# Patient Record
Sex: FEMALE | Race: WHITE | NOT HISPANIC OR LATINO | Employment: OTHER | URBAN - METROPOLITAN AREA
[De-identification: names, ages, dates, MRNs, and addresses within clinical notes are randomized per-mention and may not be internally consistent; named-entity substitution may affect disease eponyms.]

---

## 2017-01-13 ENCOUNTER — APPOINTMENT (OUTPATIENT)
Dept: LAB | Age: 77
End: 2017-01-13
Payer: MEDICARE

## 2017-01-13 ENCOUNTER — TRANSCRIBE ORDERS (OUTPATIENT)
Dept: ADMINISTRATIVE | Age: 77
End: 2017-01-13

## 2017-01-13 DIAGNOSIS — E03.9 HYPOTHYROIDISM, UNSPECIFIED TYPE: Primary | ICD-10-CM

## 2017-01-13 DIAGNOSIS — E03.9 HYPOTHYROIDISM, UNSPECIFIED TYPE: ICD-10-CM

## 2017-01-13 DIAGNOSIS — E78.5 HYPERLIPIDEMIA, UNSPECIFIED HYPERLIPIDEMIA TYPE: ICD-10-CM

## 2017-01-13 LAB
ALBUMIN SERPL BCP-MCNC: 3.5 G/DL (ref 3.5–5)
ALP SERPL-CCNC: 123 U/L (ref 46–116)
ALT SERPL W P-5'-P-CCNC: 22 U/L (ref 12–78)
ANION GAP SERPL CALCULATED.3IONS-SCNC: 10 MMOL/L (ref 4–13)
AST SERPL W P-5'-P-CCNC: 14 U/L (ref 5–45)
BILIRUB SERPL-MCNC: 0.51 MG/DL (ref 0.2–1)
BUN SERPL-MCNC: 17 MG/DL (ref 5–25)
CALCIUM SERPL-MCNC: 8.6 MG/DL (ref 8.3–10.1)
CHLORIDE SERPL-SCNC: 107 MMOL/L (ref 100–108)
CHOLEST SERPL-MCNC: 236 MG/DL (ref 50–200)
CO2 SERPL-SCNC: 24 MMOL/L (ref 21–32)
CREAT SERPL-MCNC: 0.76 MG/DL (ref 0.6–1.3)
GFR SERPL CREATININE-BSD FRML MDRD: >60 ML/MIN/1.73SQ M
GLUCOSE SERPL-MCNC: 97 MG/DL (ref 65–140)
HDLC SERPL-MCNC: 64 MG/DL (ref 40–60)
LDLC SERPL CALC-MCNC: 145 MG/DL (ref 0–100)
POTASSIUM SERPL-SCNC: 4.1 MMOL/L (ref 3.5–5.3)
PROT SERPL-MCNC: 6.9 G/DL (ref 6.4–8.2)
SODIUM SERPL-SCNC: 141 MMOL/L (ref 136–145)
T4 SERPL-MCNC: 7 UG/DL (ref 4.7–13.3)
TRIGL SERPL-MCNC: 135 MG/DL
TSH SERPL DL<=0.05 MIU/L-ACNC: 6.07 UIU/ML (ref 0.36–3.74)

## 2017-01-13 PROCEDURE — 80061 LIPID PANEL: CPT

## 2017-01-13 PROCEDURE — 84436 ASSAY OF TOTAL THYROXINE: CPT

## 2017-01-13 PROCEDURE — 36415 COLL VENOUS BLD VENIPUNCTURE: CPT

## 2017-01-13 PROCEDURE — 80053 COMPREHEN METABOLIC PANEL: CPT

## 2017-01-13 PROCEDURE — 84443 ASSAY THYROID STIM HORMONE: CPT

## 2017-03-28 ENCOUNTER — TRANSCRIBE ORDERS (OUTPATIENT)
Dept: ADMINISTRATIVE | Age: 77
End: 2017-03-28

## 2017-03-28 ENCOUNTER — APPOINTMENT (OUTPATIENT)
Dept: LAB | Age: 77
End: 2017-03-28
Payer: MEDICARE

## 2017-03-28 DIAGNOSIS — E03.9 HYPOTHYROIDISM, UNSPECIFIED TYPE: Primary | ICD-10-CM

## 2017-03-28 DIAGNOSIS — E03.9 HYPOTHYROIDISM, UNSPECIFIED TYPE: ICD-10-CM

## 2017-03-28 LAB
T4 SERPL-MCNC: 6.8 UG/DL (ref 4.7–13.3)
TSH SERPL DL<=0.05 MIU/L-ACNC: 2.33 UIU/ML (ref 0.36–3.74)

## 2017-03-28 PROCEDURE — 84443 ASSAY THYROID STIM HORMONE: CPT

## 2017-03-28 PROCEDURE — 36415 COLL VENOUS BLD VENIPUNCTURE: CPT

## 2017-03-28 PROCEDURE — 84436 ASSAY OF TOTAL THYROXINE: CPT

## 2017-06-28 ENCOUNTER — APPOINTMENT (OUTPATIENT)
Dept: LAB | Age: 77
End: 2017-06-28
Payer: MEDICARE

## 2017-06-28 ENCOUNTER — TRANSCRIBE ORDERS (OUTPATIENT)
Dept: ADMINISTRATIVE | Age: 77
End: 2017-06-28

## 2017-06-28 DIAGNOSIS — E03.9 HYPOTHYROIDISM, UNSPECIFIED TYPE: ICD-10-CM

## 2017-06-28 DIAGNOSIS — E03.9 HYPOTHYROIDISM, UNSPECIFIED TYPE: Primary | ICD-10-CM

## 2017-06-28 LAB
T4 SERPL-MCNC: 8.3 UG/DL (ref 4.7–13.3)
TSH SERPL DL<=0.05 MIU/L-ACNC: 4.7 UIU/ML (ref 0.36–3.74)

## 2017-06-28 PROCEDURE — 36415 COLL VENOUS BLD VENIPUNCTURE: CPT

## 2017-06-28 PROCEDURE — 84436 ASSAY OF TOTAL THYROXINE: CPT

## 2017-06-28 PROCEDURE — 84443 ASSAY THYROID STIM HORMONE: CPT

## 2017-10-03 ENCOUNTER — APPOINTMENT (OUTPATIENT)
Dept: LAB | Age: 77
End: 2017-10-03
Payer: MEDICARE

## 2017-10-03 ENCOUNTER — TRANSCRIBE ORDERS (OUTPATIENT)
Dept: ADMINISTRATIVE | Age: 77
End: 2017-10-03

## 2017-10-03 DIAGNOSIS — E03.9 HYPOTHYROIDISM, UNSPECIFIED TYPE: Primary | ICD-10-CM

## 2017-10-03 DIAGNOSIS — E03.9 HYPOTHYROIDISM, UNSPECIFIED TYPE: ICD-10-CM

## 2017-10-03 LAB
T4 SERPL-MCNC: 8.7 UG/DL (ref 4.7–13.3)
TSH SERPL DL<=0.05 MIU/L-ACNC: 3.05 UIU/ML (ref 0.36–3.74)

## 2017-10-03 PROCEDURE — 84443 ASSAY THYROID STIM HORMONE: CPT

## 2017-10-03 PROCEDURE — 36415 COLL VENOUS BLD VENIPUNCTURE: CPT

## 2017-10-03 PROCEDURE — 84436 ASSAY OF TOTAL THYROXINE: CPT

## 2017-10-24 ENCOUNTER — ALLSCRIPTS OFFICE VISIT (OUTPATIENT)
Dept: OTHER | Facility: OTHER | Age: 77
End: 2017-10-24

## 2017-10-24 DIAGNOSIS — R94.31 ABNORMAL ELECTROCARDIOGRAM: ICD-10-CM

## 2017-10-24 DIAGNOSIS — Z12.31 ENCOUNTER FOR SCREENING MAMMOGRAM FOR MALIGNANT NEOPLASM OF BREAST: ICD-10-CM

## 2017-10-24 DIAGNOSIS — E78.5 HYPERLIPIDEMIA: ICD-10-CM

## 2017-10-25 NOTE — CONSULTS
Assessment  1  Hyperlipidemia (272 4) (E78 5)   2  Exertional dyspnea (786 09) (R06 09)   3  Abnormal EKG (794 31) (R94 31)    Plan  Abnormal EKG    · (1) CBC/ PLT (NO DIFF); Status:Active; Requested QQE:93ALO4538;    Perform:[facility]; YWM:58JYQ4869; Ordered; For:Abnormal EKG; Ordered By:Kvng Esposito;   · (1) COMPREHENSIVE METABOLIC PANEL; Status:Active; Requested MVC:82RZB4062;    Perform:[facility]; XYO:63VXO5448; Ordered; For:Abnormal EKG; Ordered By:Kvng Esposito;   · (1) NT- BNP (PRO BRAIN NATRIURETIC PEPTIDE); Status:Active; Requested  NXR:83EUX0401;    Perform:[facility]; MNO:32WLZ0169; Ordered; For:Abnormal EKG; Ordered By:Kvng Esposito;   · * XR CHEST PA & LATERAL; Status:Active; Requested KPC:01GFN5564;    Perform:[facility]; CBK:55LPC7560; Ordered; For:Abnormal EKG; Ordered By:Kvng Esposito;   · ECHO COMPLETE WITH CONTRAST IF INDICATED; Status:Hold For - Scheduling;  Requested QMR:82HIW5275;    Perform:[facility]; VSV:28FYN8062; Ordered; For:Abnormal EKG; Ordered By:Kvng Esposito;   · STRESS TEST ONLY, EXERCISE; Status:Hold For - Scheduling; Requested  KVH:[id number];    Perform:[facility]; DJV:36RDU2972; Ordered; For:Abnormal EKG; Ordered By:Kvng Esposito;   · Take your blood pressure twice a day  Record the numbers and bring them with you to  your appointments ; Status:Complete;   Done: 49IYC4148   Ordered; For:Abnormal EKG; Ordered By:Kvng Esposito; Abnormal EKG, Hyperlipidemia    · (1) LIPID PANEL FASTING W DIRECT LDL REFLEX; Status:Active; Requested  [id number];    Perform:[facility]; PKW:08NQS8137; Ordered; For:Abnormal EKG, Hyperlipidemia; Ordered By:Kvng Esposito;  Obesity    · EKG/ECG- POC; Status:Complete;   Done: 57ZPQ6848   Perform: In Office; [phone]; Last Updated Sneha Mars; 10/24/2017 3:39:34 PM;Ordered;For:Obesity; Ordered By:Kvng Esposito; Discussion/Summary    Exertional dyspnea, he patient who is active physically  Borderline abnormal EKG  Favor noninvasive evaluation  Further recommendations pending the results of the testing, might consider blood pressure medications  She we'll do ambulatory blood pressures  We'll repeat a lipid profile and LFTs, her alkaline phosphatase was borderline elevated on the last check  She was recently started on thyroid supplementation  Chief Complaint  Pt  here for new patient consult due to elevated bp, some fatigue and SOB  History of Present Illness  Cardiology HPI Free Text Note Form  Luke: Cardiology consultation for evaluation of borderline blood pressure elevation, and exertional dyspnea  Most pleasant 44-year-old white female who is no previous cardiac history, she tells me that she's been experience and mild exertional dyspnea  She does exercise regular, she rested bike daily  There is no associated chest discomfort, the symptoms are very mild she has had several blood pressure readings with systolics adequate control and diastolic consistently in the high 80s  She states being comply with a low sodium diet, she's comply with a cholesterol diet  Lipids several months ago revealed total cholesterol 236, HDL 64,  and triglycerides of 175  She does take fish oil 1 g daily  The patient denies orthopnea or PND  Denies any chest discomfort  She is a lifelong nonsmoker, her  is a patient of mine  Review of Systems      Cardiac: No complaints of chest pain, no palpitations, no fainting ,-- no chest pain,-- no rhythm problems,-- no fainting/blackouts,-- no heart murmur present,-- no signs of swelling,-- no palpitations present,-- no syncope/fainting,-- no AM fatigue-- and-- no witnessed apnea episodes  Skin: No complaints of nonhealing sores or skin rash  ,-- no non healing sores present-- and-- no rashes seen   Genitourinary: post menopausal, but-- No complaints of recurrent urinary tract infections, frequent urination at night, difficult urination, blood in urine, kidney stones, loss of bladder control, kidney problems, denies any birth control or hormone replacement, is not post menopausal, not currently pregnant  ,-- no recurrent urinary tract infections,-- no frequent urination at night,-- no difficult urination,-- no blood in urine,-- no kidney stones,-- no loss of bladder control-- and-- no kidney problems   Psychological: No complaints of feeling depressed, anxiety, panic attacks, or difficulty concentrating ,-- no depression,-- no anxiety,-- no panic attacks,-- no difficulty concentrating-- and-- no palpitations present  General: trouble sleeping-- and-- lack of energy/fatigue, but-- no appetite changes,-- no changes in weight,-- no fever,-- no night sweats-- and-- no frequent infections  Respiratory: shortness of breath,-- cough/sputum-- and-- phlegm, but-- as noted in HPI,-- no wheezing-- and-- no hemoptysis--   Just finished an antibiotic course and a steroid course for an URI  HEENT: serious eye problems, but-- no hearing problems,-- no nose problems,-- no throat problems-- and-- no snoring   Gastrointestinal: heartburn, but-- no liver problems,-- no nausea,-- no vomiting,-- no bloody stools,-- no diarrhea,-- no constipation,-- no abdonimal pain-- and-- no rectal bleeding   Hematologic: No complaints of bleeding disorders, anemia, blood clots, or excessive brusing ,-- no bleeding disorders,-- no anemia,-- no blood clots-- and-- no excessive bruising   Neurological: No complaints of numbness, tingling, dizziness, weakness, seizures, headaches, syncope or fainting, AM fatigue, daytime sleepiness, no witnessed apnea episodes  ,-- no numbness,-- no tingling,-- no weakness,-- no seizures,-- no headaches,-- no dizziness,-- no diplopia-- and-- no daytime sleepiness   Musculoskeletal: No complaints of arthritis, back pain, or painfull swelling ,-- no arthritis,-- no back pain-- and-- no swelling/pain-- No myalgias, no gait dysfunction  Active Problems  1  Arthritis (716 90) (M19 90)   2  Encounter for gynecological examination without abnormal finding (V72 31) (Z01 419)   3  Encounter for routine gynecological examination (V72 31) (Z01 419)   4  Encounter for screening mammogram for malignant neoplasm of breast (V76 12)   (Z12 31)   5  Esophageal reflux (530 81) (K21 9)   6  Hyperlipidemia (272 4) (E78 5)   7  Obesity (278 00) (E66 9)   8  Osteopenia (733 90) (M85 80)    Past Medical History   · 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)   · Normal delivery (650) (O80,Z37  9)    The active problems and past medical history were reviewed and updated today  Surgical History   · History of Total Abdominal Hysterectomy With Removal Of Both Ovaries    The surgical history was reviewed and updated today  Family History  Mother    · Family history of malignant neoplasm of stomach (V16 0) (Z80 0)  Father    · Family history of acute myocardial infarction (V17 3) (Z82 49)  Maternal Uncle    · Family history of Cancer  Family History Reviewed: The family history was reviewed and updated today  Social History   · Exercising Regularly   · Former smoker (L64 42) (J81 673)  The social history was reviewed and updated today  Current Meds   1  Biotin CAPS Recorded   2  Calcium 500 MG TABS; Therapy: 80UDB0139 to Recorded   3  Fish Oil 1000 MG Oral Capsule Recorded   4  Levothyroxine Sodium 50 MCG Oral Tablet; TAKE 1 TABLET DAILY; Therapy: 80JTY4673 to ([phone]) Recorded   5  Multivitamins TABS; Take 1 tablet daily; Therapy: 73VXG2911 to (Evaluate:32Ebn6902); Last Rx:65Cyu4568 Ordered   6  Pantoprazole Sodium 40 MG Oral Tablet Delayed Release; Therapy: 33PHO6661 to (Last OO:83NOS5824)  Requested for: 39CDM1803 Ordered   7  Vitamin D 400 UNIT CAPS; Therapy: 57Emk0716 to (Last Rx:04Fdf2182)  Requested for: 69Jzf6283 Ordered    The medication list was reviewed and updated today  Allergies  1   No Known Drug Allergies    Vitals  Signs   Heart Rate: 78, Apical  Pulse Quality: Regular, Apical  Systolic: 178, RUE, Sitting  Diastolic: 90, RUE, Sitting  BP Cuff Size: Large  Height: 5 ft 3 5 in  Weight: 263 lb   BMI Calculated: 45 86  BSA Calculated: 2 18    Physical Exam    Constitutional   General appearance: Abnormal   appears healthy,-- obese,-- well hydrated-- and-- appears younger than stated age  Eyes   Conjunctiva and Sclera examination: Conjunctiva pink, sclera anicteric  both conjunctiva were norml and pink  Ears, Nose, Mouth, and Throat - Oropharynx: Clear, nares are clear, mucous membranes are moist  Inspection of the oropharynx showed visible hard and soft palate and base of the uvula (Mallampati class 3)  Oral mucosa was not pale  Neck   Neck and thyroid: Normal, supple, trachea midline, no thyromegaly  The neck was normal in appearance-- and-- was supple  the trachea was midline  Jugular Veins: the JVP was not elevated-- and-- no sustained hepatojugular reflux  The thyroid was normal-- and-- was not enlarged  There were no thyroid nodules  Pulmonary   Respiratory effort: No increased work of breathing or signs of respiratory distress  Respiratory rate: normal  Assessment of respiratory effort revealed normal rhythm and effort,-- no accessory muscle use-- and-- no supraclavicular retractions  Respiratory Findings: no audible wheezing-- and-- no stridor  Evaluation of respiratory movements showed no abdominal breathing  Auscultation of lungs: Clear to auscultation, no rales, no rhonchi, no wheezing, good air movement  Auscultation of the lungs revealed no expiratory wheezing,-- normal expiratory time-- and-- no inspiratory wheezing  no rales or crackles were heard bilaterally  no rhonchi  no friction rub  no wheezing  no diminished breath sounds  no bronchial breath sounds  Cardiovascular   Palpation of heart: Abnormal   The apical impulse was not palpable     Auscultation of heart: Normal rate and rhythm, normal S1 and S2, no murmurs  The heart rate was normal  The rhythm was regular  Heart sounds: the heart sounds were distant, but-- normal S1,-- normal S2,-- no gallop heard,-- no S3,-- no S4-- and-- no click  No pericardial rub  no murmurs were heard  Carotid pulses: Normal, 2+ bilaterally  right 2+,-- no bruit heard over the right carotid,-- left 2+-- and-- no bruit heard over the left carotid  Peripheral vascular exam: Normal pulses throughout, no tenderness, erythema or swelling  Radial: right 2+,-- left 2+  Femoral: right 2+,-- no bruit heard over the right femoral artery,-- left 2+,-- no bruit heard over the left femoral artery  Posterior tibialis: right 2+,-- left 2+  Dorsalis pedis: right 2+,-- left 2+ Capillary refill: capillary refill of the fingers was normal  no pitting edema present  no varicosital changes  Abdominal aorta: Normal     Chest - Chest: Normal    Abdomen   Abdomen: Non-tender and no distention  The abdomen was obese  Bowel sounds were normal  The abdomen was soft and nontender  no masses palpated  Musculoskeletal Digits and nails: Normal without clubbing or cyanosis  Examination of the extremities revealed no fingernail clubbing-- and-- well perfused fingers  Skin - Skin and subcutaneous tissue: Normal without rashes or lesions  Skin is warm and well perfused, normal turgor  Skin Inspection: fair complexion, but-- normal color and pigmentation,-- no cyanosis-- and-- no diaphoresis  Neurologic - Speech: Normal     Psychiatric - Orientation to person, place, and time: Normal -- Mood and affect: Normal       Results/Data    Rhythm and rate:  normal sinus rhythm  Axis: left axis deviation  QRS: left ventricular hypertrophy      Future Appointments    Date/Time Provider Specialty Site   11/02/2017 09:20 AM TOMMY Soliz  Obstetrics/Gynecology [facility]     End of Encounter Meds  1  Multivitamins TABS; Take 1 tablet daily;    Therapy: 46Nma0861 to (Evaluate:02Wxi3138); Last Rx:[id number] Ordered  2  Biotin CAPS Recorded   3  Calcium 500 MG TABS; Therapy: 30APO0197 to Recorded   4  Fish Oil 1000 MG Oral Capsule Recorded   5  Levothyroxine Sodium 50 MCG Oral Tablet; TAKE 1 TABLET DAILY; Therapy: 40TZY3750 to ([phone]) Recorded   6  Pantoprazole Sodium 40 MG Oral Tablet Delayed Release; Therapy: 20TQL0284 to (Last PT:86GEA2717)  Requested for: 97ZZV8588 Ordered   7  Vitamin D 400 UNIT CAPS;    Therapy: 95Lwj5682 to (Last Rx:19Shs8830)  Requested for: 62Kmi6732 Ordered    Signatures   Electronically signed by : TOMMY Linda ; Oct 24 2017  4:01PM EST                       (Author)

## 2017-10-31 ENCOUNTER — APPOINTMENT (OUTPATIENT)
Dept: RADIOLOGY | Age: 77
End: 2017-10-31
Payer: MEDICARE

## 2017-10-31 ENCOUNTER — TRANSCRIBE ORDERS (OUTPATIENT)
Dept: ADMINISTRATIVE | Age: 77
End: 2017-10-31

## 2017-10-31 ENCOUNTER — APPOINTMENT (OUTPATIENT)
Dept: LAB | Age: 77
End: 2017-10-31
Payer: MEDICARE

## 2017-10-31 DIAGNOSIS — R94.31 ABNORMAL ELECTROCARDIOGRAM: ICD-10-CM

## 2017-10-31 DIAGNOSIS — E78.5 HYPERLIPIDEMIA: ICD-10-CM

## 2017-10-31 LAB
ALBUMIN SERPL BCP-MCNC: 3.3 G/DL (ref 3.5–5)
ALP SERPL-CCNC: 114 U/L (ref 46–116)
ALT SERPL W P-5'-P-CCNC: 23 U/L (ref 12–78)
ANION GAP SERPL CALCULATED.3IONS-SCNC: 8 MMOL/L (ref 4–13)
AST SERPL W P-5'-P-CCNC: 14 U/L (ref 5–45)
BILIRUB SERPL-MCNC: 0.52 MG/DL (ref 0.2–1)
BUN SERPL-MCNC: 13 MG/DL (ref 5–25)
CALCIUM SERPL-MCNC: 8.8 MG/DL (ref 8.3–10.1)
CHLORIDE SERPL-SCNC: 98 MMOL/L (ref 100–108)
CHOLEST SERPL-MCNC: 216 MG/DL (ref 50–200)
CO2 SERPL-SCNC: 26 MMOL/L (ref 21–32)
CREAT SERPL-MCNC: 0.81 MG/DL (ref 0.6–1.3)
ERYTHROCYTE [DISTWIDTH] IN BLOOD BY AUTOMATED COUNT: 16.1 % (ref 11.6–15.1)
GFR SERPL CREATININE-BSD FRML MDRD: 70 ML/MIN/1.73SQ M
GLUCOSE P FAST SERPL-MCNC: 91 MG/DL (ref 65–99)
HCT VFR BLD AUTO: 39.7 % (ref 34.8–46.1)
HDLC SERPL-MCNC: 72 MG/DL (ref 40–60)
HGB BLD-MCNC: 13.1 G/DL (ref 11.5–15.4)
LDLC SERPL CALC-MCNC: 123 MG/DL (ref 0–100)
MCH RBC QN AUTO: 27.2 PG (ref 26.8–34.3)
MCHC RBC AUTO-ENTMCNC: 33 G/DL (ref 31.4–37.4)
MCV RBC AUTO: 83 FL (ref 82–98)
NT-PROBNP SERPL-MCNC: 102 PG/ML
PLATELET # BLD AUTO: 324 THOUSANDS/UL (ref 149–390)
PMV BLD AUTO: 9.9 FL (ref 8.9–12.7)
POTASSIUM SERPL-SCNC: 3.9 MMOL/L (ref 3.5–5.3)
PROT SERPL-MCNC: 7 G/DL (ref 6.4–8.2)
RBC # BLD AUTO: 4.81 MILLION/UL (ref 3.81–5.12)
SODIUM SERPL-SCNC: 132 MMOL/L (ref 136–145)
TRIGL SERPL-MCNC: 104 MG/DL
WBC # BLD AUTO: 8.6 THOUSAND/UL (ref 4.31–10.16)

## 2017-10-31 PROCEDURE — 36415 COLL VENOUS BLD VENIPUNCTURE: CPT

## 2017-10-31 PROCEDURE — 85027 COMPLETE CBC AUTOMATED: CPT

## 2017-10-31 PROCEDURE — 80053 COMPREHEN METABOLIC PANEL: CPT

## 2017-10-31 PROCEDURE — 83880 ASSAY OF NATRIURETIC PEPTIDE: CPT

## 2017-10-31 PROCEDURE — 80061 LIPID PANEL: CPT

## 2017-10-31 PROCEDURE — 71020 HB CHEST X-RAY 2VW FRONTAL&LATL: CPT

## 2017-11-02 ENCOUNTER — GENERIC CONVERSION - ENCOUNTER (OUTPATIENT)
Dept: OTHER | Facility: OTHER | Age: 77
End: 2017-11-02

## 2017-11-08 ENCOUNTER — HOSPITAL ENCOUNTER (OUTPATIENT)
Dept: NON INVASIVE DIAGNOSTICS | Facility: CLINIC | Age: 77
Discharge: HOME | End: 2017-11-08
Payer: MEDICARE

## 2017-11-08 DIAGNOSIS — R94.31 ABNORMAL ELECTROCARDIOGRAM: ICD-10-CM

## 2017-11-08 LAB
CHEST PAIN STATEMENT: NORMAL
MAX DIASTOLIC BP: 88 MMHG
MAX HEART RATE: 131 BPM
MAX PREDICTED HEART RATE: 142 BPM
MAX. SYSTOLIC BP: 160 MMHG
PROTOCOL NAME: NORMAL
TARGET HR FORMULA: NORMAL
TEST INDICATION: NORMAL
TIME IN EXERCISE PHASE: 187 S

## 2017-11-08 PROCEDURE — 93017 CV STRESS TEST TRACING ONLY: CPT

## 2017-11-08 PROCEDURE — 93306 TTE W/DOPPLER COMPLETE: CPT

## 2017-11-21 ENCOUNTER — HOSPITAL ENCOUNTER (OUTPATIENT)
Dept: RADIOLOGY | Age: 77
Discharge: HOME | End: 2017-11-21
Payer: MEDICARE

## 2017-11-21 DIAGNOSIS — Z12.31 ENCOUNTER FOR SCREENING MAMMOGRAM FOR MALIGNANT NEOPLASM OF BREAST: ICD-10-CM

## 2017-11-21 PROCEDURE — G0202 SCR MAMMO BI INCL CAD: HCPCS

## 2018-01-14 VITALS
SYSTOLIC BLOOD PRESSURE: 128 MMHG | DIASTOLIC BLOOD PRESSURE: 90 MMHG | BODY MASS INDEX: 44.9 KG/M2 | HEIGHT: 64 IN | WEIGHT: 263 LBS | HEART RATE: 78 BPM

## 2018-01-22 VITALS
DIASTOLIC BLOOD PRESSURE: 94 MMHG | WEIGHT: 263 LBS | HEIGHT: 64 IN | SYSTOLIC BLOOD PRESSURE: 136 MMHG | BODY MASS INDEX: 44.9 KG/M2

## 2018-06-05 ENCOUNTER — APPOINTMENT (OUTPATIENT)
Dept: LAB | Age: 78
End: 2018-06-05
Payer: MEDICARE

## 2018-06-05 ENCOUNTER — TRANSCRIBE ORDERS (OUTPATIENT)
Dept: ADMINISTRATIVE | Age: 78
End: 2018-06-05

## 2018-06-05 DIAGNOSIS — E03.9 HYPOTHYROIDISM, UNSPECIFIED TYPE: ICD-10-CM

## 2018-06-05 DIAGNOSIS — E03.9 HYPOTHYROIDISM, UNSPECIFIED TYPE: Primary | ICD-10-CM

## 2018-06-05 LAB
T4 SERPL-MCNC: 7.4 UG/DL (ref 4.7–13.3)
TSH SERPL DL<=0.05 MIU/L-ACNC: 2.67 UIU/ML (ref 0.36–3.74)

## 2018-06-05 PROCEDURE — 36415 COLL VENOUS BLD VENIPUNCTURE: CPT

## 2018-06-05 PROCEDURE — 84436 ASSAY OF TOTAL THYROXINE: CPT

## 2018-06-05 PROCEDURE — 84443 ASSAY THYROID STIM HORMONE: CPT

## 2018-07-19 ENCOUNTER — LAB REQUISITION (OUTPATIENT)
Dept: LAB | Facility: HOSPITAL | Age: 78
End: 2018-07-19
Payer: MEDICARE

## 2018-07-19 DIAGNOSIS — K57.30 DIVERTICULOSIS OF LARGE INTESTINE WITHOUT PERFORATION OR ABSCESS WITHOUT BLEEDING: ICD-10-CM

## 2018-07-19 DIAGNOSIS — D12.0 BENIGN NEOPLASM OF CECUM: ICD-10-CM

## 2018-07-19 DIAGNOSIS — Z86.010 HISTORY OF COLONIC POLYPS: ICD-10-CM

## 2018-07-19 PROCEDURE — 88305 TISSUE EXAM BY PATHOLOGIST: CPT | Performed by: PATHOLOGY

## 2018-09-30 DIAGNOSIS — I10 ESSENTIAL HYPERTENSION: Primary | ICD-10-CM

## 2018-10-01 RX ORDER — AMLODIPINE BESYLATE 5 MG/1
TABLET ORAL
Qty: 90 TABLET | Refills: 0 | Status: SHIPPED | OUTPATIENT
Start: 2018-10-01 | End: 2018-10-23 | Stop reason: SDUPTHER

## 2018-10-23 ENCOUNTER — OFFICE VISIT (OUTPATIENT)
Dept: CARDIOLOGY CLINIC | Facility: CLINIC | Age: 78
End: 2018-10-23
Payer: MEDICARE

## 2018-10-23 VITALS
WEIGHT: 267 LBS | HEART RATE: 72 BPM | SYSTOLIC BLOOD PRESSURE: 122 MMHG | DIASTOLIC BLOOD PRESSURE: 82 MMHG | BODY MASS INDEX: 45.58 KG/M2 | HEIGHT: 64 IN

## 2018-10-23 DIAGNOSIS — I10 HYPERTENSION, ESSENTIAL, BENIGN: Primary | ICD-10-CM

## 2018-10-23 DIAGNOSIS — I77.810 ASCENDING AORTA DILATATION (HCC): ICD-10-CM

## 2018-10-23 DIAGNOSIS — E78.00 HYPERCHOLESTEROLEMIA: ICD-10-CM

## 2018-10-23 DIAGNOSIS — I10 ESSENTIAL HYPERTENSION: ICD-10-CM

## 2018-10-23 PROCEDURE — 93000 ELECTROCARDIOGRAM COMPLETE: CPT | Performed by: INTERNAL MEDICINE

## 2018-10-23 PROCEDURE — 99213 OFFICE O/P EST LOW 20 MIN: CPT | Performed by: INTERNAL MEDICINE

## 2018-10-23 RX ORDER — AMOXICILLIN 500 MG/1
CAPSULE ORAL
Refills: 0 | COMMUNITY
Start: 2018-08-28 | End: 2021-03-06 | Stop reason: HOSPADM

## 2018-10-23 RX ORDER — NICOTINE POLACRILEX 2 MG
GUM BUCCAL
COMMUNITY
End: 2022-07-06 | Stop reason: ALTCHOICE

## 2018-10-23 RX ORDER — AMLODIPINE BESYLATE 5 MG/1
5 TABLET ORAL DAILY
Qty: 90 TABLET | Refills: 4 | Status: SHIPPED | OUTPATIENT
Start: 2018-10-23 | End: 2018-11-06 | Stop reason: SDUPTHER

## 2018-10-23 RX ORDER — AMLODIPINE BESYLATE 5 MG/1
5 TABLET ORAL DAILY
Qty: 90 TABLET | Refills: 4 | Status: SHIPPED | OUTPATIENT
Start: 2018-10-23 | End: 2020-12-28 | Stop reason: SDUPTHER

## 2018-10-23 RX ORDER — IBUPROFEN 800 MG
TABLET ORAL
COMMUNITY
Start: 2011-07-21

## 2018-10-23 RX ORDER — CALCIUM CARBONATE 500(1250)
TABLET ORAL
COMMUNITY
Start: 2013-08-15 | End: 2021-10-13

## 2018-10-23 RX ORDER — LEVOTHYROXINE SODIUM 0.05 MG/1
0.5 TABLET ORAL DAILY
COMMUNITY
Start: 2017-10-24

## 2018-10-23 RX ORDER — PANTOPRAZOLE SODIUM 40 MG/1
TABLET, DELAYED RELEASE ORAL
COMMUNITY
Start: 2011-05-02

## 2018-10-23 RX ORDER — CHLORAL HYDRATE 500 MG
CAPSULE ORAL
COMMUNITY
End: 2021-10-13

## 2018-10-23 RX ORDER — AMLODIPINE BESYLATE 5 MG/1
1 TABLET ORAL DAILY
COMMUNITY
Start: 2017-11-24 | End: 2018-10-23 | Stop reason: SDUPTHER

## 2018-10-23 NOTE — PROGRESS NOTES
Cardiology Follow Up    Sharifa Nesbitt  1940  [id number]  [address] [facility] [place] [address]  [phone]    1  Hypertension, essential, benign  POCT ECG   2  Hypercholesterolemia  Lipid Panel with Direct LDL reflex   3  Ascending aorta dilatation (HCC)         Interval History:   Cardiology follow-up  Patient continues to do well, denies any chest pain or significant dyspnea  She exercises daily on a stationary bike, no exertional symptoms  She has been compliant low-sodium diet, blood pressures been well control, occasionally on the low side actually  Compliant low-cholesterol diet, LDL was elevated last year at 145    Patient Active Problem List   Diagnosis    Hypertension, essential, benign    Hypercholesterolemia    Ascending aorta dilatation (HCC)     Past Medical History:   Diagnosis Date    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[facility] [address] )     1992     Social History     Social History    Marital status: /Civil Union     Spouse name: N/A    Number of children: N/A    Years of education: N/A     Occupational History    Not on file       Social History Main Topics    Smoking status: Former Smoker    Smokeless tobacco: Never Used    Alcohol use Not on file    Drug use: Unknown    Sexual activity: Not on file     Other Topics Concern    Not on file     Social History Narrative    Exercising regularly      Family History   Problem Relation Age of Onset    Stomach cancer Mother     Heart attack Father     Cancer Maternal Uncle      Past Surgical History:   Procedure Laterality Date    TOTAL ABDOMINAL HYSTERECTOMY W/ BILATERAL SALPINGOOPHORECTOMY  1992    endometrial cancer       Current Outpatient Prescriptions:     amLODIPine (NORVASC) 5 mg tablet, TAKE 1 TABLET BY MOUTH DAILY, Disp: 90 tablet, Rfl: 0    amLODIPine (NORVASC) 5 mg tablet, Take 1 tablet by mouth daily, Disp: , Rfl:     amoxicillin (AMOXIL) 500 mg capsule, TK 4 CS PO 1 HOUR B DAPP, Disp: , Rfl: 0    Biotin 1 MG CAPS, Take by mouth, Disp: , Rfl:     Calcium 500 MG tablet, Take by mouth, Disp: , Rfl:     Cholecalciferol (VITAMIN D3) 400 units CAPS, Take by mouth, Disp: , Rfl:     levothyroxine 50 mcg tablet, Take 1 tablet by mouth daily, Disp: , Rfl:     Multiple Vitamin (MULTIVITAMINS PO), Take 1 tablet by mouth daily, Disp: , Rfl:     Omega-3 Fatty Acids (FISH OIL) 1,000 mg, Take by mouth, Disp: , Rfl:     pantoprazole (PROTONIX) 40 mg tablet, Take by mouth, Disp: , Rfl:   Allergies not on file    Labs:  Lab Requisition on 07/19/2018   Component Date Value    Case Report 07/19/2018                      Value:Surgical Pathology Report                         Case: [id number]                                   Authorizing Provider:  Kathy Reddy MD           Collected:           07/19/2018 0848              Pathologist:           Destin Vickers MD             Received:            07/19/2018 1701              Specimen:    Polyp, Colorectal, CECUM                                                                   Final Diagnosis 07/19/2018                      Value: This result contains rich text formatting which cannot be displayed here   Additional Information 07/19/2018                      Value: This result contains rich text formatting which cannot be displayed here  Vicbrennan Madison Gross Description 07/19/2018                      Value: This result contains rich text formatting which cannot be displayed here  Appointment on 06/05/2018   Component Date Value    TSH 3RD GENERATON 06/05/2018 2 670     T4 TOTAL 06/05/2018 7 4      Imaging: No results found  Review of Systems:  Review of Systems   Constitutional: Negative for activity change and fatigue  Eyes: Negative for visual disturbance  Respiratory: Negative for shortness of breath, wheezing and stridor      Cardiovascular: Negative for chest pain, palpitations and leg swelling  Gastrointestinal: Negative for blood in stool  Genitourinary: Negative for hematuria  Musculoskeletal: Positive for arthralgias and gait problem  Skin: Negative for pallor and rash  Neurological: Negative for syncope  Physical Exam:  Physical Exam   Constitutional: She is oriented to person, place, and time  No distress (Morbidly obese)  Neck: No JVD present  Cardiovascular: Normal rate, regular rhythm, normal heart sounds and intact distal pulses  Exam reveals no gallop and no friction rub  No murmur heard  Pulmonary/Chest: Effort normal and breath sounds normal  No respiratory distress  She has no wheezes  She has no rales  She exhibits no tenderness  Neurological: She is alert and oriented to person, place, and time  Skin: Skin is warm  She is not diaphoretic  Psychiatric: She has a normal mood and affect  Discussion/Summary:  Hypertension, currently well controlled current medication regimen  Stress test last year she only did 3 min of [place] protocol achieving target heart rate, there were no EKG changes consistent with ischemia  An echocardiogram revealed normal left systolic function with very mild mitral and aortic insufficiency minimal tricuspid insufficiency, estimated pulmonary pressures could not be determined  Mildly ectatic ascending aorta at 39 mm  Will need follow-up imaging on her next visit  Continue blood pressure control  EKG is unchanged with borderline left ventricular hypertrophy

## 2018-11-06 ENCOUNTER — ANNUAL EXAM (OUTPATIENT)
Dept: OBGYN CLINIC | Facility: CLINIC | Age: 78
End: 2018-11-06
Payer: MEDICARE

## 2018-11-06 VITALS
DIASTOLIC BLOOD PRESSURE: 84 MMHG | BODY MASS INDEX: 46.6 KG/M2 | WEIGHT: 263 LBS | HEIGHT: 63 IN | SYSTOLIC BLOOD PRESSURE: 134 MMHG

## 2018-11-06 DIAGNOSIS — Z01.419 ENCOUNTER FOR GYNECOLOGICAL EXAMINATION WITHOUT ABNORMAL FINDING: Primary | ICD-10-CM

## 2018-11-06 DIAGNOSIS — Z12.31 ENCOUNTER FOR SCREENING MAMMOGRAM FOR MALIGNANT NEOPLASM OF BREAST: ICD-10-CM

## 2018-11-06 DIAGNOSIS — M85.80 OSTEOPENIA, UNSPECIFIED LOCATION: ICD-10-CM

## 2018-11-06 PROCEDURE — G0101 CA SCREEN;PELVIC/BREAST EXAM: HCPCS | Performed by: OBSTETRICS & GYNECOLOGY

## 2018-11-06 NOTE — PROGRESS NOTES
Assessment/Plan:    Paps not indicated  Encounter for screening mammogram for malignant neoplasm of breast  -     Mammo screening bilateral w cad; Future    Osteopenia, unspecified location  -     DXA bone density spine hip and pelvis; Future        Subjective:      Patient ID: Asha Ochoa is a 77 y o  female  Yearly  Grav-2 Para-2   x 2  Postmenopausal  JOANNE-BSO- uterine cancer 20+ years ago  Pap- uncertain timing  Mammogram 2017-negative  Colonoscopy 2018 +polyp- 5 year intervals  Dr Mike Gamez  Dexa scan 10/29/2013- osteopenia      50+ years, not sexually active  Has a son (no kids) and daughter (three kids - all in [place], [place])  History endometrial CA - many years ago (25+)  She reports occasional leaking of urine, wears a pantyliner daily just in case  Gynecologic Exam   The patient's pertinent negatives include no pelvic pain or vaginal discharge  Pertinent negatives include no constipation, diarrhea or urgency  The following portions of the patient's history were reviewed and updated as appropriate: allergies, current medications, past family history, past medical history, past social history, past surgical history and problem list     Review of Systems   Gastrointestinal: Negative for constipation and diarrhea  Genitourinary: Negative for decreased urine volume, difficulty urinating, pelvic pain, urgency, vaginal bleeding, vaginal discharge and vaginal pain  Objective:      /84 (BP Location: Left arm, Patient Position: Sitting, Cuff Size: Large)   Ht 5' 2 5" (1 588 m)   Wt 119 kg (263 lb)   BMI 47 34 kg/m²          Physical Exam   Constitutional: She is oriented to person, place, and time  She appears well-developed and well-nourished  Morbid obesity     Pulmonary/Chest: Right breast exhibits no inverted nipple, no mass, no nipple discharge, no skin change and no tenderness   Left breast exhibits no inverted nipple, no mass, no nipple discharge, no skin change and no tenderness  Abdominal: Soft  There is no tenderness  Genitourinary: Vagina normal  There is no rash or lesion on the right labia  There is no rash or lesion on the left labia  Genitourinary Comments: Cervix: Surgically Absent  Uterus: Surgically Absent  Adnexa: Surgically Absent    Neurological: She is alert and oriented to person, place, and time  Psychiatric: She has a normal mood and affect  Vitals reviewed

## 2018-11-21 ENCOUNTER — APPOINTMENT (OUTPATIENT)
Dept: LAB | Age: 78
End: 2018-11-21
Payer: MEDICARE

## 2018-11-21 ENCOUNTER — TRANSCRIBE ORDERS (OUTPATIENT)
Dept: ADMINISTRATIVE | Age: 78
End: 2018-11-21

## 2018-11-21 ENCOUNTER — TELEPHONE (OUTPATIENT)
Dept: CARDIOLOGY CLINIC | Facility: CLINIC | Age: 78
End: 2018-11-21

## 2018-11-21 DIAGNOSIS — E78.5 HYPERLIPIDEMIA, UNSPECIFIED HYPERLIPIDEMIA TYPE: Primary | ICD-10-CM

## 2018-11-21 DIAGNOSIS — E78.00 HYPERCHOLESTEROLEMIA: ICD-10-CM

## 2018-11-21 LAB
CHOLEST SERPL-MCNC: 239 MG/DL (ref 50–200)
HDLC SERPL-MCNC: 66 MG/DL (ref 40–60)
LDLC SERPL CALC-MCNC: 147 MG/DL (ref 0–100)
TRIGL SERPL-MCNC: 130 MG/DL

## 2018-11-21 PROCEDURE — 36415 COLL VENOUS BLD VENIPUNCTURE: CPT

## 2018-11-21 PROCEDURE — 80061 LIPID PANEL: CPT

## 2018-11-21 NOTE — TELEPHONE ENCOUNTER
----- Message from Gisela Oshea MD sent at 11/21/2018 12:30 PM EST -----  Please call the patient regarding her abnormal result  ?diet

## 2018-11-21 NOTE — TELEPHONE ENCOUNTER
S/w Barb Slaughter, rides 6 5 miles daily on stationary bike  Eats a low fat, low cholesterol diet  Does consume red meats and eats 1 piece of naturally cured musa daily  Will start statin if that is your recommendation

## 2018-11-23 RX ORDER — ATORVASTATIN CALCIUM 10 MG/1
10 TABLET, FILM COATED ORAL
Qty: 90 TABLET | Refills: 3 | Status: SHIPPED | OUTPATIENT
Start: 2018-11-23 | End: 2019-10-28 | Stop reason: SDUPTHER

## 2018-12-19 ENCOUNTER — HOSPITAL ENCOUNTER (OUTPATIENT)
Dept: RADIOLOGY | Age: 78
Discharge: HOME | End: 2018-12-19
Payer: MEDICARE

## 2018-12-19 VITALS — WEIGHT: 263 LBS | BODY MASS INDEX: 46.6 KG/M2 | HEIGHT: 63 IN

## 2018-12-19 DIAGNOSIS — Z12.31 ENCOUNTER FOR SCREENING MAMMOGRAM FOR MALIGNANT NEOPLASM OF BREAST: ICD-10-CM

## 2018-12-19 DIAGNOSIS — M85.80 OSTEOPENIA, UNSPECIFIED LOCATION: ICD-10-CM

## 2018-12-19 PROCEDURE — 77067 SCR MAMMO BI INCL CAD: CPT

## 2018-12-19 PROCEDURE — 77080 DXA BONE DENSITY AXIAL: CPT

## 2018-12-28 ENCOUNTER — TELEPHONE (OUTPATIENT)
Dept: OBGYN CLINIC | Facility: CLINIC | Age: 78
End: 2018-12-28

## 2018-12-28 NOTE — TELEPHONE ENCOUNTER
----- Message from Jil Randle PA-C sent at 12/28/2018  9:32 AM EST -----  This is CT's patient  Please let her know that her bone density test shows mild thinning in only one location - other 2 sites are normal  Would recommend calcium 1500mg daily, vitamin D 800 IU daily, and daily weight bearing exercise  Will consider repeat testing in 2-3 years

## 2019-01-17 ENCOUNTER — APPOINTMENT (OUTPATIENT)
Dept: LAB | Age: 79
End: 2019-01-17
Payer: MEDICARE

## 2019-01-17 DIAGNOSIS — E78.5 HYPERLIPIDEMIA, UNSPECIFIED HYPERLIPIDEMIA TYPE: ICD-10-CM

## 2019-01-17 LAB
ALBUMIN SERPL BCP-MCNC: 3.8 G/DL (ref 3.5–5)
ALP SERPL-CCNC: 125 U/L (ref 46–116)
ALT SERPL W P-5'-P-CCNC: 28 U/L (ref 12–78)
AST SERPL W P-5'-P-CCNC: 20 U/L (ref 5–45)
BILIRUB DIRECT SERPL-MCNC: 0.15 MG/DL (ref 0–0.2)
BILIRUB SERPL-MCNC: 0.45 MG/DL (ref 0.2–1)
CHOLEST SERPL-MCNC: 164 MG/DL (ref 50–200)
HDLC SERPL-MCNC: 75 MG/DL (ref 40–60)
LDLC SERPL CALC-MCNC: 75 MG/DL (ref 0–100)
PROT SERPL-MCNC: 7.3 G/DL (ref 6.4–8.2)
TRIGL SERPL-MCNC: 72 MG/DL

## 2019-01-17 PROCEDURE — 80061 LIPID PANEL: CPT

## 2019-01-17 PROCEDURE — 36415 COLL VENOUS BLD VENIPUNCTURE: CPT

## 2019-01-17 PROCEDURE — 80076 HEPATIC FUNCTION PANEL: CPT

## 2019-05-09 ENCOUNTER — TRANSCRIBE ORDERS (OUTPATIENT)
Dept: ADMINISTRATIVE | Age: 79
End: 2019-05-09

## 2019-05-09 ENCOUNTER — APPOINTMENT (OUTPATIENT)
Dept: LAB | Age: 79
End: 2019-05-09
Payer: MEDICARE

## 2019-05-09 DIAGNOSIS — E03.9 HYPOTHYROIDISM, UNSPECIFIED TYPE: Primary | ICD-10-CM

## 2019-05-09 LAB
T4 SERPL-MCNC: 7.4 UG/DL (ref 4.7–13.3)
TSH SERPL DL<=0.05 MIU/L-ACNC: 2.81 UIU/ML (ref 0.36–3.74)

## 2019-05-09 PROCEDURE — 84443 ASSAY THYROID STIM HORMONE: CPT

## 2019-05-09 PROCEDURE — 84436 ASSAY OF TOTAL THYROXINE: CPT

## 2019-05-09 PROCEDURE — 36415 COLL VENOUS BLD VENIPUNCTURE: CPT

## 2019-10-28 DIAGNOSIS — E78.5 HYPERLIPIDEMIA, UNSPECIFIED HYPERLIPIDEMIA TYPE: ICD-10-CM

## 2019-10-28 RX ORDER — ATORVASTATIN CALCIUM 10 MG/1
10 TABLET, FILM COATED ORAL
Qty: 90 TABLET | Refills: 3 | Status: SHIPPED | OUTPATIENT
Start: 2019-10-28 | End: 2020-07-13

## 2019-10-29 ENCOUNTER — TRANSCRIBE ORDERS (OUTPATIENT)
Dept: ADMINISTRATIVE | Facility: HOSPITAL | Age: 79
End: 2019-10-29

## 2019-10-29 DIAGNOSIS — Z12.31 SCREENING MAMMOGRAM, ENCOUNTER FOR: Primary | ICD-10-CM

## 2019-11-13 DIAGNOSIS — I10 ESSENTIAL HYPERTENSION: ICD-10-CM

## 2019-11-18 RX ORDER — AMLODIPINE BESYLATE 5 MG/1
TABLET ORAL
Qty: 90 TABLET | Refills: 0 | Status: SHIPPED | OUTPATIENT
Start: 2019-11-18 | End: 2020-02-06

## 2019-12-24 ENCOUNTER — HOSPITAL ENCOUNTER (OUTPATIENT)
Dept: RADIOLOGY | Age: 79
Discharge: HOME | End: 2019-12-24
Payer: MEDICARE

## 2019-12-24 VITALS — HEIGHT: 63 IN | BODY MASS INDEX: 46.07 KG/M2 | WEIGHT: 260 LBS

## 2019-12-24 DIAGNOSIS — Z12.31 SCREENING MAMMOGRAM, ENCOUNTER FOR: ICD-10-CM

## 2019-12-24 PROCEDURE — 77067 SCR MAMMO BI INCL CAD: CPT

## 2020-02-06 DIAGNOSIS — I10 ESSENTIAL HYPERTENSION: ICD-10-CM

## 2020-02-06 RX ORDER — AMLODIPINE BESYLATE 5 MG/1
TABLET ORAL
Qty: 90 TABLET | Refills: 0 | Status: SHIPPED | OUTPATIENT
Start: 2020-02-06 | End: 2020-05-04

## 2020-05-03 DIAGNOSIS — I10 ESSENTIAL HYPERTENSION: ICD-10-CM

## 2020-05-04 RX ORDER — AMLODIPINE BESYLATE 5 MG/1
TABLET ORAL
Qty: 90 TABLET | Refills: 0 | Status: SHIPPED | OUTPATIENT
Start: 2020-05-04 | End: 2020-07-22

## 2020-05-15 ENCOUNTER — TRANSCRIBE ORDERS (OUTPATIENT)
Dept: ADMINISTRATIVE | Age: 80
End: 2020-05-15

## 2020-05-15 ENCOUNTER — APPOINTMENT (OUTPATIENT)
Dept: LAB | Age: 80
End: 2020-05-15
Payer: MEDICARE

## 2020-05-15 DIAGNOSIS — E03.9 ACQUIRED HYPOTHYROIDISM: ICD-10-CM

## 2020-05-15 DIAGNOSIS — I10 ESSENTIAL HYPERTENSION, MALIGNANT: ICD-10-CM

## 2020-05-15 DIAGNOSIS — I10 ESSENTIAL HYPERTENSION, MALIGNANT: Primary | ICD-10-CM

## 2020-05-15 LAB
ALBUMIN SERPL BCP-MCNC: 3.7 G/DL (ref 3.5–5)
ALP SERPL-CCNC: 107 U/L (ref 46–116)
ALT SERPL W P-5'-P-CCNC: 29 U/L (ref 12–78)
ANION GAP SERPL CALCULATED.3IONS-SCNC: 5 MMOL/L (ref 4–13)
AST SERPL W P-5'-P-CCNC: 20 U/L (ref 5–45)
BILIRUB SERPL-MCNC: 0.62 MG/DL (ref 0.2–1)
BUN SERPL-MCNC: 15 MG/DL (ref 5–25)
CALCIUM SERPL-MCNC: 8.8 MG/DL (ref 8.3–10.1)
CHLORIDE SERPL-SCNC: 101 MMOL/L (ref 100–108)
CHOLEST SERPL-MCNC: 186 MG/DL (ref 50–200)
CO2 SERPL-SCNC: 30 MMOL/L (ref 21–32)
CREAT SERPL-MCNC: 0.72 MG/DL (ref 0.6–1.3)
GFR SERPL CREATININE-BSD FRML MDRD: 79 ML/MIN/1.73SQ M
GLUCOSE P FAST SERPL-MCNC: 108 MG/DL (ref 65–99)
HDLC SERPL-MCNC: 70 MG/DL
LDLC SERPL CALC-MCNC: 95 MG/DL (ref 0–100)
NONHDLC SERPL-MCNC: 116 MG/DL
POTASSIUM SERPL-SCNC: 4.2 MMOL/L (ref 3.5–5.3)
PROT SERPL-MCNC: 7.1 G/DL (ref 6.4–8.2)
SODIUM SERPL-SCNC: 136 MMOL/L (ref 136–145)
T4 SERPL-MCNC: 9.9 UG/DL (ref 4.7–13.3)
TRIGL SERPL-MCNC: 104 MG/DL
TSH SERPL DL<=0.05 MIU/L-ACNC: 3.26 UIU/ML (ref 0.36–3.74)

## 2020-05-15 PROCEDURE — 80053 COMPREHEN METABOLIC PANEL: CPT

## 2020-05-15 PROCEDURE — 36415 COLL VENOUS BLD VENIPUNCTURE: CPT

## 2020-05-15 PROCEDURE — 80061 LIPID PANEL: CPT

## 2020-05-15 PROCEDURE — 84443 ASSAY THYROID STIM HORMONE: CPT

## 2020-05-15 PROCEDURE — 84436 ASSAY OF TOTAL THYROXINE: CPT

## 2020-07-13 DIAGNOSIS — E78.5 HYPERLIPIDEMIA, UNSPECIFIED HYPERLIPIDEMIA TYPE: ICD-10-CM

## 2020-07-13 RX ORDER — ATORVASTATIN CALCIUM 10 MG/1
TABLET, FILM COATED ORAL
Qty: 90 TABLET | Refills: 3 | Status: SHIPPED | OUTPATIENT
Start: 2020-07-13 | End: 2021-08-10

## 2020-07-22 DIAGNOSIS — I10 ESSENTIAL HYPERTENSION: ICD-10-CM

## 2020-07-22 RX ORDER — AMLODIPINE BESYLATE 5 MG/1
TABLET ORAL
Qty: 90 TABLET | Refills: 0 | Status: SHIPPED | OUTPATIENT
Start: 2020-07-22 | End: 2020-10-06 | Stop reason: SDUPTHER

## 2020-10-06 DIAGNOSIS — I10 ESSENTIAL HYPERTENSION: ICD-10-CM

## 2020-10-06 RX ORDER — AMLODIPINE BESYLATE 5 MG/1
5 TABLET ORAL DAILY
Qty: 90 TABLET | Refills: 0 | Status: SHIPPED | OUTPATIENT
Start: 2020-10-06 | End: 2021-07-06 | Stop reason: SDUPTHER

## 2020-12-08 ENCOUNTER — OFFICE VISIT (OUTPATIENT)
Dept: CARDIOLOGY CLINIC | Facility: CLINIC | Age: 80
End: 2020-12-08
Payer: MEDICARE

## 2020-12-08 VITALS
BODY MASS INDEX: 47.48 KG/M2 | SYSTOLIC BLOOD PRESSURE: 124 MMHG | DIASTOLIC BLOOD PRESSURE: 88 MMHG | HEIGHT: 63 IN | WEIGHT: 268 LBS | HEART RATE: 64 BPM

## 2020-12-08 DIAGNOSIS — I77.810 ASCENDING AORTA DILATATION (HCC): Primary | ICD-10-CM

## 2020-12-08 DIAGNOSIS — R94.31 ABNORMAL EKG: ICD-10-CM

## 2020-12-08 DIAGNOSIS — E66.2 MORBID (SEVERE) OBESITY WITH ALVEOLAR HYPOVENTILATION (HCC): ICD-10-CM

## 2020-12-08 DIAGNOSIS — E78.00 HYPERCHOLESTEROLEMIA: ICD-10-CM

## 2020-12-08 DIAGNOSIS — I10 HYPERTENSION, ESSENTIAL, BENIGN: ICD-10-CM

## 2020-12-08 PROCEDURE — 99213 OFFICE O/P EST LOW 20 MIN: CPT | Performed by: INTERNAL MEDICINE

## 2020-12-08 PROCEDURE — 93000 ELECTROCARDIOGRAM COMPLETE: CPT | Performed by: INTERNAL MEDICINE

## 2020-12-08 RX ORDER — PHENOL 1.4 %
600 AEROSOL, SPRAY (ML) MUCOUS MEMBRANE 2 TIMES WEEKLY
COMMUNITY
End: 2021-10-13

## 2020-12-08 RX ORDER — GLUCOSAMINE SULFATE 500 MG
1500 CAPSULE ORAL DAILY
COMMUNITY
End: 2022-07-06 | Stop reason: ALTCHOICE

## 2020-12-16 ENCOUNTER — TELEPHONE (OUTPATIENT)
Dept: SLEEP CENTER | Facility: CLINIC | Age: 80
End: 2020-12-16

## 2020-12-28 DIAGNOSIS — I10 ESSENTIAL HYPERTENSION: ICD-10-CM

## 2020-12-28 RX ORDER — AMLODIPINE BESYLATE 5 MG/1
5 TABLET ORAL DAILY
Qty: 90 TABLET | Refills: 3 | Status: SHIPPED | OUTPATIENT
Start: 2020-12-28 | End: 2021-03-06 | Stop reason: HOSPADM

## 2021-01-11 ENCOUNTER — TELEPHONE (OUTPATIENT)
Dept: OBGYN CLINIC | Facility: CLINIC | Age: 81
End: 2021-01-11

## 2021-01-11 DIAGNOSIS — Z12.31 VISIT FOR SCREENING MAMMOGRAM: Primary | ICD-10-CM

## 2021-01-11 NOTE — TELEPHONE ENCOUNTER
Pt needs  A danette script - she saw Dr Rachel Isaac in 2018- is [de-identified] yrs old and needs to know how often she needs to be seen as she is not having any problems  Please advise

## 2021-01-12 ENCOUNTER — HOSPITAL ENCOUNTER (OUTPATIENT)
Dept: RADIOLOGY | Age: 81
Discharge: HOME | End: 2021-01-12
Payer: MEDICARE

## 2021-01-12 VITALS — HEIGHT: 63 IN | WEIGHT: 268 LBS | BODY MASS INDEX: 47.48 KG/M2

## 2021-01-12 DIAGNOSIS — Z12.31 VISIT FOR SCREENING MAMMOGRAM: ICD-10-CM

## 2021-01-12 PROCEDURE — 77063 BREAST TOMOSYNTHESIS BI: CPT

## 2021-01-12 PROCEDURE — 77067 SCR MAMMO BI INCL CAD: CPT

## 2021-01-13 ENCOUNTER — TELEPHONE (OUTPATIENT)
Dept: LABOR AND DELIVERY | Facility: HOSPITAL | Age: 81
End: 2021-01-13

## 2021-01-22 ENCOUNTER — IMMUNIZATIONS (OUTPATIENT)
Dept: FAMILY MEDICINE CLINIC | Facility: HOSPITAL | Age: 81
End: 2021-01-22

## 2021-01-22 DIAGNOSIS — Z23 ENCOUNTER FOR IMMUNIZATION: Primary | ICD-10-CM

## 2021-01-22 PROCEDURE — 0001A SARS-COV-2 / COVID-19 MRNA VACCINE (PFIZER-BIONTECH) 30 MCG: CPT

## 2021-01-22 PROCEDURE — 91300 SARS-COV-2 / COVID-19 MRNA VACCINE (PFIZER-BIONTECH) 30 MCG: CPT

## 2021-02-10 ENCOUNTER — CONSULT (OUTPATIENT)
Dept: SURGERY | Facility: CLINIC | Age: 81
End: 2021-02-10
Payer: MEDICARE

## 2021-02-10 VITALS — BODY MASS INDEX: 46.07 KG/M2 | TEMPERATURE: 95.7 F | HEIGHT: 63 IN | WEIGHT: 260 LBS

## 2021-02-10 DIAGNOSIS — R10.12 LUQ PAIN: Primary | ICD-10-CM

## 2021-02-10 PROCEDURE — 99202 OFFICE O/P NEW SF 15 MIN: CPT | Performed by: SURGERY

## 2021-02-10 RX ORDER — BRIMONIDINE TARTRATE/TIMOLOL 0.2%-0.5%
DROPS OPHTHALMIC (EYE)
COMMUNITY
Start: 2021-02-06

## 2021-02-10 NOTE — PROGRESS NOTES
Assessment/Plan:    Diagnoses and all orders for this visit:    LUQ pain  -     CT abdomen pelvis w contrast; Future    Other orders  -     Combigan 0 2-0 5 %; INSTILL 1 DROP IN BOTH EYES TWICE DAILY     suspect mostly musculoskeletal etiology of her pain  No signs of hernia on examination  Will give her a call once CT scan has been performed  She may use nonsteroidal medications and warm soaks in the interim  Subjective:      Patient ID: Carolina Zavaleta is a 79 y o  female  Patient presents for possible ventral hernia consult  States she has had discomfort on the left side of her abdomen for 2 weeks  Points to an area just under the left rib cage  This bothers her mostly when sitting  Denies nausea or vomiting  Only past surgical history is an open hysterectomy  She does use a cane to ambulate  The following portions of the patient's history were reviewed and updated as appropriate:     She  has a past medical history of Disease of thyroid gland, Endometrial cancer ([facility] [address] ), 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[facility] [address] ), Esophageal reflux, Hypertension, Hypothyroid, and Obesity  She  has a past surgical history that includes Colonoscopy (08/2018); Knee Arthroplasty; Cataract extraction, bilateral; Total abdominal hysterectomy; and Total abdominal hysterectomy w/ bilateral salpingoophorectomy (1992)  Her family history includes Cancer in her maternal uncle; Heart attack in her father; No Known Problems in her daughter, maternal aunt, maternal aunt, maternal grandfather, maternal grandmother, paternal grandfather, and paternal grandmother; Pancreatic cancer (age of onset: 71) in her mother; Stomach cancer (age of onset: 71) in her mother  She  reports that she has quit smoking  She has never used smokeless tobacco  She reports that she does not use drugs  No history on file for alcohol    Current Outpatient Medications   Medication Sig Dispense Refill    amLODIPine (NORVASC) 5 mg tablet Take 1 tablet (5 mg total) by mouth daily 90 tablet 0    amLODIPine (NORVASC) 5 mg tablet Take 1 tablet (5 mg total) by mouth daily 90 tablet 3    amoxicillin (AMOXIL) 500 mg capsule TK 4 CS PO 1 HOUR B DAPP  0    Ascorbic Acid (VITAMIN C PO) Take by mouth      atorvastatin (LIPITOR) 10 mg tablet TAKE 1 TABLET BY MOUTH  DAILY AT BEDTIME 90 tablet 3    Biotin 1 MG CAPS Take by mouth      Calcium 500 MG tablet Take by mouth      calcium carbonate (Calcium 600) 600 MG tablet Take 600 mg by mouth 2 (two) times a week      Cholecalciferol (VITAMIN D3) 400 units CAPS Take by mouth      Combigan 0 2-0 5 % INSTILL 1 DROP IN BOTH EYES TWICE DAILY      glucosamine 500 MG CAPS capsule Take 1,500 mg by mouth daily      levothyroxine 50 mcg tablet Take 0 5 tablets by mouth daily       Multiple Vitamin (MULTIVITAMINS PO) Take 1 tablet by mouth daily      Omega-3 Fatty Acids (FISH OIL) 1,000 mg Take by mouth      pantoprazole (PROTONIX) 40 mg tablet Take by mouth       No current facility-administered medications for this visit  She has No Known Allergies       Review of Systems   Gastrointestinal: Positive for abdominal pain  All other systems reviewed and are negative  Objective:      Temp (!) 95 7 °F (35 4 °C)   Ht 5' 3" (1 6 m)   Wt 118 kg (260 lb)   BMI 46 06 kg/m²          Physical Exam  Constitutional:       Appearance: She is obese  Abdominal:      Palpations: There is no mass  Tenderness: There is no abdominal tenderness  There is no guarding  Hernia: No hernia is present  Skin:     General: Skin is warm and dry  Neurological:      Mental Status: She is alert

## 2021-02-12 ENCOUNTER — LAB (OUTPATIENT)
Dept: LAB | Age: 81
End: 2021-02-12
Payer: MEDICARE

## 2021-02-12 ENCOUNTER — IMMUNIZATIONS (OUTPATIENT)
Dept: FAMILY MEDICINE CLINIC | Facility: HOSPITAL | Age: 81
End: 2021-02-12

## 2021-02-12 DIAGNOSIS — Z23 ENCOUNTER FOR IMMUNIZATION: Primary | ICD-10-CM

## 2021-02-12 DIAGNOSIS — R10.12 LUQ PAIN: ICD-10-CM

## 2021-02-12 DIAGNOSIS — R10.12 LUQ PAIN: Primary | ICD-10-CM

## 2021-02-12 LAB
ANION GAP SERPL CALCULATED.3IONS-SCNC: 7 MMOL/L (ref 4–13)
BUN SERPL-MCNC: 13 MG/DL (ref 5–25)
CALCIUM SERPL-MCNC: 8.8 MG/DL (ref 8.3–10.1)
CHLORIDE SERPL-SCNC: 100 MMOL/L (ref 100–108)
CO2 SERPL-SCNC: 28 MMOL/L (ref 21–32)
CREAT SERPL-MCNC: 0.7 MG/DL (ref 0.6–1.3)
GFR SERPL CREATININE-BSD FRML MDRD: 82 ML/MIN/1.73SQ M
GLUCOSE P FAST SERPL-MCNC: 104 MG/DL (ref 65–99)
POTASSIUM SERPL-SCNC: 4.6 MMOL/L (ref 3.5–5.3)
SODIUM SERPL-SCNC: 135 MMOL/L (ref 136–145)

## 2021-02-12 PROCEDURE — 36415 COLL VENOUS BLD VENIPUNCTURE: CPT

## 2021-02-12 PROCEDURE — 80048 BASIC METABOLIC PNL TOTAL CA: CPT

## 2021-02-12 PROCEDURE — 0002A SARS-COV-2 / COVID-19 MRNA VACCINE (PFIZER-BIONTECH) 30 MCG: CPT

## 2021-02-12 PROCEDURE — 91300 SARS-COV-2 / COVID-19 MRNA VACCINE (PFIZER-BIONTECH) 30 MCG: CPT

## 2021-02-17 ENCOUNTER — HOSPITAL ENCOUNTER (OUTPATIENT)
Dept: RADIOLOGY | Age: 81
Discharge: HOME | End: 2021-02-17
Payer: MEDICARE

## 2021-02-17 DIAGNOSIS — R10.12 LUQ PAIN: ICD-10-CM

## 2021-02-17 PROCEDURE — 74177 CT ABD & PELVIS W/CONTRAST: CPT

## 2021-02-17 PROCEDURE — G1004 CDSM NDSC: HCPCS

## 2021-02-17 RX ADMIN — IOHEXOL 100 ML: 350 INJECTION, SOLUTION INTRAVENOUS at 09:04

## 2021-02-22 ENCOUNTER — TELEPHONE (OUTPATIENT)
Dept: SURGICAL ONCOLOGY | Facility: CLINIC | Age: 81
End: 2021-02-22

## 2021-02-22 NOTE — TELEPHONE ENCOUNTER
NEW PATIENT INTAKE   REFERRED TO WHICH SURGEON? Lydia Figueroa AND THEIR RELATIONSHIP TO PATIENT , Hailey Larios, called  REFERRING PROVIDER'S OFFICE PHONE #   [phone]   REASON FOR REFERRAL/CONSULT     Hiatal hernia consult   DID PATIENT HAVE TESTING COMPLETED? CT   FACILITY TESTING PERFORMED  (EX  [facility], [facility], [facility])   [facility]   IS INSURANCE REFERRAL NEEDED?    Medicare/[facility]   BEST NUMBER TO REACH PATIENT   [phone]   COMMENTS:

## 2021-02-24 ENCOUNTER — OFFICE VISIT (OUTPATIENT)
Dept: CARDIAC SURGERY | Facility: CLINIC | Age: 81
End: 2021-02-24
Payer: MEDICARE

## 2021-02-24 VITALS
WEIGHT: 271.39 LBS | HEIGHT: 63 IN | TEMPERATURE: 97.9 F | HEART RATE: 69 BPM | DIASTOLIC BLOOD PRESSURE: 90 MMHG | RESPIRATION RATE: 16 BRPM | SYSTOLIC BLOOD PRESSURE: 130 MMHG | BODY MASS INDEX: 48.09 KG/M2 | OXYGEN SATURATION: 96 %

## 2021-02-24 DIAGNOSIS — E07.89 OTHER SPECIFIED DISORDERS OF THYROID: ICD-10-CM

## 2021-02-24 DIAGNOSIS — K44.9 PARAESOPHAGEAL HERNIA: Primary | ICD-10-CM

## 2021-02-24 PROCEDURE — 99205 OFFICE O/P NEW HI 60 MIN: CPT | Performed by: PHYSICIAN ASSISTANT

## 2021-02-24 NOTE — PROGRESS NOTES
Thoracic Consult  Assessment/Plan:    Paraesophageal hernia  Ms  Tonio Kaur presents to discuss treatment of a recently discovered paraesophageal hernia  This causes her left upper quadrant pain, worsening over the last few weeks  She does have some symptoms related to regurgitation including sour taste in mouth  She takes Protonix daily  Dr Laurita Dewitt is recommending repair of this as they often become larger and risk increases of complication  Surgery involves a robotic paraesophageal hernia repair, possible mesh, gastropexy versus partial fundoplication  Risks anjd benefits as well as alternatives discussed  Consent obtained today  She will need to have a barium swallow prior to surgery to evaluate esophageal motility  Diagnoses and all orders for this visit:    Paraesophageal hernia  -     Case request operating room: REPAIR HERNIA PARAESOPHAGEAL LAPAROSCOPIC W ROBOTICS, GASTROPEXY VS FUNDOPLICATION, POSSIBLE MESH, ESOPHAGOGASTRODUODENOSCOPY (EGD); Standing  -     PAT Covid Screening; Future  -     Type and screen; Future  -     CBC and Platelet; Future  -     Basic metabolic panel; Future  -     Protime-INR; Future  -     EKG 12 lead; Future  -     Case request operating room: REPAIR HERNIA PARAESOPHAGEAL LAPAROSCOPIC W ROBOTICS, GASTROPEXY VS FUNDOPLICATION, POSSIBLE MESH, ESOPHAGOGASTRODUODENOSCOPY (EGD)  -     FL barium swallow; Future    Other specified disorders of thyroid   -     Protime-INR; Future    Other orders  -     Diet NPO; Sips with meds; Standing  -     Void on call to OR; Standing  -     Insert peripheral IV; Standing  -     Place sequential compression device; Standing  -     ceFAZolin (ANCEF) 3,000 mg in dextrose 5 % 100 mL IVPB  -     metroNIDAZOLE (FLAGYL) IVPB (premix) 500 mg 100 mL          Thoracic History   Diagnosis: Paraesophageal hernia   Procedures/Surgeries:    Pathology:    Adjuvant Therapy:       Subjective:    Patient ID: Timo Mascorro is a 79 y o  female      HPI   Ms  Constanza Brunson is an 79year old female referred to our office by Dr Jerilyn Bertrand for evaluation of a hiatal hernia  She had been experiencing discomfort in her left upper quadrant for the past month  CT abdomen pelvis 2/17/21 revealed the majority of stomach in the chest    She has a past medical history of endometrial cancer status post open hysterectomy in 1992, HTN, hypothyroidism  She is a former smoker, quit 50 year ago, 1/2ppd for 5 years  On discussion, she reports constipation, comes and over the last few months  She takes Protonix daily  If she misses a dose she notices no difference  Clear productive cough, chronic  She denies heartburn or regurgitation  No dysphagia or odynophagia  She reports left upper quadrant pain over a long time occasionally which was manageable, but over the last few weeks it has become more intense and sharp with increased frequency  The pain radiates to the RUQ  Nothing makes the pain worse, not related to eating  If she stretches out it resolves  She reports sour taste in her mouth occasionally  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       The following portions of the patient's history were reviewed and updated as appropriate: allergies, current medications, past family history, past medical history, past social history, past surgical history and problem list     Past Medical History:   Diagnosis Date    Cancer ([facility] [address] )     Disease of thyroid gland     Endometrial cancer ([facility] [address] )     age 45    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[facility] [address] )     1992    Esophageal reflux     Hypertension     Hypothyroid     Obesity       Past Surgical History:   Procedure Laterality Date    CATARACT EXTRACTION, BILATERAL      COLONOSCOPY  08/2018    polyp- 5 years    KNEE ARTHROPLASTY      TOTAL ABDOMINAL HYSTERECTOMY      age 45   [facility] TOTAL ABDOMINAL HYSTERECTOMY W/ BILATERAL SALPINGOOPHORECTOMY  1992    endometrial cancer      Family History   Problem Relation Age of Onset    Stomach cancer Mother 71    Pancreatic cancer Mother 71    Heart attack Father     Cancer Maternal Uncle     No Known Problems Daughter     No Known Problems Maternal Grandmother     No Known Problems Maternal Grandfather     No Known Problems Paternal Grandmother     No Known Problems Paternal Grandfather     No Known Problems Maternal Aunt     No Known Problems Maternal Aunt       Social History     Socioeconomic History    Marital status: /Civil Union     Spouse name: Not on file    Number of children: Not on file    Years of education: Not on file    Highest education level: Not on file   Occupational History    Not on file   Social Needs    Financial resource strain: Not on file    Food insecurity     Worry: Not on file     Inability: Not on file   [employer] needs     Medical: Not on file     Non-medical: Not on file   Tobacco Use    Smoking status: Former Smoker     Types: Cigarettes    Smokeless tobacco: Never Used    Tobacco comment: Quit 40+ years ago   Substance and Sexual Activity    Alcohol use: Not on file     Comment: socially    Drug use: No    Sexual activity: Not Currently     Partners: Male     Birth control/protection: Post-menopausal     Comment:    Lifestyle    Physical activity     Days per week: Not on file     Minutes per session: Not on file    Stress: Not on file   Relationships    Social connections     Talks on phone: Not on file     Gets together: Not on file     Attends Confucianism service: Not on file     Active member of club or organization: Not on file     Attends meetings of clubs or organizations: Not on file     Relationship status: Not on file    Intimate partner violence     Fear of current or ex partner: Not on file     Emotionally abused: Not on file     Physically abused: Not on file     Forced sexual activity: Not on file   Other Topics Concern    Not on file   Social History Narrative    Exercising regularly Current Outpatient Medications   Medication Instructions    amLODIPine (NORVASC) 5 mg, Oral, Daily    amLODIPine (NORVASC) 5 mg, Oral, Daily    amoxicillin (AMOXIL) 500 mg capsule TK 4 CS PO 1 HOUR B DAPP    Ascorbic Acid (VITAMIN C PO) Oral    atorvastatin (LIPITOR) 10 mg tablet TAKE 1 TABLET BY MOUTH  DAILY AT BEDTIME    Biotin 1 MG CAPS Oral    Calcium 500 MG tablet Oral    calcium carbonate (CALCIUM 600) 600 mg, Oral, 2 times weekly    Cholecalciferol (VITAMIN D3) 400 units CAPS Oral    Combigan 0 2-0 5 % INSTILL 1 DROP IN BOTH EYES TWICE DAILY    glucosamine 1,500 mg, Oral, Daily    levothyroxine 50 mcg tablet 0 5 tablets, Oral, Daily    Multiple Vitamin (MULTIVITAMINS PO) 1 tablet, Oral, Daily    Omega-3 Fatty Acids (FISH OIL) 1,000 mg Oral    pantoprazole (PROTONIX) 40 mg tablet Oral     No Known Allergies    Review of Systems   Constitutional: Negative for activity change, appetite change, chills, diaphoresis, fatigue, fever and unexpected weight change  HENT: Negative for trouble swallowing  Respiratory: Positive for cough (chronic, clear phlegm) and shortness of breath (exertion, stable)  Negative for chest tightness  Cardiovascular: Negative for chest pain  Gastrointestinal: Positive for abdominal pain and constipation  Negative for nausea and vomiting  Musculoskeletal: Negative for gait problem  Skin: Negative  Neurological: Negative  Hematological: Negative  Objective:   Physical Exam  Constitutional:       General: She is not in acute distress  Appearance: Normal appearance  HENT:      Head: Normocephalic and atraumatic  Eyes:      General: No scleral icterus  Conjunctiva/sclera: Conjunctivae normal    Neck:      Musculoskeletal: Neck supple  Cardiovascular:      Rate and Rhythm: Normal rate and regular rhythm  Pulmonary:      Effort: Pulmonary effort is normal  No respiratory distress  Breath sounds: Normal breath sounds     Abdominal: General: There is no distension  Palpations: Abdomen is soft  Comments: Vertical incision below the umbilicus   Lymphadenopathy:      Cervical: No cervical adenopathy  Skin:     General: Skin is warm and dry  Neurological:      General: No focal deficit present  Mental Status: She is alert and oriented to person, place, and time     Psychiatric:         Mood and Affect: Mood normal      /90   Pulse 69   Temp 97 9 °F (36 6 °C) (Tympanic)   Resp 16   Ht 5' 3" (1 6 m)   Wt 123 kg (271 lb 6 2 oz)   SpO2 96%   BMI 48 07 kg/m²

## 2021-02-24 NOTE — LETTER
February 24, 2021     Bryanna Barnett [address]  [address] [address]    Patient: Alcira Wright   YOB: 1940   Date of Visit: 2/24/2021       Dear Dr Jazmin Lubin: Thank you for referring Maty Huff to me for evaluation  Below are my notes for this consultation  If you have questions, please do not hesitate to call me  I look forward to following your patient along with you  Sincerely,        David Metz MD        CC: Emeli Root DO  [place], [place]  2/24/2021  9:38 AM  Attested  Thoracic Consult  Assessment/Plan:    Paraesophageal hernia  Ms Tad Mckeon presents to discuss treatment of a recently discovered paraesophageal hernia  This causes her left upper quadrant pain, worsening over the last few weeks  She does have some symptoms related to regurgitation including sour taste in mouth  She takes Protonix daily  Dr Madison Quiles is recommending repair of this as they often become larger and risk increases of complication  Surgery involves a robotic paraesophageal hernia repair, possible mesh, gastropexy versus partial fundoplication  Risks anjd benefits as well as alternatives discussed  Consent obtained today  She will need to have a barium swallow prior to surgery to evaluate esophageal motility  Diagnoses and all orders for this visit:    Paraesophageal hernia  -     Case request operating room: REPAIR HERNIA PARAESOPHAGEAL LAPAROSCOPIC W ROBOTICS, GASTROPEXY VS FUNDOPLICATION, POSSIBLE MESH, ESOPHAGOGASTRODUODENOSCOPY (EGD); Standing  -     PAT Covid Screening; Future  -     Type and screen; Future  -     CBC and Platelet; Future  -     Basic metabolic panel; Future  -     Protime-INR; Future  -     EKG 12 lead; Future  -     Case request operating room: REPAIR HERNIA PARAESOPHAGEAL LAPAROSCOPIC W ROBOTICS, GASTROPEXY VS FUNDOPLICATION, POSSIBLE MESH, ESOPHAGOGASTRODUODENOSCOPY (EGD)  -     FL barium swallow;  Future    Other specified disorders of thyroid   -     Protime-INR; Future    Other orders  -     Diet NPO; Sips with meds; Standing  -     Void on call to OR; Standing  -     Insert peripheral IV; Standing  -     Place sequential compression device; Standing  -     ceFAZolin (ANCEF) 3,000 mg in dextrose 5 % 100 mL IVPB  -     metroNIDAZOLE (FLAGYL) IVPB (premix) 500 mg 100 mL          Thoracic History   Diagnosis: Paraesophageal hernia   Procedures/Surgeries:    Pathology:    Adjuvant Therapy:       Subjective:    Patient ID: Marco Kelly is a 79 y o  female  HPI   Ms Hector Song is an 79year old female referred to our office by Dr Xiao Hayes for evaluation of a hiatal hernia  She had been experiencing discomfort in her left upper quadrant for the past month  CT abdomen pelvis 2/17/21 revealed the majority of stomach in the chest    She has a past medical history of endometrial cancer status post open hysterectomy in 1992, HTN, hypothyroidism  She is a former smoker, quit 50 year ago, 1/2ppd for 5 years  On discussion, she reports constipation, comes and over the last few months  She takes Protonix daily  If she misses a dose she notices no difference  Clear productive cough, chronic  She denies heartburn or regurgitation  No dysphagia or odynophagia  She reports left upper quadrant pain over a long time occasionally which was manageable, but over the last few weeks it has become more intense and sharp with increased frequency  The pain radiates to the RUQ  Nothing makes the pain worse, not related to eating  If she stretches out it resolves  She reports sour taste in her mouth occasionally  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       The following portions of the patient's history were reviewed and updated as appropriate: allergies, current medications, past family history, past medical history, past social history, past surgical history and problem list     Past Medical History:   Diagnosis Date    Cancer ([facility] [address] )     Disease of thyroid gland     Endometrial cancer [facility])     age 45    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[facility] [address] )     1992    Esophageal reflux     Hypertension     Hypothyroid     Obesity       Past Surgical History:   Procedure Laterality Date    CATARACT EXTRACTION, BILATERAL      COLONOSCOPY  08/2018    polyp- 5 years    KNEE ARTHROPLASTY      TOTAL ABDOMINAL HYSTERECTOMY      age 45   [facility] TOTAL ABDOMINAL HYSTERECTOMY W/ BILATERAL SALPINGOOPHORECTOMY  1992    endometrial cancer      Family History   Problem Relation Age of Onset    Stomach cancer Mother 71    Pancreatic cancer Mother 71    Heart attack Father     Cancer Maternal Uncle     No Known Problems Daughter     No Known Problems Maternal Grandmother     No Known Problems Maternal Grandfather     No Known Problems Paternal Grandmother     No Known Problems Paternal Grandfather     No Known Problems Maternal Aunt     No Known Problems Maternal Aunt       Social History     Socioeconomic History    Marital status: /Civil Union     Spouse name: Not on file    Number of children: Not on file    Years of education: Not on file    Highest education level: Not on file   Occupational History    Not on file   Social Needs    Financial resource strain: Not on file    Food insecurity     Worry: Not on file     Inability: Not on file    Transportation needs     Medical: Not on file     Non-medical: Not on file   Tobacco Use    Smoking status: Former Smoker     Types: Cigarettes    Smokeless tobacco: Never Used    Tobacco comment: Quit 40+ years ago   Substance and Sexual Activity    Alcohol use: Not on file     Comment: socially    Drug use: No    Sexual activity: Not Currently     Partners: Male     Birth control/protection: Post-menopausal     Comment:    Lifestyle    Physical activity     Days per week: Not on file     Minutes per session: Not on file    Stress: Not on file   Relationships    Social connections     Talks on phone: Not on file     Gets together: Not on file     Attends Yazidi service: Not on file     Active member of club or organization: Not on file     Attends meetings of clubs or organizations: Not on file     Relationship status: Not on file    Intimate partner violence     Fear of current or ex partner: Not on file     Emotionally abused: Not on file     Physically abused: Not on file     Forced sexual activity: Not on file   Other Topics Concern    Not on file   Social History Narrative    Exercising regularly      Current Outpatient Medications   Medication Instructions    amLODIPine (NORVASC) 5 mg, Oral, Daily    amLODIPine (NORVASC) 5 mg, Oral, Daily    amoxicillin (AMOXIL) 500 mg capsule TK 4 CS PO 1 HOUR B DAPP    Ascorbic Acid (VITAMIN C PO) Oral    atorvastatin (LIPITOR) 10 mg tablet TAKE 1 TABLET BY MOUTH  DAILY AT BEDTIME    Biotin 1 MG CAPS Oral    Calcium 500 MG tablet Oral    calcium carbonate (CALCIUM 600) 600 mg, Oral, 2 times weekly    Cholecalciferol (VITAMIN D3) 400 units CAPS Oral    Combigan 0 2-0 5 % INSTILL 1 DROP IN BOTH EYES TWICE DAILY    glucosamine 1,500 mg, Oral, Daily    levothyroxine 50 mcg tablet 0 5 tablets, Oral, Daily    Multiple Vitamin (MULTIVITAMINS PO) 1 tablet, Oral, Daily    Omega-3 Fatty Acids (FISH OIL) 1,000 mg Oral    pantoprazole (PROTONIX) 40 mg tablet Oral     No Known Allergies    Review of Systems   Constitutional: Negative for activity change, appetite change, chills, diaphoresis, fatigue, fever and unexpected weight change  HENT: Negative for trouble swallowing  Respiratory: Positive for cough (chronic, clear phlegm) and shortness of breath (exertion, stable)  Negative for chest tightness  Cardiovascular: Negative for chest pain  Gastrointestinal: Positive for abdominal pain and constipation  Negative for nausea and vomiting  Musculoskeletal: Negative for gait problem  Skin: Negative      Neurological: Negative  Hematological: Negative  Objective:   Physical Exam  Constitutional:       General: She is not in acute distress  Appearance: Normal appearance  HENT:      Head: Normocephalic and atraumatic  Eyes:      General: No scleral icterus  Conjunctiva/sclera: Conjunctivae normal    Neck:      Musculoskeletal: Neck supple  Cardiovascular:      Rate and Rhythm: Normal rate and regular rhythm  Pulmonary:      Effort: Pulmonary effort is normal  No respiratory distress  Breath sounds: Normal breath sounds  Abdominal:      General: There is no distension  Palpations: Abdomen is soft  Comments: Vertical incision below the umbilicus   Lymphadenopathy:      Cervical: No cervical adenopathy  Skin:     General: Skin is warm and dry  Neurological:      General: No focal deficit present  Mental Status: She is alert and oriented to person, place, and time  Psychiatric:         Mood and Affect: Mood normal      /90   Pulse 69   Temp 97 9 °F (36 6 °C) (Tympanic)   Resp 16   Ht 5' 3" (1 6 m)   Wt 123 kg (271 lb 6 2 oz)   SpO2 96%   BMI 48 07 kg/m²       Attestation signed by Carol Oleary MD at 2/24/2021  9:47 AM:    Thoracic surgery attending note     Patient seen examined with PA this morning  She was accompanied by her   21-year-old female with history of hypertension, hyperlipidemia, obesity with a BMI of 48,  and endometrial cancer status post resection in 1992 we are seeing in consultation from Dr Bessie Zimmerman  for a large type 3 paraesophageal hernia with the majority of her stomach in her chest   Over the past few years she has noted increasing left upper quadrant pain  This is a bandlike pain that radiates to her right side  This is constant and gnawing in nature  She states this is not get worse with eating but does improve with laying down and letting her body "relax"  She does have occasional regurgitation of sour liquids in her mouth  She denies any issues with food getting stuck  She used to have worse issues with heartburn is now on once daily Protonix  The symptoms have improved on this  She denies any current issues with heartburn  She does note some moderate shortness of breath with activity  On exam she appears in no acute distress  Abdomen is obese  She has a midline infraumbilical scar  No appreciable ventral wall hernia  No palpable tenderness to palpation    I personally reviewed CT imaging and PACs and showed this to the patient  She has a large type 3 paraesophageal hernia with 80-90% of her stomach in the chest   This is causing significant compression of the right lower lobe as well as compression of the heart  Assessment -  79 year female with a large type 3 paraesophageal hernia causing left upper quadrant pain,    some shortness of breath,  and mild regurgitation     Plan  I  discussed with Vickey Thakur  her large type 3 paraesophageal hernia with the majority of her chest in her thoracic space  This certainly can cause for bandlike discomfort  Additionally this can contribute to breathing issues as this is compressing both the right lower lobe and the left atrium  Finally she has some mild issues with reflux that are likely associated with this  We discussed all potential management options for this including observation and surgical intervention  She understands that surgery would entail a robotic assisted paraesophageal hernia repair  She understands that with observation this hernia would likely get bigger and has risk for strangulation  She would like to proceed with surgery  We discussed risks of surgery including risks of anesthesia such as cardiac events, blood clots, stroke, pulmonary embolism and need for prolonged intubation  We discussed risks of the surgery including bleeding, infection, hernia recurrence, and pain    She understands she has an elevated risk of hernia recurrence due to her BMI   Surgery would be a robotic assisted paraesophageal hernia repair with absorbable mesh and gastropexy versus partial fundoplication  We would make a decision on partial fundoplication based on preoperative barium swallow  and intraoperative factor such as tension  She understands all risks and lung would like to proceed with surgery  Consent was signed in the office today  She will undergo preoperative testing and a barium swallow  Surgery scheduled for Friday March 5th      Kat Crocker MD

## 2021-02-24 NOTE — ASSESSMENT & PLAN NOTE
Ms Thao Soto presents to discuss treatment of a recently discovered paraesophageal hernia  This causes her left upper quadrant pain, worsening over the last few weeks  She does have some symptoms related to regurgitation including sour taste in mouth  She takes Protonix daily  Dr Ayana Wick is recommending repair of this as they often become larger and risk increases of complication  Surgery involves a robotic paraesophageal hernia repair, possible mesh, gastropexy versus partial fundoplication  Risks anjd benefits as well as alternatives discussed  Consent obtained today  She will need to have a barium swallow prior to surgery to evaluate esophageal motility

## 2021-02-24 NOTE — H&P (VIEW-ONLY)
Thoracic Consult  Assessment/Plan:    Paraesophageal hernia  Ms  Josafat Martel presents to discuss treatment of a recently discovered paraesophageal hernia  This causes her left upper quadrant pain, worsening over the last few weeks  She does have some symptoms related to regurgitation including sour taste in mouth  She takes Protonix daily  Dr Aimee Keith is recommending repair of this as they often become larger and risk increases of complication  Surgery involves a robotic paraesophageal hernia repair, possible mesh, gastropexy versus partial fundoplication  Risks anjd benefits as well as alternatives discussed  Consent obtained today  She will need to have a barium swallow prior to surgery to evaluate esophageal motility  Diagnoses and all orders for this visit:    Paraesophageal hernia  -     Case request operating room: REPAIR HERNIA PARAESOPHAGEAL LAPAROSCOPIC W ROBOTICS, GASTROPEXY VS FUNDOPLICATION, POSSIBLE MESH, ESOPHAGOGASTRODUODENOSCOPY (EGD); Standing  -     PAT Covid Screening; Future  -     Type and screen; Future  -     CBC and Platelet; Future  -     Basic metabolic panel; Future  -     Protime-INR; Future  -     EKG 12 lead; Future  -     Case request operating room: REPAIR HERNIA PARAESOPHAGEAL LAPAROSCOPIC W ROBOTICS, GASTROPEXY VS FUNDOPLICATION, POSSIBLE MESH, ESOPHAGOGASTRODUODENOSCOPY (EGD)  -     FL barium swallow; Future    Other specified disorders of thyroid   -     Protime-INR; Future    Other orders  -     Diet NPO; Sips with meds; Standing  -     Void on call to OR; Standing  -     Insert peripheral IV; Standing  -     Place sequential compression device; Standing  -     ceFAZolin (ANCEF) 3,000 mg in dextrose 5 % 100 mL IVPB  -     metroNIDAZOLE (FLAGYL) IVPB (premix) 500 mg 100 mL          Thoracic History   Diagnosis: Paraesophageal hernia   Procedures/Surgeries:    Pathology:    Adjuvant Therapy:       Subjective:    Patient ID: Juliann Mascorro is a 79 y o  female      HPI   Ms  Harshal Ellingotn is an 79year old female referred to our office by Dr Davy Forbes for evaluation of a hiatal hernia  She had been experiencing discomfort in her left upper quadrant for the past month  CT abdomen pelvis 2/17/21 revealed the majority of stomach in the chest    She has a past medical history of endometrial cancer status post open hysterectomy in 1992, HTN, hypothyroidism  She is a former smoker, quit 50 year ago, 1/2ppd for 5 years  On discussion, she reports constipation, comes and over the last few months  She takes Protonix daily  If she misses a dose she notices no difference  Clear productive cough, chronic  She denies heartburn or regurgitation  No dysphagia or odynophagia  She reports left upper quadrant pain over a long time occasionally which was manageable, but over the last few weeks it has become more intense and sharp with increased frequency  The pain radiates to the RUQ  Nothing makes the pain worse, not related to eating  If she stretches out it resolves  She reports sour taste in her mouth occasionally  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       The following portions of the patient's history were reviewed and updated as appropriate: allergies, current medications, past family history, past medical history, past social history, past surgical history and problem list     Past Medical History:   Diagnosis Date    Cancer ([facility] [address] )     Disease of thyroid gland     Endometrial cancer ([facility] [address] )     age 45    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[facility] [address] )     1992    Esophageal reflux     Hypertension     Hypothyroid     Obesity       Past Surgical History:   Procedure Laterality Date    CATARACT EXTRACTION, BILATERAL      COLONOSCOPY  08/2018    polyp- 5 years    KNEE ARTHROPLASTY      TOTAL ABDOMINAL HYSTERECTOMY      age 45   Steve Pila TOTAL ABDOMINAL HYSTERECTOMY W/ BILATERAL SALPINGOOPHORECTOMY  1992    endometrial cancer      Family History   Problem Relation Age of Onset    Stomach cancer Mother 71    Pancreatic cancer Mother 71    Heart attack Father     Cancer Maternal Uncle     No Known Problems Daughter     No Known Problems Maternal Grandmother     No Known Problems Maternal Grandfather     No Known Problems Paternal Grandmother     No Known Problems Paternal Grandfather     No Known Problems Maternal Aunt     No Known Problems Maternal Aunt       Social History     Socioeconomic History    Marital status: /Civil Union     Spouse name: Not on file    Number of children: Not on file    Years of education: Not on file    Highest education level: Not on file   Occupational History    Not on file   Social Needs    Financial resource strain: Not on file    Food insecurity     Worry: Not on file     Inability: Not on file   [employer] needs     Medical: Not on file     Non-medical: Not on file   Tobacco Use    Smoking status: Former Smoker     Types: Cigarettes    Smokeless tobacco: Never Used    Tobacco comment: Quit 40+ years ago   Substance and Sexual Activity    Alcohol use: Not on file     Comment: socially    Drug use: No    Sexual activity: Not Currently     Partners: Male     Birth control/protection: Post-menopausal     Comment:    Lifestyle    Physical activity     Days per week: Not on file     Minutes per session: Not on file    Stress: Not on file   Relationships    Social connections     Talks on phone: Not on file     Gets together: Not on file     Attends Adventism service: Not on file     Active member of club or organization: Not on file     Attends meetings of clubs or organizations: Not on file     Relationship status: Not on file    Intimate partner violence     Fear of current or ex partner: Not on file     Emotionally abused: Not on file     Physically abused: Not on file     Forced sexual activity: Not on file   Other Topics Concern    Not on file   Social History Narrative    Exercising regularly Current Outpatient Medications   Medication Instructions    amLODIPine (NORVASC) 5 mg, Oral, Daily    amLODIPine (NORVASC) 5 mg, Oral, Daily    amoxicillin (AMOXIL) 500 mg capsule TK 4 CS PO 1 HOUR B DAPP    Ascorbic Acid (VITAMIN C PO) Oral    atorvastatin (LIPITOR) 10 mg tablet TAKE 1 TABLET BY MOUTH  DAILY AT BEDTIME    Biotin 1 MG CAPS Oral    Calcium 500 MG tablet Oral    calcium carbonate (CALCIUM 600) 600 mg, Oral, 2 times weekly    Cholecalciferol (VITAMIN D3) 400 units CAPS Oral    Combigan 0 2-0 5 % INSTILL 1 DROP IN BOTH EYES TWICE DAILY    glucosamine 1,500 mg, Oral, Daily    levothyroxine 50 mcg tablet 0 5 tablets, Oral, Daily    Multiple Vitamin (MULTIVITAMINS PO) 1 tablet, Oral, Daily    Omega-3 Fatty Acids (FISH OIL) 1,000 mg Oral    pantoprazole (PROTONIX) 40 mg tablet Oral     No Known Allergies    Review of Systems   Constitutional: Negative for activity change, appetite change, chills, diaphoresis, fatigue, fever and unexpected weight change  HENT: Negative for trouble swallowing  Respiratory: Positive for cough (chronic, clear phlegm) and shortness of breath (exertion, stable)  Negative for chest tightness  Cardiovascular: Negative for chest pain  Gastrointestinal: Positive for abdominal pain and constipation  Negative for nausea and vomiting  Musculoskeletal: Negative for gait problem  Skin: Negative  Neurological: Negative  Hematological: Negative  Objective:   Physical Exam  Constitutional:       General: She is not in acute distress  Appearance: Normal appearance  HENT:      Head: Normocephalic and atraumatic  Eyes:      General: No scleral icterus  Conjunctiva/sclera: Conjunctivae normal    Neck:      Musculoskeletal: Neck supple  Cardiovascular:      Rate and Rhythm: Normal rate and regular rhythm  Pulmonary:      Effort: Pulmonary effort is normal  No respiratory distress  Breath sounds: Normal breath sounds     Abdominal: General: There is no distension  Palpations: Abdomen is soft  Comments: Vertical incision below the umbilicus   Lymphadenopathy:      Cervical: No cervical adenopathy  Skin:     General: Skin is warm and dry  Neurological:      General: No focal deficit present  Mental Status: She is alert and oriented to person, place, and time     Psychiatric:         Mood and Affect: Mood normal      /90   Pulse 69   Temp 97 9 °F (36 6 °C) (Tympanic)   Resp 16   Ht 5' 3" (1 6 m)   Wt 123 kg (271 lb 6 2 oz)   SpO2 96%   BMI 48 07 kg/m²

## 2021-02-26 ENCOUNTER — TRANSCRIBE ORDERS (OUTPATIENT)
Dept: LAB | Facility: CLINIC | Age: 81
End: 2021-02-26

## 2021-02-26 ENCOUNTER — APPOINTMENT (OUTPATIENT)
Dept: LAB | Facility: CLINIC | Age: 81
End: 2021-02-26
Payer: MEDICARE

## 2021-02-26 ENCOUNTER — LAB (OUTPATIENT)
Dept: LAB | Facility: CLINIC | Age: 81
End: 2021-02-26
Payer: MEDICARE

## 2021-02-26 ENCOUNTER — LAB REQUISITION (OUTPATIENT)
Dept: LAB | Facility: HOSPITAL | Age: 81
End: 2021-02-26
Payer: MEDICARE

## 2021-02-26 DIAGNOSIS — K44.9 PARAESOPHAGEAL HERNIA: ICD-10-CM

## 2021-02-26 DIAGNOSIS — K44.9 DIAPHRAGMATIC HERNIA WITHOUT OBSTRUCTION OR GANGRENE: ICD-10-CM

## 2021-02-26 DIAGNOSIS — E07.89 OTHER SPECIFIED DISORDERS OF THYROID: ICD-10-CM

## 2021-02-26 DIAGNOSIS — S27.809A RUPTURE OF DIAPHRAGM: ICD-10-CM

## 2021-02-26 DIAGNOSIS — S27.809A RUPTURE OF DIAPHRAGM: Primary | ICD-10-CM

## 2021-02-26 LAB
ABO GROUP BLD: NORMAL
ANION GAP SERPL CALCULATED.3IONS-SCNC: 8 MMOL/L (ref 4–13)
BLD GP AB SCN SERPL QL: NEGATIVE
BUN SERPL-MCNC: 13 MG/DL (ref 5–25)
CALCIUM SERPL-MCNC: 8.7 MG/DL (ref 8.3–10.1)
CHLORIDE SERPL-SCNC: 100 MMOL/L (ref 100–108)
CO2 SERPL-SCNC: 29 MMOL/L (ref 21–32)
CREAT SERPL-MCNC: 0.76 MG/DL (ref 0.6–1.3)
ERYTHROCYTE [DISTWIDTH] IN BLOOD BY AUTOMATED COUNT: 12.4 % (ref 11.6–15.1)
GFR SERPL CREATININE-BSD FRML MDRD: 74 ML/MIN/1.73SQ M
GLUCOSE SERPL-MCNC: 96 MG/DL (ref 65–140)
HCT VFR BLD AUTO: 46 % (ref 34.8–46.1)
HGB BLD-MCNC: 15.3 G/DL (ref 11.5–15.4)
INR PPP: 1 (ref 0.84–1.19)
MCH RBC QN AUTO: 32.4 PG (ref 26.8–34.3)
MCHC RBC AUTO-ENTMCNC: 33.3 G/DL (ref 31.4–37.4)
MCV RBC AUTO: 98 FL (ref 82–98)
PLATELET # BLD AUTO: 221 THOUSANDS/UL (ref 149–390)
PMV BLD AUTO: 10.5 FL (ref 8.9–12.7)
POTASSIUM SERPL-SCNC: 4.7 MMOL/L (ref 3.5–5.3)
PROTHROMBIN TIME: 13.3 SECONDS (ref 11.6–14.5)
RBC # BLD AUTO: 4.72 MILLION/UL (ref 3.81–5.12)
RH BLD: POSITIVE
SARS-COV-2 RNA RESP QL NAA+PROBE: NEGATIVE
SODIUM SERPL-SCNC: 137 MMOL/L (ref 136–145)
SPECIMEN EXPIRATION DATE: NORMAL
WBC # BLD AUTO: 6.76 THOUSAND/UL (ref 4.31–10.16)

## 2021-02-26 PROCEDURE — 85027 COMPLETE CBC AUTOMATED: CPT

## 2021-02-26 PROCEDURE — U0005 INFEC AGEN DETEC AMPLI PROBE: HCPCS | Performed by: PHYSICIAN ASSISTANT

## 2021-02-26 PROCEDURE — 85610 PROTHROMBIN TIME: CPT

## 2021-02-26 PROCEDURE — 80048 BASIC METABOLIC PNL TOTAL CA: CPT

## 2021-02-26 PROCEDURE — 86901 BLOOD TYPING SEROLOGIC RH(D): CPT | Performed by: PHYSICIAN ASSISTANT

## 2021-02-26 PROCEDURE — 36415 COLL VENOUS BLD VENIPUNCTURE: CPT

## 2021-02-26 PROCEDURE — 86900 BLOOD TYPING SEROLOGIC ABO: CPT | Performed by: PHYSICIAN ASSISTANT

## 2021-02-26 PROCEDURE — U0003 INFECTIOUS AGENT DETECTION BY NUCLEIC ACID (DNA OR RNA); SEVERE ACUTE RESPIRATORY SYNDROME CORONAVIRUS 2 (SARS-COV-2) (CORONAVIRUS DISEASE [COVID-19]), AMPLIFIED PROBE TECHNIQUE, MAKING USE OF HIGH THROUGHPUT TECHNOLOGIES AS DESCRIBED BY CMS-2020-01-R: HCPCS | Performed by: PHYSICIAN ASSISTANT

## 2021-02-26 PROCEDURE — 86850 RBC ANTIBODY SCREEN: CPT | Performed by: PHYSICIAN ASSISTANT

## 2021-02-28 LAB
ATRIAL RATE: 72 BPM
P AXIS: 10 DEGREES
PR INTERVAL: 198 MS
QRS AXIS: -32 DEGREES
QRSD INTERVAL: 80 MS
QT INTERVAL: 396 MS
QTC INTERVAL: 433 MS
T WAVE AXIS: 40 DEGREES
VENTRICULAR RATE: 72 BPM

## 2021-03-02 ENCOUNTER — HOSPITAL ENCOUNTER (OUTPATIENT)
Dept: RADIOLOGY | Facility: HOSPITAL | Age: 81
Discharge: HOME | DRG: 327 | End: 2021-03-02
Payer: MEDICARE

## 2021-03-02 DIAGNOSIS — K44.9 PARAESOPHAGEAL HERNIA: ICD-10-CM

## 2021-03-02 PROCEDURE — 74220 X-RAY XM ESOPHAGUS 1CNTRST: CPT

## 2021-03-04 ENCOUNTER — ANESTHESIA EVENT (OUTPATIENT)
Dept: PERIOP | Facility: HOSPITAL | Age: 81
DRG: 327 | End: 2021-03-04
Payer: MEDICARE

## 2021-03-04 PROBLEM — E66.01 MORBID OBESITY (HCC): Chronic | Status: ACTIVE | Noted: 2021-03-04

## 2021-03-04 NOTE — PRE-PROCEDURE INSTRUCTIONS
Pre-Surgery Instructions:    Pre procedure instructions given, verbalizes understanding reviewed on 3/4 with understanding  NO COVID TEST NEEDED PT IS FULLY VACCINATED LAST DOSE 2/10    Have you had / have a sore throat? no  have you had / have a cough less than 1 week? no  Have you had / have a fever greater than 100 0 - 100  4? no  Are you experiencing any shortness of breath? no       Medication Instructions    amLODIPine (NORVASC) 5 mg tablet Instructed patient per Anesthesia Guidelines  taking on 3/5    amLODIPine (NORVASC) 5 mg tablet Instructed patient per Anesthesia Guidelines   atorvastatin (LIPITOR) 10 mg tablet Instructed patient per Anesthesia Guidelines  taking 3/4 pm    glucosamine 500 MG CAPS capsule Instructed patient per Anesthesia Guidelines  holding for surgery    levothyroxine 50 mcg tablet Instructed patient per Anesthesia Guidelines  taking on 3/5

## 2021-03-04 NOTE — ANESTHESIA PREPROCEDURE EVALUATION
Procedure:  REPAIR HERNIA PARAESOPHAGEAL LAPAROSCOPIC W ROBOTICS, GASTROPEXY VS FUNDOPLICATION, POSSIBLE MESH (N/A Abdomen)  ESOPHAGOGASTRODUODENOSCOPY (EGD) (N/A Esophagus)    Relevant Problems   ANESTHESIA (within normal limits)      CARDIO   (+) Ascending aorta dilatation (HCC) (Stable  Under surveillance  per cardiology)   (+) Hypercholesterolemia   (+) Hypertension, essential, benign      GI/HEPATIC   (+) Paraesophageal hernia      Other   (+) Morbid obesity (HCC)        Physical Exam    Airway    Mallampati score: IV  TM Distance: >3 FB  Neck ROM: full     Dental   No notable dental hx     Cardiovascular  Rhythm: regular, Rate: normal, Cardiovascular exam normal    Pulmonary  Pulmonary exam normal Breath sounds clear to auscultation,     Other Findings        Anesthesia Plan  ASA Score- 3     Anesthesia Type- general with ASA Monitors  Additional Monitors: arterial line  Airway Plan: ETT  Plan Factors-Exercise tolerance (METS): <4 METS  Chart reviewed  EKG reviewed  Existing labs reviewed  Patient summary reviewed  Patient is not a current smoker  Induction- intravenous  Postoperative Plan- Plan for postoperative opioid use  Planned trial extubation    Informed Consent- Anesthetic plan and risks discussed with patient  I personally reviewed this patient with the CRNA  Discussed and agreed on the Anesthesia Plan with the CRNA  Christopher Gipson

## 2021-03-05 ENCOUNTER — ANESTHESIA (OUTPATIENT)
Dept: PERIOP | Facility: HOSPITAL | Age: 81
DRG: 327 | End: 2021-03-05
Payer: MEDICARE

## 2021-03-05 ENCOUNTER — HOSPITAL ENCOUNTER (INPATIENT)
Facility: HOSPITAL | Age: 81
LOS: 1 days | Discharge: HOME | DRG: 327 | End: 2021-03-06
Attending: THORACIC SURGERY (CARDIOTHORACIC VASCULAR SURGERY) | Admitting: THORACIC SURGERY (CARDIOTHORACIC VASCULAR SURGERY)
Payer: MEDICARE

## 2021-03-05 DIAGNOSIS — K44.9 PARAESOPHAGEAL HERNIA: ICD-10-CM

## 2021-03-05 LAB
ANION GAP SERPL CALCULATED.3IONS-SCNC: 6 MMOL/L (ref 4–13)
ATRIAL RATE: 69 BPM
BASE EXCESS BLDA CALC-SCNC: 4 MMOL/L (ref -2–3)
BUN SERPL-MCNC: 12 MG/DL (ref 5–25)
CA-I BLD-SCNC: 1.16 MMOL/L (ref 1.12–1.32)
CALCIUM SERPL-MCNC: 8.4 MG/DL (ref 8.3–10.1)
CHLORIDE SERPL-SCNC: 106 MMOL/L (ref 100–108)
CO2 SERPL-SCNC: 25 MMOL/L (ref 21–32)
CREAT SERPL-MCNC: 0.6 MG/DL (ref 0.6–1.3)
ERYTHROCYTE [DISTWIDTH] IN BLOOD BY AUTOMATED COUNT: 12.4 % (ref 11.6–15.1)
GFR SERPL CREATININE-BSD FRML MDRD: 86 ML/MIN/1.73SQ M
GLUCOSE P FAST SERPL-MCNC: 136 MG/DL (ref 65–99)
GLUCOSE SERPL-MCNC: 120 MG/DL (ref 65–140)
GLUCOSE SERPL-MCNC: 136 MG/DL (ref 65–140)
HCO3 BLDA-SCNC: 31.7 MMOL/L (ref 22–28)
HCT VFR BLD AUTO: 40.8 % (ref 34.8–46.1)
HCT VFR BLD CALC: 39 % (ref 34.8–46.1)
HGB BLD-MCNC: 13.6 G/DL (ref 11.5–15.4)
HGB BLDA-MCNC: 13.3 G/DL (ref 11.5–15.4)
MAGNESIUM SERPL-MCNC: 1.9 MG/DL (ref 1.6–2.6)
MCH RBC QN AUTO: 32.4 PG (ref 26.8–34.3)
MCHC RBC AUTO-ENTMCNC: 33.3 G/DL (ref 31.4–37.4)
MCV RBC AUTO: 97 FL (ref 82–98)
P AXIS: 49 DEGREES
PCO2 BLD: 34 MMOL/L (ref 21–32)
PCO2 BLD: 61.1 MM HG (ref 36–44)
PH BLD: 7.32 [PH] (ref 7.35–7.45)
PLATELET # BLD AUTO: 209 THOUSANDS/UL (ref 149–390)
PMV BLD AUTO: 10.5 FL (ref 8.9–12.7)
PO2 BLD: 89 MM HG (ref 75–129)
POTASSIUM BLD-SCNC: 4 MMOL/L (ref 3.5–5.3)
POTASSIUM SERPL-SCNC: 4.2 MMOL/L (ref 3.5–5.3)
PR INTERVAL: 250 MS
QRS AXIS: -28 DEGREES
QRSD INTERVAL: 88 MS
QT INTERVAL: 408 MS
QTC INTERVAL: 438 MS
RBC # BLD AUTO: 4.2 MILLION/UL (ref 3.81–5.12)
SAO2 % BLD FROM PO2: 96 % (ref 60–85)
SODIUM BLD-SCNC: 137 MMOL/L (ref 136–145)
SODIUM SERPL-SCNC: 137 MMOL/L (ref 136–145)
SPECIMEN SOURCE: ABNORMAL
T WAVE AXIS: 66 DEGREES
VENTRICULAR RATE: 69 BPM
WBC # BLD AUTO: 11.52 THOUSAND/UL (ref 4.31–10.16)

## 2021-03-05 PROCEDURE — 0DJ08ZZ INSPECTION OF UPPER INTESTINAL TRACT, VIA NATURAL OR ARTIFICIAL OPENING ENDOSCOPIC: ICD-10-PCS | Performed by: THORACIC SURGERY (CARDIOTHORACIC VASCULAR SURGERY)

## 2021-03-05 PROCEDURE — 80048 BASIC METABOLIC PNL TOTAL CA: CPT | Performed by: PHYSICIAN ASSISTANT

## 2021-03-05 PROCEDURE — 82947 ASSAY GLUCOSE BLOOD QUANT: CPT

## 2021-03-05 PROCEDURE — C1781 MESH (IMPLANTABLE): HCPCS | Performed by: THORACIC SURGERY (CARDIOTHORACIC VASCULAR SURGERY)

## 2021-03-05 PROCEDURE — 82330 ASSAY OF CALCIUM: CPT

## 2021-03-05 PROCEDURE — 82803 BLOOD GASES ANY COMBINATION: CPT

## 2021-03-05 PROCEDURE — 88302 TISSUE EXAM BY PATHOLOGIST: CPT | Performed by: PATHOLOGY

## 2021-03-05 PROCEDURE — 0BUT4JZ SUPPLEMENT DIAPHRAGM WITH SYNTHETIC SUBSTITUTE, PERCUTANEOUS ENDOSCOPIC APPROACH: ICD-10-PCS | Performed by: THORACIC SURGERY (CARDIOTHORACIC VASCULAR SURGERY)

## 2021-03-05 PROCEDURE — 84295 ASSAY OF SERUM SODIUM: CPT

## 2021-03-05 PROCEDURE — 85027 COMPLETE CBC AUTOMATED: CPT | Performed by: PHYSICIAN ASSISTANT

## 2021-03-05 PROCEDURE — 85014 HEMATOCRIT: CPT

## 2021-03-05 PROCEDURE — 0DV44ZZ RESTRICTION OF ESOPHAGOGASTRIC JUNCTION, PERCUTANEOUS ENDOSCOPIC APPROACH: ICD-10-PCS | Performed by: THORACIC SURGERY (CARDIOTHORACIC VASCULAR SURGERY)

## 2021-03-05 PROCEDURE — 83735 ASSAY OF MAGNESIUM: CPT | Performed by: PHYSICIAN ASSISTANT

## 2021-03-05 PROCEDURE — 93010 ELECTROCARDIOGRAM REPORT: CPT | Performed by: INTERNAL MEDICINE

## 2021-03-05 PROCEDURE — 43282 LAP PARAESOPH HER RPR W/MESH: CPT | Performed by: THORACIC SURGERY (CARDIOTHORACIC VASCULAR SURGERY)

## 2021-03-05 PROCEDURE — 84132 ASSAY OF SERUM POTASSIUM: CPT

## 2021-03-05 PROCEDURE — 8E0W4CZ ROBOTIC ASSISTED PROCEDURE OF TRUNK REGION, PERCUTANEOUS ENDOSCOPIC APPROACH: ICD-10-PCS | Performed by: THORACIC SURGERY (CARDIOTHORACIC VASCULAR SURGERY)

## 2021-03-05 PROCEDURE — 93005 ELECTROCARDIOGRAM TRACING: CPT

## 2021-03-05 DEVICE — BIO-A TISSUE REINFORCEMENT 7CMX10CM
Type: IMPLANTABLE DEVICE | Site: ESOPHAGUS | Status: FUNCTIONAL
Brand: GORE BIO-A TISSUE REINFORCEMENT

## 2021-03-05 RX ORDER — DEXAMETHASONE SODIUM PHOSPHATE 10 MG/ML
INJECTION, SOLUTION INTRAMUSCULAR; INTRAVENOUS AS NEEDED
Status: DISCONTINUED | OUTPATIENT
Start: 2021-03-05 | End: 2021-03-05

## 2021-03-05 RX ORDER — ONDANSETRON 2 MG/ML
4 INJECTION INTRAMUSCULAR; INTRAVENOUS ONCE AS NEEDED
Status: COMPLETED | OUTPATIENT
Start: 2021-03-05 | End: 2021-03-05

## 2021-03-05 RX ORDER — PROMETHAZINE HYDROCHLORIDE 25 MG/ML
12.5 INJECTION, SOLUTION INTRAMUSCULAR; INTRAVENOUS ONCE AS NEEDED
Status: DISCONTINUED | OUTPATIENT
Start: 2021-03-05 | End: 2021-03-05

## 2021-03-05 RX ORDER — ALBUTEROL SULFATE 2.5 MG/3ML
2.5 SOLUTION RESPIRATORY (INHALATION) ONCE AS NEEDED
Status: DISCONTINUED | OUTPATIENT
Start: 2021-03-05 | End: 2021-03-05 | Stop reason: HOSPADM

## 2021-03-05 RX ORDER — PROPOFOL 10 MG/ML
INJECTION, EMULSION INTRAVENOUS CONTINUOUS PRN
Status: DISCONTINUED | OUTPATIENT
Start: 2021-03-05 | End: 2021-03-05

## 2021-03-05 RX ORDER — ACETAMINOPHEN 325 MG/1
650 TABLET ORAL EVERY 6 HOURS SCHEDULED
Status: DISCONTINUED | OUTPATIENT
Start: 2021-03-05 | End: 2021-03-06 | Stop reason: HOSPADM

## 2021-03-05 RX ORDER — PROPOFOL 10 MG/ML
INJECTION, EMULSION INTRAVENOUS AS NEEDED
Status: DISCONTINUED | OUTPATIENT
Start: 2021-03-05 | End: 2021-03-05

## 2021-03-05 RX ORDER — ONDANSETRON 2 MG/ML
INJECTION INTRAMUSCULAR; INTRAVENOUS AS NEEDED
Status: DISCONTINUED | OUTPATIENT
Start: 2021-03-05 | End: 2021-03-05

## 2021-03-05 RX ORDER — AMLODIPINE BESYLATE 5 MG/1
5 TABLET ORAL DAILY
Status: DISCONTINUED | OUTPATIENT
Start: 2021-03-06 | End: 2021-03-06 | Stop reason: HOSPADM

## 2021-03-05 RX ORDER — GLIMEPIRIDE 2 MG/1
1 TABLET ORAL DAILY
Status: DISCONTINUED | OUTPATIENT
Start: 2021-03-06 | End: 2021-03-06 | Stop reason: HOSPADM

## 2021-03-05 RX ORDER — OXYCODONE HYDROCHLORIDE 5 MG/1
10 TABLET ORAL EVERY 4 HOURS PRN
Status: DISCONTINUED | OUTPATIENT
Start: 2021-03-05 | End: 2021-03-06 | Stop reason: HOSPADM

## 2021-03-05 RX ORDER — DEXMEDETOMIDINE HYDROCHLORIDE 100 UG/ML
INJECTION, SOLUTION INTRAVENOUS AS NEEDED
Status: DISCONTINUED | OUTPATIENT
Start: 2021-03-05 | End: 2021-03-05

## 2021-03-05 RX ORDER — SENNOSIDES 8.6 MG
1 TABLET ORAL DAILY
Status: DISCONTINUED | OUTPATIENT
Start: 2021-03-06 | End: 2021-03-06 | Stop reason: HOSPADM

## 2021-03-05 RX ORDER — ONDANSETRON 2 MG/ML
4 INJECTION INTRAMUSCULAR; INTRAVENOUS ONCE AS NEEDED
Status: DISCONTINUED | OUTPATIENT
Start: 2021-03-05 | End: 2021-03-05

## 2021-03-05 RX ORDER — NEOSTIGMINE METHYLSULFATE 1 MG/ML
INJECTION INTRAVENOUS AS NEEDED
Status: DISCONTINUED | OUTPATIENT
Start: 2021-03-05 | End: 2021-03-05

## 2021-03-05 RX ORDER — SODIUM CHLORIDE, SODIUM LACTATE, POTASSIUM CHLORIDE, CALCIUM CHLORIDE 600; 310; 30; 20 MG/100ML; MG/100ML; MG/100ML; MG/100ML
100 INJECTION, SOLUTION INTRAVENOUS CONTINUOUS
Status: DISCONTINUED | OUTPATIENT
Start: 2021-03-05 | End: 2021-03-06

## 2021-03-05 RX ORDER — EPHEDRINE SULFATE 50 MG/ML
INJECTION INTRAVENOUS AS NEEDED
Status: DISCONTINUED | OUTPATIENT
Start: 2021-03-05 | End: 2021-03-05

## 2021-03-05 RX ORDER — GLYCOPYRROLATE 0.2 MG/ML
INJECTION INTRAMUSCULAR; INTRAVENOUS AS NEEDED
Status: DISCONTINUED | OUTPATIENT
Start: 2021-03-05 | End: 2021-03-05

## 2021-03-05 RX ORDER — SODIUM CHLORIDE, SODIUM LACTATE, POTASSIUM CHLORIDE, CALCIUM CHLORIDE 600; 310; 30; 20 MG/100ML; MG/100ML; MG/100ML; MG/100ML
INJECTION, SOLUTION INTRAVENOUS CONTINUOUS PRN
Status: DISCONTINUED | OUTPATIENT
Start: 2021-03-05 | End: 2021-03-05

## 2021-03-05 RX ORDER — HYDROMORPHONE HCL/PF 1 MG/ML
0.2 SYRINGE (ML) INJECTION
Status: DISCONTINUED | OUTPATIENT
Start: 2021-03-05 | End: 2021-03-05 | Stop reason: HOSPADM

## 2021-03-05 RX ORDER — PANTOPRAZOLE SODIUM 40 MG/1
40 TABLET, DELAYED RELEASE ORAL
Status: DISCONTINUED | OUTPATIENT
Start: 2021-03-06 | End: 2021-03-06 | Stop reason: HOSPADM

## 2021-03-05 RX ORDER — LEVOTHYROXINE SODIUM 0.03 MG/1
25 TABLET ORAL
Status: DISCONTINUED | OUTPATIENT
Start: 2021-03-06 | End: 2021-03-06 | Stop reason: HOSPADM

## 2021-03-05 RX ORDER — OXYCODONE HYDROCHLORIDE 5 MG/1
5 TABLET ORAL EVERY 4 HOURS PRN
Status: DISCONTINUED | OUTPATIENT
Start: 2021-03-05 | End: 2021-03-06 | Stop reason: HOSPADM

## 2021-03-05 RX ORDER — HYDROMORPHONE HCL/PF 1 MG/ML
0.5 SYRINGE (ML) INJECTION
Status: DISCONTINUED | OUTPATIENT
Start: 2021-03-05 | End: 2021-03-06 | Stop reason: HOSPADM

## 2021-03-05 RX ORDER — ONDANSETRON 2 MG/ML
4 INJECTION INTRAMUSCULAR; INTRAVENOUS EVERY 6 HOURS PRN
Status: DISCONTINUED | OUTPATIENT
Start: 2021-03-05 | End: 2021-03-06

## 2021-03-05 RX ORDER — MAGNESIUM SULFATE 1 G/100ML
1 INJECTION INTRAVENOUS ONCE
Status: COMPLETED | OUTPATIENT
Start: 2021-03-05 | End: 2021-03-05

## 2021-03-05 RX ORDER — MAGNESIUM HYDROXIDE 1200 MG/15ML
LIQUID ORAL AS NEEDED
Status: DISCONTINUED | OUTPATIENT
Start: 2021-03-05 | End: 2021-03-05 | Stop reason: HOSPADM

## 2021-03-05 RX ORDER — DOCUSATE SODIUM 100 MG/1
100 CAPSULE, LIQUID FILLED ORAL 2 TIMES DAILY
Status: DISCONTINUED | OUTPATIENT
Start: 2021-03-06 | End: 2021-03-06 | Stop reason: HOSPADM

## 2021-03-05 RX ORDER — METOCLOPRAMIDE HYDROCHLORIDE 5 MG/ML
10 INJECTION INTRAMUSCULAR; INTRAVENOUS ONCE AS NEEDED
Status: DISCONTINUED | OUTPATIENT
Start: 2021-03-05 | End: 2021-03-05

## 2021-03-05 RX ORDER — HYDROMORPHONE HCL/PF 1 MG/ML
0.5 SYRINGE (ML) INJECTION
Status: DISCONTINUED | OUTPATIENT
Start: 2021-03-05 | End: 2021-03-05 | Stop reason: HOSPADM

## 2021-03-05 RX ORDER — ALBUMIN, HUMAN INJ 5% 5 %
SOLUTION INTRAVENOUS CONTINUOUS PRN
Status: DISCONTINUED | OUTPATIENT
Start: 2021-03-05 | End: 2021-03-05

## 2021-03-05 RX ORDER — LIDOCAINE HYDROCHLORIDE 10 MG/ML
0.5 INJECTION, SOLUTION EPIDURAL; INFILTRATION; INTRACAUDAL; PERINEURAL ONCE AS NEEDED
Status: DISCONTINUED | OUTPATIENT
Start: 2021-03-05 | End: 2021-03-05

## 2021-03-05 RX ORDER — LABETALOL 20 MG/4 ML (5 MG/ML) INTRAVENOUS SYRINGE
10
Status: DISCONTINUED | OUTPATIENT
Start: 2021-03-05 | End: 2021-03-05 | Stop reason: HOSPADM

## 2021-03-05 RX ORDER — HYDRALAZINE HYDROCHLORIDE 20 MG/ML
5 INJECTION INTRAMUSCULAR; INTRAVENOUS
Status: DISCONTINUED | OUTPATIENT
Start: 2021-03-05 | End: 2021-03-05 | Stop reason: HOSPADM

## 2021-03-05 RX ORDER — ATORVASTATIN CALCIUM 10 MG/1
10 TABLET, FILM COATED ORAL
Status: DISCONTINUED | OUTPATIENT
Start: 2021-03-06 | End: 2021-03-06 | Stop reason: HOSPADM

## 2021-03-05 RX ORDER — MAGNESIUM SULFATE HEPTAHYDRATE 40 MG/ML
INJECTION, SOLUTION INTRAVENOUS AS NEEDED
Status: DISCONTINUED | OUTPATIENT
Start: 2021-03-05 | End: 2021-03-05

## 2021-03-05 RX ORDER — PROMETHAZINE HYDROCHLORIDE 25 MG/ML
12.5 INJECTION, SOLUTION INTRAMUSCULAR; INTRAVENOUS ONCE AS NEEDED
Status: DISCONTINUED | OUTPATIENT
Start: 2021-03-05 | End: 2021-03-05 | Stop reason: HOSPADM

## 2021-03-05 RX ORDER — POLYETHYLENE GLYCOL 3350 17 G/17G
17 POWDER, FOR SOLUTION ORAL DAILY PRN
Status: DISCONTINUED | OUTPATIENT
Start: 2021-03-06 | End: 2021-03-06 | Stop reason: HOSPADM

## 2021-03-05 RX ORDER — BUPIVACAINE HYDROCHLORIDE 2.5 MG/ML
INJECTION, SOLUTION EPIDURAL; INFILTRATION; INTRACAUDAL AS NEEDED
Status: DISCONTINUED | OUTPATIENT
Start: 2021-03-05 | End: 2021-03-05 | Stop reason: HOSPADM

## 2021-03-05 RX ORDER — FENTANYL CITRATE 50 UG/ML
INJECTION, SOLUTION INTRAMUSCULAR; INTRAVENOUS AS NEEDED
Status: DISCONTINUED | OUTPATIENT
Start: 2021-03-05 | End: 2021-03-05

## 2021-03-05 RX ORDER — METOCLOPRAMIDE HYDROCHLORIDE 5 MG/ML
10 INJECTION INTRAMUSCULAR; INTRAVENOUS ONCE AS NEEDED
Status: DISCONTINUED | OUTPATIENT
Start: 2021-03-05 | End: 2021-03-05 | Stop reason: HOSPADM

## 2021-03-05 RX ORDER — SODIUM CHLORIDE 9 MG/ML
INJECTION, SOLUTION INTRAVENOUS CONTINUOUS PRN
Status: DISCONTINUED | OUTPATIENT
Start: 2021-03-05 | End: 2021-03-05

## 2021-03-05 RX ORDER — ALBUTEROL SULFATE 90 UG/1
2 AEROSOL, METERED RESPIRATORY (INHALATION) EVERY 4 HOURS PRN
Status: DISCONTINUED | OUTPATIENT
Start: 2021-03-05 | End: 2021-03-06 | Stop reason: HOSPADM

## 2021-03-05 RX ORDER — ROCURONIUM BROMIDE 10 MG/ML
INJECTION, SOLUTION INTRAVENOUS AS NEEDED
Status: DISCONTINUED | OUTPATIENT
Start: 2021-03-05 | End: 2021-03-05

## 2021-03-05 RX ORDER — FENTANYL CITRATE/PF 50 MCG/ML
25 SYRINGE (ML) INJECTION
Status: DISCONTINUED | OUTPATIENT
Start: 2021-03-05 | End: 2021-03-05 | Stop reason: HOSPADM

## 2021-03-05 RX ORDER — LABETALOL 20 MG/4 ML (5 MG/ML) INTRAVENOUS SYRINGE
10 EVERY 4 HOURS PRN
Status: DISCONTINUED | OUTPATIENT
Start: 2021-03-05 | End: 2021-03-06 | Stop reason: HOSPADM

## 2021-03-05 RX ADMIN — ONDANSETRON 4 MG: 2 INJECTION INTRAMUSCULAR; INTRAVENOUS at 10:59

## 2021-03-05 RX ADMIN — Medication 3000 MG: at 07:35

## 2021-03-05 RX ADMIN — ONDANSETRON 4 MG: 2 INJECTION INTRAMUSCULAR; INTRAVENOUS at 21:09

## 2021-03-05 RX ADMIN — HYDROMORPHONE HYDROCHLORIDE 0.2 MG: 1 INJECTION, SOLUTION INTRAMUSCULAR; INTRAVENOUS; SUBCUTANEOUS at 14:31

## 2021-03-05 RX ADMIN — PHENYLEPHRINE HYDROCHLORIDE 50 MCG: 10 INJECTION INTRAVENOUS at 09:40

## 2021-03-05 RX ADMIN — HYDROMORPHONE HYDROCHLORIDE 0.2 MG: 1 INJECTION, SOLUTION INTRAMUSCULAR; INTRAVENOUS; SUBCUTANEOUS at 14:01

## 2021-03-05 RX ADMIN — DEXMEDETOMIDINE HCL 4 MCG: 100 INJECTION INTRAVENOUS at 11:15

## 2021-03-05 RX ADMIN — MAGNESIUM SULFATE HEPTAHYDRATE 1 G: 1 INJECTION, SOLUTION INTRAVENOUS at 14:40

## 2021-03-05 RX ADMIN — HYDROMORPHONE HYDROCHLORIDE 0.2 MG: 1 INJECTION, SOLUTION INTRAMUSCULAR; INTRAVENOUS; SUBCUTANEOUS at 14:06

## 2021-03-05 RX ADMIN — PHENYLEPHRINE HYDROCHLORIDE 100 MCG: 10 INJECTION INTRAVENOUS at 08:58

## 2021-03-05 RX ADMIN — ALBUMIN (HUMAN): 12.5 INJECTION, SOLUTION INTRAVENOUS at 08:32

## 2021-03-05 RX ADMIN — SODIUM CHLORIDE, SODIUM LACTATE, POTASSIUM CHLORIDE, AND CALCIUM CHLORIDE: .6; .31; .03; .02 INJECTION, SOLUTION INTRAVENOUS at 07:30

## 2021-03-05 RX ADMIN — ONDANSETRON 4 MG: 2 INJECTION INTRAMUSCULAR; INTRAVENOUS at 13:38

## 2021-03-05 RX ADMIN — EPHEDRINE SULFATE 10 MG: 50 INJECTION, SOLUTION INTRAVENOUS at 08:04

## 2021-03-05 RX ADMIN — FENTANYL CITRATE 25 MCG: 50 INJECTION INTRAMUSCULAR; INTRAVENOUS at 08:20

## 2021-03-05 RX ADMIN — HYDROMORPHONE HYDROCHLORIDE 0.2 MG: 1 INJECTION, SOLUTION INTRAMUSCULAR; INTRAVENOUS; SUBCUTANEOUS at 13:23

## 2021-03-05 RX ADMIN — EPHEDRINE SULFATE 5 MG: 50 INJECTION, SOLUTION INTRAVENOUS at 09:24

## 2021-03-05 RX ADMIN — ROCURONIUM BROMIDE 50 MG: 50 INJECTION, SOLUTION INTRAVENOUS at 07:45

## 2021-03-05 RX ADMIN — SODIUM CHLORIDE: 0.9 INJECTION, SOLUTION INTRAVENOUS at 07:50

## 2021-03-05 RX ADMIN — GLYCOPYRROLATE 0.2 MG: 0.2 INJECTION, SOLUTION INTRAMUSCULAR; INTRAVENOUS at 09:29

## 2021-03-05 RX ADMIN — HYDRALAZINE HYDROCHLORIDE 5 MG: 20 INJECTION INTRAMUSCULAR; INTRAVENOUS at 15:40

## 2021-03-05 RX ADMIN — SODIUM CHLORIDE, SODIUM LACTATE, POTASSIUM CHLORIDE, AND CALCIUM CHLORIDE 100 ML/HR: .6; .31; .03; .02 INJECTION, SOLUTION INTRAVENOUS at 12:35

## 2021-03-05 RX ADMIN — MAGNESIUM SULFATE HEPTAHYDRATE 2 G: 40 INJECTION, SOLUTION INTRAVENOUS at 09:23

## 2021-03-05 RX ADMIN — ROCURONIUM BROMIDE 10 MG: 50 INJECTION, SOLUTION INTRAVENOUS at 09:35

## 2021-03-05 RX ADMIN — PROPOFOL 150 MG: 10 INJECTION, EMULSION INTRAVENOUS at 07:45

## 2021-03-05 RX ADMIN — SUGAMMADEX 236 MG: 100 INJECTION, SOLUTION INTRAVENOUS at 11:21

## 2021-03-05 RX ADMIN — EPHEDRINE SULFATE 10 MG: 50 INJECTION, SOLUTION INTRAVENOUS at 09:09

## 2021-03-05 RX ADMIN — HYDROMORPHONE HYDROCHLORIDE 0.2 MG: 1 INJECTION, SOLUTION INTRAMUSCULAR; INTRAVENOUS; SUBCUTANEOUS at 13:33

## 2021-03-05 RX ADMIN — PHENYLEPHRINE HYDROCHLORIDE 100 MCG: 10 INJECTION INTRAVENOUS at 09:54

## 2021-03-05 RX ADMIN — GLYCOPYRROLATE 0.8 MG: 0.2 INJECTION, SOLUTION INTRAMUSCULAR; INTRAVENOUS at 11:07

## 2021-03-05 RX ADMIN — Medication 25 MCG: at 13:10

## 2021-03-05 RX ADMIN — EPHEDRINE SULFATE 5 MG: 50 INJECTION, SOLUTION INTRAVENOUS at 08:32

## 2021-03-05 RX ADMIN — OXYCODONE HYDROCHLORIDE 5 MG: 5 TABLET ORAL at 19:57

## 2021-03-05 RX ADMIN — PHENYLEPHRINE HYDROCHLORIDE 50 MCG: 10 INJECTION INTRAVENOUS at 09:26

## 2021-03-05 RX ADMIN — PHENYLEPHRINE HYDROCHLORIDE 50 MCG: 10 INJECTION INTRAVENOUS at 08:31

## 2021-03-05 RX ADMIN — FENTANYL CITRATE 25 MCG: 50 INJECTION INTRAMUSCULAR; INTRAVENOUS at 07:40

## 2021-03-05 RX ADMIN — PROPOFOL 50 MCG/KG/MIN: 10 INJECTION, EMULSION INTRAVENOUS at 07:47

## 2021-03-05 RX ADMIN — LABETALOL 20 MG/4 ML (5 MG/ML) INTRAVENOUS SYRINGE 10 MG: at 23:41

## 2021-03-05 RX ADMIN — NEOSTIGMINE METHYLSULFATE 4 MG: 1 INJECTION INTRAVENOUS at 11:07

## 2021-03-05 RX ADMIN — ACETAMINOPHEN 650 MG: 325 TABLET ORAL at 18:07

## 2021-03-05 RX ADMIN — FENTANYL CITRATE 50 MCG: 50 INJECTION INTRAMUSCULAR; INTRAVENOUS at 11:18

## 2021-03-05 RX ADMIN — SODIUM CHLORIDE: 0.9 INJECTION, SOLUTION INTRAVENOUS at 10:05

## 2021-03-05 RX ADMIN — Medication 25 MCG: at 12:04

## 2021-03-05 RX ADMIN — DEXMEDETOMIDINE HCL 8 MCG: 100 INJECTION INTRAVENOUS at 11:13

## 2021-03-05 RX ADMIN — Medication 25 MCG: at 12:31

## 2021-03-05 RX ADMIN — PHENYLEPHRINE HYDROCHLORIDE 20 MCG/MIN: 10 INJECTION INTRAVENOUS at 07:48

## 2021-03-05 RX ADMIN — ACETAMINOPHEN 650 MG: 325 TABLET ORAL at 23:40

## 2021-03-05 RX ADMIN — METRONIDAZOLE 500 MG: 500 INJECTION, SOLUTION INTRAVENOUS at 07:35

## 2021-03-05 RX ADMIN — PHENYLEPHRINE HYDROCHLORIDE 200 MCG: 10 INJECTION INTRAVENOUS at 09:00

## 2021-03-05 RX ADMIN — LABETALOL 20 MG/4 ML (5 MG/ML) INTRAVENOUS SYRINGE 10 MG: at 15:37

## 2021-03-05 RX ADMIN — DEXAMETHASONE SODIUM PHOSPHATE 10 MG: 10 INJECTION, SOLUTION INTRAMUSCULAR; INTRAVENOUS at 10:59

## 2021-03-05 RX ADMIN — PHENYLEPHRINE HYDROCHLORIDE 100 MCG: 10 INJECTION INTRAVENOUS at 07:48

## 2021-03-05 RX ADMIN — ROCURONIUM BROMIDE 20 MG: 50 INJECTION, SOLUTION INTRAVENOUS at 09:12

## 2021-03-05 RX ADMIN — Medication 25 MCG: at 13:05

## 2021-03-05 RX ADMIN — PHENYLEPHRINE HYDROCHLORIDE 200 MCG: 10 INJECTION INTRAVENOUS at 08:49

## 2021-03-05 NOTE — RESPIRATORY THERAPY NOTE
03/05/21 1741   Respiratory Protocol   Protocol Initiated? Yes   Protocol Selection Respiratory; Airway Clearance   Language Barrier? No   Medical & Social History Reviewed? Yes   Diagnostic Studies Reviewed? Yes   Physical Assessment Performed? Yes   Respiratory Plan No distress/Pulmonary history   Airway Clearance Plan Incentive Spirometer   Respiratory Assessment   Assessment Type Assess only   General Appearance Alert   Respiratory Pattern Normal   Chest Assessment Chest expansion symmetrical   Bilateral Breath Sounds Coarse; Expiratory wheezes   Cough Strong;Non-productive   Resp Comments Pt assessed for ACP  Able to achieve >1000 mL on 1st attempt  c/o associated pain  breathsounds coarse with wheeze  Pt reports previous aprox 5 pk yr smoker  Takes a rescue inhaler at home, infrequently, PRN  Plan to order same based on assessment, and follow for ACP     O2 Device 6 lpm nc   Additional Assessments   SpO2 94 %

## 2021-03-05 NOTE — ANESTHESIA PROCEDURE NOTES
Arterial Line Insertion  Performed by: Shabana Toledo CRNA  Authorized by: Aaron Campa MD   Consent: Verbal consent obtained  Written consent obtained  Patient understanding: patient states understanding of the procedure being performed  Patient consent: the patient's understanding of the procedure matches consent given  Procedure consent: procedure consent matches procedure scheduled  Relevant documents: relevant documents present and verified  Required items: required blood products, implants, devices, and special equipment available  Patient identity confirmed: arm band  Time out: Immediately prior to procedure a "time out" was called to verify the correct patient, procedure, equipment, support staff and site/side marked as required  Preparation: Patient was prepped and draped in the usual sterile fashion    Indications: hemodynamic monitoring  Orientation:  Right  Location: radial artery  Sedation:  Patient sedated: yes    Procedure Details:  Zak's test normal: yes  Needle gauge: 20  Seldinger technique: Seldinger technique used  Number of attempts: 1    Post-procedure:  Post-procedure: dressing applied  Waveform: good waveform and waveform confirmed  Post-procedure CNS: normal and unchanged

## 2021-03-05 NOTE — OP NOTE
OPERATIVE REPORT  PATIENT NAME: Kristin Jara    :  1940  MRN: [id number]  Pt Location: [facility] OR ROOM 14    SURGERY DATE: 3/5/2021    Surgeon(s) and Role:     * Therese Gray MD - Primary     * Marc Garcia PA-C - Assisting    Preop Diagnosis:  Paraesophageal hernia [K44 9]    Post-Op Diagnosis Codes:     * Paraesophageal hernia [K44 9]    Procedure(s) (LRB):  REPAIR HERNIA PARAESOPHAGEAL LAPAROSCOPIC W ROBOTICS WITH MESH, DOOR FUNDOPLICATION (N/A)  ESOPHAGOGASTRODUODENOSCOPY (EGD) (N/A)   1  Robotic type 3 paraesophageal hernia repair and Albino fundoplication with mesh  2  EGD    Specimen(s):  ID Type Source Tests Collected by Time Destination   1 : Anterior fat pad and portion of paraesophageal hernia sac Tissue Soft Tissue, Other TISSUE EXAM Therese Gray MD 3/5/2021 1036        Estimated Blood Loss:   Minimal    Drains:  Urethral Catheter Straight-tip; Non-latex 16 Fr  (Active)   Site Assessment Clean;Skin intact 21 1129   Collection Container Standard drainage bag 21 1129   Securement Method Securing device (Describe) 21 1129   Number of days: 0       Anesthesia Type:   General    Operative Indications:  Paraesophageal hernia [K44 9]   79 year female with a large type 3 paraesophageal hernia causing left upper quadrant pain,    some shortness of breath,  and mild regurgitation       Operative Findings:   large type 3 paraesophageal hernia with 75% of stomach in chest   Initially GE junction located at 31 cm from the incisors  At the conclusion was located at 39 cm from the incisors or approximately 2-3 cm in the abdomen  Very patulous GE junction on endoscopy so perform partial fundoplication  Complications:   None    Procedure and Technique:  After obtaining informed consent from the patient, they were transferred to the operating room and placed supine on the operating table   General anesthesia was then induced and a single-lumen endotracheal tube was placed without incident  A footboard was placed at the base of the bed and the patients feet, legs and thighs were secured to the bed  All bony prominences were padded and checked  The operating room table was placed in full reverse Trendelenburg to check for any patient movement and adjustments were made as needed  Next a formal time-out was called verifying patient , date of birth, procedure, consent, antibiotics, beta-blocker as indicated, anticipated specimens with handling, SCD use and other special considerations  Next and EGD was performed  The adult endoscope was inserted into the patient's oropharynx and directed down into their esophagus  Here we encountered   A dilated proximal esophagus  The GE junction was located at 30 cm from the incisors  There was retained food products in the stomach and the majority of the stomach was in the chest and very compressed  I could not initially get into the duodenum  The abdomen was then prepped with ChloraPrep and draped in standard surgical fashion  An 8 mm incision was made at Barrera's point, 2 finger breaths below the left costal margin in the midclavicular line  The Veress needle was inserted through the abdominal wall and into the abdominal cavity using the water drop technique  Next the abdomen was then insufflated without issue  A supraumbilical 8 mm incision was made and a robotic trocar was placed  The 30 degree robotic camera was inserted and the abdomen was thoroughly inspected  There was no sign Veress needle or trocar injury  There was some moderate adhesions below the umbilicus from the previous patient's previous incisions and surgery  These were outside our port placement  All other ports were placed under direct visualization using the robotic camera  3mL of 0 25% Marcaine was used to anesthetize the peritoneum for all additional port placements  Next a left upper quadrant 8mm lateral robotic port was placed   An robotic 8mm port was placed through the Veress incision  An 8mm RUQ robotic port was placed just lateral to the falciform, at the same level as the LUQ port  A RLQ 12mm assistant port was placed between the camera and the RUQ robotic port  Finally a 5 mm incision was made just to the left of the xiphoid process  A Loreto liver retractor was placed through here and secured to the bedside with a sterile hernandez  The patient was then placed in full reverse trendeleburg  The [employer] Xi robot was docked at this time  A tip-up fenestrated grasper, caudier grasper and robotic vessel sealer were inserted and tested  I un-scrubbed at this time and went to the robotic console  We started our dissection along the lesser curvature, entering the gastro-hepatic ligament  The robotic vessel sealer was used to continue this dissection towards the right crura  Once this was identified we continued this dissection along the crura, being careful to protect the peritoneal lining of the muscle  We slowly began to reduce the hernia sac, keeping this with our stomach  We continued our dissection into the mediastinum,  all mediastinal attachments and taking care to avoid the pleura  We specifically looked for and identified the bilateral vagus nerves in the chest, taking care to stay well away from these with our dissection  The mediastinal dissection was continued around to the left lateral aspect of the esophagus and down onto the left crura  We then transitioned to the greater curvature of the stomach on the fundus  A few short gastric vessels were taken with the vessel sealer moving cephalad around the fundus towards the GE junction  We continued this dissection up towards the left corner with lobe rolling the stomach medially  This eventually allowed us to meet up with our previous mediastinal dissection      At this point, we went back to the lesser curvature and dissected the inferior aspect of the gastro-esophageal junction off of the crura  A window was made in this retroesophageal space and a penrose drain was passed through this space  This Penrose drain was then passed through a slit in itself and tightened to help with downward retraction on the gastroesophageal junction  The bedside assistant helped to grasp this penrose, providing counter traction  With the help with this retraction we went back up into the mediastinum and fully mobilized the hernia sac  The mediastinal attachments to the esophagus were freed  Again care was taken in this area to identify the bilateral vagus nerves and preserved these  The dissection this area with continued up as high as we could go  We then excised the hernia sac and a portion of the anterior gastric fat pad  This was set above the spleen for  Eventual removal      we then performed a EGD  Repeat EGD showed a fully reduced hernia  The GE junction was located at 39 cm from the incisors  This was approximately 2-3 cm inside the abdomen  The stomach was still full of a moderate amount of fluid  Remainder of stomach did not had a major masses  The duodenum appeared normal without any ulcers  Retroflexion view showed no twisting of the stomach  Withdrawal the scope but a somewhat dilated/ patch was GE junction that was patent without any insufflation  We then placed a 52 [place] bougie without any issue  Going back to the robot we mobilized the esophagus lobe further as much as possible  We dissected underneath the inferior pulmonary veins  This was as far up as we could reach  Next we performed our crural repair  Simple, interrupted 0 Ethibond sutures were used to reapproximate the crura posteriorly  A total of 4 sutures were placed posteriorly  We also placed 3 additional lateral sutures at the 11 o'clock position to further close down the crural space  When finished the esophagus without any bougie or EGD had a few mm of room circumferentially around the crura    Next a Bio [employer] Canon semi-biologic mesh was placed at the hiatus  This was positioned in place behind the liver and underneath the gastroesophageal junction  The bougie was then removed at this time without issue     Then given the patulous GE junction we performed a partial fundoplication  The stomach was translated anteriorly over the GE junction and sutured in place performing our Albino fundoplication  Major to mobilize the short gastric line so that there was no tension  A total of 4 sutures were used to suture the short gastric line to the left crura, midpoint of the crura, right crura and base of the crura  At the conclusion of this there was no significant tension on the stomach  This laid nicely and slightly compressed the GE junction  The abdomen was then inspected and thoroughly aspirated dry  There was no active bleeding identified  The robot was undocked at this time and all robotic instruments were removed  The fascia for the 12 mm assistant port site was then closed using a percutaneous closure device and 2 interrupted 0 vicryl suture  The skin and soft tissue was closed with 4 Monocryl  Surgical glue was applied to all incisions  Sponge, needle and instrument counts were correct at the conclusion of the procedure  The patient was extubated without incident in the operating room and was transported to the PACU in stable condition  LUANA Mei was scrubbed as a bedside assistant for the entirety of the procedure as no other qualified assistant or general surgery resident was available  She was critical to the performance of this operation as she was the bedside robotic assistant  In doing so, she aided with retraction, needle insertion/removal, irrigation, suctioning and instrument exchange        I was present for the entire procedure, A qualified resident physician was not available and A physician assistant was required during the procedure for retraction tissue handling,dissection and suturing    Patient Disposition:  PACU     SIGNATURE: Deon Saez MD  DATE: March 5, 2021  TIME: 11:59 AM

## 2021-03-05 NOTE — ANESTHESIA POSTPROCEDURE EVALUATION
Post-Op Assessment Note    CV Status:  Stable  Pain Score: 3    Pain management: adequate     Mental Status:  Awake and alert   Hydration Status:  Stable   PONV Controlled:  None   Airway Patency:  Patent      Post Op Vitals Reviewed: Yes      Staff: CRNA         No complications documented      /77   Temp      Pulse 70   Resp 12   SpO2 95% on simple face mask

## 2021-03-06 ENCOUNTER — APPOINTMENT (INPATIENT)
Dept: RADIOLOGY | Facility: HOSPITAL | Age: 81
DRG: 327 | End: 2021-03-06
Payer: MEDICARE

## 2021-03-06 VITALS
HEART RATE: 66 BPM | SYSTOLIC BLOOD PRESSURE: 124 MMHG | DIASTOLIC BLOOD PRESSURE: 85 MMHG | OXYGEN SATURATION: 91 % | BODY MASS INDEX: 47.03 KG/M2 | HEIGHT: 63 IN | RESPIRATION RATE: 18 BRPM | TEMPERATURE: 97.5 F | WEIGHT: 265.43 LBS

## 2021-03-06 LAB
ANION GAP SERPL CALCULATED.3IONS-SCNC: 9 MMOL/L (ref 4–13)
ATRIAL RATE: 71 BPM
BUN SERPL-MCNC: 8 MG/DL (ref 5–25)
CALCIUM SERPL-MCNC: 8.4 MG/DL (ref 8.3–10.1)
CHLORIDE SERPL-SCNC: 99 MMOL/L (ref 100–108)
CO2 SERPL-SCNC: 25 MMOL/L (ref 21–32)
CREAT SERPL-MCNC: 0.36 MG/DL (ref 0.6–1.3)
ERYTHROCYTE [DISTWIDTH] IN BLOOD BY AUTOMATED COUNT: 12.3 % (ref 11.6–15.1)
GFR SERPL CREATININE-BSD FRML MDRD: 101 ML/MIN/1.73SQ M
GLUCOSE SERPL-MCNC: 115 MG/DL (ref 65–140)
HCT VFR BLD AUTO: 41.9 % (ref 34.8–46.1)
HGB BLD-MCNC: 14 G/DL (ref 11.5–15.4)
MAGNESIUM SERPL-MCNC: 1.7 MG/DL (ref 1.6–2.6)
MCH RBC QN AUTO: 32.3 PG (ref 26.8–34.3)
MCHC RBC AUTO-ENTMCNC: 33.4 G/DL (ref 31.4–37.4)
MCV RBC AUTO: 97 FL (ref 82–98)
P AXIS: 19 DEGREES
PLATELET # BLD AUTO: 201 THOUSANDS/UL (ref 149–390)
PMV BLD AUTO: 11.2 FL (ref 8.9–12.7)
POTASSIUM SERPL-SCNC: 3.9 MMOL/L (ref 3.5–5.3)
PR INTERVAL: 216 MS
QRS AXIS: -32 DEGREES
QRSD INTERVAL: 92 MS
QT INTERVAL: 412 MS
QTC INTERVAL: 447 MS
RBC # BLD AUTO: 4.34 MILLION/UL (ref 3.81–5.12)
SODIUM SERPL-SCNC: 133 MMOL/L (ref 136–145)
T WAVE AXIS: 21 DEGREES
VENTRICULAR RATE: 71 BPM
WBC # BLD AUTO: 12.02 THOUSAND/UL (ref 4.31–10.16)

## 2021-03-06 PROCEDURE — 80048 BASIC METABOLIC PNL TOTAL CA: CPT | Performed by: PHYSICIAN ASSISTANT

## 2021-03-06 PROCEDURE — 74220 X-RAY XM ESOPHAGUS 1CNTRST: CPT

## 2021-03-06 PROCEDURE — 83735 ASSAY OF MAGNESIUM: CPT | Performed by: PHYSICIAN ASSISTANT

## 2021-03-06 PROCEDURE — 85027 COMPLETE CBC AUTOMATED: CPT | Performed by: PHYSICIAN ASSISTANT

## 2021-03-06 PROCEDURE — 99024 POSTOP FOLLOW-UP VISIT: CPT | Performed by: THORACIC SURGERY (CARDIOTHORACIC VASCULAR SURGERY)

## 2021-03-06 PROCEDURE — 93005 ELECTROCARDIOGRAM TRACING: CPT

## 2021-03-06 PROCEDURE — 93010 ELECTROCARDIOGRAM REPORT: CPT | Performed by: INTERNAL MEDICINE

## 2021-03-06 RX ORDER — LANOLIN ALCOHOL/MO/W.PET/CERES
6 CREAM (GRAM) TOPICAL
Status: DISCONTINUED | OUTPATIENT
Start: 2021-03-06 | End: 2021-03-06 | Stop reason: HOSPADM

## 2021-03-06 RX ORDER — OXYCODONE HYDROCHLORIDE 5 MG/1
5 TABLET ORAL EVERY 4 HOURS PRN
Qty: 20 TABLET | Refills: 0 | Status: SHIPPED | OUTPATIENT
Start: 2021-03-06 | End: 2022-07-06 | Stop reason: ALTCHOICE

## 2021-03-06 RX ORDER — ONDANSETRON 2 MG/ML
4 INJECTION INTRAMUSCULAR; INTRAVENOUS EVERY 4 HOURS PRN
Status: DISCONTINUED | OUTPATIENT
Start: 2021-03-06 | End: 2021-03-06 | Stop reason: HOSPADM

## 2021-03-06 RX ORDER — ASPIRIN 325 MG
325 TABLET ORAL ONCE
Status: COMPLETED | OUTPATIENT
Start: 2021-03-06 | End: 2021-03-06

## 2021-03-06 RX ORDER — DEXTROSE, SODIUM CHLORIDE, AND POTASSIUM CHLORIDE 5; .45; .15 G/100ML; G/100ML; G/100ML
100 INJECTION INTRAVENOUS CONTINUOUS
Status: DISCONTINUED | OUTPATIENT
Start: 2021-03-06 | End: 2021-03-06

## 2021-03-06 RX ADMIN — STANDARDIZED SENNA CONCENTRATE 8.6 MG: 8.6 TABLET ORAL at 09:05

## 2021-03-06 RX ADMIN — OXYCODONE HYDROCHLORIDE 10 MG: 5 TABLET ORAL at 01:55

## 2021-03-06 RX ADMIN — LEVOTHYROXINE SODIUM 25 MCG: 25 TABLET ORAL at 05:19

## 2021-03-06 RX ADMIN — ACETAMINOPHEN 650 MG: 325 TABLET ORAL at 11:39

## 2021-03-06 RX ADMIN — PANTOPRAZOLE SODIUM 40 MG: 40 TABLET, DELAYED RELEASE ORAL at 05:22

## 2021-03-06 RX ADMIN — AMLODIPINE BESYLATE 5 MG: 5 TABLET ORAL at 09:05

## 2021-03-06 RX ADMIN — IOHEXOL 10 ML: 350 INJECTION, SOLUTION INTRAVENOUS at 12:41

## 2021-03-06 RX ADMIN — SODIUM CHLORIDE, SODIUM LACTATE, POTASSIUM CHLORIDE, AND CALCIUM CHLORIDE 100 ML/HR: .6; .31; .03; .02 INJECTION, SOLUTION INTRAVENOUS at 02:04

## 2021-03-06 RX ADMIN — DOCUSATE SODIUM 100 MG: 100 CAPSULE, LIQUID FILLED ORAL at 09:05

## 2021-03-06 RX ADMIN — ONDANSETRON 4 MG: 2 INJECTION INTRAMUSCULAR; INTRAVENOUS at 01:55

## 2021-03-06 RX ADMIN — ACETAMINOPHEN 650 MG: 325 TABLET ORAL at 17:21

## 2021-03-06 RX ADMIN — ACETAMINOPHEN 650 MG: 325 TABLET ORAL at 05:19

## 2021-03-06 RX ADMIN — ENOXAPARIN SODIUM 40 MG: 40 INJECTION SUBCUTANEOUS at 09:05

## 2021-03-06 RX ADMIN — ASPIRIN 325 MG ORAL TABLET 325 MG: 325 PILL ORAL at 09:05

## 2021-03-06 RX ADMIN — MELATONIN 6 MG: at 01:54

## 2021-03-06 RX ADMIN — HYDROMORPHONE HYDROCHLORIDE 0.5 MG: 1 INJECTION, SOLUTION INTRAMUSCULAR; INTRAVENOUS; SUBCUTANEOUS at 02:53

## 2021-03-06 NOTE — QUICK NOTE
Post Op Check Note - Surgery Resident  Debbie Hartley 79 y o  female MRN: [id number]  Unit/Bed#: [facility] 410-01 Encounter: [id number]    ASSESSMENT:  Debbie Hartley is a 79 y o  female who is status post paraesophageal hernia repair    Subjective: Reports mild HA, "feeling hot"  Denies n/v/f/c/chest pain  Denies passing gas  Physical Exam:  GEN: NAD  CV: RRR  Lung: Normal effort  Ab: Soft, ND, mild TTP in epigastric/LUQ regions  Neuro: A+Ox3  Incisions: 4; c/d/i    Blood pressure 145/85, pulse 72, temperature 98 2 °F (36 8 °C), temperature source Temporal, resp  rate 17, height 5' 3" (1 6 m), weight 118 kg (260 lb), SpO2 93 %, not currently breastfeeding  ,Body mass index is 46 06 kg/m²  Intake/Output Summary (Last 24 hours) at 3/5/2021 1939  Last data filed at 3/5/2021 1550  Gross per 24 hour   Intake 2550 ml   Output 910 ml   Net 1640 ml       Invasive Devices     Peripheral Intravenous Line            Peripheral IV 03/05/21 Left Hand less than 1 day          Drain            Urethral Catheter Straight-tip; Non-latex 16 Fr  less than 1 day                VTE Pharmacologic Prophylaxis: Sequential compression device (Venodyne)  and Enoxaparin (Lovenox)    PLAN  -Continue NPO with sips  -LR @ 100  -Pain mx with acetaminophen q6h and PRN pain mx  -OOB/IS  -DVT ppx  -Add PRN labetalol for SBP >160  -F/u morning labs  -Esophagram tomorrow AM (3/6)  -Resume home meds tomorrow AM (3/6)

## 2021-03-06 NOTE — QUICK NOTE
Post Op Check Note - Surgery Resident  Brian Mccormick 79 y o  female MRN: [id number]  Unit/Bed#: [facility] 410-01 Encounter: [id number]    ASSESSMENT:  Brian Mccormick is a 79 y o  female who is status post paraesophageal hernia repair    Subjective: Reports mild HA, "feeling hot"  Denies n/v/f/c/chest pain  Denies passing gas  Physical Exam:  GEN: NAD  CV: RRR  Lung: Normal effort  Ab: Soft, ND, mild TTP in epigastric/LUQ regions  Neuro: A+Ox3  Incisions: 4; c/d/i    Blood pressure 163/95, pulse 75, temperature (!) 97 4 °F (36 3 °C), temperature source Oral, resp  rate 18, height 5' 3" (1 6 m), weight 118 kg (260 lb), SpO2 97 %, not currently breastfeeding  ,Body mass index is 46 06 kg/m²  Intake/Output Summary (Last 24 hours) at 3/5/2021 1959  Last data filed at 3/5/2021 1946  Gross per 24 hour   Intake 2550 ml   Output 1260 ml   Net 1290 ml       Invasive Devices     Peripheral Intravenous Line            Peripheral IV 03/05/21 Left Hand less than 1 day          Drain            Urethral Catheter Straight-tip; Non-latex 16 Fr  less than 1 day                VTE Pharmacologic Prophylaxis: Sequential compression device (Venodyne)  and Enoxaparin (Lovenox)    PLAN  -Continue NPO with sips  -LR @ 100  -Pain mx with acetaminophen q6h and PRN pain mx  -OOB/IS  -DVT ppx  -Add PRN labetalol for SBP >160  -F/u morning labs  -Esophagram tomorrow AM (3/6)  -Resume home meds tomorrow AM (3/6)

## 2021-03-06 NOTE — PROGRESS NOTES
Patient written for discharge  Patient recently placed on room air  Patient currently sating between 88-92%  Patient is asymptomatic, with no complaints  Patient actively participating in deep breathing and incentive spirometry  Thoracic resident, Kaitlyn Garcia How made aware  Patient okay'd to be discharged home on room air per resident

## 2021-03-06 NOTE — PROGRESS NOTES
Pt was seen tonight by surgical team, Dr Nikhil Johnston, and made aware of pt's slight HTN  Will monitor closely

## 2021-03-06 NOTE — PROGRESS NOTES
Progress Note - Thoracic Surgery   Karl Michel 79 y o  female MRN: [id number]  Unit/Bed#: [facility] 410-01 Encounter: [id number]    Assessment:  79 y o  female POD1 from elective minimally invasive paraesophageal hernia repair  No major issues overnight, complained of a headache       Plan:  UGI today  CLD pending UGI results  If patient progresses very quickly, could consider full liquids and discharge today   More likely will be discharged tomorrow    Subjective/Objective     Subjective: Complains of surgical site pain, but more bothersome to her is her significant headache      Objective:     Vitals: Temp:  [97 3 °F (36 3 °C)-98 7 °F (37 1 °C)] 97 5 °F (36 4 °C)  HR:  [56-80] 65  Resp:  [17-25] 20  BP: (113-169)/(67-96) 166/96  Arterial Line BP: (121-134)/(80-94) 134/94  Body mass index is 46 06 kg/m²  I/O       03/04 0701 - 03/05 0700 03/05 0701 - 03/06 0700    I V  (mL/kg)  2860 (24 2)    IV Piggyback  250    Total Intake(mL/kg)  3110 (26 4)    Urine (mL/kg/hr)  1410 (0 5)    Total Output  1410    Net  +1700                Physical Exam:  GEN: NAD  HEENT: MMM  CV: RRR  Lung: Normal effort  Ab: Soft, ND, appropriately mildly tender  Extrem: No CCE   Neuro: A+Ox3     Lab, Imaging and other studies: I have personally reviewed pertinent reports    , CBC with diff:   Lab Results   Component Value Date    WBC 11 52 (H) 03/05/2021    HGB 13 6 03/05/2021    HCT 40 8 03/05/2021    MCV 97 03/05/2021     03/05/2021    MCH 32 4 03/05/2021    MCHC 33 3 03/05/2021    RDW 12 4 03/05/2021    MPV 10 5 03/05/2021   , BMP/CMP:   Lab Results   Component Value Date    SODIUM 137 03/05/2021    K 4 2 03/05/2021     03/05/2021    CO2 25 03/05/2021    CO2 34 (H) 03/05/2021    BUN 12 03/05/2021    CREATININE 0 60 03/05/2021    GLUCOSE 120 03/05/2021    CALCIUM 8 4 03/05/2021    EGFR 86 03/05/2021     VTE Pharmacologic Prophylaxis: Enoxaparin (Lovenox)  VTE Mechanical Prophylaxis: sequential compression device      Bernadette Milo MD [place]  3/6/2021 2:03 AM

## 2021-03-06 NOTE — DISCHARGE INSTRUCTIONS
Please keep on full liquid diet until seen in the office  Laparoscopic Hiatal Hernia Repair     WHAT YOU NEED TO KNOW:   Laparoscopic hernia repair is surgery to repair a hiatal hernia  DISCHARGE INSTRUCTIONS:   Seek care immediately if:   · You feel lightheaded, short of breath, and have chest pain  · You cough up blood  · Your arm or leg feels warm, tender, and painful  It may look swollen and red  · Blood soaks through your bandage  · You have pus or a foul-smelling odor coming from your incision  · Your bowel movements are black, bloody, or tarry-looking  · Your vomit looks like coffee grounds or has blood in it  · You develop shortness of breath or you begin to wheeze  Contact your healthcare provider if:   · You have a fever above 101° F (or 100° F if you are older than 65 years)  · You develop a skin rash, hives, or itching  · You have nausea, or you are vomiting  · You have bloating or pain that does not improve  · You have trouble swallowing that does not improve  · You have heartburn symptoms  · You have questions or concerns about your condition or care  Medicines:   · Medicine  may be given to decrease pain  Ask your healthcare provider how to take prescription pain medicine safely  You may also need to take medicines to decrease or block stomach acid  · Take your medicine as directed  Contact your healthcare provider if you think your medicine is not helping or if you have side effects  Tell him or her if you are allergic to any medicine  Keep a list of the medicines, vitamins, and herbs you take  Include the amounts, and when and why you take them  Bring the list or the pill bottles to follow-up visits  Carry your medicine list with you in case of an emergency  Follow up with your healthcare provider as directed:  Write down your questions so you remember to ask them during your visits  Nutrition:   · Eat soft foods as directed    You may need to eat soft foods for up to 4 weeks after surgery  Examples of soft foods include mashed potatoes, pudding, custard, and milkshakes  · Avoid drinks and foods that produce gas  It may be hard for you to burp to release air in your stomach after this surgery  Avoid high-fat foods and carbonated beverages such as soda  Avoid chewing gum and drinking with a straw  Eat and drink slowly, and chew your food well  Wound care:  Care for your wound as directed  Carefully wash the wound with soap and water  Dry the area and put on new, clean bandages as directed  Change your bandages when they get wet or dirty  If you have steristrips covering your wound, do not remove them  They will come off on their own  Do not get in a bathtub, swimming pool or, hot tub until your healthcare provider says it is okay  Deep breathing:  Take deep breaths and cough 10 times each hour  This will decrease your risk for a lung infection  Take a deep breath and hold it for as long as you can  Let the air out and then cough strongly  Deep breaths help open your airway  You may be given an incentive spirometer to help you take deep breaths  Put the plastic piece in your mouth and take a slow, deep breath, then let the air out and cough  Repeat these steps 10 times every hour  Activity:  Do not drive for a week after your surgery  Do not lift heavy objects  Ask your healthcare provider when you can return to your daily activities  © Copyright [address] Information is for End User's use only and may not be sold, redistributed or otherwise used for commercial purposes  All illustrations and images included in CareNotes® are the copyrighted property of [employer]  or [facility] Reyna Gracia   The above information is an  only  It is not intended as medical advice for individual conditions or treatments   Talk to your doctor, nurse or pharmacist before following any medical regimen to see if it is safe and effective for you     Full Liquid Diet   WHAT YOU NEED TO KNOW:   A full liquid diet includes only liquids and foods that are liquid at room temperature  You may need to follow this diet if you have trouble chewing or swallowing  You may also need to follow this diet if you have a severe intestinal illness or had surgery on your intestine  Your healthcare provider will tell you how long to follow this diet and when to start solid foods  DISCHARGE INSTRUCTIONS:   Liquids and foods to include:   · Fruit juices or pureed fruit without seeds    · Vegetable juices or pureed vegetables in broth     · Broth or strained cream soups    · Refined and strained cooked cereals thinned with milk or half-and-half creamer    · Flavored gelatin without chunks of fruit    · Plain ice cream, milk shakes, sherbet, or popsicles    · Yogurt, pudding, or soft custard    · Milk or liquid nutrition supplements    · Coffee, tea, sodas, water, or sports drinks    · Butter, margarine, or cream    Other things you should know about a full liquid diet:   · You may need nutrition supplements  if you will be on this diet for more than 5 to 7 days  The full liquid diet does not provide all the nutrients, vitamins, minerals, or calories that your body needs  · You may need other diet changes  if you have another medical condition such as diabetes, high blood pressure, or heart disease  For example, you may need to choose sugar-free, low-sodium, or low-fat foods as part of your full liquid diet  · Choose lactose-free liquids if you are lactose intolerant  Examples include acidophilus milk, lactose-free milk, soy milk, or lactose-free liquid nutrition supplements  © Copyright [address] Information is for End User's use only and may not be sold, redistributed or otherwise used for commercial purposes   All illustrations and images included in CareNotes® are the copyrighted property of [employer]  or [facility] Reyna Gracia   The above information is an  only  It is not intended as medical advice for individual conditions or treatments  Talk to your doctor, nurse or pharmacist before following any medical regimen to see if it is safe and effective for you

## 2021-03-06 NOTE — PROGRESS NOTES
Pt L hand IV infiltrated with LR running  L hand and lower FA swollen, pulse still intact  IV removed, arm elevated and ice applied  Thoracic made aware  Will continue to monitor

## 2021-03-07 NOTE — DISCHARGE SUMMARY
Discharge Summary - Lula Galeas 79 y o  female MRN: [id number]    Unit/Bed#: [facility] 410-01 Encounter: [id number]    Admission Date:   Admission Orders (From admission, onward)     Ordered        03/05/21 1421  Inpatient Admission  Once                     Admitting Diagnosis: Paraesophageal hernia [K44 9]    HPI: Presented for elective surgery HHR    Procedures Performed: No orders of the defined types were placed in this encounter  Summary of Hospital Course:   Patient underwent surgery and did well  POD1, had UGi that showed no leak  Started on FLD which she tolerated well  Her pain was controlled  She was tolerating diet without problems  She was deemed ready to be discharged    Significant Findings, Care, Treatment and Services Provided: As above    Complications: none    Discharge Diagnosis: hiatal hernia    Resolved Problems  Date Reviewed: 3/5/2021    None          Condition at Discharge: good         Discharge instructions/Information to patient and family:   See after visit summary for information provided to patient and family  Provisions for Follow-Up Care:  See after visit summary for information related to follow-up care and any pertinent home health orders  PCP: Sharon Morillo MD    Disposition: Home    Planned Readmission: No      Discharge Statement   I spent 30 minutes discharging the patient  This time was spent on the day of discharge  I had direct contact with the patient on the day of discharge  Additional documentation is required if more than 30 minutes were spent on discharge  Discharge Medications:  See after visit summary for reconciled discharge medications provided to patient and family

## 2021-03-08 ENCOUNTER — TELEPHONE (OUTPATIENT)
Dept: SURGICAL ONCOLOGY | Facility: CLINIC | Age: 81
End: 2021-03-08

## 2021-03-09 ENCOUNTER — TELEPHONE (OUTPATIENT)
Dept: HEMATOLOGY ONCOLOGY | Facility: CLINIC | Age: 81
End: 2021-03-09

## 2021-03-09 NOTE — TELEPHONE ENCOUNTER
Spoke to patient at 45  She did not have a bowel movement since before surgery  She took dulcolax yesterday and today  She is starting a soft diet today and so far is without dysphagia  She is taking oxycodone as needed for some abdominal pain  I recommended colace and Miralax for now  If this does not help can try dulcolax suppository or even magnesium citrate  She should decrease use of oxycodone as able as this is constipating

## 2021-03-09 NOTE — TELEPHONE ENCOUNTER
Patient's  calling to report that the patient has not had a bowel movement since before her surgery date of 3/5/2021      Please contact patient: [phone]

## 2021-03-13 NOTE — PHYSICIAN ADVISOR
Current patient class: Inpatient  The patient is currently on Hospital Day: 2 at [address] [place] [address]      The patient was admitted to the hospital at 05 09 31 10 19 on 3/5/21 for the following diagnosis:  Paraesophageal hernia [K44 9]       There is documentation in the medical record of an expected length of stay of at least 2 midnights  The patient is therefore expected to satisfy the 2 midnight benchmark and given the 2 midnight presumption is appropriate for INPATIENT ADMISSION  Given this expectation of a satisfying stay, CMS instructs us that the patient is most often appropriate for inpatient admission under part A provided medical necessity is documented in the chart  After review of the relevant documentation, labs, vital signs and test results, the patient is appropriate for INPATIENT ADMISSION  Admission to the hospital as an inpatient is a complex decision making process which requires the practitioner to consider the patients presenting complaint, history and physical examination and all relevant testing  With this in mind, in this case, the patient was deemed appropriate for INPATIENT ADMISSION  After review of the documentation and testing available at the time of the admission I concur with this clinical determination of medical necessity  Rationale is as follows: The patient is a 79 yrs old Female who presented to the ED at 3/5/2021  5:24 AM with a chief complaint of No chief complaint on file  Patient admitted for a repair of a large paraesophageal hernia done via laparoscope using robotics with mesh placement and door fundoplication  She tolerated the procedure well and was placed on IV fluids post procedure and continued NPO with occasional sips allowed  A barium swallow was ordered for the next day to determine if any leaks were present and to see if barium progressed through the surgical repair    It is noted that the belief was that the patient would require a second night in the hospital   The barium swallow revealed no leaks and there was transit of the material through the esophagus  The patient was able to tolerate a full diet and was discharged in stable condition after only a one night hospital stay  An admission status would be considered appropriate for this patient due to the complexity of the surgical procedure even though she did recover faster than initially anticipated  The patients vitals on arrival were   ED Triage Vitals   Temperature Pulse Respirations Blood Pressure SpO2   03/05/21 0552 03/05/21 0552 03/05/21 0552 03/05/21 0552 03/05/21 0552   97 7 °F (36 5 °C) 67 18 123/83 95 %      Temp Source Heart Rate Source Patient Position - Orthostatic VS BP Location FiO2 (%)   03/05/21 0552 03/05/21 0552 03/05/21 1928 03/05/21 1928 --   Tympanic Monitor Lying Right arm       Pain Score       03/05/21 1200       Worst Possible Pain           Past Medical History:   Diagnosis Date    Cancer ([facility] )     Disease of thyroid gland     [facility]     age 45    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[facility] )     1992    Esophageal reflux     Hypertension     Hypothyroid     Obesity      Past Surgical History:   Procedure Laterality Date    CATARACT EXTRACTION, BILATERAL      COLONOSCOPY  08/2018    polyp- 5 years    JOINT REPLACEMENT      KNEE ARTHROPLASTY      AL ESOPHAGOGASTRODUODENOSCOPY TRANSORAL DIAGNOSTIC N/A 3/5/2021    Procedure: ESOPHAGOGASTRODUODENOSCOPY (EGD);   Surgeon: Fouzia Dean MD;  Location: [facility];  Service: Thoracic    AL LAP, REPAIR PARAESOPHAGEAL HERNIA, INCL FUNDOPLASTY W/O MESH N/A 3/5/2021    Procedure: REPAIR HERNIA PARAESOPHAGEAL LAPAROSCOPIC W ROBOTICS WITH MESH, DOOR FUNDOPLICATION;  Surgeon: Fouzia Dean MD;  Location[facility];  Service: Thoracic    TOTAL ABDOMINAL HYSTERECTOMY      age 45    TOTAL ABDOMINAL HYSTERECTOMY W/ BILATERAL SALPINGOOPHORECTOMY  1992    endometrial cancer Consults have been placed to:   None    Vitals:    03/06/21 0745 03/06/21 0905 03/06/21 1115 03/06/21 1601   BP: 167/84 160/93 157/96 124/85   BP Location: Right arm  Right arm Right arm   Pulse: 68  75 66   Resp: 20  18 18   Temp: 97 8 °F (36 6 °C)  98 °F (36 7 °C) 97 5 °F (36 4 °C)   TempSrc: Oral  Oral Oral   SpO2: 96%  95% 91%   Weight:       Height:           Most recent labs:    No results for input(s): WBC, HGB, HCT, PLT, K, NA, CALCIUM, BUN, CREATININE, LIPASE, AMYLASE, INR, TROPONINI, CKTOTAL, AST, ALT, ALKPHOS, BILITOT in the last 72 hours  Scheduled Meds:  Continuous Infusions:No current facility-administered medications for this encounter  PRN Meds:      Surgical procedures (if appropriate):  Procedure(s):  REPAIR HERNIA PARAESOPHAGEAL LAPAROSCOPIC W ROBOTICS WITH MESH, DOOR FUNDOPLICATION  ESOPHAGOGASTRODUODENOSCOPY (EGD)

## 2021-03-24 ENCOUNTER — OFFICE VISIT (OUTPATIENT)
Dept: CARDIAC SURGERY | Facility: CLINIC | Age: 81
End: 2021-03-24

## 2021-03-24 VITALS
HEART RATE: 75 BPM | WEIGHT: 272.27 LBS | SYSTOLIC BLOOD PRESSURE: 158 MMHG | HEIGHT: 63 IN | TEMPERATURE: 98.3 F | RESPIRATION RATE: 18 BRPM | DIASTOLIC BLOOD PRESSURE: 89 MMHG | BODY MASS INDEX: 48.24 KG/M2 | OXYGEN SATURATION: 97 %

## 2021-03-24 DIAGNOSIS — K44.9 PARAESOPHAGEAL HERNIA: Primary | ICD-10-CM

## 2021-03-24 PROCEDURE — 99024 POSTOP FOLLOW-UP VISIT: CPT | Performed by: PHYSICIAN ASSISTANT

## 2021-03-24 PROCEDURE — 1123F ACP DISCUSS/DSCN MKR DOCD: CPT | Performed by: PHYSICIAN ASSISTANT

## 2021-03-24 NOTE — PROGRESS NOTES
Thoracic Follow-Up  Assessment/Plan:    Paraesophageal hernia  She is healing well from a thoracic surgery standpoint  Her incisions are healing well and she is tolerating all food consistencies thus far  She can continue to liberalize her diet and stop her Protonix  She is afraid to eat breads and some meats, but was encouraged to at least try them  It is highly unlikely that the feeling of her organs moving around is a sign of a recurrent hernia, but any weight loss will help prevent recurrence  If she develops any other symptoms, we would obtain a repeat swallow  At this point, we will have her return in one month for her 2nd post op visit  She is in agreement with the plan  Diagnoses and all orders for this visit:    Paraesophageal hernia          Thoracic History       Diagnosis: Paraesophageal hernia   Procedure: EGD, Robotic repair of paraesophageal hernia with mesh and Albino fundoplication on 9/8/77  Pathology: Anterior fat pad and portion of paraesophageal hernia sac revealed mesothelium lined fibrovascular tissue, consistent with hernia sac  3 benign lymph nodes  Patient ID: Danica Stark is a 79 y o  female  HPI    Ms Reji Awad is an 80 yo female who was electively admitted on 3/5/21 to undergo a robotic repair of paraesophageal hernia with Albino fundoplication  Her hospital course was uncomplicated and was discharged on 3/6/21 on a full liquid diet  She returns today for her first post operative visit and on discussion, is doing okay  She did have some pain right after surgery, which has since dissipated  She denies fever, chills, shortness of breath, dysphagia, regurgitation, nausea, heartburn, or vomiting  She is currently eating orange, banana, grilled cheese, soup, lasagna, fish, chicken, rice and pasta  She is still on protonix  She feels like sometimes her organs are moving around in her abdomen         The following portions of the patient's history were reviewed and updated as appropriate: allergies, current medications, past family history, past medical history, past social history, past surgical history and problem list     Review of Systems      Objective:   Physical Exam  Vitals signs reviewed  Constitutional:       General: She is not in acute distress  Appearance: Normal appearance  She is not ill-appearing  HENT:      Head: Normocephalic and atraumatic  Mouth/Throat:      Comments: Masked   Eyes:      Extraocular Movements: Extraocular movements intact  Comments: glasses   Neck:      Musculoskeletal: Normal range of motion and neck supple  Cardiovascular:      Rate and Rhythm: Normal rate and regular rhythm  Pulmonary:      Effort: Pulmonary effort is normal  No respiratory distress  Breath sounds: Normal breath sounds  No wheezing  Abdominal:      General: Bowel sounds are normal  There is distension  Palpations: Abdomen is soft  Skin:     General: Skin is warm and dry  Comments: Robotic incisions healing well  No erythema or drainage  Neurological:      Mental Status: She is alert and oriented to person, place, and time     Psychiatric:         Mood and Affect: Mood normal          Behavior: Behavior normal        /89   Pulse 75   Temp 98 3 °F (36 8 °C) (Tympanic)   Resp 18   Ht 5' 3" (1 6 m)   Wt 124 kg (272 lb 4 3 oz)   SpO2 97%   BMI 48 23 kg/m²

## 2021-03-24 NOTE — ASSESSMENT & PLAN NOTE
She is healing well from a thoracic surgery standpoint  Her incisions are healing well and she is tolerating all food consistencies thus far  She can continue to liberalize her diet and stop her Protonix  She is afraid to eat breads and some meats, but was encouraged to at least try them  It is highly unlikely that the feeling of her organs moving around is a sign of a recurrent hernia, but any weight loss will help prevent recurrence  If she develops any other symptoms, we would obtain a repeat swallow  At this point, we will have her return in one month for her 2nd post op visit  She is in agreement with the plan

## 2021-04-19 ENCOUNTER — ANNUAL EXAM (OUTPATIENT)
Dept: OBGYN CLINIC | Facility: CLINIC | Age: 81
End: 2021-04-19
Payer: MEDICARE

## 2021-04-19 VITALS — DIASTOLIC BLOOD PRESSURE: 72 MMHG | SYSTOLIC BLOOD PRESSURE: 110 MMHG | BODY MASS INDEX: 45.21 KG/M2 | WEIGHT: 255.2 LBS

## 2021-04-19 DIAGNOSIS — Z78.0 MENOPAUSE: ICD-10-CM

## 2021-04-19 DIAGNOSIS — Z13.820 ENCOUNTER FOR SCREENING FOR OSTEOPOROSIS: ICD-10-CM

## 2021-04-19 DIAGNOSIS — Z01.419 ENCOUNTER FOR GYNECOLOGICAL EXAMINATION WITHOUT ABNORMAL FINDING: Primary | ICD-10-CM

## 2021-04-19 DIAGNOSIS — Z12.31 ENCOUNTER FOR SCREENING MAMMOGRAM FOR MALIGNANT NEOPLASM OF BREAST: ICD-10-CM

## 2021-04-19 PROCEDURE — G0101 CA SCREEN;PELVIC/BREAST EXAM: HCPCS | Performed by: OBSTETRICS & GYNECOLOGY

## 2021-04-19 NOTE — PROGRESS NOTES
Gardenia Garcia   1940    CC:  Yearly exam    S:  79 y o  female here for yearly exam  She is s/p JOANNE-BSO for uterine cancer > 20 yrs ago  Had paraesophageal hernia repeat - robotically in March  Overall recovering well  Has some discomfort in LUQ near ribs  Incisions healing  She and her  are still active, she rides her stationary bike most days  She denies vaginal bleeding  She denies vaginal discharge, itching, odor or dryness  She is not sexually active      Last Mammo 1/12/21 - BiRad 1  Last Colonoscopy 7/19/18 - polyp, 5yr recall   Last DEXA  12/19/18 - osteopenia (-1 4 L femoral neck)    Family hx of breast cancer: no  Family hx of ovarian cancer: no  Family hx of colon cancer: no      Current Outpatient Medications:     amLODIPine (NORVASC) 5 mg tablet, Take 1 tablet (5 mg total) by mouth daily, Disp: 90 tablet, Rfl: 0    Ascorbic Acid (VITAMIN C PO), Take by mouth, Disp: , Rfl:     atorvastatin (LIPITOR) 10 mg tablet, TAKE 1 TABLET BY MOUTH  DAILY AT BEDTIME, Disp: 90 tablet, Rfl: 3    Biotin 1 MG CAPS, Take by mouth, Disp: , Rfl:     Calcium 500 MG tablet, Take by mouth, Disp: , Rfl:     calcium carbonate (Calcium 600) 600 MG tablet, Take 600 mg by mouth 2 (two) times a week, Disp: , Rfl:     Cholecalciferol (VITAMIN D3) 400 units CAPS, Take by mouth, Disp: , Rfl:     Combigan 0 2-0 5 %, INSTILL 1 DROP IN BOTH EYES TWICE DAILY, Disp: , Rfl:     glucosamine 500 MG CAPS capsule, Take 1,500 mg by mouth daily, Disp: , Rfl:     levothyroxine 50 mcg tablet, Take 0 5 tablets by mouth daily , Disp: , Rfl:     Multiple Vitamin (MULTIVITAMINS PO), Take 1 tablet by mouth daily, Disp: , Rfl:     Omega-3 Fatty Acids (FISH OIL) 1,000 mg, Take by mouth, Disp: , Rfl:     pantoprazole (PROTONIX) 40 mg tablet, Take by mouth, Disp: , Rfl:     oxyCODONE (ROXICODONE) 5 mg immediate release tablet, Take 1 tablet (5 mg total) by mouth every 4 (four) hours as needed for moderate pain for up to 20 dosesMax Daily Amount: 30 mg (Patient not taking: Reported on 2021), Disp: 20 tablet, Rfl: 0  Social History     Socioeconomic History    Marital status: /Civil Union     Spouse name: Not on file    Number of children: Not on file    Years of education: Not on file    Highest education level: Not on file   Occupational History    Not on file   Social Needs    Financial resource strain: Not on file    Food insecurity     Worry: Not on file     Inability: Not on file   [employer] needs     Medical: Not on file     Non-medical: Not on file   Tobacco Use    Smoking status: Former Smoker     Packs/day: 0 25     Years: 10 00     Pack years: 2 50     Types: Cigarettes     Start date:      Quit date:      Years since quittin 3    Smokeless tobacco: Never Used    Tobacco comment: Quit 50+ years ago 3/24/21   Substance and Sexual Activity    Alcohol use: Not Currently     Comment: socially    Drug use: No    Sexual activity: Not Currently     Partners: Male     Birth control/protection: Post-menopausal     Comment:    Lifestyle    Physical activity     Days per week: Not on file     Minutes per session: Not on file    Stress: Not on file   Relationships    Social connections     Talks on phone: Not on file     Gets together: Not on file     Attends Buddhism service: Not on file     Active member of club or organization: Not on file     Attends meetings of clubs or organizations: Not on file     Relationship status: Not on file    Intimate partner violence     Fear of current or ex partner: Not on file     Emotionally abused: Not on file     Physically abused: Not on file     Forced sexual activity: Not on file   Other Topics Concern    Not on file   Social History Narrative    Exercising regularly     Family History   Problem Relation Age of Onset    Stomach cancer Mother 71    Pancreatic cancer Mother 71    Heart attack Father     Cancer Maternal Uncle  No Known Problems Daughter     No Known Problems Maternal Grandmother     No Known Problems Maternal Grandfather     No Known Problems Paternal Grandmother     No Known Problems Paternal Grandfather     No Known Problems Maternal Aunt     No Known Problems Maternal Aunt      Past Medical History:   Diagnosis Date    [facility] ([facility] )     Disease of thyroid gland     Endometrial cancer ([facility] )     age 45    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    Esophageal reflux     Hypertension     Hypothyroid     Obesity       No Known Allergies    Review of Systems   Respiratory: Negative  Cardiovascular: Negative  Gastrointestinal: Negative for constipation and diarrhea  Genitourinary: Negative for difficulty urinating, pelvic pain, vaginal bleeding, vaginal discharge, itching, or odor  O:  Blood pressure 110/72, weight 116 kg (255 lb 3 2 oz), not currently breastfeeding  Patient appears well and is not in distress, morbid obesity  Breasts are symmetrical without mass, tenderness, nipple discharge, skin changes or adenopathy  Abdomen is soft and nontender without masses, obese  External genitals are normal without lesions or rashes  Urethral meatus and urethra are normal  Vagina is normal without discharge or bleeding  Cervix and uterus are surgically absent  Adnexa - surgically absent have no masses, nontender     A:   Yearly exam      P:   Mammo slip provided   Colonoscopy   DEXA ordered  RTO one year for yearly exam or sooner as needed

## 2021-04-26 ENCOUNTER — OFFICE VISIT (OUTPATIENT)
Dept: CARDIAC SURGERY | Facility: CLINIC | Age: 81
End: 2021-04-26

## 2021-04-26 VITALS
HEART RATE: 60 BPM | HEIGHT: 63 IN | BODY MASS INDEX: 46.52 KG/M2 | DIASTOLIC BLOOD PRESSURE: 80 MMHG | OXYGEN SATURATION: 97 % | TEMPERATURE: 97 F | WEIGHT: 262.57 LBS | RESPIRATION RATE: 16 BRPM | SYSTOLIC BLOOD PRESSURE: 117 MMHG

## 2021-04-26 DIAGNOSIS — K44.9 PARAESOPHAGEAL HERNIA: Primary | ICD-10-CM

## 2021-04-26 PROCEDURE — 99024 POSTOP FOLLOW-UP VISIT: CPT | Performed by: PHYSICIAN ASSISTANT

## 2021-04-26 NOTE — PROGRESS NOTES
Thoracic Follow-Up  Assessment/Plan:    Paraesophageal hernia  Ms Claudio Collier presents almost two months out from robotic paraesophageal hernia repair and Albino fundoplication  She is tolerating all food consistencies  She tried to come off of the Protonix but experienced recurrent heartburn, so she started taking it again  She is more constipated since starting calcium supplementation, so I recommended she take Colace  She still has an occasional "feeling" in her left upper quadrant of "things moving"  I don't think this is concern for recurrence  She will monitor for pain, dysphagia, regurgitation or weight loss  She will return for a 6 month follow up to see how she is doing long term  All of her questions were addressed  Diagnoses and all orders for this visit:    Paraesophageal hernia          Thoracic History   Diagnosis: Paraesophageal hernia   Procedure: EGD, Robotic repair of paraesophageal hernia with mesh and Albino fundoplication on 3/6/32  Pathology: Anterior fat pad and portion of paraesophageal hernia sac revealed mesothelium lined fibrovascular tissue, consistent with hernia sac  3 benign lymph nodes  Subjective:    Patient ID: Yovana Pate is a 79 y o  female  HPI   Ms Claudio Collier is a 79year old female who underwent a robotic PEHR and Albino fundoplication 0/3/07  She was last seen in the office 3/24/21 for her first post-operative visit at which time she was tolerating soft foods well  Protonix was discontinued  Today, she presents nearly 2 months out from surgery  She tried to get off of the Protonix, but experienced extreme heartburn both times so she is just continuing it  She denies dysphagia, vomiting, nausea, or regurgitation  She lost about 10 pounds since the last visit       The following portions of the patient's history were reviewed and updated as appropriate: allergies, current medications, past family history, past medical history, past social history, past surgical history and problem list     Review of Systems   Constitutional: Negative for activity change, appetite change, chills, diaphoresis, fatigue, fever and unexpected weight change  Respiratory: Negative for cough and shortness of breath  Cardiovascular: Negative for chest pain  Gastrointestinal: Positive for constipation  Negative for abdominal pain, nausea and vomiting  +heartburn   Musculoskeletal: Negative for joint swelling and myalgias  Skin: Negative for color change and rash  Psychiatric/Behavioral: Negative for confusion  Objective:   Physical Exam  Constitutional:       General: She is not in acute distress  Appearance: Normal appearance  HENT:      Head: Normocephalic and atraumatic  Eyes:      General: No scleral icterus  Conjunctiva/sclera: Conjunctivae normal    Neck:      Musculoskeletal: Neck supple  Cardiovascular:      Rate and Rhythm: Normal rate and regular rhythm  Pulmonary:      Effort: Pulmonary effort is normal  No respiratory distress  Breath sounds: Normal breath sounds  Abdominal:      General: There is no distension  Palpations: Abdomen is soft  Comments: Incisions well healed   Lymphadenopathy:      Cervical: No cervical adenopathy  Skin:     General: Skin is warm and dry  Neurological:      General: No focal deficit present  Mental Status: She is alert and oriented to person, place, and time  Psychiatric:         Mood and Affect: Mood normal      /80   Pulse 60   Temp (!) 97 °F (36 1 °C) (Tympanic)   Resp 16   Ht 5' 3" (1 6 m)   Wt 119 kg (262 lb 9 1 oz)   SpO2 97%   BMI 46 51 kg/m²       Fl Esophagram Complete    Result Date: 3/6/2021  Narrative BARIUM SWALLOW-ESOPHAGRAM INDICATION:   s/p paraesophageal hernia repair  COMPARISON:  3/2/202 IMAGES:  23 FLUOROSCOPY TIME:   1 minute 43 seconds  TECHNIQUE: The patient was given water-soluble contrast by mouth and images of the esophagus were obtained   FINDINGS: Esophagus is mildly to moderately dilated with occasional tertiary contractions  There is slow passage across the gastroesophageal junction which is likely related to edema or spasm secondary to surgery performed yesterday  There has been reduction of the paraesophageal hernia  There is no extravasation of contrast   Slow passage into the gastric fundus is seen, without obvious abnormality  The patient was not given to much water subtle contrast to ingest, to avoid any risk of aspiration     Impression Status post repair of a large type III hiatal hernia yesterday  Slow passage across the narrowed GE junction most likely related to postoperative edema/spasm  There is no evidence of residual hernia or leak  Workstation performed: [id number]     Fl Barium Swallow    Result Date: 3/2/2021  Narrative BARIUM SWALLOW-ESOPHAGRAM INDICATION:   K44 9: Diaphragmatic hernia without obstruction or gangrene  COMPARISON:  CT abdomen dated 2/17/2021  IMAGES:  14 FLUOROSCOPY TIME:   1 min 35 sec  TECHNIQUE: The patient was given effervescent granules and barium by mouth and images of the esophagus were obtained  FINDINGS: The esophagus is normal in caliber  Esophageal motility is normal and emptying of contrast from the esophagus is prompt  No mucosal lesion, ulceration or evidence of fold thickening is seen  Gastroesophageal reflux was not observed  Large type III paraesophageal hernia is again seen  No volvulus  Impression Large type III paraesophageal hernia   Workstation performed: [id number]

## 2021-04-26 NOTE — ASSESSMENT & PLAN NOTE
Ms Malva Leventhal presents almost two months out from robotic paraesophageal hernia repair and Albino fundoplication  She is tolerating all food consistencies  She tried to come off of the Protonix but experienced recurrent heartburn, so she started taking it again  She is more constipated since starting calcium supplementation, so I recommended she take Colace  She still has an occasional "feeling" in her left upper quadrant of "things moving"  I don't think this is concern for recurrence  She will monitor for pain, dysphagia, regurgitation or weight loss  She will return for a 6 month follow up to see how she is doing long term  All of her questions were addressed

## 2021-07-06 DIAGNOSIS — I10 ESSENTIAL HYPERTENSION: ICD-10-CM

## 2021-07-06 RX ORDER — AMLODIPINE BESYLATE 5 MG/1
5 TABLET ORAL DAILY
Qty: 90 TABLET | Refills: 3 | Status: SHIPPED | OUTPATIENT
Start: 2021-07-06 | End: 2022-07-07

## 2021-08-10 DIAGNOSIS — E78.5 HYPERLIPIDEMIA, UNSPECIFIED HYPERLIPIDEMIA TYPE: ICD-10-CM

## 2021-08-10 RX ORDER — ATORVASTATIN CALCIUM 10 MG/1
TABLET, FILM COATED ORAL
Qty: 90 TABLET | Refills: 3 | Status: SHIPPED | OUTPATIENT
Start: 2021-08-10 | End: 2022-07-18

## 2021-08-17 ENCOUNTER — APPOINTMENT (OUTPATIENT)
Dept: LAB | Age: 81
End: 2021-08-17
Payer: MEDICARE

## 2021-08-17 DIAGNOSIS — E03.9 HYPOTHYROIDISM, UNSPECIFIED TYPE: ICD-10-CM

## 2021-08-17 LAB
ALBUMIN SERPL BCP-MCNC: 3.4 G/DL (ref 3.5–5)
ALP SERPL-CCNC: 112 U/L (ref 46–116)
ALT SERPL W P-5'-P-CCNC: 27 U/L (ref 12–78)
ANION GAP SERPL CALCULATED.3IONS-SCNC: 6 MMOL/L (ref 4–13)
AST SERPL W P-5'-P-CCNC: 19 U/L (ref 5–45)
BILIRUB SERPL-MCNC: 0.64 MG/DL (ref 0.2–1)
BUN SERPL-MCNC: 13 MG/DL (ref 5–25)
CALCIUM ALBUM COR SERPL-MCNC: 9.7 MG/DL (ref 8.3–10.1)
CALCIUM SERPL-MCNC: 9.2 MG/DL (ref 8.3–10.1)
CHLORIDE SERPL-SCNC: 105 MMOL/L (ref 100–108)
CHOLEST SERPL-MCNC: 146 MG/DL (ref 50–200)
CO2 SERPL-SCNC: 27 MMOL/L (ref 21–32)
CREAT SERPL-MCNC: 0.76 MG/DL (ref 0.6–1.3)
GFR SERPL CREATININE-BSD FRML MDRD: 74 ML/MIN/1.73SQ M
GLUCOSE P FAST SERPL-MCNC: 107 MG/DL (ref 65–99)
HDLC SERPL-MCNC: 57 MG/DL
LDLC SERPL CALC-MCNC: 73 MG/DL (ref 0–100)
NONHDLC SERPL-MCNC: 89 MG/DL
POTASSIUM SERPL-SCNC: 4.3 MMOL/L (ref 3.5–5.3)
PROT SERPL-MCNC: 7 G/DL (ref 6.4–8.2)
SODIUM SERPL-SCNC: 138 MMOL/L (ref 136–145)
T4 FREE SERPL-MCNC: 0.97 NG/DL (ref 0.76–1.46)
TRIGL SERPL-MCNC: 82 MG/DL
TSH SERPL DL<=0.05 MIU/L-ACNC: 2.57 UIU/ML (ref 0.36–3.74)

## 2021-08-17 PROCEDURE — 80053 COMPREHEN METABOLIC PANEL: CPT

## 2021-08-17 PROCEDURE — 84439 ASSAY OF FREE THYROXINE: CPT

## 2021-08-17 PROCEDURE — 80061 LIPID PANEL: CPT

## 2021-08-17 PROCEDURE — 84443 ASSAY THYROID STIM HORMONE: CPT

## 2021-08-17 PROCEDURE — 36415 COLL VENOUS BLD VENIPUNCTURE: CPT

## 2021-08-22 ENCOUNTER — HOSPITAL ENCOUNTER (INPATIENT)
Facility: HOSPITAL | Age: 81
LOS: 3 days | Discharge: HOME | DRG: 660 | End: 2021-08-25
Attending: EMERGENCY MEDICINE | Admitting: INTERNAL MEDICINE
Payer: MEDICARE

## 2021-08-22 ENCOUNTER — APPOINTMENT (EMERGENCY)
Dept: RADIOLOGY | Facility: HOSPITAL | Age: 81
DRG: 660 | End: 2021-08-22
Payer: MEDICARE

## 2021-08-22 DIAGNOSIS — R10.12 LEFT UPPER QUADRANT ABDOMINAL PAIN: Primary | ICD-10-CM

## 2021-08-22 DIAGNOSIS — K13.79 DISORDER OF UVULA: ICD-10-CM

## 2021-08-22 DIAGNOSIS — K21.9 GERD (GASTROESOPHAGEAL REFLUX DISEASE): ICD-10-CM

## 2021-08-22 DIAGNOSIS — R11.10 INTRACTABLE VOMITING: ICD-10-CM

## 2021-08-22 DIAGNOSIS — K44.9 PARAESOPHAGEAL HERNIA: ICD-10-CM

## 2021-08-22 DIAGNOSIS — N20.0 KIDNEY STONE ON LEFT SIDE: ICD-10-CM

## 2021-08-22 PROBLEM — K21.00 GASTROESOPHAGEAL REFLUX DISEASE WITH ESOPHAGITIS WITHOUT HEMORRHAGE: Status: ACTIVE | Noted: 2021-08-22

## 2021-08-22 PROBLEM — R11.15 PERSISTENT RECURRENT VOMITING: Status: ACTIVE | Noted: 2021-08-22

## 2021-08-22 LAB
ALBUMIN SERPL BCP-MCNC: 3.5 G/DL (ref 3.5–5)
ALP SERPL-CCNC: 110 U/L (ref 46–116)
ALT SERPL W P-5'-P-CCNC: 26 U/L (ref 12–78)
ANION GAP SERPL CALCULATED.3IONS-SCNC: 8 MMOL/L (ref 4–13)
AST SERPL W P-5'-P-CCNC: 22 U/L (ref 5–45)
BACTERIA UR QL AUTO: NORMAL /HPF
BASOPHILS # BLD AUTO: 0.03 THOUSANDS/ΜL (ref 0–0.1)
BASOPHILS NFR BLD AUTO: 0 % (ref 0–1)
BILIRUB SERPL-MCNC: 0.64 MG/DL (ref 0.2–1)
BILIRUB UR QL STRIP: NEGATIVE
BUN SERPL-MCNC: 14 MG/DL (ref 5–25)
CALCIUM SERPL-MCNC: 8.9 MG/DL (ref 8.3–10.1)
CHLORIDE SERPL-SCNC: 105 MMOL/L (ref 100–108)
CLARITY UR: CLEAR
CO2 SERPL-SCNC: 24 MMOL/L (ref 21–32)
COLOR UR: YELLOW
CREAT SERPL-MCNC: 0.72 MG/DL (ref 0.6–1.3)
EOSINOPHIL # BLD AUTO: 0.01 THOUSAND/ΜL (ref 0–0.61)
EOSINOPHIL NFR BLD AUTO: 0 % (ref 0–6)
ERYTHROCYTE [DISTWIDTH] IN BLOOD BY AUTOMATED COUNT: 12.3 % (ref 11.6–15.1)
GFR SERPL CREATININE-BSD FRML MDRD: 79 ML/MIN/1.73SQ M
GLUCOSE SERPL-MCNC: 155 MG/DL (ref 65–140)
GLUCOSE UR STRIP-MCNC: NEGATIVE MG/DL
HCT VFR BLD AUTO: 42.4 % (ref 34.8–46.1)
HGB BLD-MCNC: 14.4 G/DL (ref 11.5–15.4)
HGB UR QL STRIP.AUTO: ABNORMAL
HYALINE CASTS #/AREA URNS LPF: NORMAL /LPF
IMM GRANULOCYTES # BLD AUTO: 0.03 THOUSAND/UL (ref 0–0.2)
IMM GRANULOCYTES NFR BLD AUTO: 0 % (ref 0–2)
KETONES UR STRIP-MCNC: ABNORMAL MG/DL
LACTATE SERPL-SCNC: 1.7 MMOL/L (ref 0.5–2)
LEUKOCYTE ESTERASE UR QL STRIP: NEGATIVE
LIPASE SERPL-CCNC: 72 U/L (ref 73–393)
LYMPHOCYTES # BLD AUTO: 1.45 THOUSANDS/ΜL (ref 0.6–4.47)
LYMPHOCYTES NFR BLD AUTO: 19 % (ref 14–44)
MCH RBC QN AUTO: 32.5 PG (ref 26.8–34.3)
MCHC RBC AUTO-ENTMCNC: 34 G/DL (ref 31.4–37.4)
MCV RBC AUTO: 96 FL (ref 82–98)
MONOCYTES # BLD AUTO: 0.46 THOUSAND/ΜL (ref 0.17–1.22)
MONOCYTES NFR BLD AUTO: 6 % (ref 4–12)
NEUTROPHILS # BLD AUTO: 5.72 THOUSANDS/ΜL (ref 1.85–7.62)
NEUTS SEG NFR BLD AUTO: 75 % (ref 43–75)
NITRITE UR QL STRIP: NEGATIVE
NON-SQ EPI CELLS URNS QL MICRO: NORMAL /HPF
NRBC BLD AUTO-RTO: 0 /100 WBCS
PH UR STRIP.AUTO: 6.5 [PH] (ref 4.5–8)
PLATELET # BLD AUTO: 198 THOUSANDS/UL (ref 149–390)
PMV BLD AUTO: 11.2 FL (ref 8.9–12.7)
POTASSIUM SERPL-SCNC: 3.9 MMOL/L (ref 3.5–5.3)
PROT SERPL-MCNC: 7.1 G/DL (ref 6.4–8.2)
PROT UR STRIP-MCNC: NEGATIVE MG/DL
RBC # BLD AUTO: 4.43 MILLION/UL (ref 3.81–5.12)
RBC #/AREA URNS AUTO: NORMAL /HPF
SODIUM SERPL-SCNC: 137 MMOL/L (ref 136–145)
SP GR UR STRIP.AUTO: 1.01 (ref 1–1.03)
TROPONIN I SERPL-MCNC: <0.02 NG/ML
UROBILINOGEN UR QL STRIP.AUTO: 0.2 E.U./DL
WBC # BLD AUTO: 7.7 THOUSAND/UL (ref 4.31–10.16)
WBC #/AREA URNS AUTO: NORMAL /HPF

## 2021-08-22 PROCEDURE — 36415 COLL VENOUS BLD VENIPUNCTURE: CPT

## 2021-08-22 PROCEDURE — 86850 RBC ANTIBODY SCREEN: CPT | Performed by: EMERGENCY MEDICINE

## 2021-08-22 PROCEDURE — 74174 CTA ABD&PLVS W/CONTRAST: CPT

## 2021-08-22 PROCEDURE — 83605 ASSAY OF LACTIC ACID: CPT

## 2021-08-22 PROCEDURE — 96361 HYDRATE IV INFUSION ADD-ON: CPT

## 2021-08-22 PROCEDURE — 99223 1ST HOSP IP/OBS HIGH 75: CPT | Performed by: INTERNAL MEDICINE

## 2021-08-22 PROCEDURE — 96375 TX/PRO/DX INJ NEW DRUG ADDON: CPT

## 2021-08-22 PROCEDURE — 99285 EMERGENCY DEPT VISIT HI MDM: CPT

## 2021-08-22 PROCEDURE — 96376 TX/PRO/DX INJ SAME DRUG ADON: CPT

## 2021-08-22 PROCEDURE — 83690 ASSAY OF LIPASE: CPT

## 2021-08-22 PROCEDURE — 86901 BLOOD TYPING SEROLOGIC RH(D): CPT | Performed by: EMERGENCY MEDICINE

## 2021-08-22 PROCEDURE — 81001 URINALYSIS AUTO W/SCOPE: CPT

## 2021-08-22 PROCEDURE — 93005 ELECTROCARDIOGRAM TRACING: CPT

## 2021-08-22 PROCEDURE — 99285 EMERGENCY DEPT VISIT HI MDM: CPT | Performed by: EMERGENCY MEDICINE

## 2021-08-22 PROCEDURE — 96374 THER/PROPH/DIAG INJ IV PUSH: CPT

## 2021-08-22 PROCEDURE — 71275 CT ANGIOGRAPHY CHEST: CPT

## 2021-08-22 PROCEDURE — 85025 COMPLETE CBC W/AUTO DIFF WBC: CPT

## 2021-08-22 PROCEDURE — 76705 ECHO EXAM OF ABDOMEN: CPT | Performed by: EMERGENCY MEDICINE

## 2021-08-22 PROCEDURE — 86900 BLOOD TYPING SEROLOGIC ABO: CPT | Performed by: EMERGENCY MEDICINE

## 2021-08-22 PROCEDURE — 80053 COMPREHEN METABOLIC PANEL: CPT

## 2021-08-22 PROCEDURE — 84484 ASSAY OF TROPONIN QUANT: CPT | Performed by: EMERGENCY MEDICINE

## 2021-08-22 RX ORDER — SODIUM PHOSPHATE,MONO-DIBASIC 19G-7G/118
1500 ENEMA (ML) RECTAL DAILY
Status: DISCONTINUED | OUTPATIENT
Start: 2021-08-23 | End: 2021-08-25 | Stop reason: HOSPADM

## 2021-08-22 RX ORDER — HYDROMORPHONE HCL/PF 1 MG/ML
0.5 SYRINGE (ML) INJECTION ONCE
Status: COMPLETED | OUTPATIENT
Start: 2021-08-22 | End: 2021-08-22

## 2021-08-22 RX ORDER — ATORVASTATIN CALCIUM 10 MG/1
10 TABLET, FILM COATED ORAL
Status: DISCONTINUED | OUTPATIENT
Start: 2021-08-23 | End: 2021-08-25 | Stop reason: HOSPADM

## 2021-08-22 RX ORDER — KETOROLAC TROMETHAMINE 30 MG/ML
15 INJECTION, SOLUTION INTRAMUSCULAR; INTRAVENOUS ONCE
Status: COMPLETED | OUTPATIENT
Start: 2021-08-22 | End: 2021-08-22

## 2021-08-22 RX ORDER — OXYCODONE HYDROCHLORIDE 5 MG/1
5 TABLET ORAL EVERY 4 HOURS PRN
Status: DISCONTINUED | OUTPATIENT
Start: 2021-08-22 | End: 2021-08-25 | Stop reason: HOSPADM

## 2021-08-22 RX ORDER — SUCRALFATE 1 G/1
1 TABLET ORAL EVERY 6 HOURS SCHEDULED
Status: DISCONTINUED | OUTPATIENT
Start: 2021-08-23 | End: 2021-08-25 | Stop reason: HOSPADM

## 2021-08-22 RX ORDER — SODIUM CHLORIDE, SODIUM GLUCONATE, SODIUM ACETATE, POTASSIUM CHLORIDE, MAGNESIUM CHLORIDE, SODIUM PHOSPHATE, DIBASIC, AND POTASSIUM PHOSPHATE .53; .5; .37; .037; .03; .012; .00082 G/100ML; G/100ML; G/100ML; G/100ML; G/100ML; G/100ML; G/100ML
125 INJECTION, SOLUTION INTRAVENOUS CONTINUOUS
Status: DISCONTINUED | OUTPATIENT
Start: 2021-08-22 | End: 2021-08-25

## 2021-08-22 RX ORDER — LEVOTHYROXINE SODIUM 0.03 MG/1
25 TABLET ORAL DAILY
Status: DISCONTINUED | OUTPATIENT
Start: 2021-08-23 | End: 2021-08-25 | Stop reason: HOSPADM

## 2021-08-22 RX ORDER — ACETAMINOPHEN 325 MG/1
650 TABLET ORAL EVERY 6 HOURS PRN
Status: DISCONTINUED | OUTPATIENT
Start: 2021-08-22 | End: 2021-08-25 | Stop reason: HOSPADM

## 2021-08-22 RX ORDER — TIMOLOL MALEATE 5 MG/ML
1 SOLUTION/ DROPS OPHTHALMIC 2 TIMES DAILY
Status: DISCONTINUED | OUTPATIENT
Start: 2021-08-23 | End: 2021-08-25 | Stop reason: HOSPADM

## 2021-08-22 RX ORDER — PANTOPRAZOLE SODIUM 40 MG/1
40 INJECTION, POWDER, FOR SOLUTION INTRAVENOUS EVERY 12 HOURS SCHEDULED
Status: DISCONTINUED | OUTPATIENT
Start: 2021-08-23 | End: 2021-08-25

## 2021-08-22 RX ORDER — PANTOPRAZOLE SODIUM 40 MG/1
40 INJECTION, POWDER, FOR SOLUTION INTRAVENOUS ONCE
Status: COMPLETED | OUTPATIENT
Start: 2021-08-23 | End: 2021-08-23

## 2021-08-22 RX ORDER — CHLORAL HYDRATE 500 MG
1000 CAPSULE ORAL DAILY
Status: DISCONTINUED | OUTPATIENT
Start: 2021-08-23 | End: 2021-08-25 | Stop reason: HOSPADM

## 2021-08-22 RX ORDER — CALCIUM CARBONATE 500(1250)
1 TABLET ORAL
Status: DISCONTINUED | OUTPATIENT
Start: 2021-08-23 | End: 2021-08-25 | Stop reason: HOSPADM

## 2021-08-22 RX ORDER — HYDROMORPHONE HCL IN WATER/PF 6 MG/30 ML
0.2 PATIENT CONTROLLED ANALGESIA SYRINGE INTRAVENOUS
Status: DISCONTINUED | OUTPATIENT
Start: 2021-08-22 | End: 2021-08-25 | Stop reason: HOSPADM

## 2021-08-22 RX ORDER — AMLODIPINE BESYLATE 5 MG/1
5 TABLET ORAL DAILY
Status: DISCONTINUED | OUTPATIENT
Start: 2021-08-23 | End: 2021-08-25 | Stop reason: HOSPADM

## 2021-08-22 RX ORDER — DOCUSATE SODIUM 100 MG/1
100 CAPSULE, LIQUID FILLED ORAL 2 TIMES DAILY PRN
Status: DISCONTINUED | OUTPATIENT
Start: 2021-08-22 | End: 2021-08-25 | Stop reason: HOSPADM

## 2021-08-22 RX ORDER — ONDANSETRON 2 MG/ML
4 INJECTION INTRAMUSCULAR; INTRAVENOUS EVERY 6 HOURS PRN
Status: DISCONTINUED | OUTPATIENT
Start: 2021-08-22 | End: 2021-08-25 | Stop reason: HOSPADM

## 2021-08-22 RX ORDER — ONDANSETRON 2 MG/ML
1 INJECTION INTRAMUSCULAR; INTRAVENOUS ONCE
Status: DISCONTINUED | OUTPATIENT
Start: 2021-08-22 | End: 2021-08-22

## 2021-08-22 RX ORDER — OMEGA-3S/DHA/EPA/FISH OIL/D3 300MG-1000
400 CAPSULE ORAL DAILY
Status: DISCONTINUED | OUTPATIENT
Start: 2021-08-23 | End: 2021-08-25 | Stop reason: HOSPADM

## 2021-08-22 RX ORDER — ONDANSETRON 2 MG/ML
4 INJECTION INTRAMUSCULAR; INTRAVENOUS ONCE
Status: COMPLETED | OUTPATIENT
Start: 2021-08-22 | End: 2021-08-22

## 2021-08-22 RX ADMIN — ONDANSETRON 4 MG: 2 INJECTION INTRAMUSCULAR; INTRAVENOUS at 21:15

## 2021-08-22 RX ADMIN — HYDROMORPHONE HYDROCHLORIDE 0.5 MG: 1 INJECTION, SOLUTION INTRAMUSCULAR; INTRAVENOUS; SUBCUTANEOUS at 21:19

## 2021-08-22 RX ADMIN — SODIUM CHLORIDE 1000 ML: 0.9 INJECTION, SOLUTION INTRAVENOUS at 21:20

## 2021-08-22 RX ADMIN — IOHEXOL 100 ML: 350 INJECTION, SOLUTION INTRAVENOUS at 21:36

## 2021-08-22 RX ADMIN — KETOROLAC TROMETHAMINE 15 MG: 30 INJECTION, SOLUTION INTRAMUSCULAR; INTRAVENOUS at 23:36

## 2021-08-22 RX ADMIN — HYDROMORPHONE HYDROCHLORIDE 0.5 MG: 1 INJECTION, SOLUTION INTRAMUSCULAR; INTRAVENOUS; SUBCUTANEOUS at 22:17

## 2021-08-23 ENCOUNTER — ANESTHESIA EVENT (INPATIENT)
Dept: PERIOP | Facility: HOSPITAL | Age: 81
DRG: 660 | End: 2021-08-23
Payer: MEDICARE

## 2021-08-23 PROBLEM — N20.0 KIDNEY STONE: Status: ACTIVE | Noted: 2021-08-23

## 2021-08-23 LAB
ABO GROUP BLD: NORMAL
ALBUMIN SERPL BCP-MCNC: 3.8 G/DL (ref 3.5–5)
ALP SERPL-CCNC: 124 U/L (ref 46–116)
ALT SERPL W P-5'-P-CCNC: 30 U/L (ref 12–78)
ANION GAP SERPL CALCULATED.3IONS-SCNC: 8 MMOL/L (ref 4–13)
AST SERPL W P-5'-P-CCNC: 22 U/L (ref 5–45)
ATRIAL RATE: 93 BPM
BASOPHILS # BLD AUTO: 0.01 THOUSANDS/ΜL (ref 0–0.1)
BASOPHILS NFR BLD AUTO: 0 % (ref 0–1)
BILIRUB SERPL-MCNC: 0.74 MG/DL (ref 0.2–1)
BLD GP AB SCN SERPL QL: NEGATIVE
BUN SERPL-MCNC: 11 MG/DL (ref 5–25)
CALCIUM SERPL-MCNC: 9 MG/DL (ref 8.3–10.1)
CHLORIDE SERPL-SCNC: 101 MMOL/L (ref 100–108)
CO2 SERPL-SCNC: 24 MMOL/L (ref 21–32)
CREAT SERPL-MCNC: 0.64 MG/DL (ref 0.6–1.3)
EOSINOPHIL # BLD AUTO: 0 THOUSAND/ΜL (ref 0–0.61)
EOSINOPHIL NFR BLD AUTO: 0 % (ref 0–6)
ERYTHROCYTE [DISTWIDTH] IN BLOOD BY AUTOMATED COUNT: 12.3 % (ref 11.6–15.1)
GFR SERPL CREATININE-BSD FRML MDRD: 84 ML/MIN/1.73SQ M
GLUCOSE SERPL-MCNC: 169 MG/DL (ref 65–140)
HCT VFR BLD AUTO: 45.8 % (ref 34.8–46.1)
HGB BLD-MCNC: 15.6 G/DL (ref 11.5–15.4)
IMM GRANULOCYTES # BLD AUTO: 0.03 THOUSAND/UL (ref 0–0.2)
IMM GRANULOCYTES NFR BLD AUTO: 0 % (ref 0–2)
LIPASE SERPL-CCNC: 77 U/L (ref 73–393)
LYMPHOCYTES # BLD AUTO: 1 THOUSANDS/ΜL (ref 0.6–4.47)
LYMPHOCYTES NFR BLD AUTO: 11 % (ref 14–44)
MAGNESIUM SERPL-MCNC: 1.6 MG/DL (ref 1.6–2.6)
MCH RBC QN AUTO: 32.6 PG (ref 26.8–34.3)
MCHC RBC AUTO-ENTMCNC: 34.1 G/DL (ref 31.4–37.4)
MCV RBC AUTO: 96 FL (ref 82–98)
MONOCYTES # BLD AUTO: 0.36 THOUSAND/ΜL (ref 0.17–1.22)
MONOCYTES NFR BLD AUTO: 4 % (ref 4–12)
NEUTROPHILS # BLD AUTO: 7.83 THOUSANDS/ΜL (ref 1.85–7.62)
NEUTS SEG NFR BLD AUTO: 85 % (ref 43–75)
NRBC BLD AUTO-RTO: 0 /100 WBCS
P AXIS: 34 DEGREES
PHOSPHATE SERPL-MCNC: 3.1 MG/DL (ref 2.3–4.1)
PLATELET # BLD AUTO: 213 THOUSANDS/UL (ref 149–390)
PMV BLD AUTO: 11.4 FL (ref 8.9–12.7)
POTASSIUM SERPL-SCNC: 3.8 MMOL/L (ref 3.5–5.3)
PR INTERVAL: 202 MS
PROT SERPL-MCNC: 7.8 G/DL (ref 6.4–8.2)
QRS AXIS: -41 DEGREES
QRSD INTERVAL: 82 MS
QT INTERVAL: 410 MS
QTC INTERVAL: 467 MS
RBC # BLD AUTO: 4.79 MILLION/UL (ref 3.81–5.12)
RH BLD: NORMAL
SODIUM SERPL-SCNC: 133 MMOL/L (ref 136–145)
SPECIMEN EXPIRATION DATE: NORMAL
T WAVE AXIS: 28 DEGREES
TSH SERPL DL<=0.05 MIU/L-ACNC: 1.12 UIU/ML (ref 0.36–3.74)
VENTRICULAR RATE: 78 BPM
WBC # BLD AUTO: 9.23 THOUSAND/UL (ref 4.31–10.16)

## 2021-08-23 PROCEDURE — 80053 COMPREHEN METABOLIC PANEL: CPT | Performed by: INTERNAL MEDICINE

## 2021-08-23 PROCEDURE — 83735 ASSAY OF MAGNESIUM: CPT | Performed by: INTERNAL MEDICINE

## 2021-08-23 PROCEDURE — 84100 ASSAY OF PHOSPHORUS: CPT | Performed by: INTERNAL MEDICINE

## 2021-08-23 PROCEDURE — 83690 ASSAY OF LIPASE: CPT | Performed by: INTERNAL MEDICINE

## 2021-08-23 PROCEDURE — 93010 ELECTROCARDIOGRAM REPORT: CPT | Performed by: INTERNAL MEDICINE

## 2021-08-23 PROCEDURE — 99222 1ST HOSP IP/OBS MODERATE 55: CPT | Performed by: UROLOGY

## 2021-08-23 PROCEDURE — 85025 COMPLETE CBC W/AUTO DIFF WBC: CPT | Performed by: INTERNAL MEDICINE

## 2021-08-23 PROCEDURE — C9113 INJ PANTOPRAZOLE SODIUM, VIA: HCPCS | Performed by: INTERNAL MEDICINE

## 2021-08-23 PROCEDURE — 99232 SBSQ HOSP IP/OBS MODERATE 35: CPT | Performed by: NURSE PRACTITIONER

## 2021-08-23 PROCEDURE — 36415 COLL VENOUS BLD VENIPUNCTURE: CPT | Performed by: INTERNAL MEDICINE

## 2021-08-23 PROCEDURE — 99222 1ST HOSP IP/OBS MODERATE 55: CPT | Performed by: INTERNAL MEDICINE

## 2021-08-23 PROCEDURE — 84443 ASSAY THYROID STIM HORMONE: CPT | Performed by: INTERNAL MEDICINE

## 2021-08-23 RX ORDER — LORAZEPAM 2 MG/ML
1 INJECTION INTRAMUSCULAR EVERY 4 HOURS PRN
Status: DISCONTINUED | OUTPATIENT
Start: 2021-08-23 | End: 2021-08-25 | Stop reason: HOSPADM

## 2021-08-23 RX ORDER — CEFAZOLIN SODIUM 2 G/50ML
2000 SOLUTION INTRAVENOUS
Status: COMPLETED | OUTPATIENT
Start: 2021-08-24 | End: 2021-08-24

## 2021-08-23 RX ORDER — MAGNESIUM SULFATE HEPTAHYDRATE 40 MG/ML
2 INJECTION, SOLUTION INTRAVENOUS ONCE
Status: COMPLETED | OUTPATIENT
Start: 2021-08-23 | End: 2021-08-24

## 2021-08-23 RX ORDER — PROMETHAZINE HYDROCHLORIDE 25 MG/ML
12.5 INJECTION, SOLUTION INTRAMUSCULAR; INTRAVENOUS EVERY 4 HOURS PRN
Status: DISCONTINUED | OUTPATIENT
Start: 2021-08-23 | End: 2021-08-25 | Stop reason: HOSPADM

## 2021-08-23 RX ORDER — HYDRALAZINE HYDROCHLORIDE 20 MG/ML
10 INJECTION INTRAMUSCULAR; INTRAVENOUS EVERY 4 HOURS PRN
Status: DISCONTINUED | OUTPATIENT
Start: 2021-08-23 | End: 2021-08-25 | Stop reason: HOSPADM

## 2021-08-23 RX ADMIN — AMLODIPINE BESYLATE 5 MG: 5 TABLET ORAL at 09:06

## 2021-08-23 RX ADMIN — SUCRALFATE 1 G: 1 TABLET ORAL at 02:11

## 2021-08-23 RX ADMIN — LORAZEPAM 1 MG: 2 INJECTION INTRAMUSCULAR; INTRAVENOUS at 05:02

## 2021-08-23 RX ADMIN — HYDROMORPHONE HYDROCHLORIDE 0.2 MG: 0.2 INJECTION, SOLUTION INTRAMUSCULAR; INTRAVENOUS; SUBCUTANEOUS at 05:07

## 2021-08-23 RX ADMIN — FAMOTIDINE 20 MG: 10 INJECTION INTRAVENOUS at 09:24

## 2021-08-23 RX ADMIN — PANTOPRAZOLE SODIUM 40 MG: 40 INJECTION, POWDER, FOR SOLUTION INTRAVENOUS at 09:06

## 2021-08-23 RX ADMIN — ACETAMINOPHEN 650 MG: 325 TABLET, FILM COATED ORAL at 15:55

## 2021-08-23 RX ADMIN — ONDANSETRON 4 MG: 2 INJECTION INTRAMUSCULAR; INTRAVENOUS at 09:06

## 2021-08-23 RX ADMIN — PANTOPRAZOLE SODIUM 40 MG: 40 INJECTION, POWDER, FOR SOLUTION INTRAVENOUS at 21:33

## 2021-08-23 RX ADMIN — FAMOTIDINE 20 MG: 10 INJECTION INTRAVENOUS at 21:33

## 2021-08-23 RX ADMIN — MAGNESIUM SULFATE HEPTAHYDRATE 2 G: 40 INJECTION, SOLUTION INTRAVENOUS at 09:09

## 2021-08-23 RX ADMIN — FAMOTIDINE 20 MG: 10 INJECTION INTRAVENOUS at 02:12

## 2021-08-23 RX ADMIN — LEVOTHYROXINE SODIUM 25 MCG: 25 TABLET ORAL at 09:11

## 2021-08-23 RX ADMIN — SODIUM CHLORIDE, SODIUM GLUCONATE, SODIUM ACETATE, POTASSIUM CHLORIDE, MAGNESIUM CHLORIDE, SODIUM PHOSPHATE, DIBASIC, AND POTASSIUM PHOSPHATE 125 ML/HR: .53; .5; .37; .037; .03; .012; .00082 INJECTION, SOLUTION INTRAVENOUS at 02:10

## 2021-08-23 RX ADMIN — ENOXAPARIN SODIUM 40 MG: 40 INJECTION SUBCUTANEOUS at 15:53

## 2021-08-23 RX ADMIN — ATORVASTATIN CALCIUM 10 MG: 20 TABLET, FILM COATED ORAL at 02:11

## 2021-08-23 RX ADMIN — SODIUM CHLORIDE, SODIUM GLUCONATE, SODIUM ACETATE, POTASSIUM CHLORIDE, MAGNESIUM CHLORIDE, SODIUM PHOSPHATE, DIBASIC, AND POTASSIUM PHOSPHATE 125 ML/HR: .53; .5; .37; .037; .03; .012; .00082 INJECTION, SOLUTION INTRAVENOUS at 20:05

## 2021-08-23 RX ADMIN — PROMETHAZINE HYDROCHLORIDE 12.5 MG: 25 INJECTION INTRAMUSCULAR; INTRAVENOUS at 02:24

## 2021-08-23 RX ADMIN — ATORVASTATIN CALCIUM 10 MG: 20 TABLET, FILM COATED ORAL at 21:32

## 2021-08-23 RX ADMIN — OXYCODONE HYDROCHLORIDE 5 MG: 5 TABLET ORAL at 11:31

## 2021-08-23 RX ADMIN — HYDROMORPHONE HYDROCHLORIDE 0.2 MG: 0.2 INJECTION, SOLUTION INTRAMUSCULAR; INTRAVENOUS; SUBCUTANEOUS at 01:23

## 2021-08-23 RX ADMIN — SODIUM CHLORIDE, SODIUM GLUCONATE, SODIUM ACETATE, POTASSIUM CHLORIDE, MAGNESIUM CHLORIDE, SODIUM PHOSPHATE, DIBASIC, AND POTASSIUM PHOSPHATE 125 ML/HR: .53; .5; .37; .037; .03; .012; .00082 INJECTION, SOLUTION INTRAVENOUS at 09:26

## 2021-08-23 RX ADMIN — HYDROMORPHONE HYDROCHLORIDE 0.2 MG: 0.2 INJECTION, SOLUTION INTRAMUSCULAR; INTRAVENOUS; SUBCUTANEOUS at 21:33

## 2021-08-23 RX ADMIN — PANTOPRAZOLE SODIUM 40 MG: 40 INJECTION, POWDER, FOR SOLUTION INTRAVENOUS at 02:11

## 2021-08-23 RX ADMIN — TIMOLOL MALEATE 1 DROP: 5 SOLUTION/ DROPS OPHTHALMIC at 15:50

## 2021-08-23 RX ADMIN — SUCRALFATE 1 G: 1 TABLET ORAL at 18:00

## 2021-08-23 RX ADMIN — PROMETHAZINE HYDROCHLORIDE 12.5 MG: 25 INJECTION INTRAMUSCULAR; INTRAVENOUS at 11:31

## 2021-08-23 RX ADMIN — ENOXAPARIN SODIUM 40 MG: 40 INJECTION SUBCUTANEOUS at 02:12

## 2021-08-23 NOTE — ASSESSMENT & PLAN NOTE
Patient on Protonix 40 mg daily will continue and change dose to twice daily  Pepcid 20 mg twice daily  Carafate  continue  IV fluids  GI consulted,  Plan for NPO after midnight EGD in a m

## 2021-08-23 NOTE — ASSESSMENT & PLAN NOTE
Will place patient on both Protonix twice daily with H2 blockers twice daily (Pepcid)  GI consult regarding possibility of gastroesophageal reflux disease with gastritis complicating current presentation  Patient needs to be upright or at least 45° back rest   NPO right now  Possibility of EGD? Carafate 4 times daily  Fluids  Recheck metabolic profile with CBC in the morning

## 2021-08-23 NOTE — CONSULTS
Consult Service: Gastroenterology      PATIENT INFORMATION      Marielena Turpin 79 y o  female MRN: [id number]  Unit/Bed#: [facility] 304-01 Encounter: [id number]  PCP: Karri Prabhakar MD  Date of Admission:  8/22/2021  Date of Consultation: 08/23/21  Requesting Physician: Alek Bethea MD       ASSESSMENTS & PLAN   Marielena Turpin is a 79 y o  old female with PMH of morbid obesity, paraesophageal hernia status post repair in March, GERD who presented with 2 week history of nausea, vomiting and poor p o  intake  Gastrointestinal disturbance - patient with 2 week history of nausea, vomiting associated with 10 lb weight loss  Her abdominal pain has resolved  No clear etiology at this point however differential would include GERD, dysmotility from hiatal hernia repair, hiatal hernia repair dysfunction, gastroenteritis, dyspepsia, H pylori  CT scan does show medium hiatal hernia  · Plan for EGD tomorrow  Clear liquid diet tomorrow  · Symptomatic control:  IV PPI b i d , Carafate, Mylanta prn, Zofran prn    Paraesophageal hernia - status post laparoscopic repair on 03/05/2021 by Dr Prashanth Munguia  · Will re-evaluate hernia during EGD  · Follow-up outpatient with thoracic surgery        REASON FOR CONSULTATION      Nausea and vomiting      HISTORY OF PRESENT ILLNESS      Marielena Turpin is a 79 y o  old female with PMH of morbid obesity, paraesophageal hernia status post repair in March, GERD who presented with 2 week history of nausea, vomiting and poor p o  intake     Patient states she has been constantly nauseous for the past 2 weeks which has affected her ability to ED  This is associated with a 10 lb weight loss  She has vomited once per day after supper which is nonbloody or bilious  She also had associated epigastric/left upper quadrant pain starting last night that was severe 10/10 that prompted her to come to the emergency room however this has resolved    No change in bowel habits and she states she goes once a day  No dysphagia odynophagia  She denies NSAID use or blood thinners  She believes she had EGD approximately 10 years ago  She states last colonoscopy was 1 year ago however I do not have these records  No significant alcohol or smoking history  REVIEW OF SYSTEMS     A thorough 12-point review of systems has been conducted  Pertinent positives and negatives are mentioned in the history of present illness  PAST MEDICAL & SURGICAL HISTORY      Past Medical History:   Diagnosis Date    Cancer [facility])     Disease of thyroid gland     Endometrial cancer [facility])     age 45    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    Esophageal reflux     Hypertension     Hypothyroid     Obesity        Past Surgical History:   Procedure Laterality Date    CATARACT EXTRACTION, BILATERAL      COLONOSCOPY  08/2018    polyp- 5 years    JOINT REPLACEMENT      KNEE ARTHROPLASTY      CT ESOPHAGOGASTRODUODENOSCOPY TRANSORAL DIAGNOSTIC N/A 3/5/2021    Procedure: ESOPHAGOGASTRODUODENOSCOPY (EGD); Surgeon: Micky Murphy MD;  [facility]: [facility];  Service: Thoracic    CT LAP, REPAIR PARAESOPHAGEAL HERNIA, INCL FUNDOPLASTY W/O MESH N/A 3/5/2021    Procedure: REPAIR HERNIA PARAESOPHAGEAL LAPAROSCOPIC W ROBOTICS WITH MESH, DOOR FUNDOPLICATION;  Surgeon: Micky Murphy MD;  Location[facility];  Service: Thoracic    TOTAL ABDOMINAL HYSTERECTOMY      age 45    TOTAL ABDOMINAL HYSTERECTOMY W/ BILATERAL SALPINGOOPHORECTOMY  1992    endometrial cancer         MEDICATIONS & ALLERGIES       Medications:   Prior to Admission medications    Medication Sig Start Date End Date Taking?  Authorizing Provider   amLODIPine (NORVASC) 5 mg tablet Take 1 tablet (5 mg total) by mouth daily 7/6/21   Maty Davis MD   Ascorbic Acid (VITAMIN C PO) Take by mouth    Historical Provider, MD   atorvastatin (LIPITOR) 10 mg tablet TAKE 1 TABLET BY MOUTH  DAILY AT BEDTIME 8/10/21   Maty Davis MD   Biotin 1 MG CAPS Take by mouth    Historical Provider, MD   Calcium 500 MG tablet Take by mouth 8/15/13   Historical Provider, MD   calcium carbonate (Calcium 600) 600 MG tablet Take 600 mg by mouth 2 (two) times a week    Historical Provider, MD   Cholecalciferol (VITAMIN D3) 400 units CAPS Take by mouth 11   Historical Provider, MD   Combigan 0 2-0 5 % INSTILL 1 DROP IN BOTH EYES TWICE DAILY 21   Historical Provider, MD   glucosamine 500 MG CAPS capsule Take 1,500 mg by mouth daily    Historical Provider, MD   levothyroxine 50 mcg tablet Take 0 5 tablets by mouth daily  10/24/17   Historical Provider, MD   Multiple Vitamin (MULTIVITAMINS PO) Take 1 tablet by mouth daily 12   Historical Provider, MD   Omega-3 Fatty Acids (FISH OIL) 1,000 mg Take by mouth    Historical Provider, MD   oxyCODONE (ROXICODONE) 5 mg immediate release tablet Take 1 tablet (5 mg total) by mouth every 4 (four) hours as needed for moderate pain for up to 20 dosesMax Daily Amount: 30 mg 3/6/21   Brynn Chin PA-C   pantoprazole (PROTONIX) 40 mg tablet Take by mouth 11   Historical Provider, MD       Allergies: No Known Allergies      SOCIAL HISTORY      Marital Status: /Civil Union    Substance Use History:   Social History     Substance and Sexual Activity   Alcohol Use Not Currently    Comment: socially     Social History     Tobacco Use   Smoking Status Former Smoker    Packs/day: 0 25    Years: 10 00    Pack years: 2 50    Types: Cigarettes    Start date: 64    Quit date: 55    Years since quittin 6   Smokeless Tobacco Never Used   Tobacco Comment    Quit 50+ years ago 3/24/21     Social History     Substance and Sexual Activity   Drug Use No         FAMILY HISTORY      Non-Contributory      PHYSICAL EXAM     Vitals:   Blood Pressure: (!) 174/101 (21 0600)  Pulse: 88 (21 0600)  Temperature: 97 8 °F (36 6 °C) (21)  Temp Source: Oral (21)  Respirations: 20 (21 0600)  Height: 5' 3" (160 cm) (08/23/21 0800)  Weight - Scale: 111 kg (245 lb) (08/23/21 0800)  SpO2: 95 % (08/23/21 0600)    Physical Exam:   GENERAL: NAD  HEENT:  NC/AT, no scleral icterus  CARDIAC:  RRR, +S1/S2  PULMONARY:  CTA B/L, no wheezing/rales/rhonci, non-labored breathing  ABDOMEN:  Soft, NT/ND, +BS, no rebound/guarding/rigidity  Extremities:  No edema, cyanosis, or clubbing  NEUROLOGIC:  Alert  SKIN:  No rashes or erythema      ADDITIONAL DATA     Lab Results:     Results from last 7 days   Lab Units 08/23/21  0501   WBC Thousand/uL 9 23   HEMOGLOBIN g/dL 15 6*   HEMATOCRIT % 45 8   PLATELETS Thousands/uL 213   NEUTROS PCT % 85*   LYMPHS PCT % 11*   MONOS PCT % 4   EOS PCT % 0     Results from last 7 days   Lab Units 08/23/21  0501   POTASSIUM mmol/L 3 8   CHLORIDE mmol/L 101   CO2 mmol/L 24   BUN mg/dL 11   CREATININE mg/dL 0 64   CALCIUM mg/dL 9 0   ALK PHOS U/L 124*   ALT U/L 30   AST U/L 22           Imaging:    CTA dissection protocol chest/abdomen/pelvis    Result Date: 8/22/2021  Narrative: CTA - CHEST, ABDOMEN AND PELVIS - WITHOUT AND WITH IV CONTRAST INDICATION:   Rule out dissection  Upper abdominal pain  Surgical repair of paraesophageal hernia performed in March  Vomiting for several weeks  Now with intractable vomiting and left-sided abdominal pain, pain in the left upper abdomen and lower chest   Epigastric and left upper quadrant tenderness  COMPARISON:  CT of the abdomen and pelvis dated February 17, 2021  TECHNIQUE: CT examination of the chest, abdomen and pelvis was performed both prior to and after the administration of intravenous contrast   The noncontrast portion of this examination was performed utilizing low radiation dose technique  Thin section angiographic arterial phase post contrast technique was used in order to evaluate for aortic dissection  3D reformatted images and volume rendering were performed on an independent workstation    Additionally, axial, sagittal, and coronal 2D reformatted images were created from the source data and submitted for interpretation  Radiation dose length product (DLP) for this visit:  2771 15 mGy-cm   This examination, like all CT scans performed in the [facility], was performed utilizing techniques to minimize radiation dose exposure, including the use of iterative reconstruction and automated exposure control  IV Contrast:  100 mL of iohexol (OMNIPAQUE) Enteric Contrast:  Enteric contrast was not administered  FINDINGS: AORTA:  There is no aortic dissection or intramural hematoma  There is no aortic aneurysm  There is atherosclerosis  CHEST LUNGS:  Minimal bibasilar atelectasis  No focal consolidation  No pneumothorax  PLEURA:  Unremarkable  HEART/PULMONARY ARTERIAL TREE:  Coronary artery disease  MEDIASTINUM AND REGAN:  There is a moderate-sized hiatal hernia; this is significantly smaller compared with February 17, 2021  There is some fluid in the esophagus, extending to just below the level of the mary, suggesting gastroesophageal reflux  CHEST WALL AND LOWER NECK:   Unremarkable  ABDOMEN LIVER/BILIARY TREE:  There is a 1 cm area of enhancement within the right lobe of the liver (series 3 image 98), likely ALEX versus small hemangioma  GALLBLADDER:  There are gallstone(s) within the gallbladder, without pericholecystic inflammatory changes  SPLEEN:  Unremarkable  PANCREAS:  Unremarkable  ADRENAL GLANDS:  Unremarkable  KIDNEYS/URETERS:  There is a 2 cm cyst in the upper pole right kidney  There is a 1 6 x 1 1 x 1 5 cm calculus in the left ureteropelvic junction  The left renal pelvis is minimally dilated compared to the prior study, however, without caliectasis  There is slightly more perinephric stranding on the left when compared to the prior study  STOMACH AND BOWEL:  There is a moderate-sized hiatal hernia, significantly smaller compared to the prior study  There is no bowel obstruction    There is colonic diverticulosis without evidence of acute diverticulitis  APPENDIX:  No findings to suggest appendicitis  ABDOMINOPELVIC CAVITY:  As described above  No pneumoperitoneum  PELVIS REPRODUCTIVE ORGANS:  Prior hysterectomy  URINARY BLADDER:  Unremarkable  ABDOMINAL WALL/INGUINAL REGIONS:  Small fat-containing umbilical/supraumbilical hernia unchanged  OSSEOUS STRUCTURES:  No acute fracture or destructive osseous lesion  There is multilevel degenerative change of the spine  Impression: There is a 1 6 x 1 1 x 1 5 cm calculus in the left ureteropelvic junction  The left renal pelvis is minimally dilated compared to the prior study, however, there is no significant caliectasis  There is slightly more perinephric stranding on the left when compared with the prior study  No evidence of aortic aneurysm or aortic dissection  Atherosclerosis  Coronary artery disease  Colonic diverticulosis without evidence of acute diverticulitis  No bowel obstruction  Cholelithiasis  No definite CT evidence of acute cholecystitis  There is a moderate-sized hiatal hernia; this is significantly smaller compared with February 17, 2021  There is some fluid in the esophagus, extending to just below the level of the mary, suggesting gastroesophageal reflux  Other nonemergent findings, as described above  Please see discussion  I personally discussed this study with Derek Reyes on 8/22/2021 at 10:25 PM  Workstation performed: [id number]     [address] [facility] bedside procedure    Result Date: 8/22/2021  Narrative: 1 2 [id number] 2 224 [id number]      EKG, Pathology, and Other Studies Reviewed on Admission:   · EKG: Reviewed      Counseling / Coordination of Care Time: 30 total mins spent n consult   Greater than 50% of total time spent on patient counseling and coordination of care            ** Please Note: This note is constructed using a voice recognition dictation system   **

## 2021-08-23 NOTE — ED ATTENDING ATTESTATION
8/22/2021  I, Jhon Pandey MD, saw and evaluated the patient  I have discussed the patient with the resident/non-physician practitioner and agree with the resident's/non-physician practitioner's findings, Plan of Care, and MDM as documented in the resident's/non-physician practitioner's note, except where noted  All available labs and Radiology studies were reviewed  I was present for key portions of any procedure(s) performed by the resident/non-physician practitioner and I was immediately available to provide assistance  At this point I agree with the current assessment done in the Emergency Department  I have conducted an independent evaluation of this patient a history and physical is as follows: This is an 79-year-old woman who presents to the emergency department with upper abdominal pain  Patient states that she is status post repair of paraesophageal hernia that was done in March  Patient states that she has been having vomiting in the evenings for the last couple of weeks  States that she does not vomit in the morning, but in the evening, she often has several episodes of vomiting  The ears jar generally on painful  Patient states that tonight, she had vomiting, then developed severe left-sided abdominal pain  Patient states that the pain is in her upper abdomen/low chest, is severe and unremitting  Has had intractable vomiting since  Vomiting has been nonbloody  Patient has not had pain like this before  This does not feel like prior episodes of GERD, nor does it feel like her symptoms related to her hernia  Patient does not have shortness of breath  The pain does not radiate  She has not had fevers, chills, or diarrhea  Review of systems otherwise negative in 12 systems reviewed  On exam, the patient is a difficult historian because of ongoing vomiting and significant pain  The patient is leaning forward, clutching her left upper quadrant  Patient is distraught    On vital signs, she is afebrile  She is not tachycardic  She is slightly tachypneic, but does not appear to have increased work of breathing  The patient has adequate blood pressure  On HEENT exam, she does not have any pallor  She has no conjunctival injection, nor did she have any scleral icterus  Her mucous membranes are moist   Neck is supple without JVD  The patient's heart is regular without any murmurs, rubs, gallops  Lungs are clear bilaterally with good air movement  Abdomen is soft with epigastric and left upper quadrant tenderness with no rebound or guarding  I do not appreciate any masses  Her lower abdomen is benign  Extremities are intact with good pulses  She is neurologically intact with normal skin and back exam   ED course:  Patient with significant pain, chart review reveals history of thoracic aneurysm  Patient to CT scan for CT, CTA  Pending read currently, but I do not see signs of dissection  Bedside ultrasound performed, patient with enlarged gallbladder and gallbladder distention, but no observed wall edema, and no enlargement of her common bile duct  Patient states that Dilaudid has improved her pain a little, but that is now more localized to her left upper abdomen, and she states that it has moved  Patient given numerous doses of Zofran, call put in to radiology for expedited read    Patient will be admitted to the hospital for intractable vomiting, abdominal pain  ED Course         Critical Care Time  CriticalCare Time  Performed by: Radha Bang MD  Authorized by: Radha Bang MD     Critical care provider statement:     Critical care time (minutes):  34    Critical care time was exclusive of:  Separately billable procedures and treating other patients and teaching time    Critical care was time spent personally by me on the following activities:  Blood draw for specimens, obtaining history from patient or surrogate, development of treatment plan with patient or surrogate, discussions with consultants, discussions with primary provider, evaluation of patient's response to treatment, examination of patient, review of old charts, re-evaluation of patient's condition, ordering and review of radiographic studies, ordering and review of laboratory studies and ordering and performing treatments and interventions

## 2021-08-23 NOTE — H&P
[facility]  H&P- Rica Schneider 1940, 79 y o  female MRN: [id number]  Unit/Bed#: ED 29 Encounter: [id number]  Primary Care Provider: Slade Carolina MD   Date and time admitted to hospital: 8/22/2021  8:34 PM    * Persistent recurrent vomiting  Assessment & Plan  Will place patient on both Protonix twice daily with H2 blockers twice daily (Pepcid)  GI consult regarding possibility of gastroesophageal reflux disease with gastritis complicating current presentation  Patient needs to be upright or at least 45° back rest   NPO right now  Possibility of EGD? Carafate 4 times daily  Fluids  Recheck metabolic profile with CBC in the morning  Gastroesophageal reflux disease with esophagitis without hemorrhage  Assessment & Plan  Patient on Protonix 40 mg daily will continue and change dose to twice daily  Pepcid 20 mg twice daily  Carafate  Fluids for now  GI consult pending  Hypertension, essential, benign  Assessment & Plan  Continue Norvasc 5 mg daily  Acquired hypothyroidism  Assessment & Plan  On levothyroxine 50 mcg daily  Hypercholesterolemia  Assessment & Plan  Continue Lipitor 10 mg daily  Paraesophageal hernia  Assessment & Plan  Status post correction and currently is smaller although still with reflux but improved from previous  GI consult still pending  VTE Prophylaxis: Enoxaparin (Lovenox)  / sequential compression device   Code Status: Level 1 - Full Code   POLST: There is no POLST form on file for this patient (pre-hospital)    Anticipated Length of Stay:  Patient will be admitted on an Inpatient basis with an anticipated length of stay of  greater than 2 midnights  Justification for Hospital Stay: Please see detailed plans noted above      Chief Complaint:     Intractable nausea vomiting with halitosis and left-sided upper quadrant abdominal pain  History of Present Illness:  Rica Schneider is a 79 y o  female who has past medical history significant for morbid obesity, para as if a MARK ANTHONY hiatal hernia with nausea and vomiting that was corrected approximately 2 months ago few days ago of this year  Likewise she also has benign essential hypertension, hypothyroidism, hypercholesterolemia, arthritis and glaucoma  The patient for the past 2 weeks has been having nausea and over the past  Few days has been vomiting persistently with previously ingested material   Because of that, the patient is also experiencing left upper quadrant abdominal pain which is made worse  Patient denies any costovertebral angle tenderness, no fever  She also denies having any new onset nasal congestion  No back pain  No one is sick in the house         Review of Systems:    Constitutional:  Denies fever or chills   Eyes:  Denies change in visual acuity   HENT:  Denies nasal congestion or sore throat   Respiratory:  Denies cough or shortness of breath   Cardiovascular:  Denies chest pain or edema   GI:  Left upper quadrant abdominal discomfort with vomiting, patient demonstrates nausea, vomiting, without bloody stools or diarrhea   :  Denies dysuria   Musculoskeletal:  Denies back pain or joint pain   Integument:  Denies rash   Neurologic:  Denies headache, focal weakness or sensory changes   Endocrine:  Denies polyuria or polydipsia   Lymphatic:  Denies swollen glands   Psychiatric:  Denies depression or anxiety     Past Medical and Surgical History:   Past Medical History:   Diagnosis Date    [facility]     Disease of thyroid gland     Endometrial cancer [facility]     age 45    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    Esophageal reflux     Hypertension     Hypothyroid     Obesity      Past Surgical History:   Procedure Laterality Date    CATARACT EXTRACTION, BILATERAL      COLONOSCOPY  08/2018    polyp- 5 years    JOINT REPLACEMENT      KNEE ARTHROPLASTY      ND ESOPHAGOGASTRODUODENOSCOPY TRANSORAL DIAGNOSTIC N/A 3/5/2021 Procedure: ESOPHAGOGASTRODUODENOSCOPY (EGD); Surgeon: Lizzie Guerrero MD;  Location: [facility];  Service: Thoracic    WA LAP, REPAIR PARAESOPHAGEAL HERNIA, INCL FUNDOPLASTY W/O MESH N/A 3/5/2021    Procedure: REPAIR HERNIA PARAESOPHAGEAL LAPAROSCOPIC W ROBOTICS WITH MESH, DOOR FUNDOPLICATION;  Surgeon: Lizzie Guerrero MD;  Location: [facility];  Service: Thoracic    TOTAL ABDOMINAL HYSTERECTOMY      age 45    TOTAL ABDOMINAL HYSTERECTOMY W/ BILATERAL SALPINGOOPHORECTOMY      endometrial cancer       Meds/Allergies:  (Not in a hospital admission)      Allergies: No Known Allergies  History:  Marital Status: /Civil Union   Occupation:  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 and lives with     Patient Pre-hospital Living Situation:  Lives at home  Patient Pre-hospital Level of Mobility:  Ambulatory  Patient Pre-hospital Diet Restrictions:  Regular diet  Substance Use History:   Social History     Substance and Sexual Activity   Alcohol Use Not Currently    Comment: socially     Social History     Tobacco Use   Smoking Status Former Smoker    Packs/day: 0 25    Years: 10 00    Pack years: 2 50    Types: Cigarettes    Start date: 64    Quit date: Jess Martin Years since quittin 6   Smokeless Tobacco Never Used   Tobacco Comment    Quit 50+ years ago 3/24/21     Social History     Substance and Sexual Activity   Drug Use No       Family History:  Family History   Problem Relation Age of Onset    Stomach cancer Mother 71    Pancreatic cancer Mother 71    Heart attack Father     Cancer Maternal Uncle     No Known Problems Daughter     No Known Problems Maternal Grandmother     No Known Problems Maternal Grandfather     No Known Problems Paternal Grandmother     No Known Problems Paternal Grandfather     No Known Problems Maternal Aunt     No Known Problems Maternal Aunt        Physical Exam:     Vitals:   Blood Pressure: (!) 189/92 (08/22/21 2207)  Pulse: 76 (08/22/21 2207)  Temperature: 97 8 °F (36 6 °C) (08/22/21 2039)  Temp Source: Oral (08/22/21 2039)  Respirations: 22 (08/22/21 2207)  SpO2: 98 % (08/22/21 2207)    Constitutional:  Well developed, obese habitus no acute distress, non-toxic appearance however has tendency to hiccup with vomiting noted  Previously ingested material vomitus  Noted halitosis  Eyes:  PERRL, conjunctiva normal   HENT:  Atraumatic, external ears normal, nose normal, oropharynx moist, no pharyngeal exudates  Neck- normal range of motion, no tenderness, supple   Respiratory:  No respiratory distress, normal breath sounds, no rales, no wheezing   Cardiovascular:  Normal rate, normal rhythm, no murmurs, no gallops, no rubs   GI:  Soft, nondistended, normal bowel sounds, nontender except at a specific point at the left upper quadrant just underneath the ribcage, no organomegaly, no mass, no rebound, no guarding   :  No costovertebral angle tenderness   Musculoskeletal:  No edema, no tenderness, no deformities  Back- no tenderness  Integument:  Well hydrated, no rash   Lymphatic:  No lymphadenopathy noted   Neurologic:  Alert &awake, communicative, CN 2-12 normal, normal motor function, normal sensory function, no focal deficits noted   Psychiatric:  Speech and behavior appropriate but worried      Lab Results: I have personally reviewed pertinent reports  Results from last 7 days   Lab Units 08/22/21  2117   WBC Thousand/uL 7 70   HEMOGLOBIN g/dL 14 4   HEMATOCRIT % 42 4   PLATELETS Thousands/uL 198   NEUTROS PCT % 75   LYMPHS PCT % 19   MONOS PCT % 6   EOS PCT % 0     Results from last 7 days   Lab Units 08/22/21  2117   POTASSIUM mmol/L 3 9   CHLORIDE mmol/L 105   CO2 mmol/L 24   BUN mg/dL 14   CREATININE mg/dL 0 72   CALCIUM mg/dL 8 9   ALK PHOS U/L 110   ALT U/L 26   AST U/L 22           Imaging: I have personally reviewed pertinent reports        CTA dissection protocol chest/abdomen/pelvis    Result Date: 8/22/2021  Narrative: CTA - CHEST, ABDOMEN AND PELVIS - WITHOUT AND WITH IV CONTRAST INDICATION:   Rule out dissection  Upper abdominal pain  Surgical repair of paraesophageal hernia performed in March  Vomiting for several weeks  Now with intractable vomiting and left-sided abdominal pain, pain in the left upper abdomen and lower chest   Epigastric and left upper quadrant tenderness  COMPARISON:  CT of the abdomen and pelvis dated February 17, 2021  TECHNIQUE: CT examination of the chest, abdomen and pelvis was performed both prior to and after the administration of intravenous contrast   The noncontrast portion of this examination was performed utilizing low radiation dose technique  Thin section angiographic arterial phase post contrast technique was used in order to evaluate for aortic dissection  3D reformatted images and volume rendering were performed on an independent workstation  Additionally, axial, sagittal, and coronal 2D reformatted images were created from the source data and submitted for interpretation  Radiation dose length product (DLP) for this visit:  2771 15 mGy-cm   This examination, like all CT scans performed in the [facility], was performed utilizing techniques to minimize radiation dose exposure, including the use of iterative reconstruction and automated exposure control  IV Contrast:  100 mL of iohexol (OMNIPAQUE) Enteric Contrast:  Enteric contrast was not administered  FINDINGS: AORTA:  There is no aortic dissection or intramural hematoma  There is no aortic aneurysm  There is atherosclerosis  CHEST LUNGS:  Minimal bibasilar atelectasis  No focal consolidation  No pneumothorax  PLEURA:  Unremarkable  HEART/PULMONARY ARTERIAL TREE:  Coronary artery disease  MEDIASTINUM AND REGAN:  There is a moderate-sized hiatal hernia; this is significantly smaller compared with February 17, 2021    There is some fluid in the esophagus, extending to just below the level of the mary, suggesting gastroesophageal reflux  CHEST WALL AND LOWER NECK:   Unremarkable  ABDOMEN LIVER/BILIARY TREE:  There is a 1 cm area of enhancement within the right lobe of the liver (series 3 image 98), likely ALEX versus small hemangioma  GALLBLADDER:  There are gallstone(s) within the gallbladder, without pericholecystic inflammatory changes  SPLEEN:  Unremarkable  PANCREAS:  Unremarkable  ADRENAL GLANDS:  Unremarkable  KIDNEYS/URETERS:  There is a 2 cm cyst in the upper pole right kidney  There is a 1 6 x 1 1 x 1 5 cm calculus in the left ureteropelvic junction  The left renal pelvis is minimally dilated compared to the prior study, however, without caliectasis  There is slightly more perinephric stranding on the left when compared to the prior study  STOMACH AND BOWEL:  There is a moderate-sized hiatal hernia, significantly smaller compared to the prior study  There is no bowel obstruction  There is colonic diverticulosis without evidence of acute diverticulitis  APPENDIX:  No findings to suggest appendicitis  ABDOMINOPELVIC CAVITY:  As described above  No pneumoperitoneum  PELVIS REPRODUCTIVE ORGANS:  Prior hysterectomy  URINARY BLADDER:  Unremarkable  ABDOMINAL WALL/INGUINAL REGIONS:  Small fat-containing umbilical/supraumbilical hernia unchanged  OSSEOUS STRUCTURES:  No acute fracture or destructive osseous lesion  There is multilevel degenerative change of the spine  Impression: There is a 1 6 x 1 1 x 1 5 cm calculus in the left ureteropelvic junction  The left renal pelvis is minimally dilated compared to the prior study, however, there is no significant caliectasis  There is slightly more perinephric stranding on the left when compared with the prior study  No evidence of aortic aneurysm or aortic dissection  Atherosclerosis  Coronary artery disease  Colonic diverticulosis without evidence of acute diverticulitis  No bowel obstruction  Cholelithiasis  No definite CT evidence of acute cholecystitis  There is a moderate-sized hiatal hernia; this is significantly smaller compared with February 17, 2021  There is some fluid in the esophagus, extending to just below the level of the mary, suggesting gastroesophageal reflux  Other nonemergent findings, as described above  Please see discussion  I personally discussed this study with Tanya Rodriguez on 8/22/2021 at 10:25 PM  Workstation performed: [id number]     [id number] bedside procedure    Result Date: 8/22/2021  Narrative: 1 2 840 [id number] 2 224 [id number]        ** Please Note: Dragon 360 Dictation voice to text software was used in the creation of this document   **

## 2021-08-23 NOTE — ASSESSMENT & PLAN NOTE
Patient on Protonix 40 mg daily will continue and change dose to twice daily  Pepcid 20 mg twice daily  Carafate  Fluids for now  GI consult pending

## 2021-08-23 NOTE — ASSESSMENT & PLAN NOTE
A 1 6 x 1 1 x 1 5 cm calculus in left ureteral pelvic junction noted on CT scan  Left renal pelvis minimally dilated compared to prior study, however there is no significant gallop basis, there is some perinephric stranding noted As per radiology report   patient does admit to left upper abdominal discomfort radiating to her left back  Urology consulted      Follow up on recommendations

## 2021-08-23 NOTE — ED PROVIDER NOTES
History  Chief Complaint   Patient presents with    Abdominal Pain     Abd pain, N/V, hernia surgery 2 months ago  Patient is a 79year old female with a past medical history of a paraesophageal hernia repaired this past March currently presenting with sudden onset abdominal pain  She states that she has been nauseous for the past two weeks, and been vomiting daily, worse in the morning  She endorses innumerous episodes of nonbloody vomitus  She states that today she began experiencing sudden onset abdominal pain  This pain was unprovoked  It is worse in the left upper abdomen  She states that it is worse with eating  She tried taking pepto bismol and this did not help  She has never had pain like this before  This does not feel like the pain she used to get with her paraesophageal hernia  She states that the pain is constant  She is also endorsing increased sweating since the abdominal pain started  She is denying chest pain, shortness of breath, difficulty breathing, diarrhea, fever, chills, or sick contacts  Prior to Admission Medications   Prescriptions Last Dose Informant Patient Reported? Taking?    Ascorbic Acid (VITAMIN C PO)  Self Yes No   Sig: Take by mouth   Biotin 1 MG CAPS  Self Yes No   Sig: Take by mouth   Calcium 500 MG tablet  Self Yes No   Sig: Take by mouth   Cholecalciferol (VITAMIN D3) 400 units CAPS  Self Yes No   Sig: Take by mouth   Combigan 0 2-0 5 %  Self Yes No   Sig: INSTILL 1 DROP IN BOTH EYES TWICE DAILY   Multiple Vitamin (MULTIVITAMINS PO)  Self Yes No   Sig: Take 1 tablet by mouth daily   Omega-3 Fatty Acids (FISH OIL) 1,000 mg  Self Yes No   Sig: Take by mouth   amLODIPine (NORVASC) 5 mg tablet   No No   Sig: Take 1 tablet (5 mg total) by mouth daily   atorvastatin (LIPITOR) 10 mg tablet   No No   Sig: TAKE 1 TABLET BY MOUTH  DAILY AT BEDTIME   calcium carbonate (Calcium 600) 600 MG tablet  Self Yes No   Sig: Take 600 mg by mouth 2 (two) times a week   glucosamine 500 MG CAPS capsule  Self Yes No   Sig: Take 1,500 mg by mouth daily   levothyroxine 50 mcg tablet  Self Yes No   Sig: Take 0 5 tablets by mouth daily    oxyCODONE (ROXICODONE) 5 mg immediate release tablet  Self No No   Sig: Take 1 tablet (5 mg total) by mouth every 4 (four) hours as needed for moderate pain for up to 20 dosesMax Daily Amount: 30 mg   pantoprazole (PROTONIX) 40 mg tablet  Self Yes No   Sig: Take by mouth      Facility-Administered Medications: None       Past Medical History:   Diagnosis Date    Cancer ([facility] )     Disease of thyroid gland     Endometrial cancer ([facility] )     age 45    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    Esophageal reflux     Hypertension     Hypothyroid     Obesity        Past Surgical History:   Procedure Laterality Date    CATARACT EXTRACTION, BILATERAL      COLONOSCOPY  08/2018    polyp- 5 years    JOINT REPLACEMENT      KNEE ARTHROPLASTY      FL ESOPHAGOGASTRODUODENOSCOPY TRANSORAL DIAGNOSTIC N/A 3/5/2021    Procedure: ESOPHAGOGASTRODUODENOSCOPY (EGD);   Surgeon: Nya Fowler MD;  [facility]: [facility];  Service: Thoracic    FL LAP, REPAIR PARAESOPHAGEAL HERNIA, INCL FUNDOPLASTY W/O MESH N/A 3/5/2021    Procedure: REPAIR HERNIA PARAESOPHAGEAL LAPAROSCOPIC W ROBOTICS WITH MESH, DOOR FUNDOPLICATION;  Surgeon: Nya Fowler MD;  [facility];  Service: Thoracic    TOTAL ABDOMINAL HYSTERECTOMY      age 45    TOTAL ABDOMINAL HYSTERECTOMY W/ BILATERAL SALPINGOOPHORECTOMY  1992    endometrial cancer       Family History   Problem Relation Age of Onset    Stomach cancer Mother 71    Pancreatic cancer Mother 71    Heart attack Father     Cancer Maternal Uncle     No Known Problems Daughter     No Known Problems Maternal Grandmother     No Known Problems Maternal Grandfather     No Known Problems Paternal Grandmother     No Known Problems Paternal Grandfather     No Known Problems Maternal Aunt     No Known Problems Maternal Aunt I have reviewed and agree with the history as documented  E-Cigarette/Vaping    E-Cigarette Use Never User      E-Cigarette/Vaping Substances    Nicotine No     THC No     CBD No     Flavoring No     Other No     Unknown No      Social History     Tobacco Use    Smoking status: Former Smoker     Packs/day: 0 25     Years: 10 00     Pack years: 2 50     Types: Cigarettes     Start date: 64     Quit date:      Years since quittin 6    Smokeless tobacco: Never Used    Tobacco comment: Quit 50+ years ago 3/24/21   Vaping Use    Vaping Use: Never used   Substance Use Topics    Alcohol use: Not Currently     Comment: socially    Drug use: No        Review of Systems   Constitutional: Positive for appetite change and diaphoresis  Negative for chills and fever  HENT: Negative for ear pain, rhinorrhea and sore throat  Eyes: Negative for pain  Respiratory: Negative for shortness of breath  Cardiovascular: Negative for chest pain and palpitations  Gastrointestinal: Positive for abdominal pain, nausea and vomiting  Negative for constipation and diarrhea  Genitourinary: Negative for dysuria  Musculoskeletal: Negative for myalgias  Skin: Negative for color change and rash  Neurological: Negative for dizziness, syncope, light-headedness and headaches  Psychiatric/Behavioral: Negative for agitation  All other systems reviewed and are negative        Physical Exam  ED Triage Vitals   Temperature Pulse Respirations Blood Pressure SpO2   21   97 8 °F (36 6 °C) 70 (!) 26 (!) 162/118 98 %      Temp Source Heart Rate Source Patient Position - Orthostatic VS BP Location FiO2 (%)   21 0300 21 --   Oral Monitor Lying Right arm       Pain Score       21       Worst Possible Pain             Orthostatic Vital Signs  Vitals:    21 0530 21 0600 21 9957 08/23/21 0911   BP: (!) 171/90 (!) 174/101 158/94 (!) 184/112   Pulse: 82 88  99   Patient Position - Orthostatic VS: Lying Lying  Lying       Physical Exam  Vitals and nursing note reviewed  Constitutional:       General: She is in acute distress  Appearance: Normal appearance  She is ill-appearing and diaphoretic  HENT:      Head: Normocephalic and atraumatic  Right Ear: External ear normal       Left Ear: External ear normal       Nose: Nose normal    Eyes:      General: No scleral icterus  Right eye: No discharge  Left eye: No discharge  Extraocular Movements: Extraocular movements intact  Conjunctiva/sclera: Conjunctivae normal    Cardiovascular:      Rate and Rhythm: Normal rate and regular rhythm  Pulses: Normal pulses  Heart sounds: Normal heart sounds  No murmur heard  No friction rub  No gallop  Comments: Radial and dorsalis pedis pulses 2+/4 and equal  Pulmonary:      Effort: Pulmonary effort is normal  No respiratory distress  Breath sounds: Normal breath sounds  Abdominal:      General: Abdomen is flat  A surgical scar is present  Bowel sounds are normal  There is no distension  Palpations: Abdomen is soft  There is no mass  Tenderness: There is abdominal tenderness in the left upper quadrant  There is no right CVA tenderness, left CVA tenderness, guarding or rebound  Negative signs include Merino's sign and McBurney's sign  Musculoskeletal:         General: Normal range of motion  Cervical back: Normal range of motion  Skin:     General: Skin is warm  Capillary Refill: Capillary refill takes less than 2 seconds  Neurological:      General: No focal deficit present  Mental Status: She is alert and oriented to person, place, and time  Cranial Nerves: Cranial nerves are intact  No facial asymmetry  Sensory: Sensation is intact     Psychiatric:         Mood and Affect: Mood normal          ED Medications  Medications   famotidine (PEPCID) injection 20 mg (20 mg Intravenous Given 8/23/21 0924)   sucralfate (CARAFATE) tablet 1 g (1 g Oral Not Given 8/23/21 0716)   HYDROmorphone HCl (DILAUDID) injection 0 2 mg (0 2 mg Intravenous Given 8/23/21 0507)   amLODIPine (NORVASC) tablet 5 mg (5 mg Oral Given 8/23/21 0906)   atorvastatin (LIPITOR) tablet 10 mg (10 mg Oral Given 8/23/21 0211)   calcium carbonate (OYSTER SHELL,OSCAL) 500 mg tablet 1 tablet (1 tablet Oral Not Given 8/23/21 0717)   cholecalciferol (VITAMIN D3) tablet 400 Units (has no administration in time range)   timolol (TIMOPTIC) 0 5 % ophthalmic solution 1 drop (has no administration in time range)   glucosamine sulfate capsule 1,500 mg (has no administration in time range)   levothyroxine tablet 25 mcg (25 mcg Oral Given 8/23/21 0911)   multivitamin stress formula tablet 1 tablet (has no administration in time range)   fish oil capsule 1,000 mg (has no administration in time range)   oxyCODONE (ROXICODONE) IR tablet 5 mg (has no administration in time range)   pantoprazole (PROTONIX) injection 40 mg (40 mg Intravenous Given 8/23/21 0906)   multi-electrolyte (PLASMALYTE-A/ISOLYTE-S PH 7 4) IV solution (0 mL/hr Intravenous Paused 8/23/21 0927)   acetaminophen (TYLENOL) tablet 650 mg (has no administration in time range)   docusate sodium (COLACE) capsule 100 mg (has no administration in time range)   ondansetron (ZOFRAN) injection 4 mg (4 mg Intravenous Given 8/23/21 0906)   enoxaparin (LOVENOX) subcutaneous injection 40 mg (40 mg Subcutaneous Given 8/23/21 0212)   promethazine (PHENERGAN) injection 12 5 mg (12 5 mg Intravenous Given 8/23/21 0224)   hydrALAZINE (APRESOLINE) injection 10 mg (has no administration in time range)   LORazepam (ATIVAN) injection 1 mg (1 mg Intravenous Given 8/23/21 0502)   magnesium sulfate 2 g/50 mL IVPB (premix) 2 g (2 g Intravenous New Bag 8/23/21 9190)   ondansetron (ZOFRAN) injection 4 mg (4 mg Intravenous Given 8/22/21 2115)   sodium chloride 0 9 % bolus 1,000 mL (0 mL Intravenous Stopped 8/22/21 2336)   HYDROmorphone (DILAUDID) injection 0 5 mg (0 5 mg Intravenous Given 8/22/21 2119)   iohexol (OMNIPAQUE) 350 MG/ML injection (SINGLE-DOSE) 100 mL (100 mL Intravenous Given 8/22/21 2136)   HYDROmorphone (DILAUDID) injection 0 5 mg (0 5 mg Intravenous Given 8/22/21 2217)   ketorolac (TORADOL) injection 15 mg (15 mg Intravenous Given 8/22/21 2336)   pantoprazole (PROTONIX) injection 40 mg (40 mg Intravenous Given 8/23/21 0211)       Diagnostic Studies  Results Reviewed     Procedure Component Value Units Date/Time    TSH, 3rd generation [[id number]]  (Normal) Collected: 08/23/21 0501    Lab Status: Final result Specimen: Blood from Line, Venous Updated: 08/23/21 0557     TSH 3RD GENERATON 1 120 uIU/mL     Narrative:      Patients undergoing fluorescein dye angiography may retain small amounts of fluorescein in the body for 48-72 hours post procedure  Samples containing fluorescein can produce falsely depressed TSH values  If the patient had this procedure,a specimen should be resubmitted post fluorescein clearance        Comprehensive metabolic panel [[id number]]  (Abnormal) Collected: 08/23/21 0501    Lab Status: Final result Specimen: Blood from Line, Venous Updated: 08/23/21 0551     Sodium 133 mmol/L      Potassium 3 8 mmol/L      Chloride 101 mmol/L      CO2 24 mmol/L      ANION GAP 8 mmol/L      BUN 11 mg/dL      Creatinine 0 64 mg/dL      Glucose 169 mg/dL      Calcium 9 0 mg/dL      AST 22 U/L      ALT 30 U/L      Alkaline Phosphatase 124 U/L      Total Protein 7 8 g/dL      Albumin 3 8 g/dL      Total Bilirubin 0 74 mg/dL      eGFR 84 ml/min/1 73sq m     Narrative:      [place] guidelines for Chronic Kidney Disease (CKD):     Stage 1 with normal or high GFR (GFR > 90 mL/min/1 73 square meters)    Stage 2 Mild CKD (GFR = 60-89 mL/min/1 73 square meters)    Stage 3A Moderate CKD (GFR = 45-59 mL/min/1 73 square meters)    Stage 3B Moderate CKD (GFR = 30-44 mL/min/1 73 square meters)    Stage 4 Severe CKD (GFR = 15-29 mL/min/1 73 square meters)    Stage 5 End Stage CKD (GFR <15 mL/min/1 73 square meters)  Note: GFR calculation is accurate only with a steady state creatinine    Magnesium [[id number]]  (Normal) Collected: 08/23/21 0501    Lab Status: Final result Specimen: Blood from Line, Venous Updated: 08/23/21 0551     Magnesium 1 6 mg/dL     Phosphorus [[id number]]  (Normal) Collected: 08/23/21 0501    Lab Status: Final result Specimen: Blood from Line, Venous Updated: 08/23/21 0551     Phosphorus 3 1 mg/dL     Lipase [[id number]]  (Normal) Collected: 08/23/21 0501    Lab Status: Final result Specimen: Blood from Line, Venous Updated: 08/23/21 0551     Lipase 77 u/L     CBC and differential [[id number]]  (Abnormal) Collected: 08/23/21 0501    Lab Status: Final result Specimen: Blood from Line, Venous Updated: 08/23/21 0523     WBC 9 23 Thousand/uL      RBC 4 79 Million/uL      Hemoglobin 15 6 g/dL      Hematocrit 45 8 %      MCV 96 fL      MCH 32 6 pg      MCHC 34 1 g/dL      RDW 12 3 %      MPV 11 4 fL      Platelets 124 Thousands/uL      nRBC 0 /100 WBCs      Neutrophils Relative 85 %      Immat GRANS % 0 %      Lymphocytes Relative 11 %      Monocytes Relative 4 %      Eosinophils Relative 0 %      Basophils Relative 0 %      Neutrophils Absolute 7 83 Thousands/µL      Immature Grans Absolute 0 03 Thousand/uL      Lymphocytes Absolute 1 00 Thousands/µL      Monocytes Absolute 0 36 Thousand/µL      Eosinophils Absolute 0 00 Thousand/µL      Basophils Absolute 0 01 Thousands/µL     Troponin I [[id number]]  (Normal) Collected: 08/22/21 2221    Lab Status: Final result Specimen: Blood from Arm, Left Updated: 08/22/21 2246     Troponin I <0 02 ng/mL     Urine Microscopic [[id number]]  (Normal) Collected: 08/22/21 2206    Lab Status: Final result Specimen: Urine, Clean Catch Updated: 08/22/21 2223     RBC, UA 2-4 /hpf      WBC, UA None Seen /hpf      Epithelial Cells None Seen /hpf      Bacteria, UA None Seen /hpf      Hyaline Casts, UA None Seen /lpf     Urine Macroscopic, POC [[id number]]  (Abnormal) Collected: 08/22/21 2206    Lab Status: Final result Specimen: Urine Updated: 08/22/21 2207     Color, UA Yellow     Clarity, UA Clear     pH, UA 6 5     Leukocytes, UA Negative     Nitrite, UA Negative     Protein, UA Negative mg/dl      Glucose, UA Negative mg/dl      Ketones, UA 40 (2+) mg/dl      Urobilinogen, UA 0 2 E U /dl      Bilirubin, UA Negative     Blood, UA Trace     Specific [place], UA 1 015    Narrative:      CLINITEK RESULT    Lactic acid, plasma [[id number]]  (Normal) Collected: 08/22/21 2117    Lab Status: Final result Specimen: Blood from Arm, Left Updated: 08/22/21 2157     LACTIC ACID 1 7 mmol/L     Narrative:      Result may be elevated if tourniquet was used during collection      Comprehensive metabolic panel [[id number]]  (Abnormal) Collected: 08/22/21 2117    Lab Status: Final result Specimen: Blood from Arm, Left Updated: 08/22/21 2149     Sodium 137 mmol/L      Potassium 3 9 mmol/L      Chloride 105 mmol/L      CO2 24 mmol/L      ANION GAP 8 mmol/L      BUN 14 mg/dL      Creatinine 0 72 mg/dL      Glucose 155 mg/dL      Calcium 8 9 mg/dL      AST 22 U/L      ALT 26 U/L      Alkaline Phosphatase 110 U/L      Total Protein 7 1 g/dL      Albumin 3 5 g/dL      Total Bilirubin 0 64 mg/dL      eGFR 79 ml/min/1 73sq m     Narrative:      [place] guidelines for Chronic Kidney Disease (CKD):     Stage 1 with normal or high GFR (GFR > 90 mL/min/1 73 square meters)    Stage 2 Mild CKD (GFR = 60-89 mL/min/1 73 square meters)    Stage 3A Moderate CKD (GFR = 45-59 mL/min/1 73 square meters)    Stage 3B Moderate CKD (GFR = 30-44 mL/min/1 73 square meters)    Stage 4 Severe CKD (GFR = 15-29 mL/min/1 73 square meters)    Stage 5 End Stage CKD (GFR <15 mL/min/1 73 square meters)  Note: GFR calculation is accurate only with a steady state creatinine    Lipase [[id number]]  (Abnormal) Collected: 08/22/21 2117    Lab Status: Final result Specimen: Blood from Arm, Left Updated: 08/22/21 2149     Lipase 72 u/L     CBC and differential [[id number]] Collected: 08/22/21 2117    Lab Status: Final result Specimen: Blood from Arm, Left Updated: 08/22/21 2126     WBC 7 70 Thousand/uL      RBC 4 43 Million/uL      Hemoglobin 14 4 g/dL      Hematocrit 42 4 %      MCV 96 fL      MCH 32 5 pg      MCHC 34 0 g/dL      RDW 12 3 %      MPV 11 2 fL      Platelets 462 Thousands/uL      nRBC 0 /100 WBCs      Neutrophils Relative 75 %      Immat GRANS % 0 %      Lymphocytes Relative 19 %      Monocytes Relative 6 %      Eosinophils Relative 0 %      Basophils Relative 0 %      Neutrophils Absolute 5 72 Thousands/µL      Immature Grans Absolute 0 03 Thousand/uL      Lymphocytes Absolute 1 45 Thousands/µL      Monocytes Absolute 0 46 Thousand/µL      Eosinophils Absolute 0 01 Thousand/µL      Basophils Absolute 0 03 Thousands/µL                  CTA dissection protocol chest/abdomen/pelvis   Final Result by Tracie Duron MD (08/22 2253)      There is a 1 6 x 1 1 x 1 5 cm calculus in the left ureteropelvic junction  The left renal pelvis is minimally dilated compared to the prior study, however, there is no significant caliectasis  There is slightly more perinephric stranding on the left    when compared with the prior study  No evidence of aortic aneurysm or aortic dissection  Atherosclerosis  Coronary artery disease  Colonic diverticulosis without evidence of acute diverticulitis  No bowel obstruction  Cholelithiasis  No definite CT evidence of acute cholecystitis  There is a moderate-sized hiatal hernia; this is significantly smaller compared with February 17, 2021    There is some fluid in the esophagus, extending to just below the level of the mary, suggesting gastroesophageal reflux  Other nonemergent findings, as described above  Please see discussion  I personally discussed this study with Leonard Elizabeth on 8/22/2021 at 10:25 PM                   Workstation performed: [id number]               Procedures  ECG 12 Lead Documentation Only    Date/Time: 8/22/2021 10:14 PM  Performed by: Kaur Kay DO  Authorized by: Kaur Kay DO     Indications / Diagnosis:  Abdominal pain  ECG reviewed by me, the ED Provider: yes    Patient location:  ED  Previous ECG:     Previous ECG:  Compared to current    Similarity:  No change    Comparison to cardiac monitor: Yes    Interpretation:     Interpretation: abnormal    Rate:     ECG rate:  78    ECG rate assessment: normal    Rhythm:     Rhythm: sinus rhythm    QRS:     QRS axis:  Left  ST segments:     ST segments:  Normal  POC Biliary US    Date/Time: 8/22/2021 10:15 PM  Performed by: Kaur Kay DO  Authorized by: Kaur Kay DO     Patient location:  ED  Performed by:  Resident  Other Assisting Provider: Yes (comment) Aliza Couch    Procedure details:     Exam Type:  Diagnostic    Indications: nausea      Assessment for:  Cholecystitis    Views obtained: gallbladder (transverse and longitudinal)      Image quality: diagnostic    Findings:     Common bile duct:  Normal    Gallbladder wall:  Normal    Pericholecystic fluid: not identified      Sonographic Merino's sign: negative      Polyps: not identified      Mass: not identified    Interpretation:     Biliary ultrasound impressions: indeterminate            ED Course  ED Course as of Aug 23 1010   Sun Aug 22, 2021   2145 Patient examined by me  Vitals reviewed  She is hypertensive  She appears to be in acute distress  Labs and imaging ordered  Pain medications ordered  2244 Patient reexamined  She states that her pain is decreased with dilaudid, but pain still persists  Toradol ordered        2257 Troponin I: <0 02   2257 Renal stone   CTA dissection protocol chest/abdomen/pelvis   2325 Patient accepted for admit                  Identification of Seniors at Risk      Most Recent Value   (ISAR) Identification of Seniors at Risk   Before the illness or injury that brought you to the Emergency, did you need someone to help you on a regular basis? 0 Filed at: 08/22/2021 2057   In the last 24 hours, have you needed more help than usual?  0 Filed at: 08/22/2021 2057   Have you been hospitalized for one or more nights during the past 6 months?  --   In general, do you see well?  0 Filed at: 08/22/2021 2057   In general, do you have serious problems with your memory? 0 Filed at: 08/22/2021 2057   Do you take more than three different medications every day? 1 Filed at: 08/22/2021 2057   ISAR Score  1 Filed at: 08/22/2021 2057                              MDM  Number of Diagnoses or Management Options  GERD (gastroesophageal reflux disease)  Intractable vomiting  Left upper quadrant abdominal pain  Diagnosis management comments: Patient is a 59-year-old female with a history of a recent paraesophageal hernia repair presenting with 2 weeks of vomiting and 1 day of sudden onset abdominal pain  Based on her evaluation, differential diagnosis includes: Aortic dissection, esophageal perforation, mesenteric ischemia, cholecystitis, choledocholithiasis, renal stone, gastritis, less likely ACS  Plan:  ECG, troponin, CBC, CMP, lipase, lactate, IV fluids, Zofran, Dilaudid, CTA for dissection  ECG did not show any ischemic changes, troponin negative  CTA showed:       -There is a 1 6 x 1 1 x 1 5 cm calculus in the left ureteropelvic junction  The left renal pelvis is minimally dilated compared to the prior study, however, there is no significant caliectasis    There is slightly more perinephric stranding on the left   when compared with the prior study   -No evidence of aortic aneurysm or aortic dissection   -Atherosclerosis  Coronary artery disease    -Colonic diverticulosis without evidence of acute diverticulitis  No bowel obstruction    -Cholelithiasis  No definite CT evidence of acute cholecystitis  -There is a moderate-sized hiatal hernia; this is significantly smaller compared with February 17, 2021  There is some fluid in the esophagus, extending to just below the level of the mary, suggesting gastroesophageal reflux  Her abdominal pain is unspecified, however it could be due to either her renal stone or her underlying GERD  Because of her poor pain control and intractable vomiting, as well as her history of paraesophageal hernia, will plan to admit for possible GI management inpatient  Plan discussed with patient and she seems to understand  She is agreeable  Patient accepted for admission         Amount and/or Complexity of Data Reviewed  Clinical lab tests: ordered and reviewed  Tests in the radiology section of CPT®: ordered and reviewed  Review and summarize past medical records: yes  Discuss the patient with other providers: yes  Independent visualization of images, tracings, or specimens: yes    Risk of Complications, Morbidity, and/or Mortality  Presenting problems: moderate  Diagnostic procedures: low  Management options: low    Patient Progress  Patient progress: stable      Disposition  Final diagnoses:   Left upper quadrant abdominal pain   Intractable vomiting   GERD (gastroesophageal reflux disease)     Time reflects when diagnosis was documented in both MDM as applicable and the Disposition within this note     Time User Action Codes Description Comment    8/22/2021 11:27 PM Lee Ann Domingo Add [R10 9] Abdominal pain     8/22/2021 11:27 PM Lee Ann Domingo Remove [R10 9] Abdominal pain     8/22/2021 11:27 PM [place], [address] [R10 12] Left upper quadrant abdominal pain     8/22/2021 11:27 PM Lee Ann Domingo Add [R11 10] Vomiting     8/22/2021 11:27 PM [place], [address] [K21 9] GERD (gastroesophageal reflux disease)     8/22/2021 11:27 PM Caty Crigler Add [R11 10] Intractable vomiting     8/22/2021 11:27 PM Caty Crigler Remove [R11 10] Vomiting     8/22/2021 11:27 PM Caty Crigler Remove [K21 9] GERD (gastroesophageal reflux disease)     8/22/2021 11:27 PM [place], [address] [K21 9] GERD (gastroesophageal reflux disease)     8/23/2021  8:09 AM Pamela Curd Add [K44 9] Paraesophageal hernia       ED Disposition     ED Disposition Condition Date/Time Comment    Admit Stable Sun Aug 22, 2021 11:27 PM Case was discussed with BA and the patient's admission status was agreed to be Admission Status: inpatient status to the service of Dr Talitha Burkitt           Follow-up Information    None         Current Discharge Medication List      CONTINUE these medications which have NOT CHANGED    Details   amLODIPine (NORVASC) 5 mg tablet Take 1 tablet (5 mg total) by mouth daily  Qty: 90 tablet, Refills: 3    Associated Diagnoses: Essential hypertension      Ascorbic Acid (VITAMIN C PO) Take by mouth      atorvastatin (LIPITOR) 10 mg tablet TAKE 1 TABLET BY MOUTH  DAILY AT BEDTIME  Qty: 90 tablet, Refills: 3    Associated Diagnoses: Hyperlipidemia, unspecified hyperlipidemia type      Biotin 1 MG CAPS Take by mouth      Calcium 500 MG tablet Take by mouth      calcium carbonate (Calcium 600) 600 MG tablet Take 600 mg by mouth 2 (two) times a week      Cholecalciferol (VITAMIN D3) 400 units CAPS Take by mouth      Combigan 0 2-0 5 % INSTILL 1 DROP IN BOTH EYES TWICE DAILY      glucosamine 500 MG CAPS capsule Take 1,500 mg by mouth daily      levothyroxine 50 mcg tablet Take 0 5 tablets by mouth daily       Multiple Vitamin (MULTIVITAMINS PO) Take 1 tablet by mouth daily      Omega-3 Fatty Acids (FISH OIL) 1,000 mg Take by mouth      oxyCODONE (ROXICODONE) 5 mg immediate release tablet Take 1 tablet (5 mg total) by mouth every 4 (four) hours as needed for moderate pain for up to 20 dosesMax Daily Amount: 30 mg  Qty: 20 tablet, Refills: 0    Associated Diagnoses: Paraesophageal hernia      pantoprazole (PROTONIX) 40 mg tablet Take by mouth           No discharge procedures on file  PDMP Review     None           ED Provider  Attending physically available and evaluated Collin Driver  I managed the patient along with the ED Attending      Electronically Signed by         Cristin Berg DO  08/23/21 6465

## 2021-08-23 NOTE — CASE MANAGEMENT
CM met with the pt at bedside and gathered the following information:    HOME: pt lives in a 1st floor condo w/ 1 STEVEN    LIVES W/: Spouse    [facility] [phone]    [facility]A: [facility]) [phone]    PCP: Oj Herrera MD    PHARMACY: [facility]    INDEPENDENCE: Ind at baseline w/ SPC    TRANSPORTATION Appts: Drives self    DME: [facility]    HHC: None reported    I/P REHAB: None reported    MENTAL HEALTH:None reported    D&A: None reported    TRANSPORTATION AT D/C: [facility]    Patient/caregiver received discharge checklist   Content reviewed  Patient/caregiver encouraged to participate in discharge plan of care prior to discharge home  CM reviewed d/c planning process including the following: identifying help at home, patient preference for d/c planning needs, Discharge Lounge, [facility] Meds to Bed program, availability of treatment team to discuss questions or concerns patient and/or family may have regarding understanding medications and recognizing signs and symptoms once discharged  CM also encouraged patient to follow up with all recommended appointments after discharge  Patient advised of importance for patient and family to participate in managing patients medical well being

## 2021-08-23 NOTE — ASSESSMENT & PLAN NOTE
Continue patient on both Protonix twice daily with H2 blockers twice daily (Pepcid)  GI consult regarding possibility of gastroesophageal reflux disease with gastritis complicating current presentation;  Plan for EGD on 08/24  Patient needs to be upright or at least 45° back rest   Carafate 4 times daily  continue IV fluids  Recheck metabolic profile with CBC in the morning

## 2021-08-23 NOTE — PROGRESS NOTES
[facility]  Progress Note - Levon Bunch 1940, 79 y o  female MRN: [id number]  Unit/Bed#: [facility] 304-01 Encounter: [id number]  Primary Care Provider: Santos Balderas MD   Date and time admitted to hospital: 8/22/2021  8:34 PM    * Persistent recurrent vomiting  Assessment & Plan  Continue patient on both Protonix twice daily with H2 blockers twice daily (Pepcid)  GI consult regarding possibility of gastroesophageal reflux disease with gastritis complicating current presentation;  Plan for EGD on 08/24  Patient needs to be upright or at least 45° back rest   Carafate 4 times daily  continue IV fluids  Recheck metabolic profile with CBC in the morning  Paraesophageal hernia  Assessment & Plan  Status post correction and currently is smaller although still with reflux but improved from previous  GI consulted   plan for NPO after midnight, EGD in a m  Zofran p r n  nausea    Gastroesophageal reflux disease with esophagitis without hemorrhage  Assessment & Plan  Patient on Protonix 40 mg daily will continue and change dose to twice daily  Pepcid 20 mg twice daily  Carafate  continue  IV fluids  GI consulted,  Plan for NPO after midnight EGD in a      Kidney stone  Assessment & Plan   A 1 6 x 1 1 x 1 5 cm calculus in left ureteral pelvic junction noted on CT scan  Left renal pelvis minimally dilated compared to prior study, however there is no significant gallop basis, there is some perinephric stranding noted As per radiology report   patient does admit to left upper abdominal discomfort radiating to her left back  Urology consulted  Follow up on recommendations        Hypertension, essential, benign  Assessment & Plan  Continue Norvasc 5 mg daily  p r n  hydralazine for SBP greater than 170    Hypercholesterolemia  Assessment & Plan  Continue Lipitor 10 mg daily     heart healthy diet when diet resumed    Acquired hypothyroidism  Assessment & Plan  On levothyroxine 50 mcg daily  VTE Pharmacologic Prophylaxis:   Pharmacologic: Enoxaparin (Lovenox)  Mechanical VTE Prophylaxis in Place: Yes    Patient Centered Rounds: I have performed bedside rounds with nursing staff today  Discussions with Specialists or Other Care Team Provider:  TERRA note appreciated    Education and Discussions with Family / Patient:  I spoke with the patient and her  at the bedside, I have answered all questions to the best of my abilities  Time Spent for Care: 30 minutes  More than 50% of total time spent on counseling and coordination of care as described above  Current Length of Stay: 1 day(s)    Current Patient Status: Inpatient   Certification Statement: The patient will continue to require additional inpatient hospital stay due to GI consultation, EGD in a m , IV fluids, urology consultation for kidney stone  Repeat labs in a m  Discharge Plan:   Not stable for discharge today    Code Status: Level 1 - Full Code      Subjective:   Patient seen  Lying in bed, appears ill  Has been reported that patient had not been able to sleep while she was in the emergency department at all  She does awaken when spoken to, states that she is exhausted and continues to have some discomfort in her left upper quadrant radiating to her left back  Continues to still have intermittent periods of "spitting up"  Objective:     Vitals:   Temp (24hrs), Av 7 °F (36 5 °C), Min:97 6 °F (36 4 °C), Max:97 8 °F (36 6 °C)    Temp:  [97 6 °F (36 4 °C)-97 8 °F (36 6 °C)] 97 6 °F (36 4 °C)  HR:  [] 101  Resp:  [18-26] 20  BP: (158-189)/() 164/98  SpO2:  [92 %-99 %] 96 %  Body mass index is 43 4 kg/m²  Input and Output Summary (last 24 hours):        Intake/Output Summary (Last 24 hours) at 2021 1309  Last data filed at 2021 0900  Gross per 24 hour   Intake 125 ml   Output 850 ml   Net -725 ml       Physical Exam:     Physical Exam  Vitals and nursing note reviewed  Constitutional:       Appearance: She is obese  She is ill-appearing  HENT:      Head: Normocephalic  Nose: Nose normal       Mouth/Throat:      Mouth: Mucous membranes are dry  Eyes:      Extraocular Movements: Extraocular movements intact  Cardiovascular:      Rate and Rhythm: Normal rate and regular rhythm  Pulses: Normal pulses  Heart sounds: Normal heart sounds  Pulmonary:      Effort: Pulmonary effort is normal       Breath sounds: Normal breath sounds  Abdominal:      General: Bowel sounds are normal       Palpations: Abdomen is soft  Tenderness: There is abdominal tenderness  Genitourinary:     Comments: Voiding spontaneously  Musculoskeletal:         General: Normal range of motion  Cervical back: Normal range of motion  Right lower leg: No edema  Left lower leg: No edema  Skin:     General: Skin is warm and dry  Capillary Refill: Capillary refill takes less than 2 seconds  Coloration: Skin is pale  Neurological:      Mental Status: She is oriented to person, place, and time  Psychiatric:         Mood and Affect: Mood normal          Behavior: Behavior normal          Additional Data:     Labs:    Results from last 7 days   Lab Units 08/23/21  0501   WBC Thousand/uL 9 23   HEMOGLOBIN g/dL 15 6*   HEMATOCRIT % 45 8   PLATELETS Thousands/uL 213   NEUTROS PCT % 85*   LYMPHS PCT % 11*   MONOS PCT % 4   EOS PCT % 0     Results from last 7 days   Lab Units 08/23/21  0501   POTASSIUM mmol/L 3 8   CHLORIDE mmol/L 101   CO2 mmol/L 24   BUN mg/dL 11   CREATININE mg/dL 0 64   CALCIUM mg/dL 9 0   ALK PHOS U/L 124*   ALT U/L 30   AST U/L 22           * I Have Reviewed All Lab Data Listed Above  * Additional Pertinent Lab Tests Reviewed:  Naif 66 Admission Reviewed    Imaging:    Imaging Reports Reviewed Today Include:   CTA of chest abdomen pelvis  Imaging Personally Reviewed by Myself Includes:   none    Recent Cultures (last 7 days):           Last 24 Hours Medication List:   Current Facility-Administered Medications   Medication Dose Route Frequency Provider Last Rate    acetaminophen  650 mg Oral Q6H PRN Krista Cruz, MD      amLODIPine  5 mg Oral Daily Krista Cluster, MD      atorvastatin  10 mg Oral HS Krista Cruz, MD      calcium carbonate  1 tablet Oral Daily With Breakfast Krista Cruz, MD      cholecalciferol  400 Units Oral Daily Krista Cruz, MD      docusate sodium  100 mg Oral BID PRN Krista [facility], MD      enoxaparin  40 mg Subcutaneous Q12H Krista [facility], MD      famotidine  20 mg Intravenous Q12H [address] Krista Cluster, MD      fish oil  1,000 mg Oral Daily Krista Cluster, MD      glucosamine sulfate  1,500 mg Oral Daily Krista [facility], MD      hydrALAZINE  10 mg Intravenous Q4H [facility]      HYDROmorphone  0 2 mg Intravenous Q3H PRN [facility]      levothyroxine  25 mcg Oral Daily Krista [facility], MD      LORazepam  1 mg Intravenous Q4H PRN Krista [facility], MD      multi-electrolyte  125 mL/hr Intravenous Continuous Krista Cruz,  mL/hr (08/23/21 1126)    multivitamin stress formula  1 tablet Oral Daily Krista [facility], MD      ondansetron  4 mg Intravenous Q6H PRN Krista [facility], MD      oxyCODONE  5 mg Oral Q4H PRN [facility]      pantoprazole  40 mg Intravenous Q12H [address] Krista Cruz, MD      promethazine  12 5 mg Intravenous Q4H PRN Krista [facility], MD      sucralfate  1 g Oral Q6H [address] [facility]      timolol  1 drop Both Eyes BID Krista Cruz, MD          Today, Patient Was Seen By: BRAYDEN Albright    ** Please Note: Dictation voice to text software may have been used in the creation of this document   **

## 2021-08-23 NOTE — ASSESSMENT & PLAN NOTE
Status post correction and currently is smaller although still with reflux but improved from previous  GI consult still pending

## 2021-08-23 NOTE — ASSESSMENT & PLAN NOTE
Status post correction and currently is smaller although still with reflux but improved from previous  GI consulted   plan for NPO after midnight, EGD in a m     Zofran p r n  nausea

## 2021-08-24 ENCOUNTER — TELEPHONE (OUTPATIENT)
Dept: GASTROENTEROLOGY | Facility: HOSPITAL | Age: 81
End: 2021-08-24

## 2021-08-24 ENCOUNTER — APPOINTMENT (INPATIENT)
Dept: RADIOLOGY | Facility: HOSPITAL | Age: 81
DRG: 660 | End: 2021-08-24
Payer: MEDICARE

## 2021-08-24 ENCOUNTER — ANESTHESIA (INPATIENT)
Dept: PERIOP | Facility: HOSPITAL | Age: 81
DRG: 660 | End: 2021-08-24
Payer: MEDICARE

## 2021-08-24 ENCOUNTER — APPOINTMENT (INPATIENT)
Dept: GASTROENTEROLOGY | Facility: HOSPITAL | Age: 81
DRG: 660 | End: 2021-08-24
Payer: MEDICARE

## 2021-08-24 PROBLEM — N20.1 OBSTRUCTION OF LEFT URETEROPELVIC JUNCTION (UPJ) DUE TO STONE: Status: ACTIVE | Noted: 2021-08-23

## 2021-08-24 PROBLEM — R10.12 LEFT UPPER QUADRANT ABDOMINAL PAIN: Status: ACTIVE | Noted: 2021-08-22

## 2021-08-24 PROBLEM — E87.6 HYPOKALEMIA: Status: ACTIVE | Noted: 2021-08-24

## 2021-08-24 PROBLEM — E87.1 HYPONATREMIA: Status: ACTIVE | Noted: 2021-08-24

## 2021-08-24 PROBLEM — N13.5 OBSTRUCTION OF LEFT URETEROPELVIC JUNCTION (UPJ): Status: ACTIVE | Noted: 2021-08-23

## 2021-08-24 LAB
ANION GAP SERPL CALCULATED.3IONS-SCNC: 5 MMOL/L (ref 4–13)
BASOPHILS # BLD AUTO: 0.02 THOUSANDS/ΜL (ref 0–0.1)
BASOPHILS NFR BLD AUTO: 0 % (ref 0–1)
BUN SERPL-MCNC: 8 MG/DL (ref 5–25)
CALCIUM SERPL-MCNC: 8.5 MG/DL (ref 8.3–10.1)
CHLORIDE SERPL-SCNC: 97 MMOL/L (ref 100–108)
CO2 SERPL-SCNC: 29 MMOL/L (ref 21–32)
CREAT SERPL-MCNC: 0.53 MG/DL (ref 0.6–1.3)
EOSINOPHIL # BLD AUTO: 0 THOUSAND/ΜL (ref 0–0.61)
EOSINOPHIL NFR BLD AUTO: 0 % (ref 0–6)
ERYTHROCYTE [DISTWIDTH] IN BLOOD BY AUTOMATED COUNT: 12.4 % (ref 11.6–15.1)
GFR SERPL CREATININE-BSD FRML MDRD: 89 ML/MIN/1.73SQ M
GLUCOSE SERPL-MCNC: 106 MG/DL (ref 65–140)
HCT VFR BLD AUTO: 47.2 % (ref 34.8–46.1)
HGB BLD-MCNC: 15.9 G/DL (ref 11.5–15.4)
IMM GRANULOCYTES # BLD AUTO: 0.04 THOUSAND/UL (ref 0–0.2)
IMM GRANULOCYTES NFR BLD AUTO: 0 % (ref 0–2)
LYMPHOCYTES # BLD AUTO: 1.39 THOUSANDS/ΜL (ref 0.6–4.47)
LYMPHOCYTES NFR BLD AUTO: 13 % (ref 14–44)
MAGNESIUM SERPL-MCNC: 1.9 MG/DL (ref 1.6–2.6)
MCH RBC QN AUTO: 32.2 PG (ref 26.8–34.3)
MCHC RBC AUTO-ENTMCNC: 33.7 G/DL (ref 31.4–37.4)
MCV RBC AUTO: 96 FL (ref 82–98)
MONOCYTES # BLD AUTO: 0.94 THOUSAND/ΜL (ref 0.17–1.22)
MONOCYTES NFR BLD AUTO: 9 % (ref 4–12)
NEUTROPHILS # BLD AUTO: 7.96 THOUSANDS/ΜL (ref 1.85–7.62)
NEUTS SEG NFR BLD AUTO: 78 % (ref 43–75)
NRBC BLD AUTO-RTO: 0 /100 WBCS
PLATELET # BLD AUTO: 216 THOUSANDS/UL (ref 149–390)
PMV BLD AUTO: 11.9 FL (ref 8.9–12.7)
POTASSIUM SERPL-SCNC: 3.2 MMOL/L (ref 3.5–5.3)
RBC # BLD AUTO: 4.94 MILLION/UL (ref 3.81–5.12)
SODIUM SERPL-SCNC: 131 MMOL/L (ref 136–145)
WBC # BLD AUTO: 10.35 THOUSAND/UL (ref 4.31–10.16)

## 2021-08-24 PROCEDURE — 83735 ASSAY OF MAGNESIUM: CPT | Performed by: NURSE PRACTITIONER

## 2021-08-24 PROCEDURE — 52332 CYSTOSCOPY AND TREATMENT: CPT | Performed by: UROLOGY

## 2021-08-24 PROCEDURE — 80048 BASIC METABOLIC PNL TOTAL CA: CPT | Performed by: NURSE PRACTITIONER

## 2021-08-24 PROCEDURE — 74018 RADEX ABDOMEN 1 VIEW: CPT

## 2021-08-24 PROCEDURE — 85025 COMPLETE CBC W/AUTO DIFF WBC: CPT | Performed by: NURSE PRACTITIONER

## 2021-08-24 PROCEDURE — 0T778DZ DILATION OF LEFT URETER WITH INTRALUMINAL DEVICE, VIA NATURAL OR ARTIFICIAL OPENING ENDOSCOPIC: ICD-10-PCS | Performed by: UROLOGY

## 2021-08-24 PROCEDURE — C9113 INJ PANTOPRAZOLE SODIUM, VIA: HCPCS | Performed by: INTERNAL MEDICINE

## 2021-08-24 PROCEDURE — C1758 CATHETER, URETERAL: HCPCS | Performed by: UROLOGY

## 2021-08-24 PROCEDURE — C9113 INJ PANTOPRAZOLE SODIUM, VIA: HCPCS | Performed by: UROLOGY

## 2021-08-24 PROCEDURE — C2617 STENT, NON-COR, TEM W/O DEL: HCPCS | Performed by: UROLOGY

## 2021-08-24 DEVICE — INLAY OPTIMA URETERAL STENT W/O GUIDEWIRE
Type: IMPLANTABLE DEVICE | Site: URETER | Status: NON-FUNCTIONAL
Brand: BARD® INLAY OPTIMA® URETERAL STENT
Removed: 2021-10-15

## 2021-08-24 RX ORDER — PROMETHAZINE HYDROCHLORIDE 25 MG/ML
12.5 INJECTION, SOLUTION INTRAMUSCULAR; INTRAVENOUS ONCE AS NEEDED
Status: DISCONTINUED | OUTPATIENT
Start: 2021-08-24 | End: 2021-08-24 | Stop reason: HOSPADM

## 2021-08-24 RX ORDER — DEXAMETHASONE SODIUM PHOSPHATE 10 MG/ML
INJECTION, SOLUTION INTRAMUSCULAR; INTRAVENOUS AS NEEDED
Status: DISCONTINUED | OUTPATIENT
Start: 2021-08-24 | End: 2021-08-24

## 2021-08-24 RX ORDER — LIDOCAINE HYDROCHLORIDE 10 MG/ML
INJECTION, SOLUTION EPIDURAL; INFILTRATION; INTRACAUDAL; PERINEURAL AS NEEDED
Status: DISCONTINUED | OUTPATIENT
Start: 2021-08-24 | End: 2021-08-24

## 2021-08-24 RX ORDER — OXYBUTYNIN CHLORIDE 5 MG/1
5 TABLET ORAL 3 TIMES DAILY PRN
Qty: 20 TABLET | Refills: 0 | Status: SHIPPED | OUTPATIENT
Start: 2021-08-24 | End: 2021-10-13

## 2021-08-24 RX ORDER — SUCCINYLCHOLINE/SOD CL,ISO/PF 100 MG/5ML
SYRINGE (ML) INTRAVENOUS AS NEEDED
Status: DISCONTINUED | OUTPATIENT
Start: 2021-08-24 | End: 2021-08-24

## 2021-08-24 RX ORDER — HYDROMORPHONE HCL IN WATER/PF 6 MG/30 ML
0.2 PATIENT CONTROLLED ANALGESIA SYRINGE INTRAVENOUS
Status: DISCONTINUED | OUTPATIENT
Start: 2021-08-24 | End: 2021-08-24 | Stop reason: HOSPADM

## 2021-08-24 RX ORDER — PROPOFOL 10 MG/ML
INJECTION, EMULSION INTRAVENOUS AS NEEDED
Status: DISCONTINUED | OUTPATIENT
Start: 2021-08-24 | End: 2021-08-24

## 2021-08-24 RX ORDER — POTASSIUM CHLORIDE 20 MEQ/1
40 TABLET, EXTENDED RELEASE ORAL ONCE
Status: COMPLETED | OUTPATIENT
Start: 2021-08-24 | End: 2021-08-24

## 2021-08-24 RX ORDER — ALBUTEROL SULFATE 2.5 MG/3ML
2.5 SOLUTION RESPIRATORY (INHALATION) ONCE AS NEEDED
Status: DISCONTINUED | OUTPATIENT
Start: 2021-08-24 | End: 2021-08-24 | Stop reason: HOSPADM

## 2021-08-24 RX ORDER — PHENAZOPYRIDINE HYDROCHLORIDE 200 MG/1
200 TABLET, FILM COATED ORAL 3 TIMES DAILY PRN
Qty: 30 TABLET | Refills: 0 | Status: SHIPPED | OUTPATIENT
Start: 2021-08-24 | End: 2022-07-06 | Stop reason: ALTCHOICE

## 2021-08-24 RX ORDER — HYDROCODONE BITARTRATE AND ACETAMINOPHEN 5; 325 MG/1; MG/1
1-2 TABLET ORAL EVERY 4 HOURS PRN
Qty: 20 TABLET | Refills: 0 | Status: SHIPPED | OUTPATIENT
Start: 2021-08-24 | End: 2021-09-03

## 2021-08-24 RX ORDER — FENTANYL CITRATE/PF 50 MCG/ML
12.5 SYRINGE (ML) INJECTION
Status: DISCONTINUED | OUTPATIENT
Start: 2021-08-24 | End: 2021-08-24 | Stop reason: HOSPADM

## 2021-08-24 RX ORDER — MAGNESIUM HYDROXIDE 1200 MG/15ML
LIQUID ORAL AS NEEDED
Status: DISCONTINUED | OUTPATIENT
Start: 2021-08-24 | End: 2021-08-24 | Stop reason: HOSPADM

## 2021-08-24 RX ADMIN — AMLODIPINE BESYLATE 5 MG: 5 TABLET ORAL at 08:52

## 2021-08-24 RX ADMIN — TIMOLOL MALEATE 1 DROP: 5 SOLUTION/ DROPS OPHTHALMIC at 17:11

## 2021-08-24 RX ADMIN — LEVOTHYROXINE SODIUM 25 MCG: 25 TABLET ORAL at 05:30

## 2021-08-24 RX ADMIN — POTASSIUM CHLORIDE 40 MEQ: 1500 TABLET, EXTENDED RELEASE ORAL at 17:10

## 2021-08-24 RX ADMIN — DEXAMETHASONE SODIUM PHOSPHATE 5 MG: 10 INJECTION, SOLUTION INTRAMUSCULAR; INTRAVENOUS at 15:33

## 2021-08-24 RX ADMIN — Medication 100 MG: at 15:29

## 2021-08-24 RX ADMIN — SUCRALFATE 1 G: 1 TABLET ORAL at 01:18

## 2021-08-24 RX ADMIN — OXYCODONE HYDROCHLORIDE 5 MG: 5 TABLET ORAL at 05:30

## 2021-08-24 RX ADMIN — SODIUM CHLORIDE, SODIUM GLUCONATE, SODIUM ACETATE, POTASSIUM CHLORIDE, MAGNESIUM CHLORIDE, SODIUM PHOSPHATE, DIBASIC, AND POTASSIUM PHOSPHATE 125 ML/HR: .53; .5; .37; .037; .03; .012; .00082 INJECTION, SOLUTION INTRAVENOUS at 12:45

## 2021-08-24 RX ADMIN — ONDANSETRON 4 MG: 2 INJECTION INTRAMUSCULAR; INTRAVENOUS at 08:52

## 2021-08-24 RX ADMIN — PANTOPRAZOLE SODIUM 40 MG: 40 INJECTION, POWDER, FOR SOLUTION INTRAVENOUS at 08:52

## 2021-08-24 RX ADMIN — FAMOTIDINE 20 MG: 10 INJECTION INTRAVENOUS at 08:52

## 2021-08-24 RX ADMIN — OXYCODONE HYDROCHLORIDE 5 MG: 5 TABLET ORAL at 01:18

## 2021-08-24 RX ADMIN — B-COMPLEX W/ C & FOLIC ACID TAB 1 TABLET: TAB at 08:52

## 2021-08-24 RX ADMIN — SUCRALFATE 1 G: 1 TABLET ORAL at 20:34

## 2021-08-24 RX ADMIN — SUCRALFATE 1 G: 1 TABLET ORAL at 08:52

## 2021-08-24 RX ADMIN — LIDOCAINE HYDROCHLORIDE 50 MG: 10 INJECTION, SOLUTION EPIDURAL; INFILTRATION; INTRACAUDAL; PERINEURAL at 15:29

## 2021-08-24 RX ADMIN — ENOXAPARIN SODIUM 40 MG: 40 INJECTION SUBCUTANEOUS at 01:18

## 2021-08-24 RX ADMIN — CEFAZOLIN SODIUM 2000 MG: 2 SOLUTION INTRAVENOUS at 05:30

## 2021-08-24 RX ADMIN — FAMOTIDINE 20 MG: 10 INJECTION INTRAVENOUS at 20:35

## 2021-08-24 RX ADMIN — Medication 1500 MG: at 08:52

## 2021-08-24 RX ADMIN — TIMOLOL MALEATE 1 DROP: 5 SOLUTION/ DROPS OPHTHALMIC at 08:51

## 2021-08-24 RX ADMIN — SODIUM CHLORIDE, SODIUM GLUCONATE, SODIUM ACETATE, POTASSIUM CHLORIDE, MAGNESIUM CHLORIDE, SODIUM PHOSPHATE, DIBASIC, AND POTASSIUM PHOSPHATE 125 ML/HR: .53; .5; .37; .037; .03; .012; .00082 INJECTION, SOLUTION INTRAVENOUS at 17:13

## 2021-08-24 RX ADMIN — ATORVASTATIN CALCIUM 10 MG: 20 TABLET, FILM COATED ORAL at 20:38

## 2021-08-24 RX ADMIN — PANTOPRAZOLE SODIUM 40 MG: 40 INJECTION, POWDER, FOR SOLUTION INTRAVENOUS at 20:36

## 2021-08-24 RX ADMIN — PROPOFOL 150 MG: 10 INJECTION, EMULSION INTRAVENOUS at 15:29

## 2021-08-24 RX ADMIN — PROMETHAZINE HYDROCHLORIDE 12.5 MG: 25 INJECTION INTRAMUSCULAR; INTRAVENOUS at 15:36

## 2021-08-24 NOTE — ASSESSMENT & PLAN NOTE
· With nausea and vomiting (see related plan)  · Appreciate GI input   · Protonix BID + Pepcid 20 mg BID  · Carafate QID   · Tentative plan for EGD tomorrow

## 2021-08-24 NOTE — DISCHARGE INSTRUCTIONS
Expect to see blood in the urine, and to experience urgency/frequency/burning with urination and dribbling  This is normal after urological procedures  Is also normal to experience some nausea after these procedures  Go back your regular diet carefully  It is normal to feel pain in the kidney when urinating and when the bladder is filling due to urine refluxing up to the kidney because of the open stent  The stent is necessary to drain the kidney past the stone, and you will need future cystoscopy left ureteroscopy laser lithotripsy and left ureteral stent exchange with a urine culture done 1-2 weeks prior to the procedure  Our staff will contact you to set up a surgery date  I have sent in prescriptions your pharmacy for pain and discomfort  Call for fever greater that 101 5, inability to urinate, prolonged nausea and vomiting, or severe pain not relieved by pain medications  Lela valdez  No driving/operating machinery for 24 hours, and while taking narcotics  Take over the counter remedy of choice to avoid constipation  Drink plenty of fluids

## 2021-08-24 NOTE — ANESTHESIA POSTPROCEDURE EVALUATION
Post-Op Assessment Note    CV Status:  Stable    Pain management: adequate     Mental Status:  Awake   Hydration Status:  Stable   PONV Controlled:  Controlled   Airway Patency:  Patent      Post Op Vitals Reviewed: Yes      Staff: Anesthesiologist, CRNA         No complications documented      /85 (08/24/21 1556)    Temp 97 9 °F (36 6 °C) (08/24/21 1556)    Pulse 80 (08/24/21 1556)   Resp 15 (08/24/21 1556)    SpO2 96 % (08/24/21 1556)

## 2021-08-24 NOTE — ASSESSMENT & PLAN NOTE
· Status post laparascopic repair on 3/5/21  · Appreciate GI input  · Tentative plan for EGD tomorrow  · Remained of plan as above

## 2021-08-24 NOTE — INTERVAL H&P NOTE
H&P reviewed  After examining the patient I find no changes in the patients condition since the H&P had been written  Plan cysto, left ureteral stent for decompression- procedure, as well as risks explained in detail- pt wishes to proceed      Vitals:    08/24/21 1122   BP: (!) 114/113   Pulse: 77   Resp: 20   Temp: 100 1 °F (37 8 °C)   SpO2: 95%

## 2021-08-24 NOTE — CONSULTS
UROLOGY CONSULTATION NOTE     Patient Identifiers: Janna Patino (MRN [id number])  Service Requesting Consultation: Medicine  Service Providing Consultation:  Urology, Andi Snog MD    Date of Service: 8/23/2021  Inpatient consult to Urology  Consult performed by: Andi Song MD  Consult ordered by: BRAYDEN Hurst          Reason for Consultation:  Left renal calculus    History of Present Illness:     Janna Patino is a 79 y o  old with a history of  Esophageal problems and persistent vomiting  She has history of surgery in March  Recently patient developed significant vomiting as well as left upper quadrant pain over the last couple of days  She has been admitted for further evaluation  CT scan reveals a left renal calculus located in the renal pelvis  There is not significant proximal hydronephrosis  Review of previous CT scan from February, appearance is similar although currently stone may appear a bit larger, there is no evidence of significant obstruction on either film  Currently patient is somewhat somnolent  She does not endorse any significant pain at this time  Family reports she has been tolerating clear liquids today  Past Medical, Past Surgical History:     Past Medical History:   Diagnosis Date    [facility] [address] [facility]     Disease of thyroid gland     Endometrial cancer ([facility] [address] )     age 45    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    Esophageal reflux     Hypertension     Hypothyroid     Obesity    :    Past Surgical History:   Procedure Laterality Date    CATARACT EXTRACTION, BILATERAL      COLONOSCOPY  08/2018    polyp- 5 years    JOINT REPLACEMENT      KNEE ARTHROPLASTY      MO ESOPHAGOGASTRODUODENOSCOPY TRANSORAL DIAGNOSTIC N/A 3/5/2021    Procedure: ESOPHAGOGASTRODUODENOSCOPY (EGD);   Surgeon: Jack Morales MD;  [facility]: [facility];  Service: Thoracic    MO LAP, REPAIR PARAESOPHAGEAL HERNIA, INCL FUNDOPLASTY W/O MESH N/A 3/5/2021 Procedure: REPAIR HERNIA PARAESOPHAGEAL LAPAROSCOPIC W ROBOTICS WITH MESH, DOOR FUNDOPLICATION;  Surgeon: Marli Huitron MD;  Location: [facility];  Service: Thoracic    TOTAL ABDOMINAL HYSTERECTOMY      age 45    TOTAL ABDOMINAL HYSTERECTOMY W/ BILATERAL SALPINGOOPHORECTOMY  1992    endometrial cancer   :    Medications, Allergies:     Current Facility-Administered Medications   Medication Dose Route Frequency    acetaminophen (TYLENOL) tablet 650 mg  650 mg Oral Q6H PRN    amLODIPine (NORVASC) tablet 5 mg  5 mg Oral Daily    atorvastatin (LIPITOR) tablet 10 mg  10 mg Oral HS    calcium carbonate (OYSTER SHELL,OSCAL) 500 mg tablet 1 tablet  1 tablet Oral Daily With Breakfast    [START ON 8/24/2021] ceFAZolin (ANCEF) IVPB (premix in dextrose) 2,000 mg 50 mL  2,000 mg Intravenous On Call To OR    cholecalciferol (VITAMIN D3) tablet 400 Units  400 Units Oral Daily    docusate sodium (COLACE) capsule 100 mg  100 mg Oral BID PRN    enoxaparin (LOVENOX) subcutaneous injection 40 mg  40 mg Subcutaneous Q12H    famotidine (PEPCID) injection 20 mg  20 mg Intravenous Q12H [address]    fish oil capsule 1,000 mg  1,000 mg Oral Daily    glucosamine sulfate capsule 1,500 mg  1,500 mg Oral Daily    hydrALAZINE (APRESOLINE) injection 10 mg  10 mg Intravenous Q4H PRN    HYDROmorphone HCl (DILAUDID) injection 0 2 mg  0 2 mg Intravenous Q3H PRN    levothyroxine tablet 25 mcg  25 mcg Oral Daily    LORazepam (ATIVAN) injection 1 mg  1 mg Intravenous Q4H PRN    multi-electrolyte (PLASMALYTE-A/ISOLYTE-S PH 7 4) IV solution  125 mL/hr Intravenous Continuous    multivitamin stress formula tablet 1 tablet  1 tablet Oral Daily    ondansetron (ZOFRAN) injection 4 mg  4 mg Intravenous Q6H PRN    oxyCODONE (ROXICODONE) IR tablet 5 mg  5 mg Oral Q4H PRN    pantoprazole (PROTONIX) injection 40 mg  40 mg Intravenous Q12H [address]    promethazine (PHENERGAN) injection 12 5 mg  12 5 mg Intravenous Q4H PRN    sucralfate (CARAFATE) tablet 1 g  1 g Oral Q6H [facility]    timolol (TIMOPTIC) 0 5 % ophthalmic solution 1 drop  1 drop Both Eyes BID       Allergies:  No Known Allergies:    Social and Family History:   Social History:   Social History     Tobacco Use    Smoking status: Former Smoker     Packs/day: 0 25     Years: 10 00     Pack years: 2 50     Types: Cigarettes     Start date:      Quit date:      Years since quittin 6    Smokeless tobacco: Never Used    Tobacco comment: Quit 50+ years ago 3/24/21   Vaping Use    Vaping Use: Never used   Substance Use Topics    Alcohol use: Not Currently     Comment: socially    Drug use: No        Social History     Tobacco Use   Smoking Status Former Smoker    Packs/day: 0 25    Years: 10 00    Pack years: 2 50    Types: Cigarettes    Start date:     Quit date:  Years since quittin 6   Smokeless Tobacco Never Used   Tobacco Comment    Quit 50+ years ago 3/24/21       Family History:  Family History   Problem Relation Age of Onset    Stomach cancer Mother 71    Pancreatic cancer Mother 71    Heart attack Father     Cancer Maternal Uncle     No Known Problems Daughter     No Known Problems Maternal Grandmother     No Known Problems Maternal Grandfather     No Known Problems Paternal Grandmother     No Known Problems Paternal Grandfather     No Known Problems Maternal Aunt     No Known Problems Maternal Aunt    :     Review of Systems:     General: Fever, chills, or night sweats: negative  Cardiac: Negative for chest pain  Pulmonary: Negative for shortness of breath  Gastrointestinal: Abdominal pain negative  Nausea, vomiting, or diarrhea negative,  Genitourinary: See HPI above  Patient does not have hematuria  All other systems queried were negative  Physical Exam:   General: Patient is pleasant and in NAD   Awake and alert  /98 (BP Location: Right arm)   Pulse 74   Temp 98 5 °F (36 9 °C) (Oral)   Resp 20   Ht 5' 3" (1 6 m)   Wt 111 kg (245 lb)   SpO2 94%   BMI 43 40 kg/m² Temp (24hrs), Av 1 °F (36 7 °C), Min:97 6 °F (36 4 °C), Max:98 5 °F (36 9 °C)  current; Temperature: 98 5 °F (36 9 °C)  I/O last 24 hours: In: 1104 2 [I V :1104 2]  Out: 850 [Urine:850]  Head: Normocephalic, without obvious abnormality, atraumatic  Eyes: negative  Lungs: clear to auscultation bilaterally  Heart: regular rate and rhythm  Extremities: extremities normal, warm and well-perfused; no cyanosis, clubbing, or edema  Neurologic: Grossly normal    Abdomen: There is no abdominal tenderness at this time  Abdomen is soft, nontender, nondistended  :    There is no CVA tenderness at this time  Labs:     Lab Results   Component Value Date    HGB 15 6 (H) 2021    HCT 45 8 2021    WBC 9 23 2021     2021   ]    Lab Results   Component Value Date    K 3 8 2021     2021    CO2 24 2021    BUN 11 2021    CREATININE 0 64 2021    CALCIUM 9 0 2021    GLUCOSE 120 2021   ]    Imaging:   I personally reviewed the images and report of the following studies, and reviewed them with the patient:    CT Abdomen: Personally reviewed by me with the above-noted findings as indicated in HPI  ASSESSMENT:     79 y o  old female with    Left renal calculus, chronic  Recent nausea, vomiting, left upper quadrant pain  Etiology unclear whether related to her GI problems or left renal calculus  PLAN:      discussed findings with the patient and her family  She is somewhat somnolent so primarily discussed with her family  This includes her  and son  We discussed that typically renal colic would involve left flank pain and may certainly involve nausea and vomiting  However patient's complaints have been left upper quadrant  There is no CVA tenderness  Additionally CT scan is not appreciably different than several months ago    Therefore is unclear whether the source of her symptoms is the nonobstructing kidney stone or her GI issues  We discussed possible retrograde pyelogram and stent placement to rule this out, however she has not been NPO  Further, she is tentatively scheduled for GI endoscopy tomorrow  Will need to re-evaluate and coordinate with GI which procedure should take precedence for diagnostic evaluation  Patient and family understand and agree with the plan  Thank you for allowing me to participate in this patients care  Please do not hesitate to call with any additional questions    Gualberto Gao MD

## 2021-08-24 NOTE — ASSESSMENT & PLAN NOTE
· Presented with LUQ abdominal pain, nausea and vomiting x 2 weeks associated with 10 lb weight loss   · CT abdomen/pelvis revealed left-sided UPJ obstructing stone, moderate hiatal hernia with findings suggestive of reflux, diverticulosis without diverticulitis, cholelithiasis without cholecystitis   · Appreciate GI consult and recommendations   · Continue IV Protonix BID as well as Pepcid BID  · Continue carafate QID, Mylanta prn   · Deferred EGD today, will reassess tomorrow, keep NPO after midnight

## 2021-08-24 NOTE — ASSESSMENT & PLAN NOTE
· Patient presented with nausea, vomiting, LUQ abominal pain radiating to back   · CT abdomen/pelvis: 1 6 x 1 1 x 1 5 cm calculus in the left ureteropelvic junction   The left renal pelvis is minimally dilated compared to the prior study, however, there is no significant caliectasis   There is slightly more perinephric stranding on the left when compared with the prior study     · Appreciate urology consult and recommendations   · Plan for cystoscopy with left ureteral stent placement today

## 2021-08-24 NOTE — H&P (VIEW-ONLY)
UROLOGY CONSULTATION NOTE     Patient Identifiers: Brinda Winchester (MRN [id number])  Service Requesting Consultation: Medicine  Service Providing Consultation:  Urology, Cristela Molina MD    Date of Service: 8/23/2021  Inpatient consult to Urology  Consult performed by: Cristela Molina MD  Consult ordered by: BRAYDEN Diallo          Reason for Consultation:  Left renal calculus    History of Present Illness:     Brinda Winchester is a 79 y o  old with a history of  Esophageal problems and persistent vomiting  She has history of surgery in March  Recently patient developed significant vomiting as well as left upper quadrant pain over the last couple of days  She has been admitted for further evaluation  CT scan reveals a left renal calculus located in the renal pelvis  There is not significant proximal hydronephrosis  Review of previous CT scan from February, appearance is similar although currently stone may appear a bit larger, there is no evidence of significant obstruction on either film  Currently patient is somewhat somnolent  She does not endorse any significant pain at this time  Family reports she has been tolerating clear liquids today  Past Medical, Past Surgical History:     Past Medical History:   Diagnosis Date    Cancer ([facility])     Disease of thyroid gland     Endometrial cancer ([facility])     age 45    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    Esophageal reflux     Hypertension     Hypothyroid     Obesity    :    Past Surgical History:   Procedure Laterality Date    CATARACT EXTRACTION, BILATERAL      COLONOSCOPY  08/2018    polyp- 5 years    JOINT REPLACEMENT      KNEE ARTHROPLASTY      DC ESOPHAGOGASTRODUODENOSCOPY TRANSORAL DIAGNOSTIC N/A 3/5/2021    Procedure: ESOPHAGOGASTRODUODENOSCOPY (EGD);   Surgeon: Jennifer Toribio MD;  [facility]: [facility];  Service: Thoracic    DC LAP, REPAIR PARAESOPHAGEAL HERNIA, INCL FUNDOPLASTY W/O MESH N/A 3/5/2021 Procedure: REPAIR HERNIA PARAESOPHAGEAL LAPAROSCOPIC W ROBOTICS WITH MESH, DOOR FUNDOPLICATION;  Surgeon: Xander Garcia MD;  Location[facility];  Service: Thoracic    TOTAL ABDOMINAL HYSTERECTOMY      age 45    TOTAL ABDOMINAL HYSTERECTOMY W/ BILATERAL SALPINGOOPHORECTOMY  1992    endometrial cancer   :    Medications, Allergies:     Current Facility-Administered Medications   Medication Dose Route Frequency    acetaminophen (TYLENOL) tablet 650 mg  650 mg Oral Q6H PRN    amLODIPine (NORVASC) tablet 5 mg  5 mg Oral Daily    atorvastatin (LIPITOR) tablet 10 mg  10 mg Oral HS    calcium carbonate (OYSTER SHELL,OSCAL) 500 mg tablet 1 tablet  1 tablet Oral Daily With Breakfast    [START ON 8/24/2021] ceFAZolin (ANCEF) IVPB (premix in dextrose) 2,000 mg 50 mL  2,000 mg Intravenous On Call To OR    cholecalciferol (VITAMIN D3) tablet 400 Units  400 Units Oral Daily    docusate sodium (COLACE) capsule 100 mg  100 mg Oral BID PRN    enoxaparin (LOVENOX) subcutaneous injection 40 mg  40 mg Subcutaneous Q12H    famotidine (PEPCID) injection 20 mg  20 mg Intravenous Q12H [address]    fish oil capsule 1,000 mg  1,000 mg Oral Daily    glucosamine sulfate capsule 1,500 mg  1,500 mg Oral Daily    hydrALAZINE (APRESOLINE) injection 10 mg  10 mg Intravenous Q4H PRN    HYDROmorphone HCl (DILAUDID) injection 0 2 mg  0 2 mg Intravenous Q3H PRN    levothyroxine tablet 25 mcg  25 mcg Oral Daily    LORazepam (ATIVAN) injection 1 mg  1 mg Intravenous Q4H PRN    multi-electrolyte (PLASMALYTE-A/ISOLYTE-S PH 7 4) IV solution  125 mL/hr Intravenous Continuous    multivitamin stress formula tablet 1 tablet  1 tablet Oral Daily    ondansetron (ZOFRAN) injection 4 mg  4 mg Intravenous Q6H PRN    oxyCODONE (ROXICODONE) IR tablet 5 mg  5 mg Oral Q4H PRN    pantoprazole (PROTONIX) injection 40 mg  40 mg Intravenous Q12H [address]    promethazine (PHENERGAN) injection 12 5 mg  12 5 mg Intravenous Q4H PRN    sucralfate (CARAFATE) tablet 1 g  1 g Oral Q6H [address]    timolol (TIMOPTIC) 0 5 % ophthalmic solution 1 drop  1 drop Both Eyes BID       Allergies:  No Known Allergies:    Social and Family History:   Social History:   Social History     Tobacco Use    Smoking status: Former Smoker     Packs/day: 0 25     Years: 10 00     Pack years: 2 50     Types: Cigarettes     Start date: 64     Quit date:      Years since quittin 6    Smokeless tobacco: Never Used    Tobacco comment: Quit 50+ years ago 3/24/21   Vaping Use    Vaping Use: Never used   Substance Use Topics    Alcohol use: Not Currently     Comment: socially    Drug use: No        Social History     Tobacco Use   Smoking Status Former Smoker    Packs/day: 0 25    Years: 10 00    Pack years: 2 50    Types: Cigarettes    Start date:     Quit date:  Years since quittin 6   Smokeless Tobacco Never Used   Tobacco Comment    Quit 50+ years ago 3/24/21       Family History:  Family History   Problem Relation Age of Onset    Stomach cancer Mother 71    Pancreatic cancer Mother 71    Heart attack Father     Cancer Maternal Uncle     No Known Problems Daughter     No Known Problems Maternal Grandmother     No Known Problems Maternal Grandfather     No Known Problems Paternal Grandmother     No Known Problems Paternal Grandfather     No Known Problems Maternal Aunt     No Known Problems Maternal Aunt    :     Review of Systems:     General: Fever, chills, or night sweats: negative  Cardiac: Negative for chest pain  Pulmonary: Negative for shortness of breath  Gastrointestinal: Abdominal pain negative  Nausea, vomiting, or diarrhea negative,  Genitourinary: See HPI above  Patient does not have hematuria  All other systems queried were negative  Physical Exam:   General: Patient is pleasant and in NAD   Awake and alert  /98 (BP Location: Right arm)   Pulse 74   Temp 98 5 °F (36 9 °C) (Oral)   Resp 20   Ht 5' 3" (1 6 m)   Wt 111 kg (245 lb)   SpO2 94%   BMI 43 40 kg/m² Temp (24hrs), Av 1 °F (36 7 °C), Min:97 6 °F (36 4 °C), Max:98 5 °F (36 9 °C)  current; Temperature: 98 5 °F (36 9 °C)  I/O last 24 hours: In: 1104 2 [I V :1104 2]  Out: 850 [Urine:850]  Head: Normocephalic, without obvious abnormality, atraumatic  Eyes: negative  Lungs: clear to auscultation bilaterally  Heart: regular rate and rhythm  Extremities: extremities normal, warm and well-perfused; no cyanosis, clubbing, or edema  Neurologic: Grossly normal    Abdomen: There is no abdominal tenderness at this time  Abdomen is soft, nontender, nondistended  :    There is no CVA tenderness at this time  Labs:     Lab Results   Component Value Date    HGB 15 6 (H) 2021    HCT 45 8 2021    WBC 9 23 2021     2021   ]    Lab Results   Component Value Date    K 3 8 2021     2021    CO2 24 2021    BUN 11 2021    CREATININE 0 64 2021    CALCIUM 9 0 2021    GLUCOSE 120 2021   ]    Imaging:   I personally reviewed the images and report of the following studies, and reviewed them with the patient:    CT Abdomen: Personally reviewed by me with the above-noted findings as indicated in HPI  ASSESSMENT:     79 y o  old female with    Left renal calculus, chronic  Recent nausea, vomiting, left upper quadrant pain  Etiology unclear whether related to her GI problems or left renal calculus  PLAN:      discussed findings with the patient and her family  She is somewhat somnolent so primarily discussed with her family  This includes her  and son  We discussed that typically renal colic would involve left flank pain and may certainly involve nausea and vomiting  However patient's complaints have been left upper quadrant  There is no CVA tenderness  Additionally CT scan is not appreciably different than several months ago    Therefore is unclear whether the source of her symptoms is the nonobstructing kidney stone or her GI issues  We discussed possible retrograde pyelogram and stent placement to rule this out, however she has not been NPO  Further, she is tentatively scheduled for GI endoscopy tomorrow  Will need to re-evaluate and coordinate with GI which procedure should take precedence for diagnostic evaluation  Patient and family understand and agree with the plan  Thank you for allowing me to participate in this patients care  Please do not hesitate to call with any additional questions    Amos Guevara MD

## 2021-08-24 NOTE — PROGRESS NOTES
[facility]  Progress Note - Silvina Grandchild 1940, 79 y o  female MRN: [id number]  Unit/Bed#: OR POOL Encounter: [id number]  Primary Care Provider: Chinmay Da Silva MD   Date and time admitted to hospital: 8/22/2021  8:34 PM    Patient was not personally seen or examined today as she was off the floor initially for EGD and then for cystoscopy  Chart was thoroughly reviewed including vital signs, laboratory/imaging studies, progress notes  EGD deferred today in preference for urologic intervention  Discussed with GI, ok to resume diet after cystoscopy and keep NPO after midnight for potential EGD tomorrow  Plan to reassess symptoms in AM prior to scheduling procedure  Discussed case with patient's primary RN as well as  and daughter at bedside  They state patient was doing well this morning, she did not have any further episode of vomiting and had reported being hungry  All family's questions were addressed and answered to the best of my ability      Assessment/Plan:    * Left upper quadrant abdominal pain, nausea and vomiting  Assessment & Plan  · Presented with LUQ abdominal pain, nausea and vomiting x 2 weeks associated with 10 lb weight loss   · CT abdomen/pelvis revealed left-sided UPJ obstructing stone, moderate hiatal hernia with findings suggestive of reflux, diverticulosis without diverticulitis, cholelithiasis without cholecystitis   · Appreciate GI consult and recommendations   · Continue IV Protonix BID as well as Pepcid BID  · Continue carafate QID, Mylanta prn   · Deferred EGD today, will reassess tomorrow, keep NPO after midnight     Obstruction of left ureteropelvic junction (UPJ) due to stone  Assessment & Plan  · Patient presented with nausea, vomiting, LUQ abominal pain radiating to back   · CT abdomen/pelvis: 1 6 x 1 1 x 1 5 cm calculus in the left ureteropelvic junction   The left renal pelvis is minimally dilated compared to the prior study, however, there is no significant caliectasis   There is slightly more perinephric stranding on the left when compared with the prior study  · Appreciate urology consult and recommendations   · Plan for cystoscopy with left ureteral stent placement today     Paraesophageal hernia  Assessment & Plan  · Status post laparascopic repair on 3/5/21  · Appreciate GI input  · Tentative plan for EGD tomorrow  · Remained of plan as above     Gastroesophageal reflux disease with esophagitis without hemorrhage  Assessment & Plan  · With nausea and vomiting (see related plan)  · Appreciate GI input   · Protonix BID + Pepcid 20 mg BID  · Carafate QID   · Tentative plan for EGD tomorrow     Hypertension, essential, benign  Assessment & Plan  · Hypertensive urgency on admission, improved this morning   · Continue Norvasc 5 mg daily  · IV hydralazine prn for SBP > 170    Hyponatremia  Assessment & Plan  · Likely due to dehydration   · Continue with IVF for now   · Monitor BMP    Hypokalemia  Assessment & Plan  · In the setting of nausea and vomiting as well as poor oral intake   · Replete and monitor     Acquired hypothyroidism  Assessment & Plan  · Continue levothyroxine 50 mcg daily      Hypercholesterolemia  Assessment & Plan  · Continue Lipitor 10 mg daily

## 2021-08-24 NOTE — ANESTHESIA PREPROCEDURE EVALUATION
Procedure:  CYSTOSCOPY RETROGRADE PYELOGRAM WITH INSERTION STENT URETERAL (Left Bladder)    Relevant Problems   CARDIO   (+) Ascending aorta dilatation (HCC)   (+) Hypercholesterolemia   (+) Hypertension, essential, benign      ENDO   (+) Acquired hypothyroidism      GI/HEPATIC   (+) Gastroesophageal reflux disease with esophagitis without hemorrhage   (+) Paraesophageal hernia      /RENAL   (+) Obstruction of left ureteropelvic junction (UPJ) due to stone        Physical Exam    Airway    Mallampati score: IV  TM Distance: >3 FB  Neck ROM: full     Dental   No notable dental hx     Cardiovascular  Rhythm: regular, Rate: normal,     Pulmonary  Breath sounds clear to auscultation,     Other Findings    Patient presented to pr-eprocedure area feeling nauseaous and vomiting  Patient given 4mg zofran  HTN - denies taking amlodipine at home, however was given a dose at the hospital today  Received levothyroxine this am         Anesthesia Plan  ASA Score- 3     Anesthesia Type- general with ASA Monitors  Additional Monitors:   Airway Plan: ETT  Plan Factors-Exercise tolerance (METS): >4 METS  Chart reviewed  EKG reviewed  Patient summary reviewed  Patient is not a current smoker  Induction- rapid sequence induction  Postoperative Plan- Plan for postoperative opioid use  Planned trial extubation    Informed Consent- Anesthetic plan and risks discussed with patient  I personally reviewed this patient with the CRNA  Discussed and agreed on the Anesthesia Plan with the CRNA  Rica Nicholas

## 2021-08-24 NOTE — ASSESSMENT & PLAN NOTE
· Hypertensive urgency on admission, improved this morning   · Continue Norvasc 5 mg daily    · IV hydralazine prn for SBP > 170

## 2021-08-25 VITALS
SYSTOLIC BLOOD PRESSURE: 137 MMHG | OXYGEN SATURATION: 95 % | HEIGHT: 63 IN | HEART RATE: 74 BPM | WEIGHT: 245 LBS | BODY MASS INDEX: 43.41 KG/M2 | DIASTOLIC BLOOD PRESSURE: 91 MMHG | TEMPERATURE: 97.8 F | RESPIRATION RATE: 12 BRPM

## 2021-08-25 PROBLEM — K13.79 DISORDER OF UVULA: Status: ACTIVE | Noted: 2021-08-25

## 2021-08-25 LAB
ANION GAP SERPL CALCULATED.3IONS-SCNC: 4 MMOL/L (ref 4–13)
BASOPHILS # BLD AUTO: 0.01 THOUSANDS/ΜL (ref 0–0.1)
BASOPHILS NFR BLD AUTO: 0 % (ref 0–1)
BUN SERPL-MCNC: 9 MG/DL (ref 5–25)
CALCIUM SERPL-MCNC: 8.4 MG/DL (ref 8.3–10.1)
CHLORIDE SERPL-SCNC: 97 MMOL/L (ref 100–108)
CO2 SERPL-SCNC: 30 MMOL/L (ref 21–32)
CREAT SERPL-MCNC: 0.75 MG/DL (ref 0.6–1.3)
EOSINOPHIL # BLD AUTO: 0.01 THOUSAND/ΜL (ref 0–0.61)
EOSINOPHIL NFR BLD AUTO: 0 % (ref 0–6)
ERYTHROCYTE [DISTWIDTH] IN BLOOD BY AUTOMATED COUNT: 12.1 % (ref 11.6–15.1)
GFR SERPL CREATININE-BSD FRML MDRD: 75 ML/MIN/1.73SQ M
GLUCOSE SERPL-MCNC: 134 MG/DL (ref 65–140)
HCT VFR BLD AUTO: 44.9 % (ref 34.8–46.1)
HGB BLD-MCNC: 15.2 G/DL (ref 11.5–15.4)
IMM GRANULOCYTES # BLD AUTO: 0.03 THOUSAND/UL (ref 0–0.2)
IMM GRANULOCYTES NFR BLD AUTO: 0 % (ref 0–2)
LYMPHOCYTES # BLD AUTO: 1.85 THOUSANDS/ΜL (ref 0.6–4.47)
LYMPHOCYTES NFR BLD AUTO: 18 % (ref 14–44)
MCH RBC QN AUTO: 32.3 PG (ref 26.8–34.3)
MCHC RBC AUTO-ENTMCNC: 33.9 G/DL (ref 31.4–37.4)
MCV RBC AUTO: 95 FL (ref 82–98)
MONOCYTES # BLD AUTO: 1.38 THOUSAND/ΜL (ref 0.17–1.22)
MONOCYTES NFR BLD AUTO: 13 % (ref 4–12)
NEUTROPHILS # BLD AUTO: 7.08 THOUSANDS/ΜL (ref 1.85–7.62)
NEUTS SEG NFR BLD AUTO: 69 % (ref 43–75)
NRBC BLD AUTO-RTO: 0 /100 WBCS
PLATELET # BLD AUTO: 210 THOUSANDS/UL (ref 149–390)
PMV BLD AUTO: 10.9 FL (ref 8.9–12.7)
POTASSIUM SERPL-SCNC: 3.7 MMOL/L (ref 3.5–5.3)
RBC # BLD AUTO: 4.71 MILLION/UL (ref 3.81–5.12)
SODIUM SERPL-SCNC: 131 MMOL/L (ref 136–145)
WBC # BLD AUTO: 10.36 THOUSAND/UL (ref 4.31–10.16)

## 2021-08-25 PROCEDURE — 99239 HOSP IP/OBS DSCHRG MGMT >30: CPT | Performed by: PHYSICIAN ASSISTANT

## 2021-08-25 PROCEDURE — 85025 COMPLETE CBC W/AUTO DIFF WBC: CPT | Performed by: PHYSICIAN ASSISTANT

## 2021-08-25 PROCEDURE — 80048 BASIC METABOLIC PNL TOTAL CA: CPT | Performed by: PHYSICIAN ASSISTANT

## 2021-08-25 PROCEDURE — 99232 SBSQ HOSP IP/OBS MODERATE 35: CPT | Performed by: PHYSICIAN ASSISTANT

## 2021-08-25 PROCEDURE — C9113 INJ PANTOPRAZOLE SODIUM, VIA: HCPCS | Performed by: UROLOGY

## 2021-08-25 PROCEDURE — 99221 1ST HOSP IP/OBS SF/LOW 40: CPT

## 2021-08-25 RX ORDER — LIDOCAINE 50 MG/G
1 PATCH TOPICAL DAILY
Status: DISCONTINUED | OUTPATIENT
Start: 2021-08-25 | End: 2021-08-25 | Stop reason: HOSPADM

## 2021-08-25 RX ORDER — PANTOPRAZOLE SODIUM 40 MG/1
40 TABLET, DELAYED RELEASE ORAL
Status: DISCONTINUED | OUTPATIENT
Start: 2021-08-26 | End: 2021-08-25 | Stop reason: HOSPADM

## 2021-08-25 RX ORDER — SUCRALFATE 1 G/1
1 TABLET ORAL EVERY 6 HOURS SCHEDULED
Qty: 120 TABLET | Refills: 0 | Status: CANCELLED | OUTPATIENT
Start: 2021-08-25 | End: 2021-09-24

## 2021-08-25 RX ADMIN — ENOXAPARIN SODIUM 40 MG: 40 INJECTION SUBCUTANEOUS at 13:10

## 2021-08-25 RX ADMIN — SODIUM CHLORIDE, SODIUM GLUCONATE, SODIUM ACETATE, POTASSIUM CHLORIDE, MAGNESIUM CHLORIDE, SODIUM PHOSPHATE, DIBASIC, AND POTASSIUM PHOSPHATE 125 ML/HR: .53; .5; .37; .037; .03; .012; .00082 INJECTION, SOLUTION INTRAVENOUS at 02:40

## 2021-08-25 RX ADMIN — LIDOCAINE 1 PATCH: 50 PATCH TOPICAL at 09:01

## 2021-08-25 RX ADMIN — PANTOPRAZOLE SODIUM 40 MG: 40 INJECTION, POWDER, FOR SOLUTION INTRAVENOUS at 09:06

## 2021-08-25 RX ADMIN — ENOXAPARIN SODIUM 40 MG: 40 INJECTION SUBCUTANEOUS at 00:27

## 2021-08-25 RX ADMIN — TIMOLOL MALEATE 1 DROP: 5 SOLUTION/ DROPS OPHTHALMIC at 09:08

## 2021-08-25 RX ADMIN — SODIUM CHLORIDE, SODIUM GLUCONATE, SODIUM ACETATE, POTASSIUM CHLORIDE, MAGNESIUM CHLORIDE, SODIUM PHOSPHATE, DIBASIC, AND POTASSIUM PHOSPHATE 125 ML/HR: .53; .5; .37; .037; .03; .012; .00082 INJECTION, SOLUTION INTRAVENOUS at 11:00

## 2021-08-25 RX ADMIN — SUCRALFATE 1 G: 1 TABLET ORAL at 03:28

## 2021-08-25 RX ADMIN — HYDRALAZINE HYDROCHLORIDE 10 MG: 20 INJECTION, SOLUTION INTRAMUSCULAR; INTRAVENOUS at 09:03

## 2021-08-25 RX ADMIN — OXYCODONE HYDROCHLORIDE 5 MG: 5 TABLET ORAL at 07:51

## 2021-08-25 RX ADMIN — LEVOTHYROXINE SODIUM 25 MCG: 25 TABLET ORAL at 05:54

## 2021-08-25 RX ADMIN — FAMOTIDINE 20 MG: 10 INJECTION INTRAVENOUS at 10:16

## 2021-08-25 RX ADMIN — AMLODIPINE BESYLATE 5 MG: 5 TABLET ORAL at 09:04

## 2021-08-25 NOTE — DISCHARGE SUMMARY
[facility]  Discharge- Marielena Turpin 1940, 79 y o  female MRN: [id number]  Unit/Bed#: [facility] 304-01 Encounter: [id number]  Primary Care Provider: Karri Prabhakar MD   Date and time admitted to hospital: 8/22/2021  8:34 PM    * Left upper quadrant abdominal pain, nausea and vomiting  Assessment & Plan  · Presented with LUQ abdominal pain, nausea and vomiting x 2 weeks associated with 10 lb weight loss   · CT abdomen/pelvis revealed left-sided UPJ obstructing stone, moderate hiatal hernia with findings suggestive of reflux, diverticulosis without diverticulitis, cholelithiasis without cholecystitis   · Appreciate GI consult and recommendations   · Deferred EGD at this time and will arrange for outpatient follow-up   · Resolved s/p urologic intervention, plan to follow-up with Urology and GI after discharge     Obstruction of left ureteropelvic junction (UPJ) due to stone  Assessment & Plan  · Patient presented with nausea, vomiting, LUQ abominal pain radiating to back   · CT abdomen/pelvis: 1 6 x 1 1 x 1 5 cm calculus in the left ureteropelvic junction   The left renal pelvis is minimally dilated compared to the prior study, however, there is no significant caliectasis   There is slightly more perinephric stranding on the left when compared with the prior study     · Appreciate urology consult and recommendations   · S/p cystoscopy with left ureteral stent placement on 08/24  · Outpatient follow-up     Paraesophageal hernia  Assessment & Plan  · Status post laparascopic repair on 3/5/21  · Appreciate GI input  · No need for EGD at this time, will arrange for outpatient follow-up   · Remainer of plan as above       Medical Problems     Resolved Problems  Date Reviewed: 8/25/2021    None              Discharging Physician / Practitioner: Tory Marcelino PA-C  PCP: Karri Prabhakar MD  Admission Date:   Admission Orders (From admission, onward)     Ordered        08/22/21 2331  Inpatient Admission  Once                   Discharge Date: 08/25/21    Consultations During Hospital Stay:  · Urology  · Gastroenterology  · ENT    Procedures Performed:   · Cystoscopy with left ureteral stent placement on 08/24    Significant Findings / Test Results:   · Outlined above    Incidental Findings:   · None     Test Results Pending at Discharge (will require follow up): · None     Outpatient Tests Requested:  · Outpatient follow-up with Urology  · Outpatient follow-up with GI    Complications:  None    Reason for Admission:  Abdominal pain, nausea and vomiting    Hospital Course:   Jeanna Bradley is a 79 y o  female patient who originally presented to the hospital on 8/22/2021 due to abdominal pain, nausea and vomiting  CT imaging revealed left UPJ calculus causing hydronephrosis  Patient was seen by Urology and underwent cystoscopy with left ureteral stent placement on 08/24  Note, patient recently had hiatal hernia repaired earlier this year and there was concern that there was GI etiology to her presenting symptoms  She was evaluated by GI here and initially was planned for endoscopy for further evaluation however given resolution in symptoms after urologic procedure EGD was deferred at this time with plan for follow-up outpatient  On day following urologic procedure patient had complained of uvular damage and therefore was seen by ENT  No intervention recommended at this time, just symptomatic management recommended with cold liquids and ice and allow for routine healing  Patient had no abdominal pain, nausea or vomiting on day of discharge  She was eager for discharge home and recommended to schedule follow-up appointment with her PCP  She is aware that urology and GI will be calling her to schedule follow-up appointments as well  Patient and family at bedside expressed understanding and are in agreement with this plan of care      Please see above list of diagnoses and related plan for additional information  Condition at Discharge: fair    Discharge Day Visit / Exam:   * Please refer to separate progress note for these details *    Discussion with Family: Updated  ( and daughter) at bedside  Discharge instructions/Information to patient and family:   See after visit summary for information provided to patient and family  Provisions for Follow-Up Care:  See after visit summary for information related to follow-up care and any pertinent home health orders  Disposition:   Home    Planned Readmission: no     Discharge Statement:  I spent 30 minutes discharging the patient  This time was spent on the day of discharge  I had direct contact with the patient on the day of discharge  Greater than 50% of the total time was spent examining patient, answering all patient questions, arranging and discussing plan of care with patient as well as directly providing post-discharge instructions  Additional time then spent on discharge activities  Discharge Medications:  See after visit summary for reconciled discharge medications provided to patient and/or family        **Please Note: This note may have been constructed using a voice recognition system**

## 2021-08-25 NOTE — ASSESSMENT & PLAN NOTE
· Patient presented with nausea, vomiting, LUQ abominal pain radiating to back   · CT abdomen/pelvis: 1 6 x 1 1 x 1 5 cm calculus in the left ureteropelvic junction   The left renal pelvis is minimally dilated compared to the prior study, however, there is no significant caliectasis   There is slightly more perinephric stranding on the left when compared with the prior study     · Appreciate urology consult and recommendations   · S/p cystoscopy with left ureteral stent placement on 08/24

## 2021-08-25 NOTE — PROGRESS NOTES
[address] [facility]  Progress Note - Rica Schneider 1940, 79 y o  female MRN: [id number]  Unit/Bed#: [facility] 304-01 Encounter: [id number]  Primary Care Provider: Slade Carolina MD   Date and time admitted to hospital: 8/22/2021  8:34 PM    * Left upper quadrant abdominal pain, nausea and vomiting  Assessment & Plan  · Presented with LUQ abdominal pain, nausea and vomiting x 2 weeks associated with 10 lb weight loss   · CT abdomen/pelvis revealed left-sided UPJ obstructing stone, moderate hiatal hernia with findings suggestive of reflux, diverticulosis without diverticulitis, cholelithiasis without cholecystitis   · Appreciate GI consult and recommendations   · Continue IV Protonix BID as well as Pepcid BID  · Continue carafate QID, Mylanta prn   · Deferred EGD today, will reassess tomorrow, keep NPO after midnight     Obstruction of left ureteropelvic junction (UPJ) due to stone  Assessment & Plan  · Patient presented with nausea, vomiting, LUQ abominal pain radiating to back   · CT abdomen/pelvis: 1 6 x 1 1 x 1 5 cm calculus in the left ureteropelvic junction   The left renal pelvis is minimally dilated compared to the prior study, however, there is no significant caliectasis   There is slightly more perinephric stranding on the left when compared with the prior study     · Appreciate urology consult and recommendations   · S/p cystoscopy with left ureteral stent placement on 08/24    Paraesophageal hernia  Assessment & Plan  · Status post laparascopic repair on 3/5/21  · Appreciate GI input  · Tentative plan for EGD today  · Remainer of plan as above     Gastroesophageal reflux disease with esophagitis without hemorrhage  Assessment & Plan  · With nausea and vomiting (see related plan)  · Appreciate GI input   · Protonix BID + Pepcid 20 mg BID  · Carafate QID     Hypertension, essential, benign  Assessment & Plan  · Intermittent urgency, suspect due to pain   · Continue Norvasc 5 mg daily  · IV hydralazine prn for SBP > 170  · Monitor routinely     Hyponatremia  Assessment & Plan  · Likely due to dehydration   · Continue with IVF for now   · Monitor BMP    Hypokalemia  Assessment & Plan  · In the setting of nausea and vomiting as well as poor oral intake   · Replete and monitor     Acquired hypothyroidism  Assessment & Plan  · Continue levothyroxine 50 mcg daily  Hypercholesterolemia  Assessment & Plan  · Continue Lipitor 10 mg daily      VTE Pharmacologic Prophylaxis:   Low Risk (Score 0-2) - Encourage Ambulation  Patient Centered Rounds: I performed bedside rounds with nursing staff today  Discussions with Specialists or Other Care Team Provider: primary RN, GI, ENT    Education and Discussions with Family / Patient: Updated  () at bedside  Time Spent for Care: 20 minutes  More than 50% of total time spent on counseling and coordination of care as described above  Current Length of Stay: 3 day(s)  Current Patient Status: Inpatient   Certification Statement: The patient will continue to require additional inpatient hospital stay due to pending GI recommendations, ENT evaluation  Discharge Plan: Anticipate discharge in 24-48 hrs to home  Code Status: Level 1 - Full Code    Subjective:   Patient and  at bedside express frustration today, in regard to the lack of communication between specialties  They state they were unaware plan was for stent placement and they have not seen or spoken with anyone from GI team about the plan for endoscopy  Objective:     Vitals:   Temp (24hrs), Av 6 °F (37 °C), Min:97 8 °F (36 6 °C), Max:100 1 °F (37 8 °C)    Temp:  [97 8 °F (36 6 °C)-100 1 °F (37 8 °C)] 97 8 °F (36 6 °C)  HR:  [70-91] 91  Resp:  [12-20] 12  BP: (114-190)/() 190/110  SpO2:  [95 %-96 %] 95 %  Body mass index is 43 4 kg/m²  Input and Output Summary (last 24 hours):      Intake/Output Summary (Last 24 hours) at 2021 1058  Last data filed at 8/24/2021 2223  Gross per 24 hour   Intake 1012 5 ml   Output 775 ml   Net 237 5 ml       Physical Exam:   Physical Exam  Vitals and nursing note reviewed  Constitutional:       General: She is not in acute distress  HENT:      Mouth/Throat:      Pharynx: Pharyngeal swelling, posterior oropharyngeal erythema and uvula swelling present  Tonsils: No tonsillar exudate  Cardiovascular:      Rate and Rhythm: Normal rate and regular rhythm  Pulses: Normal pulses  Pulmonary:      Effort: Pulmonary effort is normal  No respiratory distress  Breath sounds: No wheezing or rales  Abdominal:      General: Abdomen is flat  There is no distension  Palpations: Abdomen is soft  Tenderness: There is no abdominal tenderness  Musculoskeletal:      Right lower leg: No edema  Left lower leg: No edema  Skin:     General: Skin is warm and dry  Coloration: Skin is not pale  Findings: No erythema  Neurological:      General: No focal deficit present  Mental Status: She is alert and oriented to person, place, and time  Mental status is at baseline            Additional Data:     Labs:  Results from last 7 days   Lab Units 08/24/21  0456   WBC Thousand/uL 10 35*   HEMOGLOBIN g/dL 15 9*   HEMATOCRIT % 47 2*   PLATELETS Thousands/uL 216   NEUTROS PCT % 78*   LYMPHS PCT % 13*   MONOS PCT % 9   EOS PCT % 0     Results from last 7 days   Lab Units 08/24/21  0456 08/23/21  0501   SODIUM mmol/L 131* 133*   POTASSIUM mmol/L 3 2* 3 8   CHLORIDE mmol/L 97* 101   CO2 mmol/L 29 24   BUN mg/dL 8 11   CREATININE mg/dL 0 53* 0 64   ANION GAP mmol/L 5 8   CALCIUM mg/dL 8 5 9 0   ALBUMIN g/dL  --  3 8   TOTAL BILIRUBIN mg/dL  --  0 74   ALK PHOS U/L  --  124*   ALT U/L  --  30   AST U/L  --  22   GLUCOSE RANDOM mg/dL 106 169*                 Results from last 7 days   Lab Units 08/22/21  2117   LACTIC ACID mmol/L 1 7       Lines/Drains:  Invasive Devices     Peripheral Intravenous Line Peripheral IV 08/22/21 Left;Proximal;Ventral (anterior) Forearm 2 days                      Imaging: No pertinent imaging reviewed  Recent Cultures (last 7 days):         Last 24 Hours Medication List:   Current Facility-Administered Medications   Medication Dose Route Frequency Provider Last Rate    acetaminophen  650 mg Oral Q6H PRN Chiquita Lilly MD      amLODIPine  5 mg Oral Daily Chiquita Lilly MD      atorvastatin  10 mg Oral HS Chiquita Lilly MD      calcium carbonate  1 tablet Oral Daily With Breakfast Chiquita Lilly MD      cholecalciferol  400 Units Oral Daily Chiquita Lilly MD      docusate sodium  100 mg Oral BID PRN Chiquita Lilly MD      enoxaparin  40 mg Subcutaneous Q12H Chiquita Lilly MD      famotidine  20 mg Intravenous Q12H Reno Camp MD      fish oil  1,000 mg Oral Daily Chiquita Lilly MD      glucosamine sulfate  1,500 mg Oral Daily Chiquita Lilly MD      hydrALAZINE  10 mg Intravenous Q4H PRN Chiquita Lilly MD      HYDROmorphone  0 2 mg Intravenous Q3H PRN Chiquita Lilly MD      levothyroxine  25 mcg Oral Daily Chiquita Lilly MD      lidocaine  1 patch Topical Daily Shahana Wilson PA-C      LORazepam  1 mg Intravenous Q4H PRN Chiquita Lilly MD      multi-electrolyte  125 mL/hr Intravenous Continuous Chiquita Lilly  mL/hr (08/25/21 0240)    multivitamin stress formula  1 tablet Oral Daily Chiquita Lilly MD      ondansetron  4 mg Intravenous Q6H PRN Chiquita Lilly MD      oxyCODONE  5 mg Oral Q4H PRN Chiquita Lilly MD      pantoprazole  40 mg Intravenous Q12H [address] Chiquita Lilly MD      promethazine  12 5 mg Intravenous Q4H PRN Chiquita Lilly MD      sucralfate  1 g Oral Q6H [address] Chiquita Lilly MD      timolol  1 drop Both Eyes BID Chiquita Lilly MD          Today, Patient Was Seen By: Lux Foote PA-C    **Please Note: This note may have been constructed using a voice recognition system  **

## 2021-08-25 NOTE — ASSESSMENT & PLAN NOTE
· Patient presented with nausea, vomiting, LUQ abominal pain radiating to back   · CT abdomen/pelvis: 1 6 x 1 1 x 1 5 cm calculus in the left ureteropelvic junction   The left renal pelvis is minimally dilated compared to the prior study, however, there is no significant caliectasis   There is slightly more perinephric stranding on the left when compared with the prior study     · Appreciate urology consult and recommendations   · S/p cystoscopy with left ureteral stent placement on 08/24  · Outpatient follow-up

## 2021-08-25 NOTE — ASSESSMENT & PLAN NOTE
· Intermittent urgency, suspect due to pain   · Continue Norvasc 5 mg daily  · IV hydralazine prn for SBP > 170  · Monitor routinely

## 2021-08-25 NOTE — CONSULTS
Consultation - Tiffanie Chol 79 y o  female MRN: [id number]  Unit/Bed#: [facility] 304-01 Encounter: [id number]        Assessment:  Patient is an 80 yo F POD #1 s/p cystoscopy and left ureteral stent placement who presents with uvular irritation most likely secondary to extubation and/or intubation trauma  Plan:  1  Recommend ice and cool liquids PRN for pain/discomfort  2   Please contact ENT for any further concerns or questions  History of Present Illness   Physician Requesting Consult: Mikhail Naidu DO  Reason for Consult / Principal Problem: Uvular trauma  HPI: Jose Bonner is a 79y o  year old female POD #1 s/p cystoscopy with left ureteral stent placement who presents with concerns of uvular damage  Patient feels there is something 'dangling' off of her uvula which is triggering her gag reflex  She has never had this happen before  Patient does belief she feels it is improving  She also states ice and cool liquids have helped with the discomfort  Patient denies any hemoptysis, dysphagia, odynophagia, hoarseness or other voice changes  Review of systems:  10 Point ROS was performed and negative except as above or otherwise noted in the medical record  Historical Information   Past Medical History:   Diagnosis Date    Cancer [facility] [address] [facility]     Disease of thyroid gland     Endometrial cancer ([facility] [address] )     age 45    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    Esophageal reflux     Hypertension     Hypothyroid     Obesity      Past Surgical History:   Procedure Laterality Date    CATARACT EXTRACTION, BILATERAL      COLONOSCOPY  08/2018    polyp- 5 years    JOINT REPLACEMENT      KNEE ARTHROPLASTY      NC ESOPHAGOGASTRODUODENOSCOPY TRANSORAL DIAGNOSTIC N/A 3/5/2021    Procedure: ESOPHAGOGASTRODUODENOSCOPY (EGD);   Surgeon: Deidra Blake MD;  [facility]: [facility];  Service: Thoracic    NC LAP, REPAIR PARAESOPHAGEAL HERNIA, INCL FUNDOPLASTY W/O MESH N/A 3/5/2021 Procedure: REPAIR HERNIA PARAESOPHAGEAL LAPAROSCOPIC W ROBOTICS WITH MESH, DOOR FUNDOPLICATION;  Surgeon: Jack Morales MD;  [facility];  Service: Thoracic    TOTAL ABDOMINAL HYSTERECTOMY      age 45    TOTAL ABDOMINAL HYSTERECTOMY W/ BILATERAL SALPINGOOPHORECTOMY      endometrial cancer     Social History   Social History     Substance and Sexual Activity   Alcohol Use Not Currently    Comment: socially     Social History     Substance and Sexual Activity   Drug Use No     Social History     Tobacco Use   Smoking Status Former Smoker    Packs/day: 0 25    Years: 10 00    Pack years: 2 50    Types: Cigarettes    Start date:     Quit date: Edwige Whittenman Years since quittin 6   Smokeless Tobacco Never Used   Tobacco Comment    Quit 50+ years ago 3/24/21     Family History: non-contributory    Meds/Allergies   all current active meds have been reviewed    No Known Allergies    Objective     Vitals:    21 0700   BP: (!) 190/110   Pulse: 91   Resp: 12   Temp: 97 8 °F (36 6 °C)   SpO2: 95%         Physical Exam   Constitutional: Oriented to person, place, and time  Well-developed and well-nourished, no apparent distress, non-toxic appearance  Cooperative, able to hear and answer questions without difficulty  Voice: Normal voice quality  Head: Normocephalic, atraumatic  No scars, masses or lesions  Face: Symmetric, no edema, no sinus tenderness  Eyes: Vision grossly intact, extra-ocular movement intact  Ears: External ears normal   No post-auricular erythema or tenderness  Oral cavity: Dentition intact  Mucosa moist, lips without lesions or masses  Tongue mobile, floor of mouth soft and flat  Hard palate intact  No masses or lesions  Oropharynx: Uvula is midline with bruising at tip and left superior aspect, soft palate intact without lesion or mass  Oropharyngeal inlet without obstruction  Tonsils unremarkable  Posterior pharyngeal wall clear   No masses or lesions  Pulmonary/Chest: Normal effort and rate  No respiratory distress  No stertor or stridor  Skin: Skin is warm and dry  Psychiatric: Normal mood and affect  Intake/Output Summary (Last 24 hours) at 8/25/2021 1132  Last data filed at 8/25/2021 1100  Gross per 24 hour   Intake 1512 5 ml   Output 1575 ml   Net -62 5 ml       Invasive Devices     Peripheral Intravenous Line            Peripheral IV 08/22/21 Left;Proximal;Ventral (anterior) Forearm 2 days                Lab Results: I have personally reviewed pertinent lab results  Imaging Studies: I have personally reviewed pertinent reports  EKG, Pathology, and Other Studies: I have personally reviewed pertinent reports        Code Status: Level 1 - Full Code  Advance Directive and Living Will:      Power of :    POLST:

## 2021-08-25 NOTE — ASSESSMENT & PLAN NOTE
· With nausea and vomiting (see related plan)  · Appreciate GI input   · Protonix BID + Pepcid 20 mg BID  · Carafate QID

## 2021-08-25 NOTE — ASSESSMENT & PLAN NOTE
· Patient claims uvula was damaged during procedure yesterday, she states that feels as though is has been lacerated and is hanging down causing her to gag and with difficulty swallowing   · Appreciate ENT evaluation and input   · Appreciate speech therapy evaluation and recommendations

## 2021-08-25 NOTE — ASSESSMENT & PLAN NOTE
· Status post laparascopic repair on 3/5/21  · Appreciate GI input  · No need for EGD at this time, will arrange for outpatient follow-up   · Remainer of plan as above

## 2021-08-25 NOTE — ASSESSMENT & PLAN NOTE
· Status post laparascopic repair on 3/5/21  · Appreciate GI input  · Tentative plan for EGD today  · Remainer of plan as above

## 2021-08-27 ENCOUNTER — TELEPHONE (OUTPATIENT)
Dept: UROLOGY | Facility: MEDICAL CENTER | Age: 81
End: 2021-08-27

## 2021-08-27 ENCOUNTER — PREP FOR PROCEDURE (OUTPATIENT)
Dept: UROLOGY | Facility: MEDICAL CENTER | Age: 81
End: 2021-08-27

## 2021-08-27 DIAGNOSIS — N20.0 KIDNEY STONES: Primary | ICD-10-CM

## 2021-08-27 DIAGNOSIS — R39.89 SUSPECTED UTI: ICD-10-CM

## 2021-08-27 NOTE — TELEPHONE ENCOUNTER
----- Message from Colby Mehta MD sent at 8/24/2021  5:28 PM EDT -----  Please schedule patient with whoever is available in 2-3 weeks for cystoscopy left ureteroscopy laser lithotripsy and left ureteral stent exchange  I placed a stent today  She will need a urine culture 1-2 weeks prior  Order placed for that as well

## 2021-08-27 NOTE — TELEPHONE ENCOUNTER
Spoke to patient and scheduled appt for 10/15/21 with Dr Kumar Acosta at [facility]  Pt will have pat's 2 weeks prior to sx  Advised pt that I will mail a surgery packet, Pt can call me with any question or concern

## 2021-09-07 ENCOUNTER — APPOINTMENT (OUTPATIENT)
Dept: LAB | Age: 81
End: 2021-09-07
Payer: MEDICARE

## 2021-09-07 DIAGNOSIS — R53.83 OTHER FATIGUE: ICD-10-CM

## 2021-09-07 LAB
BASOPHILS # BLD AUTO: 0.04 THOUSANDS/ΜL (ref 0–0.1)
BASOPHILS NFR BLD AUTO: 1 % (ref 0–1)
EOSINOPHIL # BLD AUTO: 0.09 THOUSAND/ΜL (ref 0–0.61)
EOSINOPHIL NFR BLD AUTO: 1 % (ref 0–6)
ERYTHROCYTE [DISTWIDTH] IN BLOOD BY AUTOMATED COUNT: 12.9 % (ref 11.6–15.1)
HCT VFR BLD AUTO: 44.2 % (ref 34.8–46.1)
HGB BLD-MCNC: 14.6 G/DL (ref 11.5–15.4)
IMM GRANULOCYTES # BLD AUTO: 0.03 THOUSAND/UL (ref 0–0.2)
IMM GRANULOCYTES NFR BLD AUTO: 0 % (ref 0–2)
LYMPHOCYTES # BLD AUTO: 1.55 THOUSANDS/ΜL (ref 0.6–4.47)
LYMPHOCYTES NFR BLD AUTO: 19 % (ref 14–44)
MCH RBC QN AUTO: 32.7 PG (ref 26.8–34.3)
MCHC RBC AUTO-ENTMCNC: 33 G/DL (ref 31.4–37.4)
MCV RBC AUTO: 99 FL (ref 82–98)
MONOCYTES # BLD AUTO: 0.77 THOUSAND/ΜL (ref 0.17–1.22)
MONOCYTES NFR BLD AUTO: 10 % (ref 4–12)
NEUTROPHILS # BLD AUTO: 5.59 THOUSANDS/ΜL (ref 1.85–7.62)
NEUTS SEG NFR BLD AUTO: 69 % (ref 43–75)
NRBC BLD AUTO-RTO: 0 /100 WBCS
PLATELET # BLD AUTO: 244 THOUSANDS/UL (ref 149–390)
PMV BLD AUTO: 11.6 FL (ref 8.9–12.7)
RBC # BLD AUTO: 4.47 MILLION/UL (ref 3.81–5.12)
WBC # BLD AUTO: 8.07 THOUSAND/UL (ref 4.31–10.16)

## 2021-09-07 PROCEDURE — 85025 COMPLETE CBC W/AUTO DIFF WBC: CPT

## 2021-09-07 PROCEDURE — 36415 COLL VENOUS BLD VENIPUNCTURE: CPT

## 2021-09-08 ENCOUNTER — OFFICE VISIT (OUTPATIENT)
Dept: CARDIAC SURGERY | Facility: CLINIC | Age: 81
End: 2021-09-08
Payer: MEDICARE

## 2021-09-08 VITALS
WEIGHT: 241.4 LBS | HEART RATE: 71 BPM | SYSTOLIC BLOOD PRESSURE: 106 MMHG | OXYGEN SATURATION: 98 % | RESPIRATION RATE: 16 BRPM | DIASTOLIC BLOOD PRESSURE: 72 MMHG | TEMPERATURE: 97.3 F | HEIGHT: 63 IN | BODY MASS INDEX: 42.77 KG/M2

## 2021-09-08 DIAGNOSIS — K21.00 GASTROESOPHAGEAL REFLUX DISEASE WITH ESOPHAGITIS WITHOUT HEMORRHAGE: Primary | ICD-10-CM

## 2021-09-08 PROCEDURE — 99213 OFFICE O/P EST LOW 20 MIN: CPT | Performed by: THORACIC SURGERY (CARDIOTHORACIC VASCULAR SURGERY)

## 2021-09-08 NOTE — PROGRESS NOTES
Thoracic Follow-Up  Assessment/Plan:     50-year-old female with history of a large type 3 paraesophageal hernia status post 03/05/2021 robotic paraesophageal hernia repair with mesh and Albino fundoplication with some mild regurgitant symptoms       I am not sure what to make of Deirdre's  Intermittent regurgitation of saliva/mucus  She tolerates breakfast and lunch without issue and occasionally and some dinners has slide or regurgitation 2 hours afterwards  This is not dependent on trigger foods  This is no relation to laying down after eating  This occurs irregularly  I would like to start with a barium swallow to evaluate her esophageal motility as well as evaluate for recurrent hernia  The CT scan shows a possible small hiatal hernia recurrence  This is not unexpected given her large previous type 3 paraesophageal hernia and BMI of almost 43  We will see her back in the office after barium swallow  I think she should hold off on further GI evaluation until after this barium swallow  Memo Cardona MD        Diagnoses and all orders for this visit:    Gastroesophageal reflux disease with esophagitis without hemorrhage  -     FL barium swallow ROUTINE; Future          Thoracic History   Problem: Paraesophageal hernia   Procedure: EGD, Robotic repair of paraesophageal hernia with mesh and Albino fundoplication on 2/7/16  Pathology: Anterior fat pad and portion of paraesophageal hernia sac revealed mesothelium lined fibrovascular tissue, consistent with hernia sac  3 benign lymph nodes           Subjective:    Patient ID: Cortes Vizcarra is a 79 y o  female  HPI  Juan Luis Godinez back to our office today 6 months out from robotic paraesophageal hernia repair and Albino fundoplication  She was recently just in the hospital for a large kidney stone with stent and abdominal pain  Otherwise she does note some minor issues with eating    She tolerates breakfast and lunch without issue but often at dinnertime she regurgitates mucus/ phlegm 2 hours afterwards  She denies any regurgitation of food products  She is back on her Protonix once daily and has 0 heartburn  She has 0 dysphagia and does not get food stuck  Again her symptoms are only after dinner and at that occur variably  She just went a few weeks without having any issues with this  We reviewed her GERD HR QL paperwork 6 months out from surgery  Her total score was 13 at  This time  Prior to surgery it was 0 but she was dissatisfied with her current condition  The following portions of the patient's history were reviewed and updated as appropriate: allergies, current medications, past family history, past medical history, past social history, past surgical history and problem list     Review of Systems   Constitutional: Negative for activity change, appetite change, chills, diaphoresis, fatigue, fever and unexpected weight change  HENT: Negative  Eyes: Negative  Respiratory: Negative for apnea, cough, chest tightness, shortness of breath, wheezing and stridor  Cardiovascular: Negative for chest pain, palpitations and leg swelling  Gastrointestinal: Positive for vomiting  Negative for abdominal distention, abdominal pain, blood in stool, constipation, diarrhea and nausea  Endocrine: Negative  Genitourinary: Positive for difficulty urinating, dysuria and flank pain  Skin: Negative  Allergic/Immunologic: Negative  Neurological: Negative  Hematological: Negative  Psychiatric/Behavioral: Negative  Objective:   Physical Exam  Vitals and nursing note reviewed  Constitutional:       General: She is not in acute distress  Appearance: She is well-developed  She is not diaphoretic  HENT:      Head: Normocephalic and atraumatic  Mouth/Throat:      Pharynx: No oropharyngeal exudate  Eyes:      General: No scleral icterus       Conjunctiva/sclera: Conjunctivae normal       Pupils: Pupils are equal, round, and reactive to light  Neck:      Thyroid: No thyromegaly  Vascular: No JVD  Trachea: No tracheal deviation  Cardiovascular:      Rate and Rhythm: Normal rate and regular rhythm  Heart sounds: Normal heart sounds  No murmur heard  No friction rub  No gallop  Pulmonary:      Effort: Pulmonary effort is normal  No respiratory distress  Breath sounds: Normal breath sounds  No wheezing or rales  Chest:      Chest wall: No tenderness  Abdominal:      General: Bowel sounds are normal  There is no distension  Palpations: Abdomen is soft  There is no mass  Tenderness: There is no abdominal tenderness  There is no guarding or rebound  Musculoskeletal:         General: No tenderness or deformity  Normal range of motion  Cervical back: Normal range of motion and neck supple  Lymphadenopathy:      Cervical: No cervical adenopathy  Skin:     General: Skin is warm and dry  Coloration: Skin is not pale  Findings: No erythema or rash  Neurological:      General: No focal deficit present  Mental Status: She is alert and oriented to person, place, and time  Psychiatric:         Mood and Affect: Mood normal          Behavior: Behavior normal          Thought Content: Thought content normal          Judgment: Judgment normal      /72   Pulse 71   Temp (!) 97 3 °F (36 3 °C) (Temporal)   Resp 16   Ht 5' 3" (1 6 m)   Wt 110 kg (241 lb 6 5 oz)   SpO2 98%   BMI 42 76 kg/m²     No Chest XR results available for this patient  No CT Chest results available for this patient  No CT Chest,Abdomen,Pelvis results available for this patient  No NM PET CT results available for this patient  FL esophagram complete    Result Date: 3/6/2021  Narrative BARIUM SWALLOW-ESOPHAGRAM INDICATION:   s/p paraesophageal hernia repair   COMPARISON:  3/2/202 IMAGES:  23 FLUOROSCOPY TIME:   1 minute 43 seconds  TECHNIQUE: The patient was given water-soluble contrast by mouth and images of the esophagus were obtained  FINDINGS: Esophagus is mildly to moderately dilated with occasional tertiary contractions  There is slow passage across the gastroesophageal junction which is likely related to edema or spasm secondary to surgery performed yesterday  There has been reduction of the paraesophageal hernia  There is no extravasation of contrast   Slow passage into the gastric fundus is seen, without obvious abnormality  The patient was not given to much water subtle contrast to ingest, to avoid any risk of aspiration     Impression Status post repair of a large type III hiatal hernia yesterday  Slow passage across the narrowed GE junction most likely related to postoperative edema/spasm  There is no evidence of residual hernia or leak  Workstation performed: [id number]     FL barium swallow    Result Date: 3/2/2021  Narrative BARIUM SWALLOW-ESOPHAGRAM INDICATION:   K44 [id number]: Diaphragmatic hernia without obstruction or gangrene  COMPARISON:  CT abdomen dated 2/17/2021  IMAGES:  14 FLUOROSCOPY TIME:   1 min 35 sec  TECHNIQUE: The patient was given effervescent granules and barium by mouth and images of the esophagus were obtained  FINDINGS: The esophagus is normal in caliber  Esophageal motility is normal and emptying of contrast from the esophagus is prompt  No mucosal lesion, ulceration or evidence of fold thickening is seen  Gastroesophageal reflux was not observed  Large type III paraesophageal hernia is again seen  No volvulus  Impression Large type III paraesophageal hernia  Workstation performed: [id number]           I e  personally reviewed her recent CT scan from 08/22/2021  She has a slightly dilated esophagus  Is unclear to me if the GE junction is right at the diaphragm hiatus or slightly above this  Regardless she does not have a large hernia recurrence  Maybe a small 1 cm hiatal hernia recurrence      Miguelina Zambrano MD  Thoracic Surgeon    (Available by [facility] Text)

## 2021-09-14 ENCOUNTER — HOSPITAL ENCOUNTER (OUTPATIENT)
Dept: RADIOLOGY | Facility: HOSPITAL | Age: 81
Discharge: HOME | End: 2021-09-14
Attending: THORACIC SURGERY (CARDIOTHORACIC VASCULAR SURGERY)
Payer: MEDICARE

## 2021-09-14 DIAGNOSIS — K21.00 GASTROESOPHAGEAL REFLUX DISEASE WITH ESOPHAGITIS WITHOUT HEMORRHAGE: ICD-10-CM

## 2021-09-14 PROCEDURE — 74220 X-RAY XM ESOPHAGUS 1CNTRST: CPT

## 2021-09-24 ENCOUNTER — APPOINTMENT (OUTPATIENT)
Dept: LAB | Age: 81
End: 2021-09-24
Payer: MEDICARE

## 2021-09-24 DIAGNOSIS — R39.89 SUSPECTED UTI: ICD-10-CM

## 2021-09-24 DIAGNOSIS — N20.0 KIDNEY STONES: ICD-10-CM

## 2021-09-24 PROCEDURE — 87086 URINE CULTURE/COLONY COUNT: CPT

## 2021-09-25 LAB — BACTERIA UR CULT: NORMAL

## 2021-09-28 ENCOUNTER — OFFICE VISIT (OUTPATIENT)
Dept: UROLOGY | Facility: AMBULATORY SURGERY CENTER | Age: 81
End: 2021-09-28
Payer: MEDICARE

## 2021-09-28 VITALS
SYSTOLIC BLOOD PRESSURE: 108 MMHG | HEART RATE: 79 BPM | HEIGHT: 63 IN | WEIGHT: 241 LBS | BODY MASS INDEX: 42.7 KG/M2 | DIASTOLIC BLOOD PRESSURE: 82 MMHG

## 2021-09-28 DIAGNOSIS — N20.0 NEPHROLITHIASIS: Primary | ICD-10-CM

## 2021-09-28 PROCEDURE — 99214 OFFICE O/P EST MOD 30 MIN: CPT | Performed by: NURSE PRACTITIONER

## 2021-09-28 NOTE — PATIENT INSTRUCTIONS
Stop Vitamin C, Fish Oil and Vitamin D 7 days before procedure   After the procedure you can take:   Tamsulosin- to help decrease discomfort from the stent  Oxybutynin - to help decrease urinary spasms and frequency  Ibuprofen - to help with pan from inflammation  Pyridium- to help with discomfort/paain  After surgery expect to drink at least 40 - 460 oz of water per day    Kidney Stones   WHAT YOU NEED TO KNOW:   What is a kidney stone? Kidney stones form in the urinary system when the water and waste in your urine are out of balance  When this happens, certain types of waste crystals separate from the urine  The crystals build up and form kidney stones  Kidney stones can be made of uric acid, calcium, phosphate, or oxalate crystals  You may have more than one kidney stone  What increases my risk for kidney stones? · Not drinking enough liquids (especially water) each day    · Having urinary tract infections often    · Too much of certain foods, such as meat, salt, nuts, and chocolate    · Obesity    · Certain medicines, such as diuretics, steroids, and antacids    · A family history of kidney stones    · Being born with a kidney or bowel disorder    What are the signs and symptoms of kidney stones? · Pain in the middle of your back that moves across to your side or that may spread to your groin    · Nausea and vomiting    · Urge to urinate often, burning feeling when you urinate, or pink or red urine    · Tenderness in your lower back, side, or stomach    How are kidney stones diagnosed? Your healthcare provider will ask about your health and usual foods  He or she may refer you to a urologist  Dong Spencer may need tests to find out what type of kidney stones you have  Tests can show the size of your kidney stones and where they are in your urinary system  You may need more than one of the following:  · Urine tests  may show if you have blood in your urine   They may also show high amounts of the substances that form kidney stones, such as uric acid  · Blood tests  show how well your kidneys are working  They may also be used to check the levels of calcium or uric acid in your blood  · X-ray or ultrasound pictures  may be taken of your kidneys, bladder, and ureters  You may be given contrast liquid before an x-ray to help these show up better in the pictures  You may need to have more than one x-ray  Tell the healthcare provider if you have ever had an allergic reaction to contrast liquid  How are kidney stones treated? · NSAIDs , such as ibuprofen, help decrease swelling, pain, and fever  This medicine is available with or without a doctor's order  NSAIDs can cause stomach bleeding or kidney problems in certain people  If you take blood thinner medicine, always ask your healthcare provider if NSAIDs are safe for you  Always read the medicine label and follow directions  · Acetaminophen  decreases pain and fever  It is available without a doctor's order  Ask how much to take and how often to take it  Follow directions  Read the labels of all other medicines you are using to see if they also contain acetaminophen, or ask your doctor or pharmacist  Acetaminophen can cause liver damage if not taken correctly  Do not use more than 4 grams (4,000 milligrams) total of acetaminophen in one day  · Prescription pain medicine  may be given  Ask your healthcare provider how to take this medicine safely  Some prescription pain medicines contain acetaminophen  Do not take other medicines that contain acetaminophen without talking to your healthcare provider  Too much acetaminophen may cause liver damage  Prescription pain medicine may cause constipation  Ask your healthcare provider how to prevent or treat constipation  · Medicines  to balance your electrolytes may be needed  · A procedure or surgery  to remove the kidney stones may be needed if they do not pass on their own   Your treatment will depend on the size and location of your kidney stones  What can I do to manage kidney stones? · Drink more liquids  Your healthcare provider may tell you to drink at least 8 to 12 (eight-ounce) cups of liquids each day  This helps flush out the kidney stones when you urinate  Water is the best liquid to drink  · Strain your urine every time you go to the bathroom  Urinate through a strainer or a piece of thin cloth to catch the stones  Take the stones to your healthcare provider so they can be sent to the lab for tests  This will help your healthcare providers plan the best treatment for you  · Eat a variety of healthy foods  Healthy foods include fruits, vegetables, whole-grain breads, low-fat dairy products, beans, and fish  You may need to limit how much sodium (salt) or protein you eat  Ask for information about the best foods for you  · Be physically active as directed  Your stones may pass more easily if you stay active  Physical activity can also help you manage your weight  Ask about the best activities for you  When should I seek immediate care? · You are vomiting and it is not relieved with medicine  When should I call my doctor? · You have a fever  · You have trouble urinating  · You see blood in your urine  · You have severe pain  · You have any questions or concerns about your condition or care  CARE AGREEMENT:   You have the right to help plan your care  Learn about your health condition and how it may be treated  Discuss treatment options with your healthcare providers to decide what care you want to receive  You always have the right to refuse treatment  The above information is an  only  It is not intended as medical advice for individual conditions or treatments  Talk to your doctor, nurse or pharmacist before following any medical regimen to see if it is safe and effective for you    © Copyright [employer] 2021 Information is for End User's use only and may not be sold, redistributed or otherwise used for commercial purposes   All illustrations and images included in CareNotes® are the copyrighted property of A D A M , Inc  or Amadeo Jennings

## 2021-09-28 NOTE — PROGRESS NOTES
9/28/2021      Chief Complaint   Patient presents with    Follow-up    Nephrolithiasis     Assessment and Plan    79 y o  female managed by Dr Dick Henao    1  Nephrolithiasis  ·  status post cystoscopy, left ureteral stent insertion 08/24/2021 for a 16 mm left UPJ stone  · Tentative OR 10/15/2021 for cystoscopy, left ureteroscopy with laser lithotripsy, ureteral stent exchange with Dr Anuradha Nice  ·  urine culture performed 09/24/2021 NEGATIVEfor growth  ·  OR case procedure and postoperative care reviewed  Patient verbalized understanding    History of Present Illness  Joseluis Perkins is a 79 y o  female here for follow up evaluation of nephrolithiasis  She is status post cystoscopy, insertion of left ureteral stent 08/24/2021 for 16 mm left UPJ stone identified on CT scan the abdomen pelvis  She presents to the office today for H&P for ureteroscopy with laser lithotripsy, retrograde pyelogram and ureteral stent exchange   Scheduled for 10/15/2021 with Dr Anuradha Nice   Patient denies a history of complications secondary anesthesia  She denies smoking use/abuse of substances and alcohol  He denies recent onset of chest pain, shortness of breath and dizziness  Patient reports this to be her 1st kidney stone  Review of Systems   Constitutional: Negative for chills and fever  Respiratory: Negative for cough and shortness of breath  Cardiovascular: Negative for chest pain  Gastrointestinal: Negative for abdominal distention, abdominal pain, blood in stool, nausea and vomiting  Genitourinary: Positive for flank pain  Negative for difficulty urinating, dysuria, enuresis, frequency, hematuria and urgency  Skin: Negative for rash       Past Medical History  Past Medical History:   Diagnosis Date    Cancer ([facility] )     Disease of thyroid gland     Endometrial cancer ([facility] )     age 45    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    Esophageal reflux     Hypertension     Hypothyroid     Obesity        Past Social History  Past Surgical History:   Procedure Laterality Date    CATARACT EXTRACTION, BILATERAL      COLONOSCOPY  2018    polyp- 5 years    JOINT REPLACEMENT      KNEE ARTHROPLASTY      NM CYSTOURETHROSCOPY,URETER CATHETER Left 2021    Procedure: CYSTOSCOPY WITH INSERTION STENT URETERAL;  Surgeon: Pedro Ryan MD;  [facility];  Service: Urology    NM ESOPHAGOGASTRODUODENOSCOPY TRANSORAL DIAGNOSTIC N/A 3/5/2021    Procedure: ESOPHAGOGASTRODUODENOSCOPY (EGD);   Surgeon: Arturo Borges MD;  [facility];  Service: Thoracic    NM LAP, REPAIR PARAESOPHAGEAL HERNIA, INCL FUNDOPLASTY W/O MESH N/A 3/5/2021    Procedure: REPAIR HERNIA PARAESOPHAGEAL LAPAROSCOPIC W ROBOTICS WITH MESH, DOOR FUNDOPLICATION;  Surgeon: Arturo Borges MD;  [facility];  Service: Thoracic    TOTAL ABDOMINAL HYSTERECTOMY      age 45    TOTAL ABDOMINAL HYSTERECTOMY W/ BILATERAL SALPINGOOPHORECTOMY      endometrial cancer     Social History     Tobacco Use   Smoking Status Former Smoker    Packs/day: 0 25    Years: 10 00    Pack years: 2 50    Types: Cigarettes    Start date: 64    Quit date: 55    Years since quittin 7   Smokeless Tobacco Never Used   Tobacco Comment    Quit 50+ years ago 3/24/21       Past Family History  Family History   Problem Relation Age of Onset    Stomach cancer Mother 71    Pancreatic cancer Mother 71    Heart attack Father     Cancer Maternal Uncle     No Known Problems Daughter     No Known Problems Maternal Grandmother     No Known Problems Maternal Grandfather     No Known Problems Paternal Grandmother     No Known Problems Paternal Grandfather     No Known Problems Maternal Aunt     No Known Problems Maternal Aunt        Past Social history  Social History     Socioeconomic History    Marital status: /Civil Union     Spouse name: Not on file    Number of children: Not on file    Years of education: Not on file    Highest education level: Not on file   Occupational History    Not on file   Tobacco Use    Smoking status: Former Smoker     Packs/day: 0 25     Years: 10 00     Pack years: 2 50     Types: Cigarettes     Start date: 64     Quit date:      Years since quittin 7    Smokeless tobacco: Never Used    Tobacco comment: Quit 50+ years ago 3/24/21   Vaping Use    Vaping Use: Never used   Substance and Sexual Activity    Alcohol use: Not Currently     Comment: socially    Drug use: No    Sexual activity: Not Currently     Partners: Male     Birth control/protection: Post-menopausal     Comment:    Other Topics Concern    Not on file   Social History Narrative    Exercising regularly     Social Determinants of Health     Financial Resource Strain:     Difficulty of Paying Living Expenses:    Food Insecurity:     Worried About Running Out of Food in the Last Year:     [address] in the Last Year:    Transportation Needs:     Lack of Transportation (Medical):      Lack of Transportation (Non-Medical):    Physical Activity:     Days of Exercise per Week:     Minutes of Exercise per Session:    Stress:     Feeling of Stress :    Social Connections:     Frequency of Communication with Friends and Family:     Frequency of Social Gatherings with Friends and Family:     Attends Baptism Services:     Active Member of Clubs or Organizations:     Attends Club or Organization Meetings:     Marital Status:    Intimate Partner Violence:     Fear of Current or Ex-Partner:     Emotionally Abused:     Physically Abused:     Sexually Abused:        Current Medications  Current Outpatient Medications   Medication Sig Dispense Refill    amLODIPine (NORVASC) 5 mg tablet Take 1 tablet (5 mg total) by mouth daily (Patient taking differently: Take 2 5 mg by mouth daily ) 90 tablet 3    Ascorbic Acid (VITAMIN C PO) Take by mouth      atorvastatin (LIPITOR) 10 mg tablet TAKE 1 TABLET BY MOUTH  DAILY AT BEDTIME 90 tablet 3    Biotin 1 MG CAPS Take by mouth      Cholecalciferol (VITAMIN D3) 400 units CAPS Take by mouth      Combigan 0 2-0 5 % INSTILL 1 DROP IN BOTH EYES TWICE DAILY      glucosamine 500 MG CAPS capsule Take 1,500 mg by mouth daily      levothyroxine 50 mcg tablet Take 0 5 tablets by mouth daily       Multiple Vitamin (MULTIVITAMINS PO) Take 1 tablet by mouth daily      pantoprazole (PROTONIX) 40 mg tablet Take by mouth      Calcium 500 MG tablet Take by mouth (Patient not taking: Reported on 9/28/2021)      calcium carbonate (Calcium 600) 600 MG tablet Take 600 mg by mouth 2 (two) times a week (Patient not taking: Reported on 9/28/2021)      Omega-3 Fatty Acids (FISH OIL) 1,000 mg Take by mouth (Patient not taking: Reported on 9/28/2021)      oxybutynin (DITROPAN) 5 mg tablet Take 1 tablet (5 mg total) by mouth 3 (three) times a day as needed (bladder spasm) (Patient not taking: Reported on 9/28/2021) 20 tablet 0    oxyCODONE (ROXICODONE) 5 mg immediate release tablet Take 1 tablet (5 mg total) by mouth every 4 (four) hours as needed for moderate pain for up to 20 dosesMax Daily Amount: 30 mg (Patient not taking: Reported on 9/8/2021) 20 tablet 0    phenazopyridine (PYRIDIUM) 200 mg tablet Take 1 tablet (200 mg total) by mouth 3 (three) times a day as needed for bladder spasms (Patient not taking: Reported on 9/28/2021) 30 tablet 0     No current facility-administered medications for this visit         Allergies  No Known Allergies      The following portions of the patient's history were reviewed and updated as appropriate: allergies, current medications, past medical history, past social history, past surgical history and problem list       Vitals  Vitals:    09/28/21 0949   BP: 108/82   BP Location: Left arm   Patient Position: Sitting   Cuff Size: Large   Pulse: 79   Weight: 109 kg (241 lb)   Height: 5' 3" (1 6 m)           Physical Exam  Physical Exam  Vitals reviewed  Constitutional:       General: She is not in acute distress  Appearance: Normal appearance  Cardiovascular:      Rate and Rhythm: Normal rate and regular rhythm  Heart sounds: Normal heart sounds  Pulmonary:      Effort: Pulmonary effort is normal  No respiratory distress  Breath sounds: Normal breath sounds  Musculoskeletal:         General: Normal range of motion  Comments:   Ambulates with single-point cane   Skin:     General: Skin is warm and dry  Neurological:      General: No focal deficit present  Mental Status: She is alert  Psychiatric:         Mood and Affect: Mood normal          Behavior: Behavior normal        Results  No results found for this or any previous visit (from the past 1 hour(s))  ]  No results found for: PSA  Lab Results   Component Value Date    GLUCOSE 120 03/05/2021    CALCIUM 8 4 08/25/2021    K 3 7 08/25/2021    CO2 30 08/25/2021    CL 97 (L) 08/25/2021    BUN 9 08/25/2021    CREATININE 0 75 08/25/2021     Lab Results   Component Value Date    WBC 8 07 09/07/2021    HGB 14 6 09/07/2021    HCT 44 2 09/07/2021    MCV 99 (H) 09/07/2021     09/07/2021     Orders  No orders of the defined types were placed in this encounter        BRAYDEN Randhawa

## 2021-09-29 ENCOUNTER — OFFICE VISIT (OUTPATIENT)
Dept: CARDIAC SURGERY | Facility: CLINIC | Age: 81
End: 2021-09-29
Payer: MEDICARE

## 2021-09-29 VITALS
BODY MASS INDEX: 42.62 KG/M2 | HEIGHT: 63 IN | DIASTOLIC BLOOD PRESSURE: 83 MMHG | HEART RATE: 63 BPM | SYSTOLIC BLOOD PRESSURE: 124 MMHG | WEIGHT: 240.52 LBS | RESPIRATION RATE: 16 BRPM | TEMPERATURE: 97.5 F

## 2021-09-29 DIAGNOSIS — K44.9 PARAESOPHAGEAL HERNIA: Primary | ICD-10-CM

## 2021-09-29 PROCEDURE — 99213 OFFICE O/P EST LOW 20 MIN: CPT | Performed by: THORACIC SURGERY (CARDIOTHORACIC VASCULAR SURGERY)

## 2021-09-29 NOTE — PROGRESS NOTES
Thoracic Follow-Up  Assessment/Plan:     26-year-old female with history of a giant type 3 paraesophageal hernia status post 03/05/2021 robotic paraesophageal hernia repair with mesh and Albino fundoplication as well as obesity with a BMI of 42 who now has a recurrent paraesophageal hernia     Unfortunately Madelin Living  Has a recurrent paraesophageal hernia  I suspect this is contributing to her issues with worsening heartburn and regurgitation  She is at high risk for this given her BMI greater than 40  We discussed that this is a structural problem likely would not improve without surgery  Surgery would entail a redo robotic hernia repair again with mesh placement  We discussed her previous hernia was extremely large in the defect had to be closed with a total of 7 sutures  That plus her elevated BMI makes her at high risk for this coming back  Unfortunately this appears to have happened  We discussed the best thing she could do to decrease her risk is continued weight loss  For right now she would like to avoid  Redo surgery  I have asked her to come back to see us when her symptoms outweigh any potential risk of surgery  She voiced understanding  She will continue to try any frequent smaller meals and stay upright/ elevated after eating  She will call us if symptoms worsen she would like surgery at that time    Erik Desir MD      There are no diagnoses linked to this encounter  Thoracic History   Problem:Paraesophageal hernia   Procedure: EGD, Robotic repair of paraesophageal hernia with mesh and Albino fundoplication on 7/3/89  Pathology: Anterior fat pad and portion of paraesophageal hernia sac revealed mesothelium lined fibrovascular tissue, consistent with hernia sac  3 benign lymph nodes             Subjective:    Patient ID: Marielena Turpin is a 79 y o  female  HPI    Sigmund Living comes back to our office after barium swallow /esophageal motility study    She continues to have issues with regurgitation and vomiting daily  She has lost approximately 30 lb past 6 months  She minor pain with  The following portions of the patient's history were reviewed and updated as appropriate: allergies, current medications, past family history, past medical history, past social history, past surgical history and problem list     Review of Systems   Constitutional: Negative for activity change, appetite change, chills, diaphoresis, fatigue, fever and unexpected weight change  HENT: Positive for trouble swallowing  Eyes: Negative  Respiratory: Negative for apnea, cough, chest tightness, shortness of breath, wheezing and stridor  Cardiovascular: Negative for chest pain, palpitations and leg swelling  Gastrointestinal: Positive for vomiting  Negative for abdominal distention, abdominal pain, blood in stool, constipation, diarrhea and nausea  Endocrine: Negative  Genitourinary: Negative  Musculoskeletal: Positive for back pain  Skin: Negative  Allergic/Immunologic: Negative  Neurological: Negative  Hematological: Negative  Psychiatric/Behavioral: Negative  Objective:   Physical Exam  Vitals and nursing note reviewed  Constitutional:       General: She is not in acute distress  Appearance: She is well-developed  She is obese  She is not diaphoretic  HENT:      Head: Normocephalic and atraumatic  Mouth/Throat:      Pharynx: No oropharyngeal exudate  Comments: Wearing a mask  Eyes:      General: No scleral icterus  Conjunctiva/sclera: Conjunctivae normal       Pupils: Pupils are equal, round, and reactive to light  Neck:      Thyroid: No thyromegaly  Vascular: No JVD  Trachea: No tracheal deviation  Cardiovascular:      Rate and Rhythm: Normal rate and regular rhythm  Heart sounds: Normal heart sounds  No murmur heard  No friction rub  No gallop  Pulmonary:      Effort: Pulmonary effort is normal  No respiratory distress  Breath sounds: Normal breath sounds  No wheezing or rales  Chest:      Chest wall: No tenderness  Abdominal:      General: Bowel sounds are normal  There is no distension  Palpations: Abdomen is soft  There is no mass  Tenderness: There is no abdominal tenderness  There is no guarding or rebound  Musculoskeletal:         General: No tenderness or deformity  Normal range of motion  Cervical back: Normal range of motion and neck supple  Skin:     General: Skin is warm and dry  Coloration: Skin is not pale  Findings: No erythema or rash  Neurological:      General: No focal deficit present  Mental Status: She is alert and oriented to person, place, and time  Psychiatric:         Mood and Affect: Mood normal          Behavior: Behavior normal          Thought Content: Thought content normal          Judgment: Judgment normal      /83 (BP Location: Left arm, Patient Position: Sitting, Cuff Size: Standard)   Pulse 63   Temp 97 5 °F (36 4 °C) (Temporal)   Resp 16   Ht 5' 3" (1 6 m)   Wt 109 kg (240 lb 8 4 oz)   BMI 42 61 kg/m²     No Chest XR results available for this patient  No CT Chest results available for this patient  No CT Chest,Abdomen,Pelvis results available for this patient  No NM PET CT results available for this patient  FL esophagram complete    Result Date: 3/6/2021  Narrative BARIUM SWALLOW-ESOPHAGRAM INDICATION:   s/p paraesophageal hernia repair  COMPARISON:  3/2/202 IMAGES:  23 FLUOROSCOPY TIME:   1 minute 43 seconds  TECHNIQUE: The patient was given water-soluble contrast by mouth and images of the esophagus were obtained  FINDINGS: Esophagus is mildly to moderately dilated with occasional tertiary contractions  There is slow passage across the gastroesophageal junction which is likely related to edema or spasm secondary to surgery performed yesterday  There has been reduction of the paraesophageal hernia    There is no extravasation of contrast   Slow passage into the gastric fundus is seen, without obvious abnormality  The patient was not given to much water subtle contrast to ingest, to avoid any risk of aspiration     Impression Status post repair of a large type III hiatal hernia yesterday  Slow passage across the narrowed GE junction most likely related to postoperative edema/spasm  There is no evidence of residual hernia or leak  Workstation performed: [id number]     FL barium swallow    Result Date: 3/2/2021  Narrative BARIUM SWALLOW-ESOPHAGRAM INDICATION:   K44 9: Diaphragmatic hernia without obstruction or gangrene  COMPARISON:  CT abdomen dated 2/17/2021  IMAGES:  14 FLUOROSCOPY TIME:   1 min 35 sec  TECHNIQUE: The patient was given effervescent granules and barium by mouth and images of the esophagus were obtained  FINDINGS: The esophagus is normal in caliber  Esophageal motility is normal and emptying of contrast from the esophagus is prompt  No mucosal lesion, ulceration or evidence of fold thickening is seen  Gastroesophageal reflux was not observed  Large type III paraesophageal hernia is again seen  No volvulus  Impression Large type III paraesophageal hernia  Workstation performed: [id number]         I personally reviewed her esophageal barium swallow motility study from 09/14/2021  This showed normal distensibility, on nonocclusive peristalsis of the esophagus PE, grade 2 stasis, and eventual passage into the stomach  There is a recurrent paraesophageal hernia with the  GE junction 6 cm above the hiatus  Findings consistent with gastroparesis      Lizzie Guerrero MD  Thoracic Surgeon    (Available by [facility] Text)

## 2021-10-09 ENCOUNTER — IMMUNIZATIONS (OUTPATIENT)
Dept: FAMILY MEDICINE CLINIC | Facility: HOSPITAL | Age: 81
End: 2021-10-09

## 2021-10-09 DIAGNOSIS — Z23 ENCOUNTER FOR IMMUNIZATION: Primary | ICD-10-CM

## 2021-10-09 PROCEDURE — 91300 SARS-COV-2 / COVID-19 MRNA VACCINE (PFIZER-BIONTECH) 30 MCG: CPT

## 2021-10-09 PROCEDURE — 0001A SARS-COV-2 / COVID-19 MRNA VACCINE (PFIZER-BIONTECH) 30 MCG: CPT

## 2021-10-13 ENCOUNTER — PREP FOR PROCEDURE (OUTPATIENT)
Dept: UROLOGY | Facility: AMBULATORY SURGERY CENTER | Age: 81
End: 2021-10-13

## 2021-10-13 RX ORDER — CEFAZOLIN SODIUM 2 G/50ML
2000 SOLUTION INTRAVENOUS ONCE
Status: CANCELLED | OUTPATIENT
Start: 2021-10-13 | End: 2021-10-13

## 2021-10-15 ENCOUNTER — ANESTHESIA EVENT (OUTPATIENT)
Dept: PERIOP | Facility: HOSPITAL | Age: 81
End: 2021-10-15
Payer: MEDICARE

## 2021-10-15 ENCOUNTER — APPOINTMENT (OUTPATIENT)
Dept: RADIOLOGY | Facility: HOSPITAL | Age: 81
End: 2021-10-15
Payer: MEDICARE

## 2021-10-15 ENCOUNTER — ANESTHESIA (OUTPATIENT)
Dept: PERIOP | Facility: HOSPITAL | Age: 81
End: 2021-10-15
Payer: MEDICARE

## 2021-10-15 ENCOUNTER — HOSPITAL ENCOUNTER (OUTPATIENT)
Facility: HOSPITAL | Age: 81
Setting detail: OUTPATIENT SURGERY
Discharge: HOME | End: 2021-10-15
Attending: UROLOGY | Admitting: UROLOGY
Payer: MEDICARE

## 2021-10-15 VITALS
TEMPERATURE: 98.4 F | BODY MASS INDEX: 41.29 KG/M2 | WEIGHT: 233 LBS | DIASTOLIC BLOOD PRESSURE: 91 MMHG | RESPIRATION RATE: 20 BRPM | OXYGEN SATURATION: 94 % | HEART RATE: 75 BPM | SYSTOLIC BLOOD PRESSURE: 163 MMHG | HEIGHT: 63 IN

## 2021-10-15 DIAGNOSIS — N20.0 KIDNEY STONE ON LEFT SIDE: Primary | ICD-10-CM

## 2021-10-15 PROCEDURE — C1758 CATHETER, URETERAL: HCPCS | Performed by: UROLOGY

## 2021-10-15 PROCEDURE — 82360 CALCULUS ASSAY QUANT: CPT | Performed by: UROLOGY

## 2021-10-15 PROCEDURE — C2617 STENT, NON-COR, TEM W/O DEL: HCPCS | Performed by: UROLOGY

## 2021-10-15 PROCEDURE — 74420 UROGRAPHY RTRGR +-KUB: CPT

## 2021-10-15 PROCEDURE — C1769 GUIDE WIRE: HCPCS | Performed by: UROLOGY

## 2021-10-15 PROCEDURE — 52356 CYSTO/URETERO W/LITHOTRIPSY: CPT | Performed by: UROLOGY

## 2021-10-15 DEVICE — INLAY OPTIMA URETERAL STENT W/O GUIDEWIRE
Type: IMPLANTABLE DEVICE | Site: URETER | Status: FUNCTIONAL
Brand: BARD® INLAY OPTIMA® URETERAL STENT

## 2021-10-15 RX ORDER — HYDROMORPHONE HCL/PF 1 MG/ML
0.5 SYRINGE (ML) INJECTION
Status: DISCONTINUED | OUTPATIENT
Start: 2021-10-15 | End: 2021-10-15 | Stop reason: HOSPADM

## 2021-10-15 RX ORDER — MAGNESIUM HYDROXIDE 1200 MG/15ML
LIQUID ORAL AS NEEDED
Status: DISCONTINUED | OUTPATIENT
Start: 2021-10-15 | End: 2021-10-15 | Stop reason: HOSPADM

## 2021-10-15 RX ORDER — HYDRALAZINE HYDROCHLORIDE 20 MG/ML
5 INJECTION INTRAMUSCULAR; INTRAVENOUS
Status: DISCONTINUED | OUTPATIENT
Start: 2021-10-15 | End: 2021-10-15 | Stop reason: HOSPADM

## 2021-10-15 RX ORDER — PROMETHAZINE HYDROCHLORIDE 25 MG/ML
6.25 INJECTION, SOLUTION INTRAMUSCULAR; INTRAVENOUS ONCE AS NEEDED
Status: DISCONTINUED | OUTPATIENT
Start: 2021-10-15 | End: 2021-10-15 | Stop reason: HOSPADM

## 2021-10-15 RX ORDER — FENTANYL CITRATE 50 UG/ML
INJECTION, SOLUTION INTRAMUSCULAR; INTRAVENOUS AS NEEDED
Status: DISCONTINUED | OUTPATIENT
Start: 2021-10-15 | End: 2021-10-15

## 2021-10-15 RX ORDER — PROPOFOL 10 MG/ML
INJECTION, EMULSION INTRAVENOUS AS NEEDED
Status: DISCONTINUED | OUTPATIENT
Start: 2021-10-15 | End: 2021-10-15

## 2021-10-15 RX ORDER — ONDANSETRON 2 MG/ML
4 INJECTION INTRAMUSCULAR; INTRAVENOUS ONCE AS NEEDED
Status: COMPLETED | OUTPATIENT
Start: 2021-10-15 | End: 2021-10-15

## 2021-10-15 RX ORDER — HYDROCODONE BITARTRATE AND ACETAMINOPHEN 5; 325 MG/1; MG/1
1 TABLET ORAL EVERY 6 HOURS PRN
Status: DISCONTINUED | OUTPATIENT
Start: 2021-10-15 | End: 2021-10-15 | Stop reason: HOSPADM

## 2021-10-15 RX ORDER — SODIUM CHLORIDE, SODIUM LACTATE, POTASSIUM CHLORIDE, CALCIUM CHLORIDE 600; 310; 30; 20 MG/100ML; MG/100ML; MG/100ML; MG/100ML
125 INJECTION, SOLUTION INTRAVENOUS CONTINUOUS
Status: DISCONTINUED | OUTPATIENT
Start: 2021-10-15 | End: 2021-10-15 | Stop reason: HOSPADM

## 2021-10-15 RX ORDER — LIDOCAINE HYDROCHLORIDE 10 MG/ML
0.5 INJECTION, SOLUTION EPIDURAL; INFILTRATION; INTRACAUDAL; PERINEURAL ONCE AS NEEDED
Status: COMPLETED | OUTPATIENT
Start: 2021-10-15 | End: 2021-10-15

## 2021-10-15 RX ORDER — SODIUM CHLORIDE 9 MG/ML
100 INJECTION, SOLUTION INTRAVENOUS CONTINUOUS
Status: DISCONTINUED | OUTPATIENT
Start: 2021-10-15 | End: 2021-10-15 | Stop reason: HOSPADM

## 2021-10-15 RX ORDER — CEFAZOLIN SODIUM 2 G/50ML
SOLUTION INTRAVENOUS AS NEEDED
Status: DISCONTINUED | OUTPATIENT
Start: 2021-10-15 | End: 2021-10-15

## 2021-10-15 RX ORDER — EPHEDRINE SULFATE 50 MG/ML
INJECTION INTRAVENOUS AS NEEDED
Status: DISCONTINUED | OUTPATIENT
Start: 2021-10-15 | End: 2021-10-15

## 2021-10-15 RX ORDER — METOCLOPRAMIDE HYDROCHLORIDE 5 MG/ML
10 INJECTION INTRAMUSCULAR; INTRAVENOUS ONCE
Status: DISCONTINUED | OUTPATIENT
Start: 2021-10-15 | End: 2021-10-15 | Stop reason: HOSPADM

## 2021-10-15 RX ORDER — GLYCOPYRROLATE 0.2 MG/ML
INJECTION INTRAMUSCULAR; INTRAVENOUS AS NEEDED
Status: DISCONTINUED | OUTPATIENT
Start: 2021-10-15 | End: 2021-10-15

## 2021-10-15 RX ORDER — ONDANSETRON 2 MG/ML
INJECTION INTRAMUSCULAR; INTRAVENOUS AS NEEDED
Status: DISCONTINUED | OUTPATIENT
Start: 2021-10-15 | End: 2021-10-15

## 2021-10-15 RX ORDER — FENTANYL CITRATE/PF 50 MCG/ML
25 SYRINGE (ML) INJECTION
Status: DISCONTINUED | OUTPATIENT
Start: 2021-10-15 | End: 2021-10-15 | Stop reason: HOSPADM

## 2021-10-15 RX ORDER — DEXAMETHASONE SODIUM PHOSPHATE 10 MG/ML
INJECTION, SOLUTION INTRAMUSCULAR; INTRAVENOUS AS NEEDED
Status: DISCONTINUED | OUTPATIENT
Start: 2021-10-15 | End: 2021-10-15

## 2021-10-15 RX ORDER — SUCCINYLCHOLINE/SOD CL,ISO/PF 100 MG/5ML
SYRINGE (ML) INTRAVENOUS AS NEEDED
Status: DISCONTINUED | OUTPATIENT
Start: 2021-10-15 | End: 2021-10-15

## 2021-10-15 RX ORDER — ROCURONIUM BROMIDE 10 MG/ML
INJECTION, SOLUTION INTRAVENOUS AS NEEDED
Status: DISCONTINUED | OUTPATIENT
Start: 2021-10-15 | End: 2021-10-15

## 2021-10-15 RX ORDER — CEPHALEXIN 500 MG/1
500 CAPSULE ORAL EVERY 12 HOURS SCHEDULED
Qty: 6 CAPSULE | Refills: 0 | Status: SHIPPED | OUTPATIENT
Start: 2021-10-15 | End: 2021-10-19 | Stop reason: HOSPADM

## 2021-10-15 RX ORDER — LIDOCAINE HYDROCHLORIDE 10 MG/ML
INJECTION, SOLUTION EPIDURAL; INFILTRATION; INTRACAUDAL; PERINEURAL AS NEEDED
Status: DISCONTINUED | OUTPATIENT
Start: 2021-10-15 | End: 2021-10-15

## 2021-10-15 RX ADMIN — PROPOFOL 200 MG: 10 INJECTION, EMULSION INTRAVENOUS at 10:29

## 2021-10-15 RX ADMIN — Medication 25 MCG: at 11:59

## 2021-10-15 RX ADMIN — HYDRALAZINE HYDROCHLORIDE 5 MG: 20 INJECTION, SOLUTION INTRAMUSCULAR; INTRAVENOUS at 12:20

## 2021-10-15 RX ADMIN — CEFAZOLIN SODIUM 2000 MG: 2 SOLUTION INTRAVENOUS at 10:22

## 2021-10-15 RX ADMIN — PROPOFOL 100 MG: 10 INJECTION, EMULSION INTRAVENOUS at 10:43

## 2021-10-15 RX ADMIN — HYDRALAZINE HYDROCHLORIDE 5 MG: 20 INJECTION, SOLUTION INTRAMUSCULAR; INTRAVENOUS at 12:46

## 2021-10-15 RX ADMIN — GLYCOPYRROLATE 0.1 MG: 0.2 INJECTION, SOLUTION INTRAMUSCULAR; INTRAVENOUS at 10:49

## 2021-10-15 RX ADMIN — FENTANYL CITRATE 50 MCG: 50 INJECTION INTRAMUSCULAR; INTRAVENOUS at 11:16

## 2021-10-15 RX ADMIN — LIDOCAINE HYDROCHLORIDE 0.5 ML: 10 INJECTION, SOLUTION EPIDURAL; INFILTRATION; INTRACAUDAL; PERINEURAL at 09:08

## 2021-10-15 RX ADMIN — SODIUM CHLORIDE, SODIUM LACTATE, POTASSIUM CHLORIDE, AND CALCIUM CHLORIDE 125 ML/HR: .6; .31; .03; .02 INJECTION, SOLUTION INTRAVENOUS at 09:08

## 2021-10-15 RX ADMIN — EPHEDRINE SULFATE 10 MG: 50 INJECTION, SOLUTION INTRAVENOUS at 10:38

## 2021-10-15 RX ADMIN — HYDROMORPHONE HYDROCHLORIDE 0.5 MG: 1 INJECTION, SOLUTION INTRAMUSCULAR; INTRAVENOUS; SUBCUTANEOUS at 12:06

## 2021-10-15 RX ADMIN — EPHEDRINE SULFATE 5 MG: 50 INJECTION, SOLUTION INTRAVENOUS at 10:36

## 2021-10-15 RX ADMIN — Medication 100 MG: at 10:30

## 2021-10-15 RX ADMIN — LIDOCAINE HYDROCHLORIDE 50 MG: 10 INJECTION, SOLUTION EPIDURAL; INFILTRATION; INTRACAUDAL; PERINEURAL at 10:28

## 2021-10-15 RX ADMIN — ROCURONIUM BROMIDE 30 MG: 50 INJECTION, SOLUTION INTRAVENOUS at 10:42

## 2021-10-15 RX ADMIN — DEXAMETHASONE SODIUM PHOSPHATE 10 MG: 10 INJECTION, SOLUTION INTRAMUSCULAR; INTRAVENOUS at 10:45

## 2021-10-15 RX ADMIN — SUGAMMADEX 211 MG: 100 INJECTION, SOLUTION INTRAVENOUS at 11:15

## 2021-10-15 RX ADMIN — Medication 25 MCG: at 11:50

## 2021-10-15 RX ADMIN — ONDANSETRON 4 MG: 2 INJECTION INTRAMUSCULAR; INTRAVENOUS at 10:45

## 2021-10-15 RX ADMIN — FENTANYL CITRATE 50 MCG: 50 INJECTION INTRAMUSCULAR; INTRAVENOUS at 10:43

## 2021-10-15 RX ADMIN — ONDANSETRON 4 MG: 2 INJECTION INTRAMUSCULAR; INTRAVENOUS at 11:37

## 2021-10-15 RX ADMIN — SODIUM CHLORIDE, SODIUM LACTATE, POTASSIUM CHLORIDE, AND CALCIUM CHLORIDE 125 ML/HR: .6; .31; .03; .02 INJECTION, SOLUTION INTRAVENOUS at 12:30

## 2021-10-17 ENCOUNTER — APPOINTMENT (EMERGENCY)
Dept: CT IMAGING | Facility: HOSPITAL | Age: 81
DRG: 699 | End: 2021-10-17
Payer: MEDICARE

## 2021-10-17 ENCOUNTER — HOSPITAL ENCOUNTER (INPATIENT)
Facility: HOSPITAL | Age: 81
LOS: 2 days | Discharge: HOME | DRG: 699 | End: 2021-10-19
Attending: EMERGENCY MEDICINE | Admitting: INTERNAL MEDICINE
Payer: MEDICARE

## 2021-10-17 DIAGNOSIS — R10.12 LEFT UPPER QUADRANT ABDOMINAL PAIN: ICD-10-CM

## 2021-10-17 DIAGNOSIS — N39.0 UTI (URINARY TRACT INFECTION), UNCOMPLICATED: ICD-10-CM

## 2021-10-17 DIAGNOSIS — N17.9 ACUTE KIDNEY INJURY (HCC): ICD-10-CM

## 2021-10-17 DIAGNOSIS — N20.0 KIDNEY STONE ON LEFT SIDE: ICD-10-CM

## 2021-10-17 DIAGNOSIS — N17.9 AKI (ACUTE KIDNEY INJURY) (HCC): Primary | ICD-10-CM

## 2021-10-17 DIAGNOSIS — E86.0 DEHYDRATION: ICD-10-CM

## 2021-10-17 LAB
ANION GAP SERPL CALCULATED.3IONS-SCNC: 13 MMOL/L (ref 4–13)
BACTERIA UR QL AUTO: ABNORMAL /HPF
BASOPHILS # BLD AUTO: 0.04 THOUSANDS/ΜL (ref 0–0.1)
BASOPHILS NFR BLD AUTO: 0 % (ref 0–1)
BILIRUB UR QL STRIP: NEGATIVE
BUN SERPL-MCNC: 25 MG/DL (ref 5–25)
CALCIUM SERPL-MCNC: 8 MG/DL (ref 8.3–10.1)
CHLORIDE SERPL-SCNC: 96 MMOL/L (ref 100–108)
CLARITY UR: ABNORMAL
CO2 SERPL-SCNC: 26 MMOL/L (ref 21–32)
COLOR UR: YELLOW
CREAT SERPL-MCNC: 2.98 MG/DL (ref 0.6–1.3)
EOSINOPHIL # BLD AUTO: 0.05 THOUSAND/ΜL (ref 0–0.61)
EOSINOPHIL NFR BLD AUTO: 0 % (ref 0–6)
ERYTHROCYTE [DISTWIDTH] IN BLOOD BY AUTOMATED COUNT: 12.8 % (ref 11.6–15.1)
GFR SERPL CREATININE-BSD FRML MDRD: 14 ML/MIN/1.73SQ M
GLUCOSE SERPL-MCNC: 108 MG/DL (ref 65–140)
GLUCOSE UR STRIP-MCNC: NEGATIVE MG/DL
HCT VFR BLD AUTO: 42.1 % (ref 34.8–46.1)
HGB BLD-MCNC: 13.8 G/DL (ref 11.5–15.4)
HGB UR QL STRIP.AUTO: ABNORMAL
IMM GRANULOCYTES # BLD AUTO: 0.05 THOUSAND/UL (ref 0–0.2)
IMM GRANULOCYTES NFR BLD AUTO: 0 % (ref 0–2)
KETONES UR STRIP-MCNC: ABNORMAL MG/DL
LEUKOCYTE ESTERASE UR QL STRIP: ABNORMAL
LYMPHOCYTES # BLD AUTO: 2.24 THOUSANDS/ΜL (ref 0.6–4.47)
LYMPHOCYTES NFR BLD AUTO: 18 % (ref 14–44)
MCH RBC QN AUTO: 31.9 PG (ref 26.8–34.3)
MCHC RBC AUTO-ENTMCNC: 32.8 G/DL (ref 31.4–37.4)
MCV RBC AUTO: 97 FL (ref 82–98)
MONOCYTES # BLD AUTO: 1.45 THOUSAND/ΜL (ref 0.17–1.22)
MONOCYTES NFR BLD AUTO: 11 % (ref 4–12)
NEUTROPHILS # BLD AUTO: 8.93 THOUSANDS/ΜL (ref 1.85–7.62)
NEUTS SEG NFR BLD AUTO: 71 % (ref 43–75)
NITRITE UR QL STRIP: NEGATIVE
NON-SQ EPI CELLS URNS QL MICRO: ABNORMAL /HPF
NRBC BLD AUTO-RTO: 0 /100 WBCS
PH UR STRIP.AUTO: 6 [PH]
PLATELET # BLD AUTO: 265 THOUSANDS/UL (ref 149–390)
PMV BLD AUTO: 11.1 FL (ref 8.9–12.7)
POTASSIUM SERPL-SCNC: 3.4 MMOL/L (ref 3.5–5.3)
PROT UR STRIP-MCNC: ABNORMAL MG/DL
RBC # BLD AUTO: 4.33 MILLION/UL (ref 3.81–5.12)
RBC #/AREA URNS AUTO: ABNORMAL /HPF
SODIUM SERPL-SCNC: 135 MMOL/L (ref 136–145)
SP GR UR STRIP.AUTO: 1.02 (ref 1–1.03)
URATE CRY URNS QL MICRO: ABNORMAL /HPF
UROBILINOGEN UR QL STRIP.AUTO: 0.2 E.U./DL
WBC # BLD AUTO: 12.76 THOUSAND/UL (ref 4.31–10.16)
WBC #/AREA URNS AUTO: ABNORMAL /HPF

## 2021-10-17 PROCEDURE — 99223 1ST HOSP IP/OBS HIGH 75: CPT | Performed by: INTERNAL MEDICINE

## 2021-10-17 PROCEDURE — 87086 URINE CULTURE/COLONY COUNT: CPT | Performed by: INTERNAL MEDICINE

## 2021-10-17 PROCEDURE — 36415 COLL VENOUS BLD VENIPUNCTURE: CPT

## 2021-10-17 PROCEDURE — 81001 URINALYSIS AUTO W/SCOPE: CPT

## 2021-10-17 PROCEDURE — 85025 COMPLETE CBC W/AUTO DIFF WBC: CPT

## 2021-10-17 PROCEDURE — 99285 EMERGENCY DEPT VISIT HI MDM: CPT

## 2021-10-17 PROCEDURE — 74176 CT ABD & PELVIS W/O CONTRAST: CPT

## 2021-10-17 PROCEDURE — 99222 1ST HOSP IP/OBS MODERATE 55: CPT | Performed by: PHYSICIAN ASSISTANT

## 2021-10-17 PROCEDURE — 80048 BASIC METABOLIC PNL TOTAL CA: CPT

## 2021-10-17 PROCEDURE — 96360 HYDRATION IV INFUSION INIT: CPT

## 2021-10-17 RX ORDER — HEPARIN SODIUM 5000 [USP'U]/ML
5000 INJECTION, SOLUTION INTRAVENOUS; SUBCUTANEOUS EVERY 8 HOURS SCHEDULED
Status: DISCONTINUED | OUTPATIENT
Start: 2021-10-17 | End: 2021-10-19 | Stop reason: HOSPADM

## 2021-10-17 RX ORDER — LEVOTHYROXINE SODIUM 0.03 MG/1
25 TABLET ORAL
Status: DISCONTINUED | OUTPATIENT
Start: 2021-10-17 | End: 2021-10-19 | Stop reason: HOSPADM

## 2021-10-17 RX ORDER — SODIUM CHLORIDE, SODIUM GLUCONATE, SODIUM ACETATE, POTASSIUM CHLORIDE, MAGNESIUM CHLORIDE, SODIUM PHOSPHATE, DIBASIC, AND POTASSIUM PHOSPHATE .53; .5; .37; .037; .03; .012; .00082 G/100ML; G/100ML; G/100ML; G/100ML; G/100ML; G/100ML; G/100ML
75 INJECTION, SOLUTION INTRAVENOUS CONTINUOUS
Status: DISCONTINUED | OUTPATIENT
Start: 2021-10-17 | End: 2021-10-19 | Stop reason: HOSPADM

## 2021-10-17 RX ORDER — ATORVASTATIN CALCIUM 10 MG/1
10 TABLET, FILM COATED ORAL
Status: DISCONTINUED | OUTPATIENT
Start: 2021-10-17 | End: 2021-10-19 | Stop reason: HOSPADM

## 2021-10-17 RX ORDER — LATANOPROST 50 UG/ML
1 SOLUTION/ DROPS OPHTHALMIC 2 TIMES DAILY
COMMUNITY

## 2021-10-17 RX ORDER — AMLODIPINE BESYLATE 2.5 MG/1
2.5 TABLET ORAL DAILY
Status: DISCONTINUED | OUTPATIENT
Start: 2021-10-17 | End: 2021-10-19 | Stop reason: HOSPADM

## 2021-10-17 RX ORDER — SODIUM CHLORIDE 9 MG/ML
75 INJECTION, SOLUTION INTRAVENOUS CONTINUOUS
Status: DISCONTINUED | OUTPATIENT
Start: 2021-10-17 | End: 2021-10-17

## 2021-10-17 RX ORDER — ACETAMINOPHEN 325 MG/1
650 TABLET ORAL EVERY 6 HOURS PRN
Status: DISCONTINUED | OUTPATIENT
Start: 2021-10-17 | End: 2021-10-19 | Stop reason: HOSPADM

## 2021-10-17 RX ORDER — PANTOPRAZOLE SODIUM 40 MG/1
40 TABLET, DELAYED RELEASE ORAL
Status: DISCONTINUED | OUTPATIENT
Start: 2021-10-17 | End: 2021-10-19 | Stop reason: HOSPADM

## 2021-10-17 RX ADMIN — HEPARIN SODIUM 5000 UNITS: 5000 INJECTION INTRAVENOUS; SUBCUTANEOUS at 11:10

## 2021-10-17 RX ADMIN — SODIUM CHLORIDE, SODIUM GLUCONATE, SODIUM ACETATE, POTASSIUM CHLORIDE, MAGNESIUM CHLORIDE, SODIUM PHOSPHATE, DIBASIC, AND POTASSIUM PHOSPHATE 75 ML/HR: .53; .5; .37; .037; .03; .012; .00082 INJECTION, SOLUTION INTRAVENOUS at 13:50

## 2021-10-17 RX ADMIN — PANTOPRAZOLE SODIUM 40 MG: 40 TABLET, DELAYED RELEASE ORAL at 11:10

## 2021-10-17 RX ADMIN — ATORVASTATIN CALCIUM 10 MG: 10 TABLET, FILM COATED ORAL at 21:02

## 2021-10-17 RX ADMIN — HEPARIN SODIUM 5000 UNITS: 5000 INJECTION INTRAVENOUS; SUBCUTANEOUS at 21:02

## 2021-10-17 RX ADMIN — AMLODIPINE BESYLATE 2.5 MG: 2.5 TABLET ORAL at 11:25

## 2021-10-17 RX ADMIN — ACETAMINOPHEN 650 MG: 325 TABLET, FILM COATED ORAL at 21:01

## 2021-10-17 RX ADMIN — SODIUM CHLORIDE 1000 ML: 0.9 INJECTION, SOLUTION INTRAVENOUS at 06:29

## 2021-10-17 RX ADMIN — LEVOTHYROXINE SODIUM 25 MCG: 25 TABLET ORAL at 11:25

## 2021-10-17 RX ADMIN — SODIUM CHLORIDE 75 ML/HR: 0.9 INJECTION, SOLUTION INTRAVENOUS at 11:10

## 2021-10-17 RX ADMIN — CEFTRIAXONE SODIUM 1000 MG: 10 INJECTION, POWDER, FOR SOLUTION INTRAVENOUS at 12:35

## 2021-10-18 LAB
ANION GAP SERPL CALCULATED.3IONS-SCNC: 15 MMOL/L (ref 4–13)
BACTERIA UR CULT: NORMAL
BASOPHILS # BLD AUTO: 0.05 THOUSANDS/ΜL (ref 0–0.1)
BASOPHILS NFR BLD AUTO: 1 % (ref 0–1)
BUN SERPL-MCNC: 32 MG/DL (ref 5–25)
CALCIUM SERPL-MCNC: 7.7 MG/DL (ref 8.3–10.1)
CHLORIDE SERPL-SCNC: 98 MMOL/L (ref 100–108)
CO2 SERPL-SCNC: 23 MMOL/L (ref 21–32)
CREAT SERPL-MCNC: 1.94 MG/DL (ref 0.6–1.3)
EOSINOPHIL # BLD AUTO: 0.11 THOUSAND/ΜL (ref 0–0.61)
EOSINOPHIL NFR BLD AUTO: 1 % (ref 0–6)
ERYTHROCYTE [DISTWIDTH] IN BLOOD BY AUTOMATED COUNT: 12.6 % (ref 11.6–15.1)
GFR SERPL CREATININE-BSD FRML MDRD: 24 ML/MIN/1.73SQ M
GLUCOSE SERPL-MCNC: 108 MG/DL (ref 65–140)
HCT VFR BLD AUTO: 41.6 % (ref 34.8–46.1)
HGB BLD-MCNC: 13.7 G/DL (ref 11.5–15.4)
IMM GRANULOCYTES # BLD AUTO: 0.03 THOUSAND/UL (ref 0–0.2)
IMM GRANULOCYTES NFR BLD AUTO: 0 % (ref 0–2)
INR PPP: 1.04 (ref 0.84–1.19)
LYMPHOCYTES # BLD AUTO: 2.5 THOUSANDS/ΜL (ref 0.6–4.47)
LYMPHOCYTES NFR BLD AUTO: 33 % (ref 14–44)
MCH RBC QN AUTO: 31.8 PG (ref 26.8–34.3)
MCHC RBC AUTO-ENTMCNC: 32.9 G/DL (ref 31.4–37.4)
MCV RBC AUTO: 97 FL (ref 82–98)
MONOCYTES # BLD AUTO: 1.12 THOUSAND/ΜL (ref 0.17–1.22)
MONOCYTES NFR BLD AUTO: 15 % (ref 4–12)
NEUTROPHILS # BLD AUTO: 3.89 THOUSANDS/ΜL (ref 1.85–7.62)
NEUTS SEG NFR BLD AUTO: 50 % (ref 43–75)
NRBC BLD AUTO-RTO: 0 /100 WBCS
PLATELET # BLD AUTO: 240 THOUSANDS/UL (ref 149–390)
PMV BLD AUTO: 10.9 FL (ref 8.9–12.7)
POTASSIUM SERPL-SCNC: 3.2 MMOL/L (ref 3.5–5.3)
PROTHROMBIN TIME: 13.6 SECONDS (ref 11.6–14.5)
RBC # BLD AUTO: 4.31 MILLION/UL (ref 3.81–5.12)
SODIUM SERPL-SCNC: 136 MMOL/L (ref 136–145)
WBC # BLD AUTO: 7.7 THOUSAND/UL (ref 4.31–10.16)

## 2021-10-18 PROCEDURE — 99232 SBSQ HOSP IP/OBS MODERATE 35: CPT | Performed by: NURSE PRACTITIONER

## 2021-10-18 PROCEDURE — 85610 PROTHROMBIN TIME: CPT | Performed by: INTERNAL MEDICINE

## 2021-10-18 PROCEDURE — 99232 SBSQ HOSP IP/OBS MODERATE 35: CPT | Performed by: FAMILY MEDICINE

## 2021-10-18 PROCEDURE — 99232 SBSQ HOSP IP/OBS MODERATE 35: CPT | Performed by: STUDENT IN AN ORGANIZED HEALTH CARE EDUCATION/TRAINING PROGRAM

## 2021-10-18 PROCEDURE — 36415 COLL VENOUS BLD VENIPUNCTURE: CPT | Performed by: INTERNAL MEDICINE

## 2021-10-18 PROCEDURE — 85025 COMPLETE CBC W/AUTO DIFF WBC: CPT | Performed by: INTERNAL MEDICINE

## 2021-10-18 PROCEDURE — 97163 PT EVAL HIGH COMPLEX 45 MIN: CPT

## 2021-10-18 PROCEDURE — 80048 BASIC METABOLIC PNL TOTAL CA: CPT | Performed by: INTERNAL MEDICINE

## 2021-10-18 RX ORDER — POTASSIUM CHLORIDE 20 MEQ/1
40 TABLET, EXTENDED RELEASE ORAL ONCE
Status: COMPLETED | OUTPATIENT
Start: 2021-10-18 | End: 2021-10-18

## 2021-10-18 RX ADMIN — ATORVASTATIN CALCIUM 10 MG: 10 TABLET, FILM COATED ORAL at 21:14

## 2021-10-18 RX ADMIN — LEVOTHYROXINE SODIUM 25 MCG: 25 TABLET ORAL at 07:44

## 2021-10-18 RX ADMIN — PANTOPRAZOLE SODIUM 40 MG: 40 TABLET, DELAYED RELEASE ORAL at 07:44

## 2021-10-18 RX ADMIN — HEPARIN SODIUM 5000 UNITS: 5000 INJECTION INTRAVENOUS; SUBCUTANEOUS at 05:11

## 2021-10-18 RX ADMIN — POTASSIUM CHLORIDE 40 MEQ: 1500 TABLET, EXTENDED RELEASE ORAL at 09:57

## 2021-10-18 RX ADMIN — CEFTRIAXONE SODIUM 1000 MG: 10 INJECTION, POWDER, FOR SOLUTION INTRAVENOUS at 13:14

## 2021-10-18 RX ADMIN — BISACODYL 5 MG: 5 TABLET, COATED ORAL at 14:22

## 2021-10-18 RX ADMIN — HEPARIN SODIUM 5000 UNITS: 5000 INJECTION INTRAVENOUS; SUBCUTANEOUS at 13:14

## 2021-10-18 RX ADMIN — SODIUM CHLORIDE, SODIUM GLUCONATE, SODIUM ACETATE, POTASSIUM CHLORIDE, MAGNESIUM CHLORIDE, SODIUM PHOSPHATE, DIBASIC, AND POTASSIUM PHOSPHATE 75 ML/HR: .53; .5; .37; .037; .03; .012; .00082 INJECTION, SOLUTION INTRAVENOUS at 23:36

## 2021-10-18 RX ADMIN — HEPARIN SODIUM 5000 UNITS: 5000 INJECTION INTRAVENOUS; SUBCUTANEOUS at 21:14

## 2021-10-18 RX ADMIN — ACETAMINOPHEN 650 MG: 325 TABLET, FILM COATED ORAL at 21:14

## 2021-10-19 ENCOUNTER — TELEPHONE (OUTPATIENT)
Dept: OTHER | Facility: HOSPITAL | Age: 81
End: 2021-10-19

## 2021-10-19 VITALS
HEART RATE: 77 BPM | OXYGEN SATURATION: 96 % | DIASTOLIC BLOOD PRESSURE: 74 MMHG | SYSTOLIC BLOOD PRESSURE: 132 MMHG | TEMPERATURE: 98.5 F | RESPIRATION RATE: 18 BRPM | BODY MASS INDEX: 41.27 KG/M2 | HEIGHT: 63 IN

## 2021-10-19 DIAGNOSIS — N17.9 ACUTE KIDNEY INJURY (HCC): Primary | ICD-10-CM

## 2021-10-19 DIAGNOSIS — N20.0 KIDNEY STONE ON LEFT SIDE: Primary | ICD-10-CM

## 2021-10-19 PROBLEM — N39.0 UTI (URINARY TRACT INFECTION), UNCOMPLICATED: Status: ACTIVE | Noted: 2021-10-19

## 2021-10-19 LAB
ANION GAP SERPL CALCULATED.3IONS-SCNC: 13 MMOL/L (ref 4–13)
BUN SERPL-MCNC: 32 MG/DL (ref 5–25)
CALCIUM SERPL-MCNC: 7.9 MG/DL (ref 8.3–10.1)
CHLORIDE SERPL-SCNC: 100 MMOL/L (ref 100–108)
CO2 SERPL-SCNC: 26 MMOL/L (ref 21–32)
CREAT SERPL-MCNC: 1.39 MG/DL (ref 0.6–1.3)
ERYTHROCYTE [DISTWIDTH] IN BLOOD BY AUTOMATED COUNT: 12.9 % (ref 11.6–15.1)
GFR SERPL CREATININE-BSD FRML MDRD: 36 ML/MIN/1.73SQ M
GLUCOSE SERPL-MCNC: 96 MG/DL (ref 65–140)
HCT VFR BLD AUTO: 40.1 % (ref 34.8–46.1)
HGB BLD-MCNC: 13.4 G/DL (ref 11.5–15.4)
MCH RBC QN AUTO: 32.2 PG (ref 26.8–34.3)
MCHC RBC AUTO-ENTMCNC: 33.4 G/DL (ref 31.4–37.4)
MCV RBC AUTO: 96 FL (ref 82–98)
PLATELET # BLD AUTO: 229 THOUSANDS/UL (ref 149–390)
PMV BLD AUTO: 11.2 FL (ref 8.9–12.7)
POTASSIUM SERPL-SCNC: 3.5 MMOL/L (ref 3.5–5.3)
RBC # BLD AUTO: 4.16 MILLION/UL (ref 3.81–5.12)
SODIUM SERPL-SCNC: 139 MMOL/L (ref 136–145)
WBC # BLD AUTO: 6.46 THOUSAND/UL (ref 4.31–10.16)

## 2021-10-19 PROCEDURE — 99232 SBSQ HOSP IP/OBS MODERATE 35: CPT | Performed by: STUDENT IN AN ORGANIZED HEALTH CARE EDUCATION/TRAINING PROGRAM

## 2021-10-19 PROCEDURE — 0TP9XDZ REMOVAL OF INTRALUMINAL DEVICE FROM URETER, EXTERNAL APPROACH: ICD-10-PCS | Performed by: INTERNAL MEDICINE

## 2021-10-19 PROCEDURE — 99239 HOSP IP/OBS DSCHRG MGMT >30: CPT | Performed by: INTERNAL MEDICINE

## 2021-10-19 PROCEDURE — 80048 BASIC METABOLIC PNL TOTAL CA: CPT

## 2021-10-19 PROCEDURE — 99232 SBSQ HOSP IP/OBS MODERATE 35: CPT | Performed by: NURSE PRACTITIONER

## 2021-10-19 PROCEDURE — 85027 COMPLETE CBC AUTOMATED: CPT

## 2021-10-19 RX ORDER — CEPHALEXIN 500 MG/1
500 CAPSULE ORAL EVERY 12 HOURS SCHEDULED
Qty: 4 CAPSULE | Refills: 0 | Status: SHIPPED | OUTPATIENT
Start: 2021-10-19 | End: 2021-10-21

## 2021-10-19 RX ORDER — CEPHALEXIN 500 MG/1
500 CAPSULE ORAL EVERY 12 HOURS SCHEDULED
Qty: 4 CAPSULE | Refills: 0 | Status: CANCELLED | OUTPATIENT
Start: 2021-10-19 | End: 2021-10-21

## 2021-10-19 RX ORDER — ACETAMINOPHEN 325 MG/1
650 TABLET ORAL EVERY 6 HOURS PRN
Refills: 0
Start: 2021-10-19 | End: 2022-07-06 | Stop reason: ALTCHOICE

## 2021-10-19 RX ADMIN — BISACODYL 5 MG: 5 TABLET, COATED ORAL at 06:20

## 2021-10-19 RX ADMIN — AMLODIPINE BESYLATE 2.5 MG: 2.5 TABLET ORAL at 08:28

## 2021-10-19 RX ADMIN — HEPARIN SODIUM 5000 UNITS: 5000 INJECTION INTRAVENOUS; SUBCUTANEOUS at 12:48

## 2021-10-19 RX ADMIN — LEVOTHYROXINE SODIUM 25 MCG: 25 TABLET ORAL at 05:52

## 2021-10-19 RX ADMIN — PANTOPRAZOLE SODIUM 40 MG: 40 TABLET, DELAYED RELEASE ORAL at 05:52

## 2021-10-19 RX ADMIN — CEFTRIAXONE SODIUM 1000 MG: 10 INJECTION, POWDER, FOR SOLUTION INTRAVENOUS at 11:38

## 2021-10-19 RX ADMIN — HEPARIN SODIUM 5000 UNITS: 5000 INJECTION INTRAVENOUS; SUBCUTANEOUS at 05:52

## 2021-10-20 ENCOUNTER — TELEPHONE (OUTPATIENT)
Dept: NEPHROLOGY | Facility: CLINIC | Age: 81
End: 2021-10-20

## 2021-10-20 LAB
CALCIUM OXALATE DIHYDRATE MFR STONE IR: 20 %
COLOR STONE: NORMAL
COM MFR STONE: 80 %
COMMENT-STONE3: NORMAL
COMPOSITION: NORMAL
LABORATORY COMMENT REPORT: NORMAL
PHOTO: NORMAL
SIZE STONE: NORMAL MM
SPEC SOURCE SUBJ: NORMAL
STONE ANALYSIS-IMP: NORMAL
WT STONE: 253 MG

## 2021-12-07 ENCOUNTER — APPOINTMENT (OUTPATIENT)
Dept: LAB | Age: 81
End: 2021-12-07
Payer: MEDICARE

## 2021-12-07 ENCOUNTER — APPOINTMENT (OUTPATIENT)
Dept: RADIOLOGY | Age: 81
End: 2021-12-07
Payer: MEDICARE

## 2021-12-07 DIAGNOSIS — N20.0 KIDNEY STONE ON LEFT SIDE: ICD-10-CM

## 2021-12-07 DIAGNOSIS — N17.9 ACUTE KIDNEY INJURY (HCC): ICD-10-CM

## 2021-12-07 LAB
ANION GAP SERPL CALCULATED.3IONS-SCNC: 8 MMOL/L (ref 4–13)
BACTERIA UR QL AUTO: ABNORMAL /HPF
BILIRUB UR QL STRIP: NEGATIVE
BUN SERPL-MCNC: 16 MG/DL (ref 5–25)
CALCIUM SERPL-MCNC: 9.7 MG/DL (ref 8.3–10.1)
CAOX CRY URNS QL MICRO: ABNORMAL /HPF
CHLORIDE SERPL-SCNC: 103 MMOL/L (ref 100–108)
CLARITY UR: ABNORMAL
CO2 SERPL-SCNC: 27 MMOL/L (ref 21–32)
COLOR UR: ABNORMAL
CREAT SERPL-MCNC: 0.8 MG/DL (ref 0.6–1.3)
GFR SERPL CREATININE-BSD FRML MDRD: 69 ML/MIN/1.73SQ M
GLUCOSE P FAST SERPL-MCNC: 82 MG/DL (ref 65–99)
GLUCOSE UR STRIP-MCNC: NEGATIVE MG/DL
HGB UR QL STRIP.AUTO: NEGATIVE
HYALINE CASTS #/AREA URNS LPF: ABNORMAL /LPF
KETONES UR STRIP-MCNC: ABNORMAL MG/DL
LEUKOCYTE ESTERASE UR QL STRIP: ABNORMAL
MUCOUS THREADS UR QL AUTO: ABNORMAL
NITRITE UR QL STRIP: NEGATIVE
NON-SQ EPI CELLS URNS QL MICRO: ABNORMAL /HPF
PH UR STRIP.AUTO: 6 [PH]
POTASSIUM SERPL-SCNC: 3.6 MMOL/L (ref 3.5–5.3)
PROT UR STRIP-MCNC: ABNORMAL MG/DL
RBC #/AREA URNS AUTO: ABNORMAL /HPF
SODIUM SERPL-SCNC: 138 MMOL/L (ref 136–145)
SP GR UR STRIP.AUTO: 1.03 (ref 1–1.03)
UROBILINOGEN UR QL STRIP.AUTO: 0.2 E.U./DL
WBC #/AREA URNS AUTO: ABNORMAL /HPF

## 2021-12-07 PROCEDURE — 87086 URINE CULTURE/COLONY COUNT: CPT

## 2021-12-07 PROCEDURE — 80048 BASIC METABOLIC PNL TOTAL CA: CPT

## 2021-12-07 PROCEDURE — 87077 CULTURE AEROBIC IDENTIFY: CPT

## 2021-12-07 PROCEDURE — 74018 RADEX ABDOMEN 1 VIEW: CPT

## 2021-12-07 PROCEDURE — 36415 COLL VENOUS BLD VENIPUNCTURE: CPT

## 2021-12-07 PROCEDURE — 81001 URINALYSIS AUTO W/SCOPE: CPT

## 2021-12-09 LAB — BACTERIA UR CULT: ABNORMAL

## 2021-12-10 ENCOUNTER — TELEPHONE (OUTPATIENT)
Dept: UROLOGY | Facility: MEDICAL CENTER | Age: 81
End: 2021-12-10

## 2021-12-14 ENCOUNTER — OFFICE VISIT (OUTPATIENT)
Dept: NEPHROLOGY | Facility: CLINIC | Age: 81
End: 2021-12-14
Payer: MEDICARE

## 2021-12-14 VITALS
HEART RATE: 60 BPM | SYSTOLIC BLOOD PRESSURE: 108 MMHG | DIASTOLIC BLOOD PRESSURE: 72 MMHG | BODY MASS INDEX: 40.22 KG/M2 | WEIGHT: 227 LBS | RESPIRATION RATE: 16 BRPM | HEIGHT: 63 IN

## 2021-12-14 DIAGNOSIS — N17.9 ACUTE RENAL FAILURE, UNSPECIFIED ACUTE RENAL FAILURE TYPE (HCC): Primary | ICD-10-CM

## 2021-12-14 PROCEDURE — 1124F ACP DISCUSS-NO DSCNMKR DOCD: CPT | Performed by: STUDENT IN AN ORGANIZED HEALTH CARE EDUCATION/TRAINING PROGRAM

## 2021-12-14 PROCEDURE — 99213 OFFICE O/P EST LOW 20 MIN: CPT | Performed by: STUDENT IN AN ORGANIZED HEALTH CARE EDUCATION/TRAINING PROGRAM

## 2022-01-13 ENCOUNTER — HOSPITAL ENCOUNTER (OUTPATIENT)
Dept: RADIOLOGY | Age: 82
Discharge: HOME | End: 2022-01-13
Payer: MEDICARE

## 2022-01-13 DIAGNOSIS — N20.0 KIDNEY STONE ON LEFT SIDE: ICD-10-CM

## 2022-01-13 PROCEDURE — 76770 US EXAM ABDO BACK WALL COMP: CPT

## 2022-01-18 ENCOUNTER — HOSPITAL ENCOUNTER (OUTPATIENT)
Dept: RADIOLOGY | Age: 82
Discharge: HOME | End: 2022-01-18
Payer: MEDICARE

## 2022-01-18 VITALS — HEIGHT: 63 IN | WEIGHT: 227 LBS | BODY MASS INDEX: 40.22 KG/M2

## 2022-01-18 DIAGNOSIS — Z12.31 ENCOUNTER FOR SCREENING MAMMOGRAM FOR MALIGNANT NEOPLASM OF BREAST: ICD-10-CM

## 2022-01-18 PROCEDURE — 77067 SCR MAMMO BI INCL CAD: CPT

## 2022-01-18 PROCEDURE — 77063 BREAST TOMOSYNTHESIS BI: CPT

## 2022-01-20 ENCOUNTER — OFFICE VISIT (OUTPATIENT)
Dept: UROLOGY | Facility: AMBULATORY SURGERY CENTER | Age: 82
End: 2022-01-20
Payer: MEDICARE

## 2022-01-20 VITALS
SYSTOLIC BLOOD PRESSURE: 118 MMHG | HEIGHT: 63 IN | WEIGHT: 227 LBS | HEART RATE: 76 BPM | BODY MASS INDEX: 40.22 KG/M2 | OXYGEN SATURATION: 95 % | DIASTOLIC BLOOD PRESSURE: 68 MMHG

## 2022-01-20 DIAGNOSIS — N20.0 NEPHROLITHIASIS: Primary | ICD-10-CM

## 2022-01-20 PROCEDURE — 99213 OFFICE O/P EST LOW 20 MIN: CPT | Performed by: NURSE PRACTITIONER

## 2022-01-20 NOTE — PROGRESS NOTES
01/20/22    Luis Raygoza   1940   [id number]     Assessment  1 Nephrolithiasis     Discussion/Plan  1 Nephrolithiasis    12/7/21 [facility] Xray: Interval removal of the left-sided double-J ureteral stent and resolution of the previously seen large calculus in the region of the UVJ  Nephrolithiasis in the left kidney is seen to better advantage on prior CT obtained post lithotripsy  1/13/22 Renal US: Nonobstructing left lower renal stone measuring 4 mm  Bilateral renal cysts   10/15/21 stone analysis: calcium oxalate    Hydration with lemon water 64 oz daily   Educational packet provided  We reviewed dietary recommendations at length     She wishes to observe her stone at this time  ER precautions reviewed  Patient will return as needed  Subjective  HPI   Luis Raygoza is an 79 y o  female here for follow up evaluation of nephrolithiasis  She is accompanied by her   She is status post cystoscopy, insertion of left ureteral stent 08/24/2021 for 16 mm left UPJ stone identified on CT scan the abdomen pelvis  Most recently had cystoscopy ureteroscopy with lithotripsy and exchange of left ureteral stent on 10/15/2021 with Dr Yadira Gallegos  Follow up imaging shows non-obstructing left lower renal stone measuring 4 mm  Her brother has history of nephrolithiasis  She hydrates with adequate water daily  Denies symptoms at this time  Review of Systems - History obtained from chart review and the patient  General ROS: negative  Psychological ROS: negative  Ophthalmic ROS: negative  Endocrine ROS: negative  Breast ROS: negative  Respiratory ROS: negative  Cardiovascular ROS: negative  Gastrointestinal ROS: negative  Genito-Urinary ROS: negative  Musculoskeletal ROS: negative  Neurological ROS: no TIA or stroke symptoms  Dermatological ROS: negative       Objective  Physical Exam  Vitals and nursing note reviewed  Constitutional:       General: She is not in acute distress  Appearance: Normal appearance  She is not ill-appearing, toxic-appearing or diaphoretic  HENT:      Head: Normocephalic and atraumatic  Pulmonary:      Effort: Pulmonary effort is normal  No respiratory distress  Abdominal:      Tenderness: There is no right CVA tenderness or left CVA tenderness  Musculoskeletal:         General: Normal range of motion  Cervical back: Normal range of motion  Skin:     General: Skin is warm and dry  Neurological:      General: No focal deficit present  Mental Status: She is alert and oriented to person, place, and time  Mental status is at baseline  Psychiatric:         Mood and Affect: Mood normal          Behavior: Behavior normal          Thought Content: Thought content normal          Judgment: Judgment normal        RENAL ULTRASOUND     INDICATION:   N20 0: Calculus of kidney  Left lithotripsy     COMPARISON: CT 10/17/2021     TECHNIQUE:   Ultrasound of the retroperitoneum was performed with a curvilinear transducer utilizing volumetric sweeps and still imaging techniques       FINDINGS:     KIDNEYS:  Symmetric and normal size  Right kidney:  11 9 x 6 2 x 5 8 cm  Left kidney:  12 x 6 9 x 4 7 cm      Right kidney  Normal echogenicity and contour  No suspicious masses detected  Right upper renal cyst measures 2 3 x 2 x 1 9 cm  No hydronephrosis  No shadowing calculi  No perinephric fluid collections      Left kidney  Normal echogenicity and contour  No suspicious masses detected  Small mid cortical cyst measures 1 1 x 1 2 x 1 2 cm  No hydronephrosis  Nonobstructing lower pole stone measuring 4 mm  No perinephric fluid collections      URETERS:  Nonvisualized      BLADDER:   Normally distended  No focal thickening or mass lesions  Bilateral ureteral jets detected         IMPRESSION:  1  Nonobstructing left lower renal stone measuring 4 mm  2   Bilateral renal cysts  ABDOMEN     INDICATION:   N20 0: Calculus of kidney    Patient is status post ureteroscopy and laser lithotripsy with ureteral stent insertion on 10/15/2021      COMPARISON:  10/17/2021     VIEWS:  AP supine        FINDINGS:  There is a large amount of stool throughout the colon      There has been interval removal of the previously seen left-sided double-J ureteral stent      Rounded calcifications in the left upper quadrant corresponding to vascular calcifications on prior CT  There has been resolution of the large rounded radiopaque calculus in the region of the left UPJ since prior  radiograph 8/24/2021 and   retrograde pyelogram 10/15/2021  Small calcifications in the left kidney seen on prior CT are poorly visualized on the current study      Nonobstructive bowel gas pattern      No acute osseous abnormality is seen      IMPRESSION:     Interval removal of the left-sided double-J ureteral stent and resolution of the previously seen large calculus in the region of the UVJ  Nephrolithiasis in the left kidney is seen to better advantage on prior CT obtained post lithotripsy      Rounded calcifications in the left upper quadrant are vascular in nature      Tej Morales

## 2022-03-28 ENCOUNTER — APPOINTMENT (OUTPATIENT)
Dept: LAB | Age: 82
End: 2022-03-28
Payer: MEDICARE

## 2022-03-28 DIAGNOSIS — R73.03 DIABETES MELLITUS, LATENT: ICD-10-CM

## 2022-03-28 DIAGNOSIS — E03.9 ACQUIRED HYPOTHYROIDISM: ICD-10-CM

## 2022-03-28 DIAGNOSIS — E78.5 HYPERLIPIDEMIA, UNSPECIFIED HYPERLIPIDEMIA TYPE: ICD-10-CM

## 2022-03-28 LAB
ANION GAP SERPL CALCULATED.3IONS-SCNC: 5 MMOL/L (ref 4–13)
BUN SERPL-MCNC: 16 MG/DL (ref 5–25)
CALCIUM SERPL-MCNC: 9.2 MG/DL (ref 8.3–10.1)
CHLORIDE SERPL-SCNC: 105 MMOL/L (ref 100–108)
CHOLEST SERPL-MCNC: 138 MG/DL
CO2 SERPL-SCNC: 26 MMOL/L (ref 21–32)
CREAT SERPL-MCNC: 0.87 MG/DL (ref 0.6–1.3)
EST. AVERAGE GLUCOSE BLD GHB EST-MCNC: 105 MG/DL
GFR SERPL CREATININE-BSD FRML MDRD: 62 ML/MIN/1.73SQ M
GLUCOSE P FAST SERPL-MCNC: 108 MG/DL (ref 65–99)
HBA1C MFR BLD: 5.3 %
HDLC SERPL-MCNC: 57 MG/DL
LDLC SERPL CALC-MCNC: 65 MG/DL (ref 0–100)
NONHDLC SERPL-MCNC: 81 MG/DL
POTASSIUM SERPL-SCNC: 3.9 MMOL/L (ref 3.5–5.3)
SODIUM SERPL-SCNC: 136 MMOL/L (ref 136–145)
T4 SERPL-MCNC: 6.9 UG/DL (ref 4.7–13.3)
TRIGL SERPL-MCNC: 82 MG/DL
TSH SERPL DL<=0.05 MIU/L-ACNC: 2.69 UIU/ML (ref 0.36–3.74)

## 2022-03-28 PROCEDURE — 83036 HEMOGLOBIN GLYCOSYLATED A1C: CPT

## 2022-03-28 PROCEDURE — 80061 LIPID PANEL: CPT

## 2022-03-28 PROCEDURE — 36415 COLL VENOUS BLD VENIPUNCTURE: CPT

## 2022-03-28 PROCEDURE — 84443 ASSAY THYROID STIM HORMONE: CPT

## 2022-03-28 PROCEDURE — 84436 ASSAY OF TOTAL THYROXINE: CPT

## 2022-03-28 PROCEDURE — 80048 BASIC METABOLIC PNL TOTAL CA: CPT

## 2022-04-28 PROBLEM — Z85.43 HISTORY OF OVARIAN CANCER: Status: ACTIVE | Noted: 2022-04-28

## 2022-05-12 PROBLEM — R73.03 PREDIABETES: Status: ACTIVE | Noted: 2022-05-12

## 2022-05-12 PROBLEM — L71.9 ROSACEA: Status: ACTIVE | Noted: 2022-05-12

## 2022-07-07 ENCOUNTER — OFFICE VISIT (OUTPATIENT)
Dept: FAMILY MEDICINE CLINIC | Facility: CLINIC | Age: 82
End: 2022-07-07
Payer: MEDICARE

## 2022-07-07 VITALS
TEMPERATURE: 97 F | BODY MASS INDEX: 36.04 KG/M2 | DIASTOLIC BLOOD PRESSURE: 70 MMHG | WEIGHT: 203.4 LBS | HEART RATE: 70 BPM | SYSTOLIC BLOOD PRESSURE: 124 MMHG | RESPIRATION RATE: 17 BRPM | HEIGHT: 63 IN | OXYGEN SATURATION: 97 %

## 2022-07-07 DIAGNOSIS — Z85.43 HISTORY OF OVARIAN CANCER: ICD-10-CM

## 2022-07-07 DIAGNOSIS — N20.1 OBSTRUCTION OF LEFT URETEROPELVIC JUNCTION (UPJ) DUE TO STONE: ICD-10-CM

## 2022-07-07 DIAGNOSIS — E78.00 HYPERCHOLESTEROLEMIA: ICD-10-CM

## 2022-07-07 DIAGNOSIS — H91.93 BILATERAL HEARING LOSS, UNSPECIFIED HEARING LOSS TYPE: ICD-10-CM

## 2022-07-07 DIAGNOSIS — Z23 ENCOUNTER FOR IMMUNIZATION: ICD-10-CM

## 2022-07-07 DIAGNOSIS — K59.00 CONSTIPATION, UNSPECIFIED CONSTIPATION TYPE: ICD-10-CM

## 2022-07-07 DIAGNOSIS — Z78.0 POST-MENOPAUSAL: ICD-10-CM

## 2022-07-07 DIAGNOSIS — I77.810 ASCENDING AORTA DILATATION (HCC): ICD-10-CM

## 2022-07-07 DIAGNOSIS — K44.9 PARAESOPHAGEAL HERNIA: ICD-10-CM

## 2022-07-07 DIAGNOSIS — E03.9 ACQUIRED HYPOTHYROIDISM: ICD-10-CM

## 2022-07-07 DIAGNOSIS — N17.9 ACUTE KIDNEY INJURY (HCC): ICD-10-CM

## 2022-07-07 DIAGNOSIS — E87.1 HYPONATREMIA: ICD-10-CM

## 2022-07-07 DIAGNOSIS — E87.6 HYPOKALEMIA: ICD-10-CM

## 2022-07-07 DIAGNOSIS — I10 HYPERTENSION, ESSENTIAL, BENIGN: Primary | ICD-10-CM

## 2022-07-07 DIAGNOSIS — K21.00 GASTROESOPHAGEAL REFLUX DISEASE WITH ESOPHAGITIS WITHOUT HEMORRHAGE: ICD-10-CM

## 2022-07-07 DIAGNOSIS — E55.9 VITAMIN D DEFICIENCY: ICD-10-CM

## 2022-07-07 DIAGNOSIS — E53.8 CYANOCOBALAMIN DEFICIENCY: ICD-10-CM

## 2022-07-07 PROCEDURE — 99205 OFFICE O/P NEW HI 60 MIN: CPT | Performed by: FAMILY MEDICINE

## 2022-07-07 PROCEDURE — G0009 ADMIN PNEUMOCOCCAL VACCINE: HCPCS

## 2022-07-07 PROCEDURE — 90677 PCV20 VACCINE IM: CPT

## 2022-07-07 RX ORDER — ALBUTEROL SULFATE 90 UG/1
2 AEROSOL, METERED RESPIRATORY (INHALATION) 4 TIMES DAILY PRN
COMMUNITY
Start: 2022-04-21

## 2022-07-07 RX ORDER — AMOXICILLIN 500 MG/1
TABLET, FILM COATED ORAL
COMMUNITY
Start: 2022-05-12

## 2022-07-07 NOTE — PROGRESS NOTES
BMI Counseling: Body mass index is 36 03 kg/m²  The BMI is above normal  Nutrition recommendations include decreasing portion sizes, encouraging healthy choices of fruits and vegetables, limiting drinks that contain sugar and reducing intake of cholesterol  Exercise recommendations include exercising 3-5 times per week  No pharmacotherapy was ordered  Rationale for BMI follow-up plan is due to patient being overweight or obese  Depression Screening and Follow-up Plan: Patient was screened for depression during today's encounter  They screened negative with a PHQ-2 score of 0  Falls Plan of Care: balance, strength, and gait training instructions were provided  Assessment/Plan:    Patient advised to take levothyroxine the morning, Protonix at night that way to absorb completely of medication  Will monitor her blood pressure off amlodipine  Advised to do MiraLax twice a day along with smooth move tea until she had a good bowel movement then do that 3 times a week  Will do Cologuard study for her when she is 79years old   order for bone density test, thyroid ultrasound to check for nodule, will check for  parathyroid hormone level since she has kidney stone   will do echocardiogram for her to follow-up ascending aorta dilatation  Will notify her cardiologist     Pneumonia vaccine given today  Will see her back in 6 weeks follow-up constipation  Refer patient to audiology for hearing evaluation    I have spent 60 minutes with Patient and family today in which greater than 50% of this time was spent in counseling/coordination of care regarding Diagnostic results, Prognosis, Risks and benefits of tx options, Intructions for management and Patient and family education           Problem List Items Addressed This Visit        Digestive    Gastroesophageal reflux disease with esophagitis without hemorrhage       Endocrine    Acquired hypothyroidism    Relevant Orders    TSH, 3rd generation with Free T4 reflex    US thyroid       Cardiovascular and Mediastinum    Hypertension, essential, benign - Primary    Relevant Orders    Echo complete w/ contrast if indicated    Ascending aorta dilatation (HCC)    Relevant Orders    Echo complete w/ contrast if indicated       Nervous and Auditory    Bilateral hearing loss    Relevant Orders    Ambulatory Referral to Audiology       Genitourinary    Obstruction of left ureteropelvic junction (UPJ) due to stone    Relevant Orders    UA w Reflex to Microscopic w Reflex to Culture    PTH, intact    Acute kidney injury ([address] )    Relevant Orders    Uric acid    Basic metabolic panel       Other    Hypercholesterolemia    Paraesophageal hernia    History of ovarian cancer    Constipation    RESOLVED: Hypokalemia    RESOLVED: Hyponatremia      Other Visit Diagnoses     Post-menopausal        Relevant Orders    DXA bone density spine hip and pelvis    Cyanocobalamin deficiency        Relevant Orders    Vitamin B12    Vitamin D deficiency        Relevant Orders    Vitamin D 25 hydroxy    Encounter for immunization        Relevant Orders    Pneumococcal Conjugate Vaccine 20-valent (PCV20) (Completed)            Subjective:      Patient ID: Genie Greene is a 79 y o  female  [de-identified] year female for [facility] care  Her PCP retired  She follow-up with cardiologist last year for hypertension and ascending aortic dilatation  Last echocardiogram was 2017  She is doing well  Her PCP stop amlodipine for her because her blood pressure was getting low  She lost a lot of weight and now she does not need bp med anymore  She has high cholesterol on atorvastatin 10 mg daily  she was diagnosed w hypothyroidism 8 years ago she is on levothyroxine 25 mcg daily  She has paraesophageal hiatal hernia on Protonix 40 mg daily  Last year she was admitted to the hospital because of a large kidney stone and also hiatal hernia    She underwent surgery to repair hernia and stone replace removed with laser and stent placed  She saw nephrologist because her creatinine went up to almost 3 and now went down to 0 6  Since the surgery she had trouble with bowel movement very constipated  She has to take MiraLax and Colace to have a bowel movement when she stop then she constipated start again  Last colonoscopy was 5 years ago that was normal she was told she no longer needs colonoscopy  Both of her brother and dad had aneurysm of aorta  She used to smoke in her 19 for 5 years that she stopped  She had cataract surgery, right knee replacement, and complete hysterectomy for ovarian cancer diagnosed at 47years old  She follow-up with her gyn closely  Up-to-date COVID vaccine and booster  Patient complain hearing loss specially on her left ear been going for couple months  The following portions of the patient's history were reviewed and updated as appropriate:   Past Medical History:  She has a past medical history of Cancer ([facility] [address] ), Disease of thyroid gland, Endometrial cancer ([facility] [address] ), 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[facility] [address] ), Esophageal reflux, GERD (gastroesophageal reflux disease), History of ovarian cancer (4/28/2022), Hypertension, Hypothyroid, Obesity, Prediabetes (5/12/2022), and Rosacea (5/12/2022)  ,  _______________________________________________________________________  Medical Problems:  does not have any pertinent problems on file ,  _______________________________________________________________________  Past Surgical History:   has a past surgical history that includes Colonoscopy (08/2018); Knee Arthroplasty; Cataract extraction, bilateral; Total abdominal hysterectomy; Total abdominal hysterectomy w/ bilateral salpingoophorectomy (1992); Joint replacement; pr lap, repair paraesophageal hernia, incl fundoplasty w/o mesh (N/A, 3/5/2021); pr esophagogastroduodenoscopy transoral diagnostic (N/A, 3/5/2021); pr cystourethroscopy,ureter catheter (Left, 8/24/2021);  Cystoscopy; pr cysto/uretero w/lithotripsy &indwell stent insrt (Left, 10/15/2021); and [place] retrograde pyelogram (10/15/2021)  ,  _______________________________________________________________________  Family History:  family history includes Cancer in her maternal uncle; Heart attack in her father; No Known Problems in her daughter, maternal aunt, maternal aunt, maternal grandfather, maternal grandmother, paternal grandfather, and paternal grandmother; Pancreatic cancer (age of onset: 71) in her mother; Stomach cancer (age of onset: 71) in her mother ,  _______________________________________________________________________  Social History:   reports that she quit smoking about 53 years ago  Her smoking use included cigarettes  She started smoking about 63 years ago  She has a 2 50 pack-year smoking history  She has never used smokeless tobacco  She reports previous alcohol use  She reports that she does not use drugs  ,  _______________________________________________________________________  Allergies:  has No Known Allergies     _______________________________________________________________________  Current Outpatient Medications   Medication Sig Dispense Refill    amoxicillin (AMOXIL) 500 MG tablet TAKE 4 TABLETS ONE HOUR BEFORE DENTAL PROCEDURE      albuterol (PROVENTIL HFA,VENTOLIN HFA) 90 mcg/act inhaler Inhale 2 puffs 4 (four) times a day as needed      atorvastatin (LIPITOR) 10 mg tablet TAKE 1 TABLET BY MOUTH  DAILY AT BEDTIME 90 tablet 3    Cholecalciferol (VITAMIN D3) 400 units CAPS Take by mouth      Combigan 0 2-0 5 % INSTILL 1 DROP IN BOTH EYES TWICE DAILY      latanoprost (XALATAN) 0 005 % ophthalmic solution Administer 1 drop to both eyes 2 (two) times a day      levothyroxine 50 mcg tablet Take 0 5 tablets by mouth daily       Multiple Vitamin (MULTIVITAMINS PO) Take 1 tablet by mouth daily      pantoprazole (PROTONIX) 40 mg tablet Take by mouth       No current facility-administered medications for this visit      _______________________________________________________________________  Review of Systems   Constitutional: Negative for activity change, appetite change, fatigue and unexpected weight change  HENT: Negative for ear pain, sore throat, trouble swallowing and voice change  Eyes: Negative for photophobia and visual disturbance  Respiratory: Negative for cough, chest tightness, shortness of breath and wheezing  Cardiovascular: Negative for chest pain, palpitations and leg swelling  Gastrointestinal: Positive for constipation  Negative for abdominal pain, diarrhea, nausea, rectal pain and vomiting  Endocrine: Negative for cold intolerance, polydipsia and polyuria  Genitourinary: Negative for difficulty urinating, dysuria, flank pain, menstrual problem and pelvic pain  Musculoskeletal: Negative for arthralgias, joint swelling and myalgias  Skin: Negative for color change and rash  Allergic/Immunologic: Negative for environmental allergies and immunocompromised state  Neurological: Negative for dizziness, weakness, numbness and headaches  Hematological: Negative for adenopathy  Does not bruise/bleed easily  Psychiatric/Behavioral: Negative for decreased concentration, dysphoric mood, self-injury, sleep disturbance and suicidal ideas  The patient is not nervous/anxious  Objective:  Vitals:    07/07/22 0908   BP: 124/70   BP Location: Left arm   Patient Position: Sitting   Cuff Size: Standard   Pulse: 70   Resp: 17   Temp: (!) 97 °F (36 1 °C)   TempSrc: Temporal   SpO2: 97%   Weight: 92 3 kg (203 lb 6 4 oz)   Height: 5' 3" (1 6 m)     Body mass index is 36 03 kg/m²  Physical Exam  Vitals and nursing note reviewed  Constitutional:       Appearance: Normal appearance  She is well-developed  She is obese  HENT:      Head: Normocephalic and atraumatic  Right Ear: Tympanic membrane, ear canal and external ear normal  There is impacted cerumen        Left Ear: Tympanic membrane, ear canal and external ear normal       Nose: Nose normal       Mouth/Throat:      Mouth: Mucous membranes are moist       Pharynx: Oropharynx is clear  No oropharyngeal exudate  Eyes:      Extraocular Movements: Extraocular movements intact  Pupils: Pupils are equal, round, and reactive to light  Neck:      Thyroid: No thyromegaly  Cardiovascular:      Rate and Rhythm: Normal rate and regular rhythm  Heart sounds: Normal heart sounds  No murmur heard  Pulmonary:      Effort: Pulmonary effort is normal  No respiratory distress  Breath sounds: Normal breath sounds  No wheezing or rales  Abdominal:      General: Bowel sounds are normal  There is no distension  Palpations: Abdomen is soft  Tenderness: There is no abdominal tenderness  There is no guarding  Genitourinary:     Vagina: Normal    Musculoskeletal:         General: No tenderness  Normal range of motion  Cervical back: Normal range of motion and neck supple  Comments: Ambulate w cane   Skin:     General: Skin is warm and dry  Capillary Refill: Capillary refill takes less than 2 seconds  Findings: No rash  Neurological:      General: No focal deficit present  Mental Status: She is alert and oriented to person, place, and time  Mental status is at baseline  Psychiatric:         Mood and Affect: Mood normal          Behavior: Behavior normal          Thought Content:  Thought content normal          Judgment: Judgment normal

## 2022-07-17 DIAGNOSIS — E78.5 HYPERLIPIDEMIA, UNSPECIFIED HYPERLIPIDEMIA TYPE: ICD-10-CM

## 2022-07-18 RX ORDER — ATORVASTATIN CALCIUM 10 MG/1
TABLET, FILM COATED ORAL
Qty: 90 TABLET | Refills: 3 | Status: SHIPPED | OUTPATIENT
Start: 2022-07-18

## 2022-07-26 ENCOUNTER — HOSPITAL ENCOUNTER (OUTPATIENT)
Dept: NON INVASIVE DIAGNOSTICS | Facility: CLINIC | Age: 82
Discharge: HOME | End: 2022-07-26
Payer: MEDICARE

## 2022-07-26 VITALS
DIASTOLIC BLOOD PRESSURE: 70 MMHG | SYSTOLIC BLOOD PRESSURE: 124 MMHG | HEART RATE: 80 BPM | BODY MASS INDEX: 35.97 KG/M2 | WEIGHT: 203 LBS | HEIGHT: 63 IN

## 2022-07-26 DIAGNOSIS — I77.810 ASCENDING AORTA DILATATION (HCC): ICD-10-CM

## 2022-07-26 DIAGNOSIS — I10 HYPERTENSION, ESSENTIAL, BENIGN: ICD-10-CM

## 2022-07-26 LAB
AORTIC ROOT: 3.4 CM
APICAL FOUR CHAMBER EJECTION FRACTION: 71 %
ASCENDING AORTA: 3.9 CM
E WAVE DECELERATION TIME: 280 MS
FRACTIONAL SHORTENING: 39 % (ref 28–44)
INTERVENTRICULAR SEPTUM IN DIASTOLE (PARASTERNAL SHORT AXIS VIEW): 1.2 CM
INTERVENTRICULAR SEPTUM: 1.2 CM (ref 0.6–1.1)
LAAS-AP2: 23.9 CM2
LAAS-AP4: 19.8 CM2
LEFT ATRIUM AREA SYSTOLE SINGLE PLANE A4C: 17.9 CM2
LEFT ATRIUM SIZE: 3.4 CM
LEFT INTERNAL DIMENSION IN SYSTOLE: 2 CM (ref 2.1–4)
LEFT VENTRICULAR INTERNAL DIMENSION IN DIASTOLE: 3.3 CM (ref 3.5–6)
LEFT VENTRICULAR POSTERIOR WALL IN END DIASTOLE: 1.2 CM
LEFT VENTRICULAR STROKE VOLUME: 32 ML
LVSV (TEICH): 32 ML
MV E'TISSUE VEL-SEP: 6 CM/S
MV PEAK A VEL: 0.88 M/S
MV PEAK E VEL: 55 CM/S
MV STENOSIS PRESSURE HALF TIME: 81 MS
MV VALVE AREA P 1/2 METHOD: 2.72 CM2
RIGHT ATRIUM AREA SYSTOLE A4C: 10.3 CM2
RIGHT VENTRICLE ID DIMENSION: 3.3 CM
SL CV LEFT ATRIUM LENGTH A2C: 6.4 CM
SL CV LV EF: 60
SL CV PED ECHO LEFT VENTRICLE DIASTOLIC VOLUME (MOD BIPLANE) 2D: 45 ML
SL CV PED ECHO LEFT VENTRICLE SYSTOLIC VOLUME (MOD BIPLANE) 2D: 12 ML

## 2022-07-26 PROCEDURE — 93306 TTE W/DOPPLER COMPLETE: CPT

## 2022-07-26 PROCEDURE — 93306 TTE W/DOPPLER COMPLETE: CPT | Performed by: INTERNAL MEDICINE

## 2022-08-08 ENCOUNTER — TELEMEDICINE (OUTPATIENT)
Dept: FAMILY MEDICINE CLINIC | Facility: CLINIC | Age: 82
End: 2022-08-08
Payer: MEDICARE

## 2022-08-08 ENCOUNTER — TELEPHONE (OUTPATIENT)
Dept: FAMILY MEDICINE CLINIC | Facility: CLINIC | Age: 82
End: 2022-08-08

## 2022-08-08 VITALS — WEIGHT: 203 LBS | BODY MASS INDEX: 35.97 KG/M2 | HEIGHT: 63 IN

## 2022-08-08 DIAGNOSIS — I10 HYPERTENSION, ESSENTIAL, BENIGN: ICD-10-CM

## 2022-08-08 DIAGNOSIS — E03.9 ACQUIRED HYPOTHYROIDISM: Primary | ICD-10-CM

## 2022-08-08 DIAGNOSIS — I77.810 ASCENDING AORTA DILATATION (HCC): ICD-10-CM

## 2022-08-08 DIAGNOSIS — U07.1 COVID-19: ICD-10-CM

## 2022-08-08 DIAGNOSIS — Z85.43 HISTORY OF OVARIAN CANCER: ICD-10-CM

## 2022-08-08 PROCEDURE — 99442 PR PHYS/QHP TELEPHONE EVALUATION 11-20 MIN: CPT | Performed by: FAMILY MEDICINE

## 2022-08-08 NOTE — PROGRESS NOTES
COVID-19 Outpatient Progress Note    Assessment/Plan:      covid + 8/8/2022 rapid at home  [place]   Sorethroat/running nose/low grade temp  Vaccinated and boosted#1  High risk  Start paxlovid  Asa 81 mg w food  Protein/hydration  Fu 5 days covid    I have spent 30 minutes with Patient and family today in which greater than 50% of this time was spent in counseling/coordination of care regarding Prognosis, Risks and benefits of tx options, Intructions for management, Patient and family education, Importance of tx compliance, Risk factor reductions and Impressions  Problem List Items Addressed This Visit        Endocrine    Acquired hypothyroidism - Primary       Cardiovascular and Mediastinum    Hypertension, essential, benign    Ascending aorta dilatation (HCC)       Other    History of ovarian cancer    COVID-19    Relevant Medications    nirmatrelvir & ritonavir (Paxlovid) tablet therapy pack         Disposition:     Patient is fully vaccinated and I recommended self quarantine for 5 days followed by strict mask use for an additional 5 days  If patient were to develop symptoms, they should immediately self isolate and call our office for further guidance  Discussed symptom directed medication options with patient  Discussed vitamin D, vitamin C, and/or zinc supplementation with patient  Patient meets criteria for PAXLOVID and they have been counseled appropriately according to EUA documentation released by the FDA  After discussion, patient agrees to treatment  Giselle Smyth is an investigational medicine used to treat mild-to-moderate COVID-19 in adults and children (15years of age and older weighing at least 80 pounds (40 kg)) with positive results of direct SARS-CoV-2 viral testing, and who are at high risk for progression to severe COVID-19, including hospitalization or death  PAXLOVID is investigational because it is still being studied   There is limited information about the safety and effectiveness of using PAXLOVID to treat people with mild-to-moderate COVID-19  The FDA has authorized the emergency use of PAXLOVID for the treatment of mild-tomoderate COVID-19 in adults and children (15years of age and older weighing at least 80 pounds (40 kg)) with a positive test for the virus that causes COVID-19, and who are at high risk for progression to severe COVID-19, including hospitalization or death, under an EUA  What should I tell my healthcare provider before I take PAXLOVID? Tell your healthcare provider if you:  - Have any allergies  - Have liver or kidney disease  - Are pregnant or plan to become pregnant  - Are breastfeeding a child  - Have any serious illnesses    Tell your healthcare provider about all the medicines you take, including prescription and over-the-counter medicines, vitamins, and herbal supplements  Some medicines may interact with PAXLOVID and may cause serious side effects  Keep a list of your medicines to show your healthcare provider and pharmacist when you get a new medicine  You can ask your healthcare provider or pharmacist for a list of medicines that interact with PAXLOVID  Do not start taking a new medicine without telling your healthcare provider  Your healthcare provider can tell you if it is safe to take PAXLOVID with other medicines  Tell your healthcare provider if you are taking combined hormonal contraceptive  PAXLOVID may affect how your birth control pills work  Females who are able to become pregnant should use another effective alternative form of contraception or an additional barrier method of contraception  Talk to your healthcare provider if you have any questions about contraceptive methods that might be right for you  How do I take PAXLOVID? PAXLOVID consists of 2 medicines: nirmatrelvir and ritonavir    - Take 2 pink tablets of nirmatrelvir with 1 white tablet of ritonavir by mouth 2 times each day (in the morning and in the evening) for 5 days  For each dose, take all 3 tablets at the same time  - If you have kidney disease, talk to your healthcare provider  You may need a different dose  - Swallow the tablets whole  Do not chew, break, or crush the tablets  - Take PAXLOVID with or without food  - Do not stop taking PAXLOVID without talking to your healthcare provider, even if you feel better  - If you miss a dose of PAXLOVID within 8 hours of the time it is usually taken, take it as soon as you remember  If you miss a dose by more than 8 hours, skip the missed dose and take the next dose at your regular time  Do not take 2 doses of PAXLOVID at the same time  - If you take too much PAXLOVID, call your healthcare provider or go to the nearest hospital emergency room right away  - If you are taking a ritonavir- or cobicistat-containing medicine to treat hepatitis C or Human Immunodeficiency Virus (HIV), you should continue to take your medicine as prescribed by your healthcare provider   - Talk to your healthcare provider if you do not feel better or if you feel worse after 5 days  Who should generally not take PAXLOVID? Do not take PAXLOVID if:  You are allergic to nirmatrelvir, ritonavir, or any of the ingredients in PAXLOVID  You are taking any of the following medicines:  - Alfuzosin  - Pethidine, piroxicam, propoxyphene  - Ranolazine  - Amiodarone, dronedarone, flecainide, propafenone, quinidine  - Colchicine  - Lurasidone, pimozide, clozapine  - Dihydroergotamine, ergotamine, methylergonovine  - Lovastatin, simvastatin  - Sildenafil (Revatio®) for pulmonary arterial hypertension (PAH)  - Triazolam, oral midazolam  - Apalutamide  - Carbamazepine, phenobarbital, phenytoin  - Rifampin  - St  Westleys Wort (hypericum perforatum)    What are the important possible side effects of PAXLOVID? Possible side effects of PAXLOVID are:  - Liver Problems   Tell your healthcare provider right away if you have any of these signs and symptoms of liver problems: loss of appetite, yellowing of your skin and the whites of eyes (jaundice), dark-colored urine, pale colored stools and itchy skin, stomach area (abdominal) pain  - Resistance to HIV Medicines  If you have untreated HIV infection, PAXLOVID may lead to some HIV medicines not working as well in the future  - Other possible side effects include: altered sense of taste, diarrhea, high blood pressure, or muscle aches    These are not all the possible side effects of PAXLOVID  Not many people have taken PAXLOVID  Serious and unexpected side effects may happen  Larita Blizzard is still being studied, so it is possible that all of the risks are not known at this time  What other treatment choices are there? Like Melvin Miller may allow for the emergency use of other medicines to treat people with COVID-19  Go to https://[employer]/ for information on the emergency use of other medicines that are authorized by FDA to treat people with COVID-19  Your healthcare provider may talk with you about clinical trials for which you may be eligible  It is your choice to be treated or not to be treated with PAXLOVID  Should you decide not to receive it or for your child not to receive it, it will not change your standard medical care  What if I am pregnant or breastfeeding? There is no experience treating pregnant women or breastfeeding mothers with PAXLOVID  For a mother and unborn baby, the benefit of taking PAXLOVID may be greater than the risk from the treatment  If you are pregnant, discuss your options and specific situation with your healthcare provider  It is recommended that you use effective barrier contraception or do not have sexual activity while taking PAXLOVID  If you are breastfeeding, discuss your options and specific situation with your healthcare provider  How do I report side effects with PAXLOVID? Contact your healthcare provider if you have any side effects that bother you or do not go away  Report side effects to FDA MedWatch at www fda gov/medwatch or call 2-166-HNX7316 or you can report side effects to [facility]  at the contact information provided below  Website Fax number Telephone number   [employer] [phone]     How should I store [address]? Store PAXLOVID tablets at room temperature between 68°F to 77°F (20°C to 25°C)  Full fact sheet for patients, parents, and caregivers can be found at: [employer]    I have spent 20 minutes directly with the patient  Greater than 50% of this time was spent in counseling/coordination of care regarding: prognosis, risks and benefits of treatment options, instructions for management, patient and family education, importance of treatment compliance, risk factor reductions and impressions  Encounter provider Naatli Lora MD    Provider located at [address] [place] [address] [id number] [phone]    Recent Visits  No visits were found meeting these conditions  Showing recent visits within past 7 days and meeting all other requirements  Today's Visits  Date Type Provider Dept   08/08/22 Telemedicine Natali Lora MD  [facility] [place]   08/08/22 Telephone Natali Lora MD [id number] [address] today's visits and meeting all other requirements  Future Appointments  No visits were found meeting these conditions  Showing future appointments within next 150 days and meeting all other requirements     This virtual check-in was done via telephone and she agrees to proceed  Patient agrees to participate in a virtual check in via telephone or video visit instead of presenting to the office to address urgent/immediate medical needs  Patient is aware this is a billable service  After connecting through Telephone, the patient was identified by name and date of birth  Vaishali Hernandez was informed that this was a telemedicine visit and that the exam was being conducted confidentially over secure lines  My office door was closed  No one else was in the room  Vaishali Hernandez acknowledged consent and understanding of privacy and security of the telemedicine visit  I informed the patient that I have reviewed her record in Epic and presented the opportunity for her to ask any questions regarding the visit today  The patient agreed to participate  It was my intent to perform this visit via video technology but the patient was not able to do a video connection so the visit was completed via audio telephone only  Verification of patient location:  Patient is located in the following state in which I hold an active license: [place]    Subjective:   Vaishali Hernandez is a 79 y o  female who is concerned about COVID-19  Patient's symptoms include fever, fatigue, nasal congestion, rhinorrhea and sore throat       - Date of symptom onset: 8/7/2022  - Date of exposure: 8/6/2022      COVID-19 vaccination status: Fully vaccinated with booster    Exposure:   Contact with a person who is under investigation (PUI) for or who is positive for COVID-19 within the last 14 days?: Yes    Hospitalized recently for fever and/or lower respiratory symptoms?: No      Currently a healthcare worker that is involved in direct patient care?: No      Works in a special setting where the risk of COVID-19 transmission may be high? (this may include long-term care, correctional and penitentiary facilities; homeless shelters; assisted-living facilities and group homes ): No      Resident in a special setting where the risk of COVID-19 transmission may be high? 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    Esophageal reflux     GERD (gastroesophageal reflux disease)     History of ovarian cancer 4/28/2022    Hypertension     Hypothyroid     Obesity     Prediabetes 5/12/2022    Rosacea 5/12/2022     Past Surgical History:   Procedure Laterality Date    CATARACT EXTRACTION, BILATERAL      COLONOSCOPY  08/2018    polyp- 5 years    CYSTOSCOPY      FL RETROGRADE PYELOGRAM  10/15/2021    JOINT REPLACEMENT      KNEE ARTHROPLASTY      AR CYSTO/URETERO W/LITHOTRIPSY &INDWELL STENT INSRT Left 10/15/2021    Procedure: CYSTOSCOPY URETEROSCOPY WITH LITHOTRIPSY HOLMIUM LASER, RETROGRADE PYELOGRAM, BASKET STONE EXTRACTION AND EXCHANGE STENT URETERAL;  Surgeon: Marzena Weiner MD;  [facility];  Service: Urology    AR CYSTOURETHROSCOPY,URETER CATHETER Left 8/24/2021    Procedure: CYSTOSCOPY WITH INSERTION STENT URETERAL;  Surgeon: Yaritza Medel MD;  [facility];  Service: Urology    AR ESOPHAGOGASTRODUODENOSCOPY TRANSORAL DIAGNOSTIC N/A 3/5/2021    Procedure: ESOPHAGOGASTRODUODENOSCOPY (EGD);   Surgeon: Jose Francisco Roca MD;  [facility];  Service: Thoracic    AR LAP, REPAIR PARAESOPHAGEAL HERNIA, INCL FUNDOPLASTY W/O MESH N/A 3/5/2021    Procedure: REPAIR HERNIA PARAESOPHAGEAL LAPAROSCOPIC W ROBOTICS WITH MESH, DOOR FUNDOPLICATION;  Surgeon: Jose Francisco Roca MD;  [facility];  Service: Thoracic    TOTAL ABDOMINAL HYSTERECTOMY      age 45    TOTAL ABDOMINAL HYSTERECTOMY W/ BILATERAL SALPINGOOPHORECTOMY  1992    endometrial cancer     Current Outpatient Medications   Medication Sig Dispense Refill    nirmatrelvir & ritonavir (Paxlovid) tablet therapy pack Take 3 tablets by mouth 2 (two) times a day for 5 days Take 2 nirmatrelvir tablets + 1 ritonavir tablet together per dose 30 tablet 0    albuterol (PROVENTIL HFA,VENTOLIN HFA) 90 mcg/act inhaler Inhale 2 puffs 4 (four) times a day as needed      amoxicillin (AMOXIL) 500 MG tablet TAKE 4 TABLETS ONE HOUR BEFORE DENTAL PROCEDURE      atorvastatin (LIPITOR) 10 mg tablet TAKE 1 TABLET BY MOUTH  DAILY AT BEDTIME 90 tablet 3    Cholecalciferol (VITAMIN D3) 400 units CAPS Take by mouth      Combigan 0 2-0 5 % INSTILL 1 DROP IN BOTH EYES TWICE DAILY      latanoprost (XALATAN) 0 005 % ophthalmic solution Administer 1 drop to both eyes 2 (two) times a day      levothyroxine 50 mcg tablet Take 0 5 tablets by mouth daily       Multiple Vitamin (MULTIVITAMINS PO) Take 1 tablet by mouth daily      pantoprazole (PROTONIX) 40 mg tablet Take by mouth       No current facility-administered medications for this visit  No Known Allergies    Review of Systems   Constitutional: Positive for fatigue and fever  HENT: Positive for congestion, rhinorrhea and sore throat  Objective:    Vitals:    08/08/22 1706   Weight: 92 1 kg (203 lb)   Height: 5' 3" (1 6 m)       Physical Exam  Vitals and nursing note reviewed  Constitutional:       Appearance: Normal appearance  HENT:      Head: Normocephalic  Pulmonary:      Effort: Pulmonary effort is normal    Neurological:      Mental Status: She is alert and oriented to person, place, and time  Psychiatric:         Mood and Affect: Mood normal          Behavior: Behavior normal          Thought Content: Thought content normal          Judgment: Judgment normal          VIRTUAL VISIT DISCLAIMER    Shayne Martínez verbally agrees to participate in [employer]   Pt is aware that [employer] could be limited without vital signs or the ability to perform a full hands-on physical exam  Deirdre Arnold understands she or the provider may request at any time to terminate the video visit and request the patient to seek care or treatment in person

## 2022-08-12 ENCOUNTER — TELEMEDICINE (OUTPATIENT)
Dept: FAMILY MEDICINE CLINIC | Facility: CLINIC | Age: 82
End: 2022-08-12
Payer: MEDICARE

## 2022-08-12 VITALS — BODY MASS INDEX: 35.97 KG/M2 | HEIGHT: 63 IN | WEIGHT: 203 LBS

## 2022-08-12 DIAGNOSIS — U07.1 COVID-19: Primary | ICD-10-CM

## 2022-08-12 PROCEDURE — 99442 PR PHYS/QHP TELEPHONE EVALUATION 11-20 MIN: CPT | Performed by: FAMILY MEDICINE

## 2022-08-12 NOTE — PROGRESS NOTES
COVID-19 Outpatient Progress Note    Assessment/Plan:      covid + 8/8/2022 rapid at home  Was on a trip to [facility]     Vaccinated and boosted#1  High risk  finishing paxlovid  Asa 81 mg w food  Protein/hydration  Recover well  Stable  Fu 10/20/2022    I have spent 30 minutes with Patient and family today in which greater than 50% of this time was spent in counseling/coordination of care regarding Prognosis, Risks and benefits of tx options, Intructions for management, Patient and family education, Importance of tx compliance, Risk factor reductions and Impressions  Problem List Items Addressed This Visit        Other    COVID-19 - Primary         Disposition:     Patient has COVID-19 infection  Based off CDC guidelines, they were recommended to isolate for 5 days from the date of the positive test  If they remain asymptomatic, isolation may be ended followed by 5 days of wearing a mask when around othes to minimize risk of infecting others  If they have a fever, continue to stay home until fever resolves for at least 24 hours  Discussed symptom directed medication options with patient  Discussed vitamin D, vitamin C, and/or zinc supplementation with patient  Patient meets criteria for PAXLOVID and they have been counseled appropriately according to EUA documentation released by the FDA  After discussion, patient agrees to treatment  Mandya Whitley is an investigational medicine used to treat mild-to-moderate COVID-19 in adults and children (15years of age and older weighing at least 80 pounds (40 kg)) with positive results of direct SARS-CoV-2 viral testing, and who are at high risk for progression to severe COVID-19, including hospitalization or death  PAXLOVID is investigational because it is still being studied  There is limited information about the safety and effectiveness of using PAXLOVID to treat people with mild-to-moderate COVID-19      The FDA has authorized the emergency use of PAXLOVID for the treatment of mild-tomoderate COVID-19 in adults and children (15years of age and older weighing at least 80 pounds (40 kg)) with a positive test for the virus that causes COVID-19, and who are at high risk for progression to severe COVID-19, including hospitalization or death, under an EUA  What should I tell my healthcare provider before I take PAXLOVID? Tell your healthcare provider if you:  - Have any allergies  - Have liver or kidney disease  - Are pregnant or plan to become pregnant  - Are breastfeeding a child  - Have any serious illnesses    Tell your healthcare provider about all the medicines you take, including prescription and over-the-counter medicines, vitamins, and herbal supplements  Some medicines may interact with PAXLOVID and may cause serious side effects  Keep a list of your medicines to show your healthcare provider and pharmacist when you get a new medicine  You can ask your healthcare provider or pharmacist for a list of medicines that interact with PAXLOVID  Do not start taking a new medicine without telling your healthcare provider  Your healthcare provider can tell you if it is safe to take PAXLOVID with other medicines  Tell your healthcare provider if you are taking combined hormonal contraceptive  PAXLOVID may affect how your birth control pills work  Females who are able to become pregnant should use another effective alternative form of contraception or an additional barrier method of contraception  Talk to your healthcare provider if you have any questions about contraceptive methods that might be right for you  How do I take PAXLOVID? PAXLOVID consists of 2 medicines: nirmatrelvir and ritonavir  - Take 2 pink tablets of nirmatrelvir with 1 white tablet of ritonavir by mouth 2 times each day (in the morning and in the evening) for 5 days  For each dose, take all 3 tablets at the same time  - If you have kidney disease, talk to your healthcare provider   You may need a different dose  - Swallow the tablets whole  Do not chew, break, or crush the tablets  - Take PAXLOVID with or without food  - Do not stop taking PAXLOVID without talking to your healthcare provider, even if you feel better  - If you miss a dose of PAXLOVID within 8 hours of the time it is usually taken, take it as soon as you remember  If you miss a dose by more than 8 hours, skip the missed dose and take the next dose at your regular time  Do not take 2 doses of PAXLOVID at the same time  - If you take too much PAXLOVID, call your healthcare provider or go to the nearest hospital emergency room right away  - If you are taking a ritonavir- or cobicistat-containing medicine to treat hepatitis C or Human Immunodeficiency Virus (HIV), you should continue to take your medicine as prescribed by your healthcare provider   - Talk to your healthcare provider if you do not feel better or if you feel worse after 5 days  Who should generally not take PAXLOVID? Do not take PAXLOVID if:  You are allergic to nirmatrelvir, ritonavir, or any of the ingredients in PAXLOVID  You are taking any of the following medicines:  - Alfuzosin  - Pethidine, piroxicam, propoxyphene  - Ranolazine  - Amiodarone, dronedarone, flecainide, propafenone, quinidine  - Colchicine  - Lurasidone, pimozide, clozapine  - Dihydroergotamine, ergotamine, methylergonovine  - Lovastatin, simvastatin  - Sildenafil (Revatio®) for pulmonary arterial hypertension (PAH)  - Triazolam, oral midazolam  - Apalutamide  - Carbamazepine, phenobarbital, phenytoin  - Rifampin  - St  Westleys Wort (hypericum perforatum)    What are the important possible side effects of PAXLOVID? Possible side effects of PAXLOVID are:  - Liver Problems   Tell your healthcare provider right away if you have any of these signs and symptoms of liver problems: loss of appetite, yellowing of your skin and the whites of eyes (jaundice), dark-colored urine, pale colored stools and itchy skin, stomach area (abdominal) pain  - Resistance to HIV Medicines  If you have untreated HIV infection, PAXLOVID may lead to some HIV medicines not working as well in the future  - Other possible side effects include: altered sense of taste, diarrhea, high blood pressure, or muscle aches    These are not all the possible side effects of PAXLOVID  Not many people have taken PAXLOVID  Serious and unexpected side effects may happen  Eugene Whitley is still being studied, so it is possible that all of the risks are not known at this time  What other treatment choices are there? Like Anabela Roper may allow for the emergency use of other medicines to treat people with COVID-19  Go to https://[place]/ for information on the emergency use of other medicines that are authorized by FDA to treat people with COVID-19  Your healthcare provider may talk with you about clinical trials for which you may be eligible  It is your choice to be treated or not to be treated with PAXLOVID  Should you decide not to receive it or for your child not to receive it, it will not change your standard medical care  What if I am pregnant or breastfeeding? There is no experience treating pregnant women or breastfeeding mothers with PAXLOVID  For a mother and unborn baby, the benefit of taking PAXLOVID may be greater than the risk from the treatment  If you are pregnant, discuss your options and specific situation with your healthcare provider  It is recommended that you use effective barrier contraception or do not have sexual activity while taking PAXLOVID  If you are breastfeeding, discuss your options and specific situation with your healthcare provider  How do I report side effects with PAXLOVID? Contact your healthcare provider if you have any side effects that bother you or do not go away      Report side effects to FDA MedWatch at www fda gov/medwatch or call 5-711-CNK9202 or you can report side effects to [facility]  at the contact information provided below  Website Fax number Telephone number   [employer] [phone]     How should I store Karen Hartley? Store PAXLOVID tablets at room temperature between 68°F to 77°F (20°C to 25°C)  Full fact sheet for patients, parents, and caregivers can be found at: [place] [employer]    I have spent 20 minutes directly with the patient  Greater than 50% of this time was spent in counseling/coordination of care regarding: prognosis, risks and benefits of treatment options, instructions for management, patient and family education, importance of treatment compliance, risk factor reductions and impressions  Encounter provider Natali Willams MD    Provider located at [address] [place], [place] [id number] [facility] [id number] [phone]    Recent Visits  Date Type Provider Dept   08/08/22 Telemedicine Natali Willams MD  Primary Care [facility]   08/08/22 Telephone Natali Willams MD  [facility]   Showing recent visits within past 7 days and meeting all other requirements  Today's Visits  Date Type Provider Dept   08/12/22 Telemedicine Natali Willams MD  Primary Care [facility]   Showing today's visits and meeting all other requirements  Future Appointments  No visits were found meeting these conditions  Showing future appointments within next 150 days and meeting all other requirements     This virtual check-in was done via telephone and she agrees to proceed  Patient agrees to participate in a virtual check in via telephone or video visit instead of presenting to the office to address urgent/immediate medical needs  Patient is aware this is a billable service      After connecting through Telephone, the patient was identified by name and date of birth  Vaishali Hernandez was informed that this was a telemedicine visit and that the exam was being conducted confidentially over secure lines  My office door was closed  No one else was in the room  Vaishali Hernandez acknowledged consent and understanding of privacy and security of the telemedicine visit  I informed the patient that I have reviewed her record in Epic and presented the opportunity for her to ask any questions regarding the visit today  The patient agreed to participate  It was my intent to perform this visit via video technology but the patient was not able to do a video connection so the visit was completed via audio telephone only  Verification of patient location:  Patient is located in the following state in which I hold an active license: [place]    Subjective:   Vaishali Hernandez is a 79 y o  female who has been screened for COVID-19  Patient's symptoms include fatigue and sore throat  Patient denies fever, congestion and rhinorrhea  - Date of symptom onset: 8/7/2022  - Date of exposure: 8/6/2022      COVID-19 vaccination status: Fully vaccinated with booster    Caroline Shea has been staying home and has isolated themselves in her home  She is taking care to not share personal items and is cleaning all surfaces that are touched often, like counters, tabletops, and doorknobs using household cleaning sprays or wipes  She is wearing a mask when she leaves her room       Lab Results   Component Value Date    SARSCOV2 Negative 02/26/2021     Past Medical History:   Diagnosis Date    Cancer [facility])     Disease of thyroid gland     Endometrial cancer [facility])     age 45    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    Esophageal reflux     GERD (gastroesophageal reflux disease)     History of ovarian cancer 4/28/2022    Hypertension     Hypothyroid     Obesity     Prediabetes 5/12/2022    Rosacea 5/12/2022     Past Surgical History:   Procedure Laterality Date    CATARACT EXTRACTION, BILATERAL      COLONOSCOPY  08/2018    polyp- 5 years    CYSTOSCOPY      FL RETROGRADE PYELOGRAM  10/15/2021    JOINT REPLACEMENT      KNEE ARTHROPLASTY      OH CYSTO/URETERO W/LITHOTRIPSY &INDWELL STENT INSRT Left 10/15/2021    Procedure: CYSTOSCOPY URETEROSCOPY WITH LITHOTRIPSY HOLMIUM LASER, RETROGRADE PYELOGRAM, BASKET STONE EXTRACTION AND EXCHANGE STENT URETERAL;  Surgeon: Pj Merlos MD;  [facility];  Service: Urology    OH CYSTOURETHROSCOPY,URETER CATHETER Left 8/24/2021    Procedure: CYSTOSCOPY WITH INSERTION STENT URETERAL;  Surgeon: Keenan Garcia MD;  [facility];  Service: Urology    OH ESOPHAGOGASTRODUODENOSCOPY TRANSORAL DIAGNOSTIC N/A 3/5/2021    Procedure: ESOPHAGOGASTRODUODENOSCOPY (EGD);   Surgeon: Tomasa Pate MD;  [facility];  Service: Thoracic    OH LAP, REPAIR PARAESOPHAGEAL HERNIA, INCL FUNDOPLASTY W/O MESH N/A 3/5/2021    Procedure: REPAIR HERNIA PARAESOPHAGEAL LAPAROSCOPIC W ROBOTICS WITH MESH, DOOR FUNDOPLICATION;  Surgeon: Tomasa Pate MD;  [facility];  Service: Thoracic    TOTAL ABDOMINAL HYSTERECTOMY      age 45    TOTAL ABDOMINAL HYSTERECTOMY W/ BILATERAL SALPINGOOPHORECTOMY  1992    endometrial cancer     Current Outpatient Medications   Medication Sig Dispense Refill    albuterol (PROVENTIL HFA,VENTOLIN HFA) 90 mcg/act inhaler Inhale 2 puffs 4 (four) times a day as needed      amoxicillin (AMOXIL) 500 MG tablet TAKE 4 TABLETS ONE HOUR BEFORE DENTAL PROCEDURE      atorvastatin (LIPITOR) 10 mg tablet TAKE 1 TABLET BY MOUTH  DAILY AT BEDTIME 90 tablet 3    Cholecalciferol (VITAMIN D3) 400 units CAPS Take by mouth      Combigan 0 2-0 5 % INSTILL 1 DROP IN BOTH EYES TWICE DAILY      latanoprost (XALATAN) 0 005 % ophthalmic solution Administer 1 drop to both eyes 2 (two) times a day      levothyroxine 50 mcg tablet Take 0 5 tablets by mouth daily       Multiple Vitamin (MULTIVITAMINS PO) Take 1 tablet by mouth daily      nirmatrelvir & ritonavir (Paxlovid) tablet therapy pack Take 3 tablets by mouth 2 (two) times a day for 5 days Take 2 nirmatrelvir tablets + 1 ritonavir tablet together per dose 30 tablet 0    pantoprazole (PROTONIX) 40 mg tablet Take by mouth       No current facility-administered medications for this visit  No Known Allergies    Review of Systems   Constitutional: Positive for fatigue  Negative for fever  HENT: Positive for sore throat  Negative for congestion and rhinorrhea  Objective:    Vitals:    08/12/22 0924   Weight: 92 1 kg (203 lb)   Height: 5' 3" (1 6 m)       Physical Exam  Vitals and nursing note reviewed  Constitutional:       Appearance: Normal appearance  HENT:      Head: Normocephalic  Pulmonary:      Effort: Pulmonary effort is normal    Neurological:      Mental Status: She is alert and oriented to person, place, and time  Psychiatric:         Mood and Affect: Mood normal          Behavior: Behavior normal          Thought Content: Thought content normal          Judgment: Judgment normal          VIRTUAL VISIT DISCLAIMER    Walter Humphries verbally agrees to participate in [employer]  Pt is aware that [employer] could be limited without vital signs or the ability to perform a full hands-on physical exam  Deirdre Pierre understands she or the provider may request at any time to terminate the video visit and request the patient to seek care or treatment in person

## 2022-08-16 ENCOUNTER — HOSPITAL ENCOUNTER (OUTPATIENT)
Dept: RADIOLOGY | Age: 82
Discharge: HOME | End: 2022-08-16
Payer: MEDICARE

## 2022-08-16 DIAGNOSIS — E03.9 ACQUIRED HYPOTHYROIDISM: ICD-10-CM

## 2022-08-16 PROCEDURE — 76536 US EXAM OF HEAD AND NECK: CPT

## 2022-08-24 ENCOUNTER — TELEPHONE (OUTPATIENT)
Dept: FAMILY MEDICINE CLINIC | Facility: CLINIC | Age: 82
End: 2022-08-24

## 2022-08-25 ENCOUNTER — TELEPHONE (OUTPATIENT)
Dept: FAMILY MEDICINE CLINIC | Facility: CLINIC | Age: 82
End: 2022-08-25

## 2022-08-25 NOTE — TELEPHONE ENCOUNTER
Patient would like to know if you have any suggestions for her regarding lingering Covid symptoms  She states she is ok, but feels like she cannot clear her throat and lungs sometimes and has phlegm  Please advise

## 2022-08-26 NOTE — TELEPHONE ENCOUNTER
Called and spoke to pt and she stated she is feeling better to day however she is going to use the musinex, warm water with honey and flonase nasal spray ,, she stated she still has the phlegm in the morning

## 2022-08-30 ENCOUNTER — APPOINTMENT (EMERGENCY)
Dept: CT IMAGING | Facility: HOSPITAL | Age: 82
DRG: 392 | End: 2022-08-30
Payer: MEDICARE

## 2022-08-30 ENCOUNTER — HOSPITAL ENCOUNTER (EMERGENCY)
Facility: HOSPITAL | Age: 82
Discharge: HOME | DRG: 392 | End: 2022-08-30
Attending: EMERGENCY MEDICINE
Payer: MEDICARE

## 2022-08-30 VITALS
DIASTOLIC BLOOD PRESSURE: 79 MMHG | SYSTOLIC BLOOD PRESSURE: 166 MMHG | TEMPERATURE: 98 F | HEART RATE: 76 BPM | OXYGEN SATURATION: 97 % | RESPIRATION RATE: 20 BRPM

## 2022-08-30 DIAGNOSIS — R10.13 EPIGASTRIC PAIN: Primary | ICD-10-CM

## 2022-08-30 DIAGNOSIS — R11.2 NAUSEA AND VOMITING, UNSPECIFIED VOMITING TYPE: ICD-10-CM

## 2022-08-30 LAB
ALBUMIN SERPL BCP-MCNC: 4 G/DL (ref 3.5–5)
ALP SERPL-CCNC: 96 U/L (ref 34–104)
ALT SERPL W P-5'-P-CCNC: 15 U/L (ref 7–52)
ANION GAP SERPL CALCULATED.3IONS-SCNC: 11 MMOL/L (ref 4–13)
AST SERPL W P-5'-P-CCNC: 24 U/L (ref 13–39)
BASOPHILS # BLD AUTO: 0.03 THOUSANDS/ΜL (ref 0–0.1)
BASOPHILS NFR BLD AUTO: 0 % (ref 0–1)
BILIRUB SERPL-MCNC: 0.92 MG/DL (ref 0.2–1)
BUN SERPL-MCNC: 9 MG/DL (ref 5–25)
CALCIUM SERPL-MCNC: 9 MG/DL (ref 8.4–10.2)
CHLORIDE SERPL-SCNC: 93 MMOL/L (ref 96–108)
CO2 SERPL-SCNC: 23 MMOL/L (ref 21–32)
CREAT SERPL-MCNC: 0.54 MG/DL (ref 0.6–1.3)
EOSINOPHIL # BLD AUTO: 0.01 THOUSAND/ΜL (ref 0–0.61)
EOSINOPHIL NFR BLD AUTO: 0 % (ref 0–6)
ERYTHROCYTE [DISTWIDTH] IN BLOOD BY AUTOMATED COUNT: 12.4 % (ref 11.6–15.1)
GFR SERPL CREATININE-BSD FRML MDRD: 88 ML/MIN/1.73SQ M
GLUCOSE SERPL-MCNC: 152 MG/DL (ref 65–140)
HCT VFR BLD AUTO: 41.4 % (ref 34.8–46.1)
HGB BLD-MCNC: 15 G/DL (ref 11.5–15.4)
IMM GRANULOCYTES # BLD AUTO: 0.05 THOUSAND/UL (ref 0–0.2)
IMM GRANULOCYTES NFR BLD AUTO: 1 % (ref 0–2)
LIPASE SERPL-CCNC: 16 U/L (ref 11–82)
LYMPHOCYTES # BLD AUTO: 1.02 THOUSANDS/ΜL (ref 0.6–4.47)
LYMPHOCYTES NFR BLD AUTO: 11 % (ref 14–44)
MCH RBC QN AUTO: 33.3 PG (ref 26.8–34.3)
MCHC RBC AUTO-ENTMCNC: 36.2 G/DL (ref 31.4–37.4)
MCV RBC AUTO: 92 FL (ref 82–98)
MONOCYTES # BLD AUTO: 0.47 THOUSAND/ΜL (ref 0.17–1.22)
MONOCYTES NFR BLD AUTO: 5 % (ref 4–12)
NEUTROPHILS # BLD AUTO: 7.73 THOUSANDS/ΜL (ref 1.85–7.62)
NEUTS SEG NFR BLD AUTO: 83 % (ref 43–75)
NRBC BLD AUTO-RTO: 0 /100 WBCS
PLATELET # BLD AUTO: 255 THOUSANDS/UL (ref 149–390)
PMV BLD AUTO: 10.6 FL (ref 8.9–12.7)
POTASSIUM SERPL-SCNC: 3.5 MMOL/L (ref 3.5–5.3)
PROT SERPL-MCNC: 6.8 G/DL (ref 6.4–8.4)
RBC # BLD AUTO: 4.5 MILLION/UL (ref 3.81–5.12)
SODIUM SERPL-SCNC: 127 MMOL/L (ref 135–147)
WBC # BLD AUTO: 9.31 THOUSAND/UL (ref 4.31–10.16)

## 2022-08-30 PROCEDURE — 80053 COMPREHEN METABOLIC PANEL: CPT | Performed by: EMERGENCY MEDICINE

## 2022-08-30 PROCEDURE — 85025 COMPLETE CBC W/AUTO DIFF WBC: CPT | Performed by: EMERGENCY MEDICINE

## 2022-08-30 PROCEDURE — 83690 ASSAY OF LIPASE: CPT | Performed by: EMERGENCY MEDICINE

## 2022-08-30 PROCEDURE — 36415 COLL VENOUS BLD VENIPUNCTURE: CPT | Performed by: EMERGENCY MEDICINE

## 2022-08-30 PROCEDURE — 96376 TX/PRO/DX INJ SAME DRUG ADON: CPT

## 2022-08-30 PROCEDURE — 99285 EMERGENCY DEPT VISIT HI MDM: CPT | Performed by: EMERGENCY MEDICINE

## 2022-08-30 PROCEDURE — 71260 CT THORAX DX C+: CPT

## 2022-08-30 PROCEDURE — 74177 CT ABD & PELVIS W/CONTRAST: CPT

## 2022-08-30 PROCEDURE — 96365 THER/PROPH/DIAG IV INF INIT: CPT

## 2022-08-30 PROCEDURE — 93005 ELECTROCARDIOGRAM TRACING: CPT

## 2022-08-30 PROCEDURE — 99285 EMERGENCY DEPT VISIT HI MDM: CPT

## 2022-08-30 PROCEDURE — 96375 TX/PRO/DX INJ NEW DRUG ADDON: CPT

## 2022-08-30 RX ORDER — METOCLOPRAMIDE HYDROCHLORIDE 5 MG/ML
5 INJECTION INTRAMUSCULAR; INTRAVENOUS ONCE
Status: COMPLETED | OUTPATIENT
Start: 2022-08-30 | End: 2022-08-30

## 2022-08-30 RX ORDER — HYDROMORPHONE HCL/PF 1 MG/ML
0.5 SYRINGE (ML) INJECTION ONCE
Status: COMPLETED | OUTPATIENT
Start: 2022-08-30 | End: 2022-08-30

## 2022-08-30 RX ORDER — METOCLOPRAMIDE 10 MG/1
5 TABLET ORAL 3 TIMES DAILY PRN
Qty: 10 TABLET | Refills: 3 | Status: SHIPPED | OUTPATIENT
Start: 2022-08-30

## 2022-08-30 RX ORDER — METOCLOPRAMIDE 10 MG/1
5 TABLET ORAL 3 TIMES DAILY PRN
Qty: 10 TABLET | Refills: 0 | Status: SHIPPED | OUTPATIENT
Start: 2022-08-30 | End: 2022-08-31 | Stop reason: ALTCHOICE

## 2022-08-30 RX ORDER — ONDANSETRON 2 MG/ML
4 INJECTION INTRAMUSCULAR; INTRAVENOUS ONCE
Status: COMPLETED | OUTPATIENT
Start: 2022-08-30 | End: 2022-08-30

## 2022-08-30 RX ORDER — ONDANSETRON 2 MG/ML
1 INJECTION INTRAMUSCULAR; INTRAVENOUS ONCE
Status: COMPLETED | OUTPATIENT
Start: 2022-08-30 | End: 2022-08-30

## 2022-08-30 RX ADMIN — SODIUM CHLORIDE, SODIUM LACTATE, POTASSIUM CHLORIDE, AND CALCIUM CHLORIDE 250 ML: .6; .31; .03; .02 INJECTION, SOLUTION INTRAVENOUS at 18:33

## 2022-08-30 RX ADMIN — ONDANSETRON 4 MG: 2 INJECTION INTRAMUSCULAR; INTRAVENOUS at 18:23

## 2022-08-30 RX ADMIN — METOCLOPRAMIDE 5 MG: 5 INJECTION, SOLUTION INTRAMUSCULAR; INTRAVENOUS at 19:25

## 2022-08-30 RX ADMIN — HYDROMORPHONE HYDROCHLORIDE 0.5 MG: 1 INJECTION, SOLUTION INTRAMUSCULAR; INTRAVENOUS; SUBCUTANEOUS at 18:22

## 2022-08-30 RX ADMIN — IOHEXOL 80 ML: 350 INJECTION, SOLUTION INTRAVENOUS at 20:27

## 2022-08-30 RX ADMIN — HYDROMORPHONE HYDROCHLORIDE 0.5 MG: 1 INJECTION, SOLUTION INTRAMUSCULAR; INTRAVENOUS; SUBCUTANEOUS at 19:46

## 2022-08-30 NOTE — ED PROVIDER NOTES
History  Chief Complaint   Patient presents with    Vomiting     N+V started this morning  Pt covid + 2 weeks ago  4mg IV zofran admin in field     79 YR FEMALE WITH HX OF 3/21 PARAESOPHAGEAL HERNIA REPAIR- SINCE AS PER PT AND  SPITS UP CLEARS  INTERMITENTLY - NOT ANY WORSE THEN USUAL- 2 WEEKS AGO  HAD COVID- WAS ON PAXLOVID- HAD MOSTLY URI  COMPS-    MILD COUGH WHICH HAS IMPROVED --  THIS AM STARTED WITH CLEAR VOMITUS  WHICH HAS BEEN FREQUENT THRU OUT DAY - FOLLOWED BY UPPER ABD PAIN -- NO CP/SOB- NO FEVERS- NORMAL BM'S TODAY - NO /GYN COMPS- NO OTHER COMPS -- NO RECENT INCREASE IN GERD/DYSPESPAI/ PROGRESSIVE DYSPHAGIA/ HEMATEMESIS/ MELENA/ NO RECENT RUQ PAIN RAD TO BACK AFTER MEALS      History provided by:  Patient   used: No    Vomiting  Timing:  Intermittent  Number of daily episodes:  CLEAR   Associated symptoms: abdominal pain    Associated symptoms: no chills, no diarrhea and no fever        Prior to Admission Medications   Prescriptions Last Dose Informant Patient Reported? Taking?    Cholecalciferol (VITAMIN D3) 400 units CAPS  Self Yes No   Sig: Take by mouth   Combigan 0 2-0 5 %  Self Yes No   Sig: INSTILL 1 DROP IN BOTH EYES TWICE DAILY   Multiple Vitamin (MULTIVITAMINS PO)  Self Yes No   Sig: Take 1 tablet by mouth daily   albuterol (PROVENTIL HFA,VENTOLIN HFA) 90 mcg/act inhaler   Yes No   Sig: Inhale 2 puffs 4 (four) times a day as needed   amoxicillin (AMOXIL) 500 MG tablet   Yes No   Sig: TAKE 4 TABLETS ONE HOUR BEFORE DENTAL PROCEDURE   atorvastatin (LIPITOR) 10 mg tablet   No No   Sig: TAKE 1 TABLET BY MOUTH  DAILY AT BEDTIME   latanoprost (XALATAN) 0 005 % ophthalmic solution  Self Yes No   Sig: Administer 1 drop to both eyes 2 (two) times a day   levothyroxine 50 mcg tablet  Self Yes No   Sig: Take 0 5 tablets by mouth daily    pantoprazole (PROTONIX) 40 mg tablet  Self Yes No   Sig: Take by mouth      Facility-Administered Medications: None       Past Medical History:   Diagnosis Date    Cancer ([facility] [address] )     Disease of thyroid gland     Endometrial cancer [facility])     age 45    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    Esophageal reflux     GERD (gastroesophageal reflux disease)     History of ovarian cancer 4/28/2022    Hypertension     Hypothyroid     Obesity     Prediabetes 5/12/2022    Rosacea 5/12/2022       Past Surgical History:   Procedure Laterality Date    CATARACT EXTRACTION, BILATERAL      COLONOSCOPY  08/2018    polyp- 5 years    CYSTOSCOPY      FL RETROGRADE PYELOGRAM  10/15/2021    JOINT REPLACEMENT      KNEE ARTHROPLASTY      WI CYSTO/URETERO W/LITHOTRIPSY &INDWELL STENT INSRT Left 10/15/2021    Procedure: CYSTOSCOPY URETEROSCOPY WITH LITHOTRIPSY HOLMIUM LASER, RETROGRADE PYELOGRAM, BASKET STONE EXTRACTION AND EXCHANGE STENT URETERAL;  Surgeon: Abner Caceres MD;  [facility];  Service: Urology    WI CYSTOURETHROSCOPY,URETER CATHETER Left 8/24/2021    Procedure: CYSTOSCOPY WITH INSERTION STENT URETERAL;  Surgeon: Sarita Green MD;  [facility];  Service: Urology    WI ESOPHAGOGASTRODUODENOSCOPY TRANSORAL DIAGNOSTIC N/A 3/5/2021    Procedure: ESOPHAGOGASTRODUODENOSCOPY (EGD);   Surgeon: Rc Meyer MD;  [facility];  Service: Thoracic    WI LAP, REPAIR PARAESOPHAGEAL HERNIA, INCL FUNDOPLASTY W/O MESH N/A 3/5/2021    Procedure: REPAIR HERNIA PARAESOPHAGEAL LAPAROSCOPIC W ROBOTICS WITH MESH, DOOR FUNDOPLICATION;  Surgeon: Rc Meyer MD;  [facility];  Service: Thoracic    TOTAL ABDOMINAL HYSTERECTOMY      age 45    TOTAL ABDOMINAL HYSTERECTOMY W/ BILATERAL SALPINGOOPHORECTOMY  1992    endometrial cancer       Family History   Problem Relation Age of Onset    Stomach cancer Mother 71    Pancreatic cancer Mother 71    Heart attack Father     Cancer Maternal Uncle     No Known Problems Daughter     No Known Problems Maternal Grandmother     No Known Problems Maternal Grandfather     No Known Problems Paternal Grandmother     No Known Problems Paternal Grandfather     No Known Problems Maternal Aunt     No Known Problems Maternal Aunt      I have reviewed and agree with the history as documented  E-Cigarette/Vaping    E-Cigarette Use Never User      E-Cigarette/Vaping Substances    Nicotine No     THC No     CBD No     Flavoring No     Other No     Unknown No      Social History     Tobacco Use    Smoking status: Former Smoker     Packs/day: 0 25     Years: 10 00     Pack years: 2 50     Types: Cigarettes     Start date:      Quit date:      Years since quittin 6    Smokeless tobacco: Never Used    Tobacco comment: Quit 50+ years ago 3/24/21   Vaping Use    Vaping Use: Never used   Substance Use Topics    Alcohol use: Not Currently     Comment: socially    Drug use: No       Review of Systems   Constitutional: Positive for activity change and appetite change  Negative for chills, diaphoresis, fatigue, fever and unexpected weight change  HENT: Negative  Eyes: Negative  Respiratory: Negative  Cardiovascular: Negative  Gastrointestinal: Positive for abdominal pain, nausea and vomiting  Negative for abdominal distention, anal bleeding, blood in stool, constipation, diarrhea and rectal pain  Endocrine: Negative  Genitourinary: Negative  Musculoskeletal: Negative  Skin: Negative  Allergic/Immunologic: Negative  Neurological: Negative  Hematological: Negative  Psychiatric/Behavioral: Negative  Physical Exam  Physical Exam  Vitals and nursing note reviewed  Constitutional:       General: She is in acute distress  Appearance: Normal appearance  She is not ill-appearing, toxic-appearing or diaphoretic        Comments: AVSS- HTNSIVE-  WHICH DECREASED IN THE ER - PT IN PAIN- ACTIVELY SPITTING UP IN ROOM - PULSE OX 92 % ON RA- INTERPRETATION IS LOW- NORMAL- NO INTERVENTION    HENT:      Head: Normocephalic and atraumatic  Nose: Nose normal       Mouth/Throat:      Mouth: Mucous membranes are moist    Eyes:      General: No scleral icterus  Right eye: No discharge  Left eye: No discharge  Extraocular Movements: Extraocular movements intact  Conjunctiva/sclera: Conjunctivae normal       Pupils: Pupils are equal, round, and reactive to light  Comments: MM PINK   Neck:      Vascular: No carotid bruit  Comments: NO PMT C/T/L/S SPINE   Cardiovascular:      Rate and Rhythm: Normal rate and regular rhythm  Pulses: Normal pulses  Heart sounds: Normal heart sounds  No murmur heard  No friction rub  No gallop  Pulmonary:      Effort: Pulmonary effort is normal  No respiratory distress  Breath sounds: No stridor  No wheezing, rhonchi or rales  Comments: MILD DECREASED BS AT BASES BILATERALLY-   Chest:      Chest wall: No tenderness  Abdominal:      General: There is no distension  Palpations: Abdomen is soft  There is no mass  Tenderness: There is abdominal tenderness  There is no right CVA tenderness, left CVA tenderness, guarding or rebound  Hernia: No hernia is present  Comments: MILD EPIGASTRIC TENDERNESS- REST OF ABD IS SOFT NT/ND- NO HSM - NO CVA TENDERNESS- NO PERITONEAL SIGNS- NO PULSATILE ABD MASS/BRUIT/ TENDERNESS    Musculoskeletal:         General: No swelling, tenderness, deformity or signs of injury  Normal range of motion  Cervical back: Normal range of motion and neck supple  No rigidity or tenderness  Right lower leg: Edema present  Left lower leg: Edema present  Comments: EQUAL BILATERAL RADIAL/DP PULSES- TRACE BEL PRETIBIAL EDEMA- NT- NO ASYM/ ERYTHEMA   Lymphadenopathy:      Cervical: No cervical adenopathy  Skin:     Capillary Refill: Capillary refill takes less than 2 seconds  Coloration: Skin is pale  Skin is not jaundiced  Findings: No bruising, erythema, lesion or rash     Neurological: General: No focal deficit present  Mental Status: She is alert and oriented to person, place, and time  Mental status is at baseline  Cranial Nerves: No cranial nerve deficit  Sensory: No sensory deficit  Motor: No weakness  Comments: NORMAL NON FOCAL NEURO EXAM    Psychiatric:         Mood and Affect: Mood normal          Behavior: Behavior normal          Thought Content:  Thought content normal          Judgment: Judgment normal          Vital Signs  ED Triage Vitals [08/30/22 1735]   Temperature Pulse Respirations Blood Pressure SpO2   98 °F (36 7 °C) 72 20 (!) 225/109 99 %      Temp Source Heart Rate Source Patient Position - Orthostatic VS BP Location FiO2 (%)   Oral Monitor Sitting Right arm --      Pain Score       No Pain           Vitals:    08/30/22 1735   BP: (!) 225/109   Pulse: 72   Patient Position - Orthostatic VS: Sitting         Visual Acuity      ED Medications  Medications   lactated ringers bolus 250 mL (has no administration in time range)   ondansetron (FOR EMS ONLY) (ZOFRAN) 4 mg/2 mL injection 4 mg (0 mg Does not apply Given to EMS 8/30/22 1743)   HYDROmorphone (DILAUDID) injection 0 5 mg (0 5 mg Intravenous Given 8/30/22 1822)   ondansetron (ZOFRAN) injection 4 mg (4 mg Intravenous Given 8/30/22 1823)       Diagnostic Studies  Results Reviewed     Procedure Component Value Units Date/Time    CBC and differential [[id number]]     Lab Status: No result Specimen: Blood     Comprehensive metabolic panel [[id number]]     Lab Status: No result Specimen: Blood     Lipase [[id number]]     Lab Status: No result Specimen: Blood                  CT chest abdomen pelvis w contrast    (Results Pending)              Procedures  Procedures         ED Course  ED Course as of 08/30/22 2243   Tue Aug 30, 2022   1817 JACOBO MACHADO NOTE- PT DID NOT TAKE ANY OF HER MEDS TODAY    1818 JACOBO MACHADO NOTE- RECENT LABS- 3/22 AND CT SCAN OF ABD/PELVIS- 10/21 REVIEWED BY JACOBO MACHADO   4212 JACOBO MACHADO NOTE- PT RE-EVALUATED - AFTER PAIN/ ANTI-EMETICS- FEELS MUCH IMPROVED- WILL CONT TO EVAL    1928 -- ER MD NOTE- PT- RE-EVALUATED- STILL WITH NAUSEA -- WILL GIVE IV REGLAN PENDING CT SCAN    2117 ER MD NOTE- PT- RE-EVALUATED- NOW FEELS BACK TO BASELINE WITH NO COMPLAINTS- AWARE OF PENDING CT SCAN AND THEN WILL TRY PO CHALLENGE    2217 - er md note- pt- re-evaluated- continues to feel much improved- tolerated  po challenge in the er with no comps- will d/c                                              MDM    Disposition  Final diagnoses:   None     ED Disposition     None      Follow-up Information    None         Patient's Medications   Discharge Prescriptions    No medications on file       No discharge procedures on file      PDMP Review       Value Time User    PDMP Reviewed  Yes 10/17/2021  5:16 AM Angel Sykes MD          ED Provider  Electronically Signed by           Herbert Singh MD  08/30/22 9521

## 2022-08-30 NOTE — ED PROCEDURE NOTE
PROCEDURE  ECG 12 Lead Documentation Only    Date/Time: 8/30/2022 7:10 PM  Performed by: Niyah Perez MD  Authorized by: Niyah Perez MD     Indications / Diagnosis:  UPPER ABD PAIN -   ECG reviewed by me, the ED Provider: yes    Patient location:  ED and bedside  Previous ECG:     Previous ECG:  Unavailable    Comparison to cardiac monitor: Yes    Interpretation:     Interpretation: non-specific    Rate:     ECG rate:  59    ECG rate assessment: bradycardic    Rhythm:     Rhythm: sinus bradycardia    Ectopy:     Ectopy: PAC    QRS:     QRS axis:  Left    QRS intervals:  Normal  Conduction:     Conduction: abnormal      Abnormal conduction: 1st degree    ST segments:     ST segments:  Normal  T waves:     T waves: flattening      Flattening:  AVL, V1 and V2  Q waves:     Q waves:  V1  Other findings:     Other findings: poor R wave progression and U wave    Comments:      NO ECG SIGNS OF ISCHEMIA/ INJURY / R HEART STRAIN / Genaro Sandoval MD  08/30/22 0977

## 2022-08-31 ENCOUNTER — HOSPITAL ENCOUNTER (INPATIENT)
Facility: HOSPITAL | Age: 82
LOS: 1 days | Discharge: HOME | DRG: 392 | End: 2022-09-02
Attending: EMERGENCY MEDICINE | Admitting: INTERNAL MEDICINE
Payer: MEDICARE

## 2022-08-31 ENCOUNTER — APPOINTMENT (OUTPATIENT)
Dept: ULTRASOUND IMAGING | Facility: HOSPITAL | Age: 82
DRG: 392 | End: 2022-08-31
Payer: MEDICARE

## 2022-08-31 DIAGNOSIS — R11.2 NAUSEA AND VOMITING, UNSPECIFIED VOMITING TYPE: ICD-10-CM

## 2022-08-31 DIAGNOSIS — R77.8 ELEVATED TROPONIN: ICD-10-CM

## 2022-08-31 DIAGNOSIS — R10.13 EPIGASTRIC PAIN: Primary | ICD-10-CM

## 2022-08-31 DIAGNOSIS — I25.10 CORONARY ARTERY CALCIFICATION: ICD-10-CM

## 2022-08-31 DIAGNOSIS — E87.1 HYPONATREMIA: ICD-10-CM

## 2022-08-31 DIAGNOSIS — I25.84 CORONARY ARTERY CALCIFICATION: ICD-10-CM

## 2022-08-31 DIAGNOSIS — R10.13 EPIGASTRIC ABDOMINAL PAIN: ICD-10-CM

## 2022-08-31 DIAGNOSIS — K44.9 PARAESOPHAGEAL HERNIA: ICD-10-CM

## 2022-08-31 DIAGNOSIS — R10.13 DYSPEPSIA: ICD-10-CM

## 2022-08-31 DIAGNOSIS — K44.9 HIATAL HERNIA: ICD-10-CM

## 2022-08-31 PROBLEM — I10 HYPERTENSION: Status: ACTIVE | Noted: 2022-08-31

## 2022-08-31 PROBLEM — R63.4 WEIGHT LOSS: Status: ACTIVE | Noted: 2022-08-31

## 2022-08-31 PROBLEM — K80.20 GALLSTONES: Status: ACTIVE | Noted: 2022-08-31

## 2022-08-31 LAB
2HR DELTA HS TROPONIN: -111 NG/L
4HR DELTA HS TROPONIN: -350 NG/L
ALBUMIN SERPL BCP-MCNC: 4.2 G/DL (ref 3.5–5)
ALP SERPL-CCNC: 99 U/L (ref 34–104)
ALT SERPL W P-5'-P-CCNC: 16 U/L (ref 7–52)
ANION GAP SERPL CALCULATED.3IONS-SCNC: 12 MMOL/L (ref 4–13)
ANION GAP SERPL CALCULATED.3IONS-SCNC: 13 MMOL/L (ref 4–13)
ANION GAP SERPL CALCULATED.3IONS-SCNC: 13 MMOL/L (ref 4–13)
APTT PPP: 28 SECONDS (ref 23–37)
AST SERPL W P-5'-P-CCNC: 25 U/L (ref 13–39)
ATRIAL RATE: 59 BPM
BASOPHILS # BLD AUTO: 0.02 THOUSANDS/ΜL (ref 0–0.1)
BASOPHILS NFR BLD AUTO: 0 % (ref 0–1)
BILIRUB SERPL-MCNC: 1.01 MG/DL (ref 0.2–1)
BUN SERPL-MCNC: 11 MG/DL (ref 5–25)
BUN SERPL-MCNC: 8 MG/DL (ref 5–25)
BUN SERPL-MCNC: 9 MG/DL (ref 5–25)
CALCIUM SERPL-MCNC: 9.1 MG/DL (ref 8.4–10.2)
CALCIUM SERPL-MCNC: 9.1 MG/DL (ref 8.4–10.2)
CALCIUM SERPL-MCNC: 9.4 MG/DL (ref 8.4–10.2)
CARDIAC TROPONIN I PNL SERPL HS: 1026 NG/L
CARDIAC TROPONIN I PNL SERPL HS: 1265 NG/L
CARDIAC TROPONIN I PNL SERPL HS: 1376 NG/L
CHLORIDE SERPL-SCNC: 90 MMOL/L (ref 96–108)
CHLORIDE SERPL-SCNC: 90 MMOL/L (ref 96–108)
CHLORIDE SERPL-SCNC: 92 MMOL/L (ref 96–108)
CO2 SERPL-SCNC: 21 MMOL/L (ref 21–32)
CO2 SERPL-SCNC: 24 MMOL/L (ref 21–32)
CO2 SERPL-SCNC: 26 MMOL/L (ref 21–32)
CREAT SERPL-MCNC: 0.52 MG/DL (ref 0.6–1.3)
CREAT SERPL-MCNC: 0.56 MG/DL (ref 0.6–1.3)
CREAT SERPL-MCNC: 0.56 MG/DL (ref 0.6–1.3)
EOSINOPHIL # BLD AUTO: 0 THOUSAND/ΜL (ref 0–0.61)
EOSINOPHIL NFR BLD AUTO: 0 % (ref 0–6)
ERYTHROCYTE [DISTWIDTH] IN BLOOD BY AUTOMATED COUNT: 12.3 % (ref 11.6–15.1)
GFR SERPL CREATININE-BSD FRML MDRD: 87 ML/MIN/1.73SQ M
GFR SERPL CREATININE-BSD FRML MDRD: 87 ML/MIN/1.73SQ M
GFR SERPL CREATININE-BSD FRML MDRD: 89 ML/MIN/1.73SQ M
GLUCOSE P FAST SERPL-MCNC: 135 MG/DL (ref 65–99)
GLUCOSE SERPL-MCNC: 125 MG/DL (ref 65–140)
GLUCOSE SERPL-MCNC: 135 MG/DL (ref 65–140)
GLUCOSE SERPL-MCNC: 154 MG/DL (ref 65–140)
HCT VFR BLD AUTO: 45.4 % (ref 34.8–46.1)
HGB BLD-MCNC: 15.9 G/DL (ref 11.5–15.4)
IMM GRANULOCYTES # BLD AUTO: 0.04 THOUSAND/UL (ref 0–0.2)
IMM GRANULOCYTES NFR BLD AUTO: 0 % (ref 0–2)
INR PPP: 1.08 (ref 0.84–1.19)
LIPASE SERPL-CCNC: 37 U/L (ref 11–82)
LYMPHOCYTES # BLD AUTO: 1.36 THOUSANDS/ΜL (ref 0.6–4.47)
LYMPHOCYTES NFR BLD AUTO: 14 % (ref 14–44)
MCH RBC QN AUTO: 31.9 PG (ref 26.8–34.3)
MCHC RBC AUTO-ENTMCNC: 35 G/DL (ref 31.4–37.4)
MCV RBC AUTO: 91 FL (ref 82–98)
MONOCYTES # BLD AUTO: 0.71 THOUSAND/ΜL (ref 0.17–1.22)
MONOCYTES NFR BLD AUTO: 7 % (ref 4–12)
NEUTROPHILS # BLD AUTO: 7.44 THOUSANDS/ΜL (ref 1.85–7.62)
NEUTS SEG NFR BLD AUTO: 79 % (ref 43–75)
NRBC BLD AUTO-RTO: 0 /100 WBCS
P AXIS: 28 DEGREES
PLATELET # BLD AUTO: 288 THOUSANDS/UL (ref 149–390)
PMV BLD AUTO: 10.3 FL (ref 8.9–12.7)
POTASSIUM SERPL-SCNC: 3.2 MMOL/L (ref 3.5–5.3)
POTASSIUM SERPL-SCNC: 3.3 MMOL/L (ref 3.5–5.3)
POTASSIUM SERPL-SCNC: 3.4 MMOL/L (ref 3.5–5.3)
PR INTERVAL: 216 MS
PROT SERPL-MCNC: 7.3 G/DL (ref 6.4–8.4)
PROTHROMBIN TIME: 14.2 SECONDS (ref 11.6–14.5)
QRS AXIS: -30 DEGREES
QRSD INTERVAL: 92 MS
QT INTERVAL: 470 MS
QTC INTERVAL: 465 MS
RBC # BLD AUTO: 4.98 MILLION/UL (ref 3.81–5.12)
SODIUM SERPL-SCNC: 126 MMOL/L (ref 135–147)
SODIUM SERPL-SCNC: 127 MMOL/L (ref 135–147)
SODIUM SERPL-SCNC: 128 MMOL/L (ref 135–147)
T WAVE AXIS: 60 DEGREES
VENTRICULAR RATE: 59 BPM
WBC # BLD AUTO: 9.57 THOUSAND/UL (ref 4.31–10.16)

## 2022-08-31 PROCEDURE — 96374 THER/PROPH/DIAG INJ IV PUSH: CPT

## 2022-08-31 PROCEDURE — 99215 OFFICE O/P EST HI 40 MIN: CPT | Performed by: INTERNAL MEDICINE

## 2022-08-31 PROCEDURE — 93010 ELECTROCARDIOGRAM REPORT: CPT | Performed by: INTERNAL MEDICINE

## 2022-08-31 PROCEDURE — 96375 TX/PRO/DX INJ NEW DRUG ADDON: CPT

## 2022-08-31 PROCEDURE — 85610 PROTHROMBIN TIME: CPT | Performed by: NURSE PRACTITIONER

## 2022-08-31 PROCEDURE — 99285 EMERGENCY DEPT VISIT HI MDM: CPT | Performed by: PHYSICIAN ASSISTANT

## 2022-08-31 PROCEDURE — 99220 PR INITIAL OBSERVATION CARE/DAY 70 MINUTES: CPT | Performed by: INTERNAL MEDICINE

## 2022-08-31 PROCEDURE — 76705 ECHO EXAM OF ABDOMEN: CPT

## 2022-08-31 PROCEDURE — 80053 COMPREHEN METABOLIC PANEL: CPT | Performed by: PHYSICIAN ASSISTANT

## 2022-08-31 PROCEDURE — 99223 1ST HOSP IP/OBS HIGH 75: CPT | Performed by: INTERNAL MEDICINE

## 2022-08-31 PROCEDURE — 85025 COMPLETE CBC W/AUTO DIFF WBC: CPT | Performed by: PHYSICIAN ASSISTANT

## 2022-08-31 PROCEDURE — 96361 HYDRATE IV INFUSION ADD-ON: CPT

## 2022-08-31 PROCEDURE — 36415 COLL VENOUS BLD VENIPUNCTURE: CPT | Performed by: PHYSICIAN ASSISTANT

## 2022-08-31 PROCEDURE — C9113 INJ PANTOPRAZOLE SODIUM, VIA: HCPCS | Performed by: INTERNAL MEDICINE

## 2022-08-31 PROCEDURE — 99285 EMERGENCY DEPT VISIT HI MDM: CPT

## 2022-08-31 PROCEDURE — 84484 ASSAY OF TROPONIN QUANT: CPT | Performed by: INTERNAL MEDICINE

## 2022-08-31 PROCEDURE — 93005 ELECTROCARDIOGRAM TRACING: CPT

## 2022-08-31 PROCEDURE — 85730 THROMBOPLASTIN TIME PARTIAL: CPT | Performed by: NURSE PRACTITIONER

## 2022-08-31 PROCEDURE — 83690 ASSAY OF LIPASE: CPT | Performed by: PHYSICIAN ASSISTANT

## 2022-08-31 PROCEDURE — 99205 OFFICE O/P NEW HI 60 MIN: CPT | Performed by: INTERNAL MEDICINE

## 2022-08-31 PROCEDURE — 80048 BASIC METABOLIC PNL TOTAL CA: CPT | Performed by: INTERNAL MEDICINE

## 2022-08-31 PROCEDURE — 85730 THROMBOPLASTIN TIME PARTIAL: CPT | Performed by: INTERNAL MEDICINE

## 2022-08-31 RX ORDER — ASPIRIN 81 MG/1
81 TABLET ORAL DAILY
Status: DISCONTINUED | OUTPATIENT
Start: 2022-08-31 | End: 2022-09-01

## 2022-08-31 RX ORDER — HEPARIN SODIUM 1000 [USP'U]/ML
4000 INJECTION, SOLUTION INTRAVENOUS; SUBCUTANEOUS
Status: DISCONTINUED | OUTPATIENT
Start: 2022-08-31 | End: 2022-09-01

## 2022-08-31 RX ORDER — MORPHINE SULFATE 4 MG/ML
4 INJECTION, SOLUTION INTRAMUSCULAR; INTRAVENOUS ONCE
Status: COMPLETED | OUTPATIENT
Start: 2022-08-31 | End: 2022-08-31

## 2022-08-31 RX ORDER — LEVOTHYROXINE SODIUM 0.03 MG/1
25 TABLET ORAL DAILY
Status: DISCONTINUED | OUTPATIENT
Start: 2022-08-31 | End: 2022-09-02 | Stop reason: HOSPADM

## 2022-08-31 RX ORDER — LATANOPROST 50 UG/ML
1 SOLUTION/ DROPS OPHTHALMIC 2 TIMES DAILY
Status: DISCONTINUED | OUTPATIENT
Start: 2022-08-31 | End: 2022-09-02 | Stop reason: HOSPADM

## 2022-08-31 RX ORDER — ASCORBIC ACID 500 MG
2000 TABLET ORAL DAILY
Status: DISCONTINUED | OUTPATIENT
Start: 2022-08-31 | End: 2022-09-02 | Stop reason: HOSPADM

## 2022-08-31 RX ORDER — ONDANSETRON 2 MG/ML
4 INJECTION INTRAMUSCULAR; INTRAVENOUS ONCE
Status: COMPLETED | OUTPATIENT
Start: 2022-08-31 | End: 2022-08-31

## 2022-08-31 RX ORDER — SUCRALFATE 1 G/1
1 TABLET ORAL
Status: DISCONTINUED | OUTPATIENT
Start: 2022-08-31 | End: 2022-09-02 | Stop reason: HOSPADM

## 2022-08-31 RX ORDER — HEPARIN SODIUM 1000 [USP'U]/ML
2000 INJECTION, SOLUTION INTRAVENOUS; SUBCUTANEOUS
Status: DISCONTINUED | OUTPATIENT
Start: 2022-08-31 | End: 2022-09-01

## 2022-08-31 RX ORDER — PANTOPRAZOLE SODIUM 40 MG/10ML
40 INJECTION, POWDER, LYOPHILIZED, FOR SOLUTION INTRAVENOUS EVERY 12 HOURS SCHEDULED
Status: DISCONTINUED | OUTPATIENT
Start: 2022-08-31 | End: 2022-09-02 | Stop reason: HOSPADM

## 2022-08-31 RX ORDER — ALBUTEROL SULFATE 90 UG/1
2 AEROSOL, METERED RESPIRATORY (INHALATION) 4 TIMES DAILY PRN
Status: DISCONTINUED | OUTPATIENT
Start: 2022-08-31 | End: 2022-09-02 | Stop reason: HOSPADM

## 2022-08-31 RX ORDER — ASPIRIN 81 MG/1
81 TABLET ORAL DAILY
COMMUNITY
End: 2022-10-04

## 2022-08-31 RX ORDER — SUCRALFATE ORAL 1 G/10ML
1000 SUSPENSION ORAL EVERY 6 HOURS SCHEDULED
Status: DISCONTINUED | OUTPATIENT
Start: 2022-08-31 | End: 2022-08-31

## 2022-08-31 RX ORDER — ONDANSETRON 2 MG/ML
4 INJECTION INTRAMUSCULAR; INTRAVENOUS EVERY 6 HOURS PRN
Status: DISCONTINUED | OUTPATIENT
Start: 2022-08-31 | End: 2022-09-02 | Stop reason: HOSPADM

## 2022-08-31 RX ORDER — MULTIVIT WITH MINERALS/LUTEIN
2000 TABLET ORAL DAILY
COMMUNITY

## 2022-08-31 RX ORDER — HEPARIN SODIUM 10000 [USP'U]/100ML
3-20 INJECTION, SOLUTION INTRAVENOUS
Status: DISCONTINUED | OUTPATIENT
Start: 2022-08-31 | End: 2022-09-01

## 2022-08-31 RX ORDER — HEPARIN SODIUM 1000 [USP'U]/ML
4000 INJECTION, SOLUTION INTRAVENOUS; SUBCUTANEOUS ONCE
Status: COMPLETED | OUTPATIENT
Start: 2022-08-31 | End: 2022-08-31

## 2022-08-31 RX ORDER — SODIUM CHLORIDE 9 MG/ML
50 INJECTION, SOLUTION INTRAVENOUS CONTINUOUS
Status: DISCONTINUED | OUTPATIENT
Start: 2022-08-31 | End: 2022-09-01

## 2022-08-31 RX ORDER — ATORVASTATIN CALCIUM 40 MG/1
40 TABLET, FILM COATED ORAL
Status: DISCONTINUED | OUTPATIENT
Start: 2022-08-31 | End: 2022-09-02 | Stop reason: HOSPADM

## 2022-08-31 RX ORDER — LABETALOL HYDROCHLORIDE 5 MG/ML
10 INJECTION, SOLUTION INTRAVENOUS EVERY 6 HOURS PRN
Status: DISCONTINUED | OUTPATIENT
Start: 2022-08-31 | End: 2022-09-02

## 2022-08-31 RX ORDER — DICYCLOMINE HYDROCHLORIDE 10 MG/5ML
10 SOLUTION ORAL 4 TIMES DAILY PRN
Status: DISCONTINUED | OUTPATIENT
Start: 2022-08-31 | End: 2022-08-31

## 2022-08-31 RX ORDER — MAGNESIUM HYDROXIDE/ALUMINUM HYDROXICE/SIMETHICONE 120; 1200; 1200 MG/30ML; MG/30ML; MG/30ML
30 SUSPENSION ORAL EVERY 4 HOURS PRN
Status: DISCONTINUED | OUTPATIENT
Start: 2022-08-31 | End: 2022-09-02 | Stop reason: HOSPADM

## 2022-08-31 RX ORDER — DICYCLOMINE HYDROCHLORIDE 10 MG/1
10 CAPSULE ORAL 4 TIMES DAILY PRN
Status: DISCONTINUED | OUTPATIENT
Start: 2022-08-31 | End: 2022-09-02 | Stop reason: HOSPADM

## 2022-08-31 RX ORDER — ATORVASTATIN CALCIUM 10 MG/1
10 TABLET, FILM COATED ORAL
Status: DISCONTINUED | OUTPATIENT
Start: 2022-08-31 | End: 2022-08-31

## 2022-08-31 RX ADMIN — SODIUM CHLORIDE 500 ML: 0.9 INJECTION, SOLUTION INTRAVENOUS at 09:22

## 2022-08-31 RX ADMIN — DICYCLOMINE HYDROCHLORIDE 10 MG: 10 CAPSULE ORAL at 13:55

## 2022-08-31 RX ADMIN — ONDANSETRON 4 MG: 2 INJECTION INTRAMUSCULAR; INTRAVENOUS at 09:09

## 2022-08-31 RX ADMIN — METOPROLOL TARTRATE 25 MG: 25 TABLET, FILM COATED ORAL at 16:20

## 2022-08-31 RX ADMIN — LATANOPROST 1 DROP: 50 SOLUTION OPHTHALMIC at 21:16

## 2022-08-31 RX ADMIN — PANTOPRAZOLE SODIUM 40 MG: 40 INJECTION, POWDER, FOR SOLUTION INTRAVENOUS at 21:16

## 2022-08-31 RX ADMIN — HEPARIN SODIUM 4000 UNITS: 1000 INJECTION INTRAVENOUS; SUBCUTANEOUS at 17:40

## 2022-08-31 RX ADMIN — SUCRALFATE 1 G: 1 TABLET ORAL at 21:16

## 2022-08-31 RX ADMIN — ONDANSETRON 4 MG: 2 INJECTION INTRAMUSCULAR; INTRAVENOUS at 19:36

## 2022-08-31 RX ADMIN — TICAGRELOR 180 MG: 90 TABLET ORAL at 17:40

## 2022-08-31 RX ADMIN — HEPARIN SODIUM 11.1 UNITS/KG/HR: 10000 INJECTION, SOLUTION INTRAVENOUS at 17:45

## 2022-08-31 RX ADMIN — SODIUM CHLORIDE 50 ML/HR: 0.9 INJECTION, SOLUTION INTRAVENOUS at 11:54

## 2022-08-31 RX ADMIN — ATORVASTATIN CALCIUM 40 MG: 40 TABLET, FILM COATED ORAL at 21:16

## 2022-08-31 RX ADMIN — ONDANSETRON 4 MG: 2 INJECTION INTRAMUSCULAR; INTRAVENOUS at 13:08

## 2022-08-31 RX ADMIN — SUCRALFATE 1 G: 1 TABLET ORAL at 16:20

## 2022-08-31 RX ADMIN — MORPHINE SULFATE 4 MG: 4 INJECTION INTRAVENOUS at 09:15

## 2022-08-31 RX ADMIN — PANTOPRAZOLE SODIUM 40 MG: 40 INJECTION, POWDER, FOR SOLUTION INTRAVENOUS at 11:50

## 2022-08-31 RX ADMIN — NITROGLYCERIN 0.5 INCH: 20 OINTMENT TOPICAL at 16:21

## 2022-08-31 RX ADMIN — LABETALOL HYDROCHLORIDE 10 MG: 5 INJECTION, SOLUTION INTRAVENOUS at 19:44

## 2022-08-31 NOTE — CONSULTS
Cardiology   Genie Greene 79 y o  female MRN: [id number]  Unit/Bed#: S -77 Encounter: [id number]      Reason for Consult / Principal Problem:  Elevated troponins    Physician Requesting Consult:  Thien Knox MD    Outpatient Cardiologist:  Dr Ray Paz  1  Abdominal/epigastric pain   -Symptoms are atypical but cannot completely rule out cardiac chest pain  -GI following-given history of hiatal hernia  -CT chest 8/30/2022 - moderate to large hiatal hernia fluid within the distal esophagus consistent with reflux  2  Elevated troponin levels -  unclear if NSTEMI type 1/type 2 MI (ACS r/o) vs nonischemic myocardial injury in the setting of accelerated hypertension  -ECG on 8/30 demonstrated SB/first-degree AVB, nonspecific ST T-wave abnormalities  -HS troponin levels; 1376 -- 1265  -CT chest 8/30 - no thoracic aortic aneurysm, coronary artery calcifications, and moderate to large hiatal hernia- fluid within the distal esophagus consistent with reflux  3  Accelerated hypertension  -/122 on admission, last recorded at 152/102   -Outpatient BP regimen;   -Inpatient BP regimen;  4  Coronary artery calcifications   -Noted on CT imaging (8/30/22)  -On low-dose aspirin/statin therapy  5  Ascending aortic dilatation  -3 9 cm on recent TTE in 7/2022  6  Preserved LV systolic function  -Appears compensated  -TTE 7/2022 - LVEF 60%, wall motion normal, diastolic function mildly abnormal with grade 1 DD, LA mildly dilated, mild AI, trace MR and dilated ascending aorta at 3 9 cm    7  Dyslipidemia  -Lipid profile 03/2022 - cholesterol 138, triglycerides 82, HDL 57, LDL 65    -On atorvastatin 10 mg daily  Plan  -Presentation concerning for ACS  Though symptoms are rather atypical we cannot completely rule out cardiac related chest pain  HS troponin levels modestly elevated on admission and now down trending  Would proceed with [facility] procedure for definitive ischemic evaluation    Pt agreeable to plan  Maintain NPO status after midnight and will start IV fluids for hydration in the a m  Izabel Sparks -Limited TTE for function assessment   -Pt is currently without complaints of abdominal pain but is still persistently nauseated with dry heaves -- ?  anginal equivalent symptoms   -Start IV heparin GTT ACS low protocol - until ACS is ruled out   -Continue aspirin 81 mg daily, will load w/Brilinta 180 mg now and start Brilinta 90 mg q12h tomorrow    -Continue statin therapy - will intensify to 40 mg daily for now  -Start metoprolol tartrate 25 mg q 12 hours  Add nitro paste 0 5 in b i d   Monitor renal function and electrolytes closely  Replete to maintain K +level 4 0 magnesium 2 0   -Continue to monitor closely on telemetry  HPI: Mari Lee 79y o  year old female with a medical history of dilated ascending aorta (at 3 9 cm), preserved LV systolic function, essential hypertension, dyslipidemia,hiatal hernia s/p prior Katerina off a GL hernia repair, and COVID-19 + (approximately 2 weeks ago)  Former smoker, denies excessive alcohol or recreational drug use  She does have a family history for coronary artery disease / aortic aneurysms in her father  She routinely follows with Dr Raman Diamond w/[facility], last seen as an outpatient back in 2020  Overall reported been doing well from a cardiac perspective  She initially presented to the [facility] ED on 8/30/2022 with complaints of abdominal pain, nausea/vomiting  In the ED had received IV pain medication and IV anti emetic agents with improvement in her symptoms  CT imaging of the abdomen/pelvis with no acute intra-abdominal process  She was discharged home later that day  She stated her symptoms came back later in the evening and persisted throughout the nighttime hours and into the early morning  She presented back to the [facility] ED today for further evaluation of her symptoms    On my evaluation she is no longer complaining of abdominal pain but is still persistently nauseated with dry heaves  Hemodynamics on admission  -Temp 97 9° F, HR 79, R 18, /122, sat 100% on RA  Laboratory data on admission  -NA +127, K +3 2, chloride 90, CO2 24, anion gap 13, BUN 8, creatinine 0 56, glucose 154, calcium 9 4, AST 25, ALT 16, alk-phos 99, T bili 1 01, lipase 37, albumin 4 2, WBC 9 5, HGB 15 9, and platelet count 603  -HS troponin levels; 1376 -- 1265  ECG on admission demonstrated sinus bradycardia, first-degree AVB, nonspecific ST T-wave abnormalities  Imaging  -CT chest abdomen pelvis with contrast - no acute inflammatory process, moderate to large hiatal hernia, cholelithiasis without evidence of acute cholecystitis  Coronary artery calcifications  Cardiology was consulted for further treatment recommendations/management in the presence of abdominal/epigastric pain with elevated troponin levels concerning for acute coronary syndrome        Family History:   Family History   Problem Relation Age of Onset    Stomach cancer Mother 71    Pancreatic cancer Mother 71    Heart attack Father     Cancer Maternal Uncle     No Known Problems Daughter     No Known Problems Maternal Grandmother     No Known Problems Maternal Grandfather     No Known Problems Paternal Grandmother     No Known Problems Paternal Grandfather     No Known Problems Maternal Aunt     No Known Problems Maternal Aunt      Historical Information   Past Medical History:   Diagnosis Date    [facility]     Disease of thyroid gland     Endometrial cancer ([facility] )     age 45    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    Esophageal reflux     GERD (gastroesophageal reflux disease)     History of ovarian cancer 4/28/2022    Hypertension     Hypothyroid     Obesity     Prediabetes 5/12/2022    Rosacea 5/12/2022     Past Surgical History:   Procedure Laterality Date    CATARACT EXTRACTION, BILATERAL      COLONOSCOPY  08/2018    polyp- 5 years    CYSTOSCOPY      FL RETROGRADE PYELOGRAM  10/15/2021    JOINT REPLACEMENT      KNEE ARTHROPLASTY      CA CYSTO/URETERO W/LITHOTRIPSY &INDWELL STENT INSRT Left 10/15/2021    Procedure: CYSTOSCOPY URETEROSCOPY WITH LITHOTRIPSY HOLMIUM LASER, RETROGRADE PYELOGRAM, BASKET STONE EXTRACTION AND EXCHANGE STENT URETERAL;  Surgeon: Beau Alejandro MD;  [facility];  Service: Urology    CA CYSTOURETHROSCOPY,URETER CATHETER Left 2021    Procedure: CYSTOSCOPY WITH INSERTION STENT URETERAL;  Surgeon: Daxa Kearns MD;  [facility];  Service: Urology    CA ESOPHAGOGASTRODUODENOSCOPY TRANSORAL DIAGNOSTIC N/A 3/5/2021    Procedure: ESOPHAGOGASTRODUODENOSCOPY (EGD);   Surgeon: Natalie Gillespie MD;  [facility];  Service: Thoracic    CA LAP, REPAIR PARAESOPHAGEAL HERNIA, INCL FUNDOPLASTY W/O MESH N/A 3/5/2021    Procedure: REPAIR HERNIA PARAESOPHAGEAL LAPAROSCOPIC W ROBOTICS WITH MESH, DOOR FUNDOPLICATION;  Surgeon: Natalie Gillespie MD;  [facility];  Service: Thoracic    TOTAL ABDOMINAL HYSTERECTOMY      age 45    TOTAL ABDOMINAL HYSTERECTOMY W/ BILATERAL SALPINGOOPHORECTOMY      endometrial cancer     Social History   Social History     Substance and Sexual Activity   Alcohol Use Not Currently    Comment: socially     Social History     Substance and Sexual Activity   Drug Use No     Social History     Tobacco Use   Smoking Status Former Smoker    Packs/day: 0 25    Years: 10 00    Pack years: 2 50    Types: Cigarettes    Start date: 64    Quit date:  Years since quittin 6   Smokeless Tobacco Never Used   Tobacco Comment    Quit 50+ years ago 3/24/21     Family History:   Family History   Problem Relation Age of Onset    Stomach cancer Mother 71    Pancreatic cancer Mother 71    Heart attack Father     Cancer Maternal Uncle     No Known Problems Daughter     No Known Problems Maternal Grandmother     No Known Problems Maternal Grandfather     No Known Problems Paternal Grandmother     No Known Problems Paternal Grandfather     No Known Problems Maternal Aunt     No Known Problems Maternal Aunt        Review of Systems:  Review of Systems   Constitutional: Positive for fatigue  Negative for chills and fever  Respiratory: Negative for cough, chest tightness and shortness of breath  Cardiovascular: Negative for chest pain, palpitations and leg swelling  Gastrointestinal: Positive for nausea  Negative for abdominal pain  Neurological: Negative for dizziness, light-headedness and headaches             Scheduled Meds:  Current Facility-Administered Medications   Medication Dose Route Frequency Provider Last Rate    albuterol  2 puff Inhalation 4x Daily PRN Brigido Duncan MD      aluminum-magnesium hydroxide-simethicone  30 mL Oral Q4H PRN Brigido Duncan MD      vitamin C  2,000 mg Oral Daily Brigido Duncan MD      aspirin  81 mg Oral Daily Brigido Duncan MD      atorvastatin  10 mg Oral HS Brigido Duncan MD      dicyclomine  10 mg Oral 4x Daily PRN Brigido Duncan MD      latanoprost  1 drop Both Eyes BID Brigido Duncan MD      levothyroxine  25 mcg Oral Daily Brigido Duncan MD      morphine injection  2 mg Intravenous Q4H PRN Brigido Duncan MD      ondansetron  4 mg Intravenous Q6H PRN Brigido Duncan MD      pantoprazole  40 mg Intravenous Q12H [facility] Ton Zarate MD      sodium chloride  50 mL/hr Intravenous Continuous Brigido Duncan MD 50 mL/hr (08/31/22 1154)     Continuous Infusions:sodium chloride, 50 mL/hr, Last Rate: 50 mL/hr (08/31/22 1154)      PRN Meds:   albuterol    aluminum-magnesium hydroxide-simethicone    dicyclomine    morphine injection    ondansetron  all current active meds have been reviewed and current meds:   Current Facility-Administered Medications   Medication Dose Route Frequency    albuterol (PROVENTIL HFA,VENTOLIN HFA) inhaler 2 puff  2 puff Inhalation 4x Daily PRN    aluminum-magnesium hydroxide-simethicone (MYLANTA) oral suspension 30 mL  30 mL Oral Q4H PRN    ascorbic acid (VITAMIN C) tablet 2,000 mg  2,000 mg Oral Daily    aspirin (ECOTRIN LOW STRENGTH) EC tablet 81 mg  81 mg Oral Daily    atorvastatin (LIPITOR) tablet 10 mg  10 mg Oral HS    dicyclomine (BENTYL) capsule 10 mg  10 mg Oral 4x Daily PRN    latanoprost (XALATAN) 0 005 % ophthalmic solution 1 drop  1 drop Both Eyes BID    levothyroxine tablet 25 mcg  25 mcg Oral Daily    morphine injection 2 mg  2 mg Intravenous Q4H PRN    ondansetron (ZOFRAN) injection 4 mg  4 mg Intravenous Q6H PRN    pantoprazole (PROTONIX) injection 40 mg  40 mg Intravenous Q12H [address]    sodium chloride 0 9 % infusion  50 mL/hr Intravenous Continuous       No Known Allergies    Objective   Vitals: Blood pressure (!) 152/102, pulse 94, temperature 98 °F (36 7 °C), resp   rate 18, weight 90 6 kg (199 lb 11 8 oz), SpO2 96 %, not currently breastfeeding , Body mass index is 35 38 kg/m² ,   Orthostatic Blood Pressures    Flowsheet Row Most Recent Value   Blood Pressure 152/102 filed at 08/31/2022 1320   Patient Position - Orthostatic VS Sitting filed at 08/31/2022 1320            Intake/Output Summary (Last 24 hours) at 8/31/2022 1501  Last data filed at 8/31/2022 1153  Gross per 24 hour   Intake 500 ml   Output --   Net 500 ml       Invasive Devices  Report    Peripheral Intravenous Line  Duration           Peripheral IV 08/31/22 Right Antecubital <1 day              Physical Exam:  Physical Exam    Lab Results:   Recent Results (from the past 24 hour(s))   CBC and differential    Collection Time: 08/30/22  6:32 PM   Result Value Ref Range    WBC 9 31 4 31 - 10 16 Thousand/uL    RBC 4 50 3 81 - 5 12 Million/uL    Hemoglobin 15 0 11 5 - 15 4 g/dL    Hematocrit 41 4 34 8 - 46 1 %    MCV 92 82 - 98 fL    MCH 33 3 26 8 - 34 3 pg    MCHC 36 2 31 4 - 37 4 g/dL    RDW 12 4 11 6 - 15 1 %    MPV 10 6 8 9 - 12 7 fL    Platelets 231 032 - 435 Thousands/uL    nRBC 0 /100 WBCs    Neutrophils Relative 83 (H) 43 - 75 %    Immat GRANS % 1 0 - 2 %    Lymphocytes Relative 11 (L) 14 - 44 %    Monocytes Relative 5 4 - 12 %    Eosinophils Relative 0 0 - 6 %    Basophils Relative 0 0 - 1 %    Neutrophils Absolute 7 73 (H) 1 85 - 7 62 Thousands/µL    Immature Grans Absolute 0 05 0 00 - 0 20 Thousand/uL    Lymphocytes Absolute 1 02 0 60 - 4 47 Thousands/µL    Monocytes Absolute 0 47 0 17 - 1 22 Thousand/µL    Eosinophils Absolute 0 01 0 00 - 0 61 Thousand/µL    Basophils Absolute 0 03 0 00 - 0 10 Thousands/µL   Comprehensive metabolic panel    Collection Time: 08/30/22  6:32 PM   Result Value Ref Range    Sodium 127 (L) 135 - 147 mmol/L    Potassium 3 5 3 5 - 5 3 mmol/L    Chloride 93 (L) 96 - 108 mmol/L    CO2 23 21 - 32 mmol/L    ANION GAP 11 4 - 13 mmol/L    BUN 9 5 - 25 mg/dL    Creatinine 0 54 (L) 0 60 - 1 30 mg/dL    Glucose 152 (H) 65 - 140 mg/dL    Calcium 9 0 8 4 - 10 2 mg/dL    AST 24 13 - 39 U/L    ALT 15 7 - 52 U/L    Alkaline Phosphatase 96 34 - 104 U/L    Total Protein 6 8 6 4 - 8 4 g/dL    Albumin 4 0 3 5 - 5 0 g/dL    Total Bilirubin 0 92 0 20 - 1 00 mg/dL    eGFR 88 ml/min/1 73sq m   Lipase    Collection Time: 08/30/22  6:32 PM   Result Value Ref Range    Lipase 16 11 - 82 u/L   ECG 12 lead    Collection Time: 08/30/22  7:03 PM   Result Value Ref Range    Ventricular Rate 59 BPM    Atrial Rate 59 BPM    NJ Interval 216 ms    QRSD Interval 92 ms    QT Interval 470 ms    QTC Interval 465 ms    P Axis 28 degrees    QRS Axis -30 degrees    T Wave Axis 60 degrees   CBC and differential    Collection Time: 08/31/22  8:48 AM   Result Value Ref Range    WBC 9 57 4 31 - 10 16 Thousand/uL    RBC 4 98 3 81 - 5 12 Million/uL    Hemoglobin 15 9 (H) 11 5 - 15 4 g/dL    Hematocrit 45 4 34 8 - 46 1 %    MCV 91 82 - 98 fL    MCH 31 9 26 8 - 34 3 pg    MCHC 35 0 31 4 - 37 4 g/dL    RDW 12 3 11 6 - 15 1 %    MPV 10 3 8 9 - 12 7 fL    Platelets 574 377 - 748 Thousands/uL    nRBC 0 /100 WBCs Neutrophils Relative 79 (H) 43 - 75 %    Immat GRANS % 0 0 - 2 %    Lymphocytes Relative 14 14 - 44 %    Monocytes Relative 7 4 - 12 %    Eosinophils Relative 0 0 - 6 %    Basophils Relative 0 0 - 1 %    Neutrophils Absolute 7 44 1 85 - 7 62 Thousands/µL    Immature Grans Absolute 0 04 0 00 - 0 20 Thousand/uL    Lymphocytes Absolute 1 36 0 60 - 4 47 Thousands/µL    Monocytes Absolute 0 71 0 17 - 1 22 Thousand/µL    Eosinophils Absolute 0 00 0 00 - 0 61 Thousand/µL    Basophils Absolute 0 02 0 00 - 0 10 Thousands/µL   Comprehensive metabolic panel    Collection Time: 08/31/22  8:48 AM   Result Value Ref Range    Sodium 127 (L) 135 - 147 mmol/L    Potassium 3 2 (L) 3 5 - 5 3 mmol/L    Chloride 90 (L) 96 - 108 mmol/L    CO2 24 21 - 32 mmol/L    ANION GAP 13 4 - 13 mmol/L    BUN 8 5 - 25 mg/dL    Creatinine 0 56 (L) 0 60 - 1 30 mg/dL    Glucose 154 (H) 65 - 140 mg/dL    Calcium 9 4 8 4 - 10 2 mg/dL    AST 25 13 - 39 U/L    ALT 16 7 - 52 U/L    Alkaline Phosphatase 99 34 - 104 U/L    Total Protein 7 3 6 4 - 8 4 g/dL    Albumin 4 2 3 5 - 5 0 g/dL    Total Bilirubin 1 01 (H) 0 20 - 1 00 mg/dL    eGFR 87 ml/min/1 73sq m   Lipase    Collection Time: 08/31/22  8:48 AM   Result Value Ref Range    Lipase 37 11 - 82 u/L   HS Troponin 0hr (reflex protocol)    Collection Time: 08/31/22 11:34 AM   Result Value Ref Range    hs TnI 0hr 1,376 (H) "Refer to ACS Flowchart"- see link ng/L   Basic metabolic panel    Collection Time: 08/31/22 11:34 AM   Result Value Ref Range    Sodium 128 (L) 135 - 147 mmol/L    Potassium 3 3 (L) 3 5 - 5 3 mmol/L    Chloride 90 (L) 96 - 108 mmol/L    CO2 26 21 - 32 mmol/L    ANION GAP 12 4 - 13 mmol/L    BUN 9 5 - 25 mg/dL    Creatinine 0 56 (L) 0 60 - 1 30 mg/dL    Glucose 125 65 - 140 mg/dL    Calcium 9 1 8 4 - 10 2 mg/dL    eGFR 87 ml/min/1 73sq m   HS Troponin I 2hr    Collection Time: 08/31/22  1:59 PM   Result Value Ref Range    hs TnI 2hr 1,265 (H) "Refer to ACS Flowchart"- see link ng/L Delta 2hr hsTnI -111 <20 ng/L     Imaging: I have personally reviewed pertinent reports  and I have personally reviewed pertinent films in PACS  Code Status:  Prior  Epic/ Allscripts/Care Everywhere records reviewed:  Yes    * Please Note: Fluency DirectDictation voice to text software may have been used in the creation of this document   **

## 2022-08-31 NOTE — ASSESSMENT & PLAN NOTE
Patient has known paraesophageal hernia status post robotic repair with recurrence  Patient saw thoracic surgery in the office approximately year ago and at that time risks were thought to outweigh benefits of any potential revisit patient of surgical repair  Since then the patient notes that she has lost 70-80 lb and has had increasing dyspeptic symptoms  Since yesterday she has noted that her symptoms have been   "Intolerable" and comes in with the inability to take anything orally since the day before yesterday and constant belching and discomfort in her epigastrium and chest     CT chest abdomen pelvis with contrast did not show a dissection or any cardiopulmonary disease to explain her current symptoms  We will check a troponin       Also discussed with CT surgery team on call - they recommend barium swallow and further discussion with them depending on clinical course

## 2022-08-31 NOTE — ED NOTES
US at bedside      Valeriano Aden, [facility]  08/31/22 6180

## 2022-08-31 NOTE — CONSULTS
Consultation - [facility]  Arvind Burton 79 y o  female MRN: [id number]  Unit/Bed#: S -26 Encounter: [id number]        Inpatient consult to gastroenterology  Consult performed by: Warren Velazquez PA-C  Consult ordered by: Franco Reyes MD          Reason for Consult / Principal Problem: Epigastric abdominal pain    HPI: Arvind Burton is a 79y o  year old female with history of paraesophageal hiatal hernia repair March 2021 who presented to the hospital with complaint of sudden onset of chest pain 2 days ago and lasting all day yesterday, she said she also had not eaten much in the last 2 days, last intake was a quarter of a bagel yesterday morning  She says that in a more longstanding sense she has been having problems with spitting up phlegm, spitting up with other oral intake, she says she was thought to possibly need repair of her hiatal hernia again, as her CT scan here did show evidence of moderate to large-sized hiatal hernia despite her reported surgical history  She was noted with hypokalemia and hyponatremia, as well as elevated troponins, for which Cardiology was consulted to see her, she is actually being planned for a cardiac catheterization  She says she still feels nauseous at this time, has been NPO today  Her last colonoscopy was in 2020 which she thinks was normal   She says she takes Protonix every evening at home  Denies any hematemesis  Denies any melena or disturbances to her bowel habits  REVIEW OF SYSTEMS:    CONSTITUTIONAL: Denies any fever, chills, or rigors  Good appetite, and no recent weight loss  HEENT: No earache or tinnitus  Denies hearing loss or visual disturbances  CARDIOVASCULAR: No chest pain or palpitations  RESPIRATORY: Denies any cough, hemoptysis, shortness of breath or dyspnea on exertion  GASTROINTESTINAL: As noted in the History of Present Illness  GENITOURINARY: No problems with urination   Denies any hematuria or dysuria  NEUROLOGIC: No dizziness or vertigo, denies headaches  MUSCULOSKELETAL: Denies any muscle or joint pain  SKIN: Denies skin rashes or itching  ENDOCRINE: Denies excessive thirst  Denies intolerance to heat or cold  PSYCHOSOCIAL: Denies depression or anxiety  Denies any recent memory loss  Historical Information   Past Medical History:   Diagnosis Date    Cancer (RUST [facility] )     Disease of thyroid gland     Endometrial cancer ([facility] 75 )     age 45    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    Esophageal reflux     GERD (gastroesophageal reflux disease)     History of ovarian cancer 4/28/2022    Hypertension     Hypothyroid     Obesity     Prediabetes 5/12/2022    Rosacea 5/12/2022     Past Surgical History:   Procedure Laterality Date    CATARACT EXTRACTION, BILATERAL      COLONOSCOPY  08/2018    polyp- 5 years    CYSTOSCOPY      FL RETROGRADE PYELOGRAM  10/15/2021    JOINT REPLACEMENT      KNEE ARTHROPLASTY      AK CYSTO/URETERO W/LITHOTRIPSY &INDWELL STENT INSRT Left 10/15/2021    Procedure: CYSTOSCOPY URETEROSCOPY WITH LITHOTRIPSY HOLMIUM LASER, RETROGRADE PYELOGRAM, BASKET STONE EXTRACTION AND EXCHANGE STENT URETERAL;  Surgeon: Mandeep Simmons MD;  [facility]: [facility];  Service: Urology    AK CYSTOURETHROSCOPY,URETER CATHETER Left 8/24/2021    Procedure: CYSTOSCOPY WITH INSERTION STENT URETERAL;  Surgeon: Bailee Gonzalez MD;  [facility];  Service: Urology    AK ESOPHAGOGASTRODUODENOSCOPY TRANSORAL DIAGNOSTIC N/A 3/5/2021    Procedure: ESOPHAGOGASTRODUODENOSCOPY (EGD);   Surgeon: Brian Mathews MD;  [facility];  Service: Thoracic    AK LAP, REPAIR PARAESOPHAGEAL HERNIA, INCL FUNDOPLASTY W/O MESH N/A 3/5/2021    Procedure: REPAIR HERNIA PARAESOPHAGEAL LAPAROSCOPIC W ROBOTICS WITH MESH, DOOR FUNDOPLICATION;  Surgeon: Brian Mathews MD;  [facility];  Service: Thoracic    TOTAL ABDOMINAL HYSTERECTOMY      age 45   [facility] HYSTERECTOMY W/ BILATERAL SALPINGOOPHORECTOMY      endometrial cancer     Social History   Social History     Substance and Sexual Activity   Alcohol Use Not Currently    Comment: socially     Social History     Substance and Sexual Activity   Drug Use No     Social History     Tobacco Use   Smoking Status Former Smoker    Packs/day: 0 25    Years: 10 00    Pack years: 2 50    Types: Cigarettes    Start date: 64    Quit date: 55    Years since quittin 6   Smokeless Tobacco Never Used   Tobacco Comment    Quit 50+ years ago 3/24/21     Family History   Problem Relation Age of Onset    Stomach cancer Mother 71    Pancreatic cancer Mother 71    Heart attack Father     Cancer Maternal Uncle     No Known Problems Daughter     No Known Problems Maternal Grandmother     No Known Problems Maternal Grandfather     No Known Problems Paternal Grandmother     No Known Problems Paternal Grandfather     No Known Problems Maternal Aunt     No Known Problems Maternal Aunt        Meds/Allergies     Medications Prior to Admission   Medication    albuterol (PROVENTIL HFA,VENTOLIN HFA) 90 mcg/act inhaler    Ascorbic Acid (vitamin C) 1000 MG tablet    aspirin (ECOTRIN LOW STRENGTH) 81 mg EC tablet    atorvastatin (LIPITOR) 10 mg tablet    Cholecalciferol (VITAMIN D3) 400 units CAPS    Combigan 0 2-0 5 %    latanoprost (XALATAN) 0 005 % ophthalmic solution    levothyroxine 50 mcg tablet    Multiple Vitamin (MULTIVITAMINS PO)    pantoprazole (PROTONIX) 40 mg tablet    amoxicillin (AMOXIL) 500 MG tablet    metoclopramide (Reglan) 10 mg tablet     Current Facility-Administered Medications   Medication Dose Route Frequency    albuterol (PROVENTIL HFA,VENTOLIN HFA) inhaler 2 puff  2 puff Inhalation 4x Daily PRN    aluminum-magnesium hydroxide-simethicone (MYLANTA) oral suspension 30 mL  30 mL Oral Q4H PRN    ascorbic acid (VITAMIN C) tablet 2,000 mg  2,000 mg Oral Daily    aspirin (ECOTRIN LOW STRENGTH) EC tablet 81 mg  81 mg Oral Daily    atorvastatin (LIPITOR) tablet 10 mg  10 mg Oral HS    dicyclomine (BENTYL) capsule 10 mg  10 mg Oral 4x Daily PRN    latanoprost (XALATAN) 0 005 % ophthalmic solution 1 drop  1 drop Both Eyes BID    levothyroxine tablet 25 mcg  25 mcg Oral Daily    morphine injection 2 mg  2 mg Intravenous Q4H PRN    ondansetron (ZOFRAN) injection 4 mg  4 mg Intravenous Q6H PRN    pantoprazole (PROTONIX) injection 40 mg  40 mg Intravenous Q12H [facility]    sodium chloride 0 9 % infusion  50 mL/hr Intravenous Continuous       No Known Allergies        Objective     Blood pressure (!) 152/102, pulse 94, temperature 98 °F (36 7 °C), resp  rate 18, weight 90 6 kg (199 lb 11 8 oz), SpO2 96 %, not currently breastfeeding  Intake/Output Summary (Last 24 hours) at 8/31/2022 1432  Last data filed at 8/31/2022 1153  Gross per 24 hour   Intake 500 ml   Output --   Net 500 ml         PHYSICAL EXAM     General Appearance:   Alert, cooperative, no distress, appears stated age    HEENT:   Normocephalic, atraumatic, anicteric      Neck:  Supple, symmetrical, trachea midline, no adenopathy;    thyroid: no enlargement/tenderness/nodules; no carotid  bruit or JVD    Lungs:   Clear to auscultation bilaterally; no rales, rhonchi or wheezing; respirations unlabored    Heart[de-identified]   S1 and S2 normal; regular rate and rhythm; no murmur, rub, or gallop     Abdomen:   Soft, non-tender, non-distended; normal bowel sounds; no masses, no organomegaly    Genitalia:   Deferred    Rectal:   Deferred    Extremities:  No cyanosis, clubbing or edema    Pulses:  2+ and symmetric all extremities    Skin:  Skin color, texture, turgor normal, no rashes or lesions    Lymph nodes:  No palpable cervical, axillary or inguinal lymphadenopathy        Lab Results:   Admission on 08/31/2022   Component Date Value    WBC 08/31/2022 9 57     RBC 08/31/2022 4 98     Hemoglobin 08/31/2022 15 9 (A)    Hematocrit 08/31/2022 45 4  MCV 08/31/2022 91     MCH 08/31/2022 31 9     MCHC 08/31/2022 35 0     RDW 08/31/2022 12 3     MPV 08/31/2022 10 3     Platelets 23/02/1358 288     nRBC 08/31/2022 0     Neutrophils Relative 08/31/2022 79 (A)    Immat GRANS % 08/31/2022 0     Lymphocytes Relative 08/31/2022 14     Monocytes Relative 08/31/2022 7     Eosinophils Relative 08/31/2022 0     Basophils Relative 08/31/2022 0     Neutrophils Absolute 08/31/2022 7 44     Immature Grans Absolute 08/31/2022 0 04     Lymphocytes Absolute 08/31/2022 1 36     Monocytes Absolute 08/31/2022 0 71     Eosinophils Absolute 08/31/2022 0 00     Basophils Absolute 08/31/2022 0 02     Sodium 08/31/2022 127 (A)    Potassium 08/31/2022 3 2 (A)    Chloride 08/31/2022 90 (A)    CO2 08/31/2022 24     ANION GAP 08/31/2022 13     BUN 08/31/2022 8     Creatinine 08/31/2022 0 56 (A)    Glucose 08/31/2022 154 (A)    Calcium 08/31/2022 9 4     AST 08/31/2022 25     ALT 08/31/2022 16     Alkaline Phosphatase 08/31/2022 99     Total Protein 08/31/2022 7 3     Albumin 08/31/2022 4 2     Total Bilirubin 08/31/2022 1 01 (A)    eGFR 08/31/2022 87     Lipase 08/31/2022 37     hs TnI 0hr 08/31/2022 1,376 (A)    Sodium 08/31/2022 128 (A)    Potassium 08/31/2022 3 3 (A)    Chloride 08/31/2022 90 (A)    CO2 08/31/2022 26     ANION GAP 08/31/2022 12     BUN 08/31/2022 9     Creatinine 08/31/2022 0 56 (A)    Glucose 08/31/2022 125     Calcium 08/31/2022 9 1     eGFR 08/31/2022 87        Imaging Studies: I have personally reviewed pertinent reports        RIGHT UPPER QUADRANT ULTRASOUND     INDICATION:     pain      COMPARISON:  1/13/2022     TECHNIQUE:   Real-time ultrasound of the right upper quadrant was performed with a curvilinear transducer with both volumetric sweeps and still imaging techniques      FINDINGS:     PANCREAS:  Visualized portions of the pancreas are within normal limits      AORTA AND IVC:  Visualized portions are normal for patient age      LIVER:  Size:  Within normal range  The liver measures 16 cm in the midclavicular line  Contour:  Surface contour is smooth  Parenchyma:  Echogenicity and echotexture are within normal limits  No liver mass identified  Limited imaging of the main portal vein shows it to be patent and hepatopetal      BILIARY:  There is cholelithiasis  There is no gallbladder wall thickening  There is a negative sonographic Merino's sign  No intrahepatic biliary dilatation  CBD measures 7 0 mm  No choledocholithiasis      KIDNEY:   Right kidney measures 10 1 x 5 8 x 5 2 cm  Volume 158 7 mL  There is a right upper pole renal cyst   There are no ASCITES:   None      IMPRESSION:     Cholelithiasis without sonographic evidence for acute cholecystitis  CT CHEST, ABDOMEN AND PELVIS WITH IV CONTRAST     INDICATION:   PT WITH FUNDOPLICATION/ HIATAL HERNIA-  WITH EPIGASTIC PAIN ADN PERSISTENT VOMITING      COMPARISON:  Multiple priors most recently ultrasound 1/13/2022     TECHNIQUE: CT examination of the chest, abdomen and pelvis was performed  Axial, sagittal, and coronal 2D reformatted images were created from the source data and submitted for interpretation      Radiation dose length product (DLP) for this visit:  355 mGy-cm   This examination, like all CT scans performed in the [facility], was performed utilizing techniques to minimize radiation dose exposure, including the use of iterative   reconstruction and automated exposure control      IV Contrast:  80 mL of iohexol (OMNIPAQUE)  Enteric Contrast: Enteric contrast was not administered      FINDINGS:     CHEST     LUNGS:  Cluster of small pulmonary nodules in the medial right upper lobe (series through 2, images 28 through 45), likely postinfectious or inflammatory in nature        PLEURA:  Unremarkable      HEART/GREAT VESSELS: Atherosclerotic coronary artery calcification is noted  Heart is otherwise unremarkable    No thoracic aortic aneurysm      MEDIASTINUM AND REGAN:  Moderate to large hiatal hernia, similar to CT of 10/17/2021  Fluid within the distal esophagus consistent with reflux      CHEST WALL AND LOWER NECK:  Unremarkable      ABDOMEN     LIVER/BILIARY TREE:  1 0 cm avidly enhancing focus in the central liver likely a flash filling hemangioma  This is unchanged from 8/20/2021      GALLBLADDER:  There are gallstone(s) within the gallbladder, without pericholecystic inflammatory changes      SPLEEN:  Unremarkable      PANCREAS:  Unremarkable      ADRENAL GLANDS:  Unremarkable      KIDNEYS/URETERS:  One or more simple renal cyst(s) is noted  Punctate nonobstructing left lower pole renal calculus     No hydronephrosis      STOMACH AND BOWEL:  There is colonic diverticulosis without evidence of acute diverticulitis      APPENDIX:  A normal appendix was visualized      ABDOMINOPELVIC CAVITY:  No ascites  No pneumoperitoneum  No lymphadenopathy      VESSELS:  Atherosclerotic changes are present  No evidence of aneurysm      PELVIS     REPRODUCTIVE ORGANS:  Surgical changes of prior hysterectomy      URINARY BLADDER:  Unremarkable      ABDOMINAL WALL/INGUINAL REGIONS:  Unremarkable      OSSEOUS STRUCTURES:  No acute fracture or destructive osseous lesion  Spinal degenerative changes are noted  Severe bilateral glenohumeral osteoarthritis         IMPRESSION:     No acute inflammatory process or suspicious mass      Moderate to large hiatal hernia, similar to CT of 10/17/2021      Cholelithiasis without evidence of acute cholecystitis      Additional nonacute findings, as described              ASSESSMENT/PLAN:     1   Acute onset of chest pain and worsening of otherwise longstanding dysphagia symptoms; patient was noted with nonspecific abnormalities on EKG and elevated troponin levels, and is being planned for cardiac catheterization; acute cardiac process should certainly be ruled out 1st, but patient may also have GI related process, with broad differential at this time including esophageal motility disorder, hypercontractility, esophagitis/peptic ulcer disease, Kristyn Shutters lesions associated with large hiatal hernia  Does not appear to have volvulus or obstruction associated with hiatal hernia at this time    - continue cardiac evaluation and proceed with catheterization as recommended    -if acute coronary process is excluded, we can consider pursuing EGD for further workup to exclude intraluminal pathology, and if that is normal, next step in the workup would likely be upper GI series to evaluate motility    - continue PPI therapy for now    - discussed patient's plan with Internal Medicine team    The patient was seen and examined by Dr Lynn Azevedo, all estrada medical decisions were made with Dr Lynn Azevedo  Thank you for allowing us to participate in the care of this pleasant patient  We will follow up with you closely

## 2022-08-31 NOTE — QUICK NOTE
Followed up on troponins- found to be positive  Discussed with cardiology- they will see patient -  and ordered stat EKG  Hold of on barium swallow with esophogram until after cardiology eval for now  Cardiac eval takes priority for now

## 2022-08-31 NOTE — DISCHARGE INSTRUCTIONS
Diagnosis; epigastric pain with nausea/ vomiting     - please start diet out with frequent sips of liquids- advancing gradually to more solid foods as tolerated     - for nay nausea- reglan 1 tablet  every 8 hrs as needed only     - please return to  the er for any fevers- temp > 100 4/ any worsening abdominal pain/ any intractable vomiting with the inability to keep anything down by mouth /any7 bloody /coffee ground vomitus-any bloody/black tarry stools

## 2022-08-31 NOTE — ASSESSMENT & PLAN NOTE
127 - likely secondary to poor oral intake possibly also component of SIADH in the setting of nausea vomiting    Will give normal saline gently and recheck every 8 hours

## 2022-08-31 NOTE — H&P
[facility]  H&P- Rhea Leslie 1940, 79 y o  female MRN: [id number]  Unit/Bed#: S -01 Encounter: [id number]  Primary Care Provider: Gt Nicholson MD   Date and time admitted to hospital: 8/31/2022  7:10 AM    Gallstones  [address] does not show cholecystitis  No RUQ pain rather chest and epigastrium     Hyponatremia  Assessment & Plan  127 - likely secondary to poor oral intake possibly also component of SIADH in the setting of nausea vomiting  Will give normal saline gently and recheck every 8 hours    Hypertension  Assessment & Plan  BP is elevated - particularly diastolic   Will continue to monitor and add PRN BB       Weight loss  Assessment & Plan  70 lb weight loss in the setting of dyspeptic symptoms  Awaiting GI eval     Dyspepsia  Assessment & Plan  Patient has known paraesophageal hernia status post robotic repair with recurrence  Patient saw thoracic surgery in the office approximately year ago and at that time risks were thought to outweigh benefits of any potential revisit patient of surgical repair  Since then the patient notes that she has lost 70-80 lb and has had increasing dyspeptic symptoms  Since yesterday she has noted that her symptoms have been   "Intolerable" and comes in with the inability to take anything orally since the day before yesterday and constant belching and discomfort in her epigastrium and chest     CT chest abdomen pelvis with contrast did not show a dissection or any cardiopulmonary disease to explain her current symptoms  We will check a troponin  Also discussed with CT surgery team on call - they recommend barium swallow and further discussion with them depending on clinical course     * Epigastric abdominal pain  Assessment & Plan  ? GI source   Cannot rule out ACS   Will get troponins         VTE Pharmacologic Prophylaxis: VTE Score: 4 Moderate Risk (Score 3-4) - Pharmacological DVT Prophylaxis Ordered: heparin  Code Status: Prior   Discussion with family: Updated  ( and son) at bedside  Anticipated Length of Stay: Patient will be admitted on an inpatient basis with an anticipated length of stay of greater than 2 midnights secondary to epigastric pain   Total Time for Visit, including Counseling / Coordination of Care: 45 minutes Greater than 50% of this total time spent on direct patient counseling and coordination of care  Chief Complaint: chest pain  History of Present Illness:  Slade Akbar is a 79 y o  female with a PMH of paraesophageal hernia s/p repair in March 2021, found to have recurrence a year ago, also with known hypertension hypercholesterolemia and ascending aorta dilatation for which she follows with cardiology with  who presents with epigastric pain, dyspeptic symptoms, weight loss over the course of the last year  This became acutely worse when she woke up yesterday morning and since then had been unable to take anything orally secondary to epigastric and chest discomfort along with belching  She reports nausea but not really vomiting as she has had no got gastric content has been bringing up [address]"  In the ED she is found to have elevated blood pressure and hyponatremia with a sodium of 127  Additionally is CT chest abdomen pelvis was done which did not comment about thoracic aortic aneurysm or aortic root dilation  Furthermore did show a hiatal hernia and gallstones  Ultrasound was also performed and showed gallstones but no cholecystitis  Review of Systems:  Review of Systems   Constitutional: Positive for unexpected weight change  HENT: Negative  Respiratory: Positive for chest tightness  Negative for apnea, cough, choking, wheezing and stridor  Cardiovascular: Positive for chest pain  Gastrointestinal: Positive for abdominal pain, nausea and vomiting  Endocrine: Negative  Genitourinary: Negative      Musculoskeletal: Negative  Skin: Negative  Allergic/Immunologic: Negative  Neurological: Positive for weakness  Hematological: Negative  Psychiatric/Behavioral: Negative  Past Medical and Surgical History:   Past Medical History:   Diagnosis Date    Cancer [facility]     Disease of thyroid gland     Endometrial cancer ([facility] )     age 45    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    Esophageal reflux     GERD (gastroesophageal reflux disease)     History of ovarian cancer 4/28/2022    Hypertension     Hypothyroid     Obesity     Prediabetes 5/12/2022    Rosacea 5/12/2022       Past Surgical History:   Procedure Laterality Date    CATARACT EXTRACTION, BILATERAL      COLONOSCOPY  08/2018    polyp- 5 years    CYSTOSCOPY      FL RETROGRADE PYELOGRAM  10/15/2021    JOINT REPLACEMENT      KNEE ARTHROPLASTY      UT CYSTO/URETERO W/LITHOTRIPSY &INDWELL STENT INSRT Left 10/15/2021    Procedure: CYSTOSCOPY URETEROSCOPY WITH LITHOTRIPSY HOLMIUM LASER, RETROGRADE PYELOGRAM, BASKET STONE EXTRACTION AND EXCHANGE STENT URETERAL;  Surgeon: Tyrell Bernal MD;  [facility];  Service: Urology    UT CYSTOURETHROSCOPY,URETER CATHETER Left 8/24/2021    Procedure: CYSTOSCOPY WITH INSERTION STENT URETERAL;  Surgeon: Abraham Mathews MD;  [facility];  Service: Urology    UT ESOPHAGOGASTRODUODENOSCOPY TRANSORAL DIAGNOSTIC N/A 3/5/2021    Procedure: ESOPHAGOGASTRODUODENOSCOPY (EGD);   Surgeon: Surya Pardees MD;  [facility];  Service: Thoracic    UT LAP, REPAIR PARAESOPHAGEAL HERNIA, INCL FUNDOPLASTY W/O MESH N/A 3/5/2021    Procedure: REPAIR HERNIA PARAESOPHAGEAL LAPAROSCOPIC W ROBOTICS WITH MESH, DOOR FUNDOPLICATION;  Surgeon: Surya Paredes MD;  [facility];  Service: Thoracic    TOTAL ABDOMINAL HYSTERECTOMY      age 45    TOTAL ABDOMINAL HYSTERECTOMY W/ BILATERAL SALPINGOOPHORECTOMY  1992    endometrial cancer       Meds/Allergies:  Prior to Admission medications Medication Sig Start Date End Date Taking? Authorizing Provider   albuterol (PROVENTIL HFA,VENTOLIN HFA) 90 mcg/act inhaler Inhale 2 puffs 4 (four) times a day as needed 4/21/22  Yes Historical Provider, MD   Ascorbic Acid (vitamin C) 1000 MG tablet Take 2,000 mg by mouth daily   Yes Historical Provider, MD   aspirin (ECOTRIN LOW STRENGTH) 81 mg EC tablet Take 81 mg by mouth daily   Yes Historical Provider, MD   atorvastatin (LIPITOR) 10 mg tablet TAKE 1 TABLET BY MOUTH  DAILY AT BEDTIME 7/18/22  Yes Linh Jimenez MD   Cholecalciferol (VITAMIN D3) 400 units CAPS Take by mouth 7/21/11  Yes Historical Provider, MD   Combigan 0 2-0 5 % INSTILL 1 DROP IN BOTH EYES TWICE DAILY 2/6/21  Yes Historical Provider, MD   latanoprost (XALATAN) 0 005 % ophthalmic solution Administer 1 drop to both eyes 2 (two) times a day   Yes Historical Provider, MD   levothyroxine 50 mcg tablet Take 0 5 tablets by mouth daily  10/24/17  Yes Historical Provider, MD   Multiple Vitamin (MULTIVITAMINS PO) Take 1 tablet by mouth daily 7/30/12  Yes Historical Provider, MD   pantoprazole (PROTONIX) 40 mg tablet Take by mouth 5/2/11  Yes Historical Provider, MD   amoxicillin (AMOXIL) 500 MG tablet TAKE 4 TABLETS ONE HOUR BEFORE DENTAL PROCEDURE  Patient not taking: Reported on 8/31/2022 5/12/22   Historical Provider, MD   metoclopramide (Reglan) 10 mg tablet Take 0 5 tablets (5 mg total) by mouth 3 (three) times a day as needed (nausea) for up to 10 doses  Patient not taking: Reported on 8/31/2022 8/30/22   Shona Dee MD   metoclopramide (Reglan) 10 mg tablet Take 0 5 tablets (5 mg total) by mouth 3 (three) times a day as needed (nausea/ vomiting) for up to 10 doses 8/30/22 8/31/22  Shona Dee MD     I have reviewed home medications with patient personally      Allergies: No Known Allergies    Social History:  Marital Status: /Civil Union   Occupation:  Retired homemaker  Patient Pre-hospital Living Situation: Home  Patient Pre-hospital Level of Mobility: walks  Patient Pre-hospital Diet Restrictions:  None  Substance Use History:   Social History     Substance and Sexual Activity   Alcohol Use Not Currently    Comment: socially     Social History     Tobacco Use   Smoking Status Former Smoker    Packs/day: 0 25    Years: 10 00    Pack years: 2 50    Types: Cigarettes    Start date: 64    Quit date: 55    Years since quittin 6   Smokeless Tobacco Never Used   Tobacco Comment    Quit 50+ years ago 3/24/21     Social History     Substance and Sexual Activity   Drug Use No       Family History:  Family History   Problem Relation Age of Onset    Stomach cancer Mother 71    Pancreatic cancer Mother 71    Heart attack Father     Cancer Maternal Uncle     No Known Problems Daughter     No Known Problems Maternal Grandmother     No Known Problems Maternal Grandfather     No Known Problems Paternal Grandmother     No Known Problems Paternal Grandfather     No Known Problems Maternal Aunt     No Known Problems Maternal Aunt        Physical Exam:     Vitals:   Blood Pressure: (!) 152/102 (22 1320)  Pulse: 94 (22 1312)  Temperature: 98 °F (36 7 °C) (22 1312)  Temp Source: Oral (22 0714)  Respirations: 18 (22 1100)  Weight - Scale: 90 6 kg (199 lb 11 8 oz) (22 0712)  SpO2: 96 % (22 1312)    Physical Exam  Constitutional:       General: She is not in acute distress  Appearance: She is not ill-appearing or toxic-appearing  HENT:      Head: Normocephalic  Nose: Nose normal       Mouth/Throat:      Mouth: Mucous membranes are moist    Eyes:      General: No scleral icterus  Right eye: No discharge  Left eye: No discharge  Pupils: Pupils are equal, round, and reactive to light  Cardiovascular:      Rate and Rhythm: Normal rate  Pulmonary:      Effort: Pulmonary effort is normal  No respiratory distress  Breath sounds: No stridor   No wheezing or rales    Chest:      Chest wall: No tenderness  Abdominal:      General: There is no distension  Palpations: There is no mass  Tenderness: There is abdominal tenderness (epigastric )  There is no right CVA tenderness, left CVA tenderness, guarding or rebound  Hernia: No hernia is present  Musculoskeletal:         General: No swelling, tenderness, deformity or signs of injury  Right lower leg: No edema  Skin:     Coloration: Skin is pale  Skin is not jaundiced  Findings: No bruising, erythema or rash  Neurological:      Mental Status: She is alert  Cranial Nerves: No cranial nerve deficit  Sensory: No sensory deficit  Motor: No weakness  Coordination: Coordination normal       Gait: Gait normal    Psychiatric:         Mood and Affect: Mood normal           Additional Data:     Lab Results:  Results from last 7 days   Lab Units 08/31/22  0848   WBC Thousand/uL 9 57   HEMOGLOBIN g/dL 15 9*   HEMATOCRIT % 45 4   PLATELETS Thousands/uL 288   NEUTROS PCT % 79*   LYMPHS PCT % 14   MONOS PCT % 7   EOS PCT % 0     Results from last 7 days   Lab Units 08/31/22  1134 08/31/22  0848   SODIUM mmol/L 128* 127*   POTASSIUM mmol/L 3 3* 3 2*   CHLORIDE mmol/L 90* 90*   CO2 mmol/L 26 24   BUN mg/dL 9 8   CREATININE mg/dL 0 56* 0 56*   ANION GAP mmol/L 12 13   CALCIUM mg/dL 9 1 9 4   ALBUMIN g/dL  --  4 2   TOTAL BILIRUBIN mg/dL  --  1 01*   ALK PHOS U/L  --  99   ALT U/L  --  16   AST U/L  --  25   GLUCOSE RANDOM mg/dL 125 154*                       Imaging: No pertinent imaging reviewed  US right upper quadrant   Final Result by Brandt Campos MD (08/31 8905)      Cholelithiasis without sonographic evidence for acute cholecystitis  Workstation performed: [id number]             EKG and Other Studies Reviewed on Admission:   · EKG: Sinus Bradycardia   HR Reviewed ekg from yesterday only no ekg from this am     ** Please Note: This note has been constructed using a voice recognition system   **

## 2022-08-31 NOTE — ED NOTES
Patient transported to room 408 and placed on telemetry, PCA at bedside upon arrival and receiving RN shortly after   Transmission of telemetry to central station confirmed     Williston Fraction  08/31/22 1349

## 2022-09-01 ENCOUNTER — APPOINTMENT (OUTPATIENT)
Dept: NON INVASIVE DIAGNOSTICS | Facility: HOSPITAL | Age: 82
DRG: 392 | End: 2022-09-01
Payer: MEDICARE

## 2022-09-01 PROBLEM — R77.8 ELEVATED TROPONIN: Status: ACTIVE | Noted: 2022-09-01

## 2022-09-01 PROBLEM — I71.21 ASCENDING AORTIC ANEURYSM: Status: ACTIVE | Noted: 2018-10-23

## 2022-09-01 PROBLEM — R79.89 ELEVATED TROPONIN: Status: ACTIVE | Noted: 2022-09-01

## 2022-09-01 PROBLEM — I71.2 ASCENDING AORTIC ANEURYSM: Status: ACTIVE | Noted: 2018-10-23

## 2022-09-01 LAB
ANION GAP SERPL CALCULATED.3IONS-SCNC: 10 MMOL/L (ref 4–13)
ANION GAP SERPL CALCULATED.3IONS-SCNC: 10 MMOL/L (ref 4–13)
ANION GAP SERPL CALCULATED.3IONS-SCNC: 12 MMOL/L (ref 4–13)
ANION GAP SERPL CALCULATED.3IONS-SCNC: 9 MMOL/L (ref 4–13)
APICAL FOUR CHAMBER EJECTION FRACTION: 66 %
APTT PPP: 87 SECONDS (ref 23–37)
APTT PPP: 96 SECONDS (ref 23–37)
ASCENDING AORTA: 3.6 CM
ATRIAL RATE: 103 BPM
BUN SERPL-MCNC: 13 MG/DL (ref 5–25)
BUN SERPL-MCNC: 17 MG/DL (ref 5–25)
BUN SERPL-MCNC: 19 MG/DL (ref 5–25)
BUN SERPL-MCNC: 24 MG/DL (ref 5–25)
CALCIUM SERPL-MCNC: 8.2 MG/DL (ref 8.4–10.2)
CALCIUM SERPL-MCNC: 8.7 MG/DL (ref 8.4–10.2)
CALCIUM SERPL-MCNC: 8.8 MG/DL (ref 8.4–10.2)
CALCIUM SERPL-MCNC: 9 MG/DL (ref 8.4–10.2)
CHLORIDE SERPL-SCNC: 93 MMOL/L (ref 96–108)
CHLORIDE SERPL-SCNC: 98 MMOL/L (ref 96–108)
CO2 SERPL-SCNC: 22 MMOL/L (ref 21–32)
CO2 SERPL-SCNC: 24 MMOL/L (ref 21–32)
CO2 SERPL-SCNC: 25 MMOL/L (ref 21–32)
CO2 SERPL-SCNC: 26 MMOL/L (ref 21–32)
CREAT SERPL-MCNC: 0.63 MG/DL (ref 0.6–1.3)
CREAT SERPL-MCNC: 0.96 MG/DL (ref 0.6–1.3)
CREAT SERPL-MCNC: 0.96 MG/DL (ref 0.6–1.3)
CREAT SERPL-MCNC: 0.99 MG/DL (ref 0.6–1.3)
E WAVE DECELERATION TIME: 407 MS
ERYTHROCYTE [DISTWIDTH] IN BLOOD BY AUTOMATED COUNT: 13 % (ref 11.6–15.1)
FRACTIONAL SHORTENING: 32 (ref 28–44)
GFR SERPL CREATININE-BSD FRML MDRD: 53 ML/MIN/1.73SQ M
GFR SERPL CREATININE-BSD FRML MDRD: 55 ML/MIN/1.73SQ M
GFR SERPL CREATININE-BSD FRML MDRD: 55 ML/MIN/1.73SQ M
GFR SERPL CREATININE-BSD FRML MDRD: 83 ML/MIN/1.73SQ M
GLUCOSE P FAST SERPL-MCNC: 108 MG/DL (ref 65–99)
GLUCOSE P FAST SERPL-MCNC: 98 MG/DL (ref 65–99)
GLUCOSE SERPL-MCNC: 108 MG/DL (ref 65–140)
GLUCOSE SERPL-MCNC: 116 MG/DL (ref 65–140)
GLUCOSE SERPL-MCNC: 124 MG/DL (ref 65–140)
GLUCOSE SERPL-MCNC: 98 MG/DL (ref 65–140)
HCT VFR BLD AUTO: 44 % (ref 34.8–46.1)
HGB BLD-MCNC: 15.1 G/DL (ref 11.5–15.4)
INR PPP: 1.18 (ref 0.84–1.19)
INTERVENTRICULAR SEPTUM IN DIASTOLE (PARASTERNAL SHORT AXIS VIEW): 1.2 CM
INTERVENTRICULAR SEPTUM: 1.2 CM (ref 0.6–1.1)
LEFT INTERNAL DIMENSION IN SYSTOLE: 2.5 CM (ref 2.1–4)
LEFT VENTRICULAR INTERNAL DIMENSION IN DIASTOLE: 3.7 CM (ref 3.5–6)
LEFT VENTRICULAR POSTERIOR WALL IN END DIASTOLE: 1.2 CM
LEFT VENTRICULAR STROKE VOLUME: 37 ML
LVSV (TEICH): 37 ML
MAGNESIUM SERPL-MCNC: 1.3 MG/DL (ref 1.9–2.7)
MCH RBC QN AUTO: 32.3 PG (ref 26.8–34.3)
MCHC RBC AUTO-ENTMCNC: 34.3 G/DL (ref 31.4–37.4)
MCV RBC AUTO: 94 FL (ref 82–98)
MV E'TISSUE VEL-SEP: 6 CM/S
MV PEAK A VEL: 0.82 M/S
MV PEAK E VEL: 50 CM/S
MV STENOSIS PRESSURE HALF TIME: 118 MS
MV VALVE AREA P 1/2 METHOD: 1.86
P AXIS: 23 DEGREES
PLATELET # BLD AUTO: 272 THOUSANDS/UL (ref 149–390)
PMV BLD AUTO: 10.2 FL (ref 8.9–12.7)
POTASSIUM SERPL-SCNC: 3.4 MMOL/L (ref 3.5–5.3)
POTASSIUM SERPL-SCNC: 3.5 MMOL/L (ref 3.5–5.3)
POTASSIUM SERPL-SCNC: 3.6 MMOL/L (ref 3.5–5.3)
POTASSIUM SERPL-SCNC: 4.1 MMOL/L (ref 3.5–5.3)
PR INTERVAL: 190 MS
PROTHROMBIN TIME: 15.3 SECONDS (ref 11.6–14.5)
QRS AXIS: -50 DEGREES
QRSD INTERVAL: 86 MS
QT INTERVAL: 368 MS
QTC INTERVAL: 482 MS
RBC # BLD AUTO: 4.68 MILLION/UL (ref 3.81–5.12)
SL CV LV EF: 65
SL CV PED ECHO LEFT VENTRICLE DIASTOLIC VOLUME (MOD BIPLANE) 2D: 60 ML
SL CV PED ECHO LEFT VENTRICLE SYSTOLIC VOLUME (MOD BIPLANE) 2D: 22 ML
SODIUM SERPL-SCNC: 127 MMOL/L (ref 135–147)
SODIUM SERPL-SCNC: 128 MMOL/L (ref 135–147)
SODIUM SERPL-SCNC: 129 MMOL/L (ref 135–147)
SODIUM SERPL-SCNC: 131 MMOL/L (ref 135–147)
T WAVE AXIS: 47 DEGREES
TR MAX PG: 18 MMHG
TR PEAK VELOCITY: 2.1 M/S
TRICUSPID VALVE PEAK REGURGITATION VELOCITY: 2.12 M/S
VENTRICULAR RATE: 103 BPM
WBC # BLD AUTO: 9.34 THOUSAND/UL (ref 4.31–10.16)

## 2022-09-01 PROCEDURE — 99232 SBSQ HOSP IP/OBS MODERATE 35: CPT | Performed by: INTERNAL MEDICINE

## 2022-09-01 PROCEDURE — 93325 DOPPLER ECHO COLOR FLOW MAPG: CPT | Performed by: INTERNAL MEDICINE

## 2022-09-01 PROCEDURE — 99153 MOD SED SAME PHYS/QHP EA: CPT | Performed by: INTERNAL MEDICINE

## 2022-09-01 PROCEDURE — 80048 BASIC METABOLIC PNL TOTAL CA: CPT | Performed by: INTERNAL MEDICINE

## 2022-09-01 PROCEDURE — 99152 MOD SED SAME PHYS/QHP 5/>YRS: CPT | Performed by: INTERNAL MEDICINE

## 2022-09-01 PROCEDURE — 83735 ASSAY OF MAGNESIUM: CPT | Performed by: NURSE PRACTITIONER

## 2022-09-01 PROCEDURE — C1887 CATHETER, GUIDING: HCPCS | Performed by: INTERNAL MEDICINE

## 2022-09-01 PROCEDURE — 93325 DOPPLER ECHO COLOR FLOW MAPG: CPT

## 2022-09-01 PROCEDURE — 93308 TTE F-UP OR LMTD: CPT

## 2022-09-01 PROCEDURE — C1894 INTRO/SHEATH, NON-LASER: HCPCS | Performed by: INTERNAL MEDICINE

## 2022-09-01 PROCEDURE — 93308 TTE F-UP OR LMTD: CPT | Performed by: INTERNAL MEDICINE

## 2022-09-01 PROCEDURE — 93321 DOPPLER ECHO F-UP/LMTD STD: CPT | Performed by: INTERNAL MEDICINE

## 2022-09-01 PROCEDURE — C1769 GUIDE WIRE: HCPCS | Performed by: INTERNAL MEDICINE

## 2022-09-01 PROCEDURE — 85027 COMPLETE CBC AUTOMATED: CPT | Performed by: NURSE PRACTITIONER

## 2022-09-01 PROCEDURE — 93454 CORONARY ARTERY ANGIO S&I: CPT | Performed by: INTERNAL MEDICINE

## 2022-09-01 PROCEDURE — B2111ZZ FLUOROSCOPY OF MULTIPLE CORONARY ARTERIES USING LOW OSMOLAR CONTRAST: ICD-10-PCS | Performed by: INTERNAL MEDICINE

## 2022-09-01 PROCEDURE — 93321 DOPPLER ECHO F-UP/LMTD STD: CPT

## 2022-09-01 PROCEDURE — C9113 INJ PANTOPRAZOLE SODIUM, VIA: HCPCS | Performed by: INTERNAL MEDICINE

## 2022-09-01 PROCEDURE — 85610 PROTHROMBIN TIME: CPT | Performed by: NURSE PRACTITIONER

## 2022-09-01 PROCEDURE — 93010 ELECTROCARDIOGRAM REPORT: CPT | Performed by: INTERNAL MEDICINE

## 2022-09-01 PROCEDURE — 99214 OFFICE O/P EST MOD 30 MIN: CPT | Performed by: INTERNAL MEDICINE

## 2022-09-01 PROCEDURE — 85730 THROMBOPLASTIN TIME PARTIAL: CPT | Performed by: INTERNAL MEDICINE

## 2022-09-01 RX ORDER — ASPIRIN 81 MG/1
324 TABLET, CHEWABLE ORAL ONCE
Status: COMPLETED | OUTPATIENT
Start: 2022-09-01 | End: 2022-09-01

## 2022-09-01 RX ORDER — MIDAZOLAM HYDROCHLORIDE 2 MG/2ML
INJECTION, SOLUTION INTRAMUSCULAR; INTRAVENOUS AS NEEDED
Status: DISCONTINUED | OUTPATIENT
Start: 2022-09-01 | End: 2022-09-01 | Stop reason: HOSPADM

## 2022-09-01 RX ORDER — LIDOCAINE HYDROCHLORIDE 10 MG/ML
INJECTION, SOLUTION EPIDURAL; INFILTRATION; INTRACAUDAL; PERINEURAL AS NEEDED
Status: DISCONTINUED | OUTPATIENT
Start: 2022-09-01 | End: 2022-09-01 | Stop reason: HOSPADM

## 2022-09-01 RX ORDER — SODIUM CHLORIDE 1000 MG
2 TABLET, SOLUBLE MISCELLANEOUS
Status: DISCONTINUED | OUTPATIENT
Start: 2022-09-01 | End: 2022-09-02 | Stop reason: HOSPADM

## 2022-09-01 RX ORDER — VERAPAMIL HCL 2.5 MG/ML
AMPUL (ML) INTRAVENOUS AS NEEDED
Status: DISCONTINUED | OUTPATIENT
Start: 2022-09-01 | End: 2022-09-01 | Stop reason: HOSPADM

## 2022-09-01 RX ORDER — POTASSIUM CHLORIDE 20 MEQ/1
40 TABLET, EXTENDED RELEASE ORAL ONCE
Status: COMPLETED | OUTPATIENT
Start: 2022-09-01 | End: 2022-09-01

## 2022-09-01 RX ORDER — FENTANYL CITRATE 50 UG/ML
INJECTION, SOLUTION INTRAMUSCULAR; INTRAVENOUS AS NEEDED
Status: DISCONTINUED | OUTPATIENT
Start: 2022-09-01 | End: 2022-09-01 | Stop reason: HOSPADM

## 2022-09-01 RX ORDER — NITROGLYCERIN 20 MG/100ML
INJECTION INTRAVENOUS AS NEEDED
Status: DISCONTINUED | OUTPATIENT
Start: 2022-09-01 | End: 2022-09-01 | Stop reason: HOSPADM

## 2022-09-01 RX ORDER — MAGNESIUM SULFATE HEPTAHYDRATE 40 MG/ML
2 INJECTION, SOLUTION INTRAVENOUS ONCE
Status: COMPLETED | OUTPATIENT
Start: 2022-09-01 | End: 2022-09-01

## 2022-09-01 RX ORDER — SODIUM CHLORIDE 9 MG/ML
125 INJECTION, SOLUTION INTRAVENOUS CONTINUOUS
Status: DISCONTINUED | OUTPATIENT
Start: 2022-09-01 | End: 2022-09-01

## 2022-09-01 RX ORDER — SODIUM CHLORIDE 9 MG/ML
125 INJECTION, SOLUTION INTRAVENOUS CONTINUOUS
Status: DISPENSED | OUTPATIENT
Start: 2022-09-01 | End: 2022-09-01

## 2022-09-01 RX ORDER — ALBUMIN (HUMAN) 12.5 G/50ML
12.5 SOLUTION INTRAVENOUS ONCE
Status: COMPLETED | OUTPATIENT
Start: 2022-09-01 | End: 2022-09-02

## 2022-09-01 RX ORDER — HEPARIN SODIUM 1000 [USP'U]/ML
INJECTION, SOLUTION INTRAVENOUS; SUBCUTANEOUS AS NEEDED
Status: DISCONTINUED | OUTPATIENT
Start: 2022-09-01 | End: 2022-09-01 | Stop reason: HOSPADM

## 2022-09-01 RX ADMIN — ASPIRIN 81 MG CHEWABLE TABLET 324 MG: 81 TABLET CHEWABLE at 08:45

## 2022-09-01 RX ADMIN — SODIUM CHLORIDE 2 G: 1 TABLET ORAL at 12:48

## 2022-09-01 RX ADMIN — LATANOPROST 1 DROP: 50 SOLUTION OPHTHALMIC at 21:12

## 2022-09-01 RX ADMIN — SUCRALFATE 1 G: 1 TABLET ORAL at 16:37

## 2022-09-01 RX ADMIN — MAGNESIUM SULFATE HEPTAHYDRATE 2 G: 40 INJECTION, SOLUTION INTRAVENOUS at 12:47

## 2022-09-01 RX ADMIN — SUCRALFATE 1 G: 1 TABLET ORAL at 21:12

## 2022-09-01 RX ADMIN — SUCRALFATE 1 G: 1 TABLET ORAL at 12:48

## 2022-09-01 RX ADMIN — SODIUM CHLORIDE 125 ML/HR: 0.9 INJECTION, SOLUTION INTRAVENOUS at 08:55

## 2022-09-01 RX ADMIN — PANTOPRAZOLE SODIUM 40 MG: 40 INJECTION, POWDER, FOR SOLUTION INTRAVENOUS at 21:12

## 2022-09-01 RX ADMIN — SODIUM CHLORIDE 125 ML/HR: 0.9 INJECTION, SOLUTION INTRAVENOUS at 12:45

## 2022-09-01 RX ADMIN — PANTOPRAZOLE SODIUM 40 MG: 40 INJECTION, POWDER, FOR SOLUTION INTRAVENOUS at 08:45

## 2022-09-01 RX ADMIN — METOPROLOL TARTRATE 25 MG: 25 TABLET, FILM COATED ORAL at 08:46

## 2022-09-01 RX ADMIN — LEVOTHYROXINE SODIUM 25 MCG: 25 TABLET ORAL at 08:46

## 2022-09-01 RX ADMIN — POTASSIUM CHLORIDE 40 MEQ: 1500 TABLET, EXTENDED RELEASE ORAL at 12:48

## 2022-09-01 RX ADMIN — NITROGLYCERIN 0.5 INCH: 20 OINTMENT TOPICAL at 08:46

## 2022-09-01 RX ADMIN — LATANOPROST 1 DROP: 50 SOLUTION OPHTHALMIC at 08:56

## 2022-09-01 RX ADMIN — SODIUM CHLORIDE 2 G: 1 TABLET ORAL at 16:37

## 2022-09-01 RX ADMIN — ALBUMIN (HUMAN) 12.5 G: 0.25 INJECTION, SOLUTION INTRAVENOUS at 23:32

## 2022-09-01 RX ADMIN — ATORVASTATIN CALCIUM 40 MG: 40 TABLET, FILM COATED ORAL at 21:12

## 2022-09-01 RX ADMIN — TICAGRELOR 90 MG: 90 TABLET ORAL at 08:46

## 2022-09-01 RX ADMIN — SUCRALFATE 1 G: 1 TABLET ORAL at 06:36

## 2022-09-01 NOTE — ED PROVIDER NOTES
History  Chief Complaint   Patient presents with    Abdominal Pain     Pt seen here last night w co upper abdominal pain  Pain worsening this morning  +N/V  10/10 pain currently  24-year-old female presents to the emergency department with complaints epigastric abdominal pain  States that pain started last night  Seen in the emergency department and had an essentially normal workup  States that she was comfortable the time she was discharged but when she tried to drink water at home her pain returned  Has had intermittent dry heaving since that time  She denies chest pain or shortness of breath  No fevers  History provided by:  Patient   used: No    Abdominal Pain  Pain location:  Epigastric  Pain quality: cramping and squeezing    Pain radiates to:  Does not radiate  Pain severity:  Moderate  Onset quality:  Gradual  Duration:  1 day  Timing:  Intermittent  Progression:  Waxing and waning  Chronicity:  New  Relieved by:  Nothing  Worsened by:  Nothing  Associated symptoms: nausea and vomiting    Associated symptoms: no anorexia, no belching, no chest pain, no chills, no constipation, no cough, no diarrhea, no dysuria, no fatigue, no fever, no flatus, no hematemesis, no hematochezia, no hematuria, no melena, no shortness of breath and no sore throat        Prior to Admission Medications   Prescriptions Last Dose Informant Patient Reported? Taking?    Ascorbic Acid (vitamin C) 1000 MG tablet Past Week at Unknown time  Yes Yes   Sig: Take 2,000 mg by mouth daily   Cholecalciferol (VITAMIN D3) 400 units CAPS Past Week at Unknown time Self Yes Yes   Sig: Take by mouth   Combigan 0 2-0 5 % Past Week at Unknown time Self Yes Yes   Sig: INSTILL 1 DROP IN BOTH EYES TWICE DAILY   Multiple Vitamin (MULTIVITAMINS PO) Past Week at Unknown time Self Yes Yes   Sig: Take 1 tablet by mouth daily   albuterol (PROVENTIL HFA,VENTOLIN HFA) 90 mcg/act inhaler Past Week at Unknown time  Yes Yes   Sig: Inhale 2 puffs 4 (four) times a day as needed   amoxicillin (AMOXIL) 500 MG tablet Not Taking at Unknown time  Yes No   Sig: TAKE 4 TABLETS ONE HOUR BEFORE DENTAL PROCEDURE   Patient not taking: Reported on 8/31/2022   aspirin (ECOTRIN LOW STRENGTH) 81 mg EC tablet 8/30/2022 at 0700  Yes Yes   Sig: Take 81 mg by mouth daily   atorvastatin (LIPITOR) 10 mg tablet Past Week at Unknown time  No Yes   Sig: TAKE 1 TABLET BY MOUTH  DAILY AT BEDTIME   latanoprost (XALATAN) 0 005 % ophthalmic solution Past Week at Unknown time Self Yes Yes   Sig: Administer 1 drop to both eyes 2 (two) times a day   levothyroxine 50 mcg tablet Past Week at Unknown time Self Yes Yes   Sig: Take 0 5 tablets by mouth daily    metoclopramide (Reglan) 10 mg tablet Not Taking at Unknown time  No No   Sig: Take 0 5 tablets (5 mg total) by mouth 3 (three) times a day as needed (nausea) for up to 10 doses   Patient not taking: Reported on 8/31/2022   pantoprazole (PROTONIX) 40 mg tablet Past Week at Unknown time Self Yes Yes   Sig: Take by mouth      Facility-Administered Medications: None       Past Medical History:   Diagnosis Date    Cancer ([facility] [facility] )     Disease of thyroid gland     Endometrial cancer (HCC)     age 45    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    Esophageal reflux     GERD (gastroesophageal reflux disease)     History of ovarian cancer 4/28/2022    Hypertension     Hypothyroid     Obesity     Prediabetes 5/12/2022    Rosacea 5/12/2022       Past Surgical History:   Procedure Laterality Date    CATARACT EXTRACTION, BILATERAL      COLONOSCOPY  08/2018    polyp- 5 years    CYSTOSCOPY      FL RETROGRADE PYELOGRAM  10/15/2021    JOINT REPLACEMENT      KNEE ARTHROPLASTY      VA CYSTO/URETERO W/LITHOTRIPSY &INDWELL STENT INSRT Left 10/15/2021    Procedure: CYSTOSCOPY URETEROSCOPY WITH LITHOTRIPSY HOLMIUM LASER, RETROGRADE PYELOGRAM, BASKET STONE EXTRACTION AND EXCHANGE STENT URETERAL;  Surgeon: Juanita Madrigal Efren Gonzalez MD;  [facility];  Service: Urology    MA CYSTOURETHROSCOPY,URETER CATHETER Left 2021    Procedure: CYSTOSCOPY WITH INSERTION STENT URETERAL;  Surgeon: Yris Pepper MD;  [facility];  Service: Urology    MA ESOPHAGOGASTRODUODENOSCOPY TRANSORAL DIAGNOSTIC N/A 3/5/2021    Procedure: ESOPHAGOGASTRODUODENOSCOPY (EGD); Surgeon: Alec Estrella MD;  [facility];  Service: Thoracic    MA LAP, REPAIR PARAESOPHAGEAL HERNIA, INCL FUNDOPLASTY W/O MESH N/A 3/5/2021    Procedure: REPAIR HERNIA PARAESOPHAGEAL LAPAROSCOPIC W ROBOTICS WITH MESH, DOOR FUNDOPLICATION;  Surgeon: Alec Estrella MD;  [facility];  Service: Thoracic    TOTAL ABDOMINAL HYSTERECTOMY      age 45    TOTAL ABDOMINAL HYSTERECTOMY W/ BILATERAL SALPINGOOPHORECTOMY      endometrial cancer       Family History   Problem Relation Age of Onset    Stomach cancer Mother 71    Pancreatic cancer Mother 71    Heart attack Father     Cancer Maternal Uncle     No Known Problems Daughter     No Known Problems Maternal Grandmother     No Known Problems Maternal Grandfather     No Known Problems Paternal Grandmother     No Known Problems Paternal Grandfather     No Known Problems Maternal Aunt     No Known Problems Maternal Aunt      I have reviewed and agree with the history as documented  E-Cigarette/Vaping    E-Cigarette Use Never User      E-Cigarette/Vaping Substances    Nicotine No     THC No     CBD No     Flavoring No     Other No     Unknown No      Social History     Tobacco Use    Smoking status: Former Smoker     Packs/day: 0 25     Years: 10 00     Pack years: 2 50     Types: Cigarettes     Start date: 64     Quit date:      Years since quittin 7    Smokeless tobacco: Never Used    Tobacco comment: Quit 50+ years ago 3/24/21   Vaping Use    Vaping Use: Never used   Substance Use Topics    Alcohol use: Not Currently     Comment: socially    Drug use:  No Review of Systems   Constitutional: Negative for activity change, appetite change, chills, fatigue and fever  HENT: Negative for congestion, dental problem, drooling, ear discharge, ear pain, mouth sores, nosebleeds, rhinorrhea, sore throat and trouble swallowing  Eyes: Negative for pain, discharge and itching  Respiratory: Negative for cough, chest tightness, shortness of breath and wheezing  Cardiovascular: Negative for chest pain and palpitations  Gastrointestinal: Positive for abdominal pain, nausea and vomiting  Negative for anorexia, blood in stool, constipation, diarrhea, flatus, hematemesis, hematochezia and melena  Endocrine: Negative for cold intolerance and heat intolerance  Genitourinary: Negative for difficulty urinating, dysuria, flank pain, frequency, hematuria and urgency  Skin: Negative for rash and wound  Allergic/Immunologic: Negative for food allergies and immunocompromised state  Neurological: Negative for dizziness, seizures, syncope, weakness, numbness and headaches  Psychiatric/Behavioral: Negative for agitation, behavioral problems and confusion  Physical Exam  Physical Exam  Vitals and nursing note reviewed  Constitutional:       General: She is not in acute distress  Appearance: She is not diaphoretic  HENT:      Head: Normocephalic and atraumatic  Right Ear: External ear normal       Left Ear: External ear normal       Mouth/Throat:      Pharynx: No oropharyngeal exudate  Eyes:      Conjunctiva/sclera: Conjunctivae normal    Neck:      Vascular: No JVD  Trachea: No tracheal deviation  Cardiovascular:      Rate and Rhythm: Normal rate and regular rhythm  Heart sounds: Murmur heard  No friction rub  No gallop  Pulmonary:      Effort: Pulmonary effort is normal  No respiratory distress  Breath sounds: Normal breath sounds  No wheezing or rales  Chest:      Chest wall: No tenderness     Abdominal:      General: Bowel sounds are normal  There is no distension  Palpations: Abdomen is soft  Tenderness: There is abdominal tenderness in the epigastric area  There is no guarding  Musculoskeletal:         General: No tenderness or deformity  Normal range of motion  Lymphadenopathy:      Cervical: No cervical adenopathy  Skin:     General: Skin is warm and dry  Findings: No erythema or rash  Neurological:      General: No focal deficit present  Mental Status: She is alert and oriented to person, place, and time     Psychiatric:         Mood and Affect: Mood normal          Behavior: Behavior normal          Vital Signs  ED Triage Vitals   Temperature Pulse Respirations Blood Pressure SpO2   08/31/22 0714 08/31/22 0712 08/31/22 0712 08/31/22 0714 08/31/22 0714   97 9 °F (36 6 °C) 79 18 (!) 182/122 100 %      Temp Source Heart Rate Source Patient Position - Orthostatic VS BP Location FiO2 (%)   08/31/22 0714 08/31/22 0712 08/31/22 0712 08/31/22 0712 --   Oral Monitor Lying Right arm       Pain Score       08/31/22 0915       10 - Worst Possible Pain           Vitals:    09/01/22 1245 09/01/22 1300 09/01/22 1330 09/01/22 1400   BP: 101/58 118/72 (!) 88/55 (!) 84/52   Pulse:   77 74   Patient Position - Orthostatic VS:   Lying Lying         Visual Acuity      ED Medications  Medications   albuterol (PROVENTIL HFA,VENTOLIN HFA) inhaler 2 puff (has no administration in time range)   ascorbic acid (VITAMIN C) tablet 2,000 mg (2,000 mg Oral Not Given 9/1/22 0848)   aspirin (ECOTRIN LOW STRENGTH) EC tablet 81 mg (81 mg Oral Not Given 9/1/22 0848)   latanoprost (XALATAN) 0 005 % ophthalmic solution 1 drop (1 drop Both Eyes Given 9/1/22 0856)   levothyroxine tablet 25 mcg (25 mcg Oral Given 9/1/22 0846)   pantoprazole (PROTONIX) injection 40 mg (40 mg Intravenous Given 9/1/22 0845)   ondansetron (ZOFRAN) injection 4 mg (4 mg Intravenous Given 8/31/22 1936)   morphine injection 2 mg (has no administration in time range) aluminum-magnesium hydroxide-simethicone (MYLANTA) oral suspension 30 mL (has no administration in time range)   dicyclomine (BENTYL) capsule 10 mg (10 mg Oral Given 8/31/22 1355)   labetalol (NORMODYNE) injection 10 mg (10 mg Intravenous Given 8/31/22 1944)   sucralfate (CARAFATE) tablet 1 g (1 g Oral Given 9/1/22 1248)   metoprolol tartrate (LOPRESSOR) tablet 25 mg (25 mg Oral Given 9/1/22 0846)   nitroglycerin (NITRO-BID) 2 % TD ointment 0 5 inch (0 5 inches Topical Given 9/1/22 0846)   atorvastatin (LIPITOR) tablet 40 mg (40 mg Oral Given 8/31/22 2116)   sodium chloride tablet 2 g (2 g Oral Given 9/1/22 1248)   sodium chloride 0 9 % infusion (125 mL/hr Intravenous New Bag 9/1/22 1245)   sodium chloride 0 9 % bolus 500 mL (0 mL Intravenous Stopped 8/31/22 1153)   ondansetron (ZOFRAN) injection 4 mg (4 mg Intravenous Given 8/31/22 0909)   morphine injection 4 mg (4 mg Intravenous Given 8/31/22 0915)   aspirin chewable tablet 324 mg (324 mg Oral Given 9/1/22 0845)   ticagrelor (BRILINTA) tablet 180 mg (180 mg Oral Given 8/31/22 1740)   heparin (porcine) injection 4,000 Units (4,000 Units Intravenous Given 8/31/22 1740)   magnesium sulfate 2 g/50 mL IVPB (premix) 2 g (2 g Intravenous New Bag 9/1/22 1247)   potassium chloride (K-DUR,KLOR-CON) CR tablet 40 mEq (40 mEq Oral Given 9/1/22 1248)       Diagnostic Studies  Results Reviewed     Procedure Component Value Units Date/Time    Basic metabolic panel [[id number]]  (Abnormal) Collected: 09/01/22 0841    Lab Status: Final result Specimen: Blood from Arm, Left Updated: 09/01/22 0905     Sodium 128 mmol/L      Potassium 3 5 mmol/L      Chloride 93 mmol/L      CO2 25 mmol/L      ANION GAP 10 mmol/L      BUN 19 mg/dL      Creatinine 0 96 mg/dL      Glucose 98 mg/dL      Glucose, Fasting 98 mg/dL      Calcium 8 8 mg/dL      eGFR 55 ml/min/1 73sq m     Narrative:      [place] guidelines for Chronic Kidney Disease (CKD):     Stage 1 with normal or high GFR (GFR > 90 mL/min/1 73 square meters)    Stage 2 Mild CKD (GFR = 60-89 mL/min/1 73 square meters)    Stage 3A Moderate CKD (GFR = 45-59 mL/min/1 73 square meters)    Stage 3B Moderate CKD (GFR = 30-44 mL/min/1 73 square meters)    Stage 4 Severe CKD (GFR = 15-29 mL/min/1 73 square meters)    Stage 5 End Stage CKD (GFR <15 mL/min/1 73 square meters)  Note: GFR calculation is accurate only with a steady state creatinine    Basic metabolic panel [[id number]]  (Abnormal) Collected: 09/01/22 0446    Lab Status: Final result Specimen: Blood from Arm, Left Updated: 09/01/22 0552     Sodium 129 mmol/L      Potassium 4 1 mmol/L      Chloride 93 mmol/L      CO2 26 mmol/L      ANION GAP 10 mmol/L      BUN 17 mg/dL      Creatinine 0 96 mg/dL      Glucose 108 mg/dL      Glucose, Fasting 108 mg/dL      Calcium 9 0 mg/dL      eGFR 55 ml/min/1 73sq m     Narrative:      [place] guidelines for Chronic Kidney Disease (CKD):     Stage 1 with normal or high GFR (GFR > 90 mL/min/1 73 square meters)    Stage 2 Mild CKD (GFR = 60-89 mL/min/1 73 square meters)    Stage 3A Moderate CKD (GFR = 45-59 mL/min/1 73 square meters)    Stage 3B Moderate CKD (GFR = 30-44 mL/min/1 73 square meters)    Stage 4 Severe CKD (GFR = 15-29 mL/min/1 73 square meters)    Stage 5 End Stage CKD (GFR <15 mL/min/1 73 square meters)  Note: GFR calculation is accurate only with a steady state creatinine    Basic metabolic panel [[id number]]  (Abnormal) Collected: 08/31/22 2687    Lab Status: Final result Specimen: Blood from Arm, Left Updated: 09/01/22 0033     Sodium 127 mmol/L      Potassium 3 4 mmol/L      Chloride 93 mmol/L      CO2 22 mmol/L      ANION GAP 12 mmol/L      BUN 13 mg/dL      Creatinine 0 63 mg/dL      Glucose 124 mg/dL      Calcium 8 7 mg/dL      eGFR 83 ml/min/1 73sq m     Narrative:      [place] guidelines for Chronic Kidney Disease (CKD):     Stage 1 with normal or high GFR (GFR > 90 mL/min/1 73 square meters)    Stage 2 Mild CKD (GFR = 60-89 mL/min/1 73 square meters)    Stage 3A Moderate CKD (GFR = 45-59 mL/min/1 73 square meters)    Stage 3B Moderate CKD (GFR = 30-44 mL/min/1 73 square meters)    Stage 4 Severe CKD (GFR = 15-29 mL/min/1 73 square meters)    Stage 5 End Stage CKD (GFR <15 mL/min/1 73 square meters)  Note: GFR calculation is accurate only with a steady state creatinine    HS Troponin I 4hr [[id number]]  (Abnormal) Collected: 08/31/22 1639    Lab Status: Final result Specimen: Blood from Arm, Left Updated: 08/31/22 1719     hs TnI 4hr 1,026 ng/L      Delta 4hr hsTnI -350 ng/L     Basic metabolic panel [[id number]]  (Abnormal) Collected: 08/31/22 1639    Lab Status: Final result Specimen: Blood from Arm, Left Updated: 08/31/22 1714     Sodium 126 mmol/L      Potassium 3 4 mmol/L      Chloride 92 mmol/L      CO2 21 mmol/L      ANION GAP 13 mmol/L      BUN 11 mg/dL      Creatinine 0 52 mg/dL      Glucose 135 mg/dL      Glucose, Fasting 135 mg/dL      Calcium 9 1 mg/dL      eGFR 89 ml/min/1 73sq m     Narrative:      [place] guidelines for Chronic Kidney Disease (CKD):     Stage 1 with normal or high GFR (GFR > 90 mL/min/1 73 square meters)    Stage 2 Mild CKD (GFR = 60-89 mL/min/1 73 square meters)    Stage 3A Moderate CKD (GFR = 45-59 mL/min/1 73 square meters)    Stage 3B Moderate CKD (GFR = 30-44 mL/min/1 73 square meters)    Stage 4 Severe CKD (GFR = 15-29 mL/min/1 73 square meters)    Stage 5 End Stage CKD (GFR <15 mL/min/1 73 square meters)  Note: GFR calculation is accurate only with a steady state creatinine    HS Troponin I 2hr [[id number]]  (Abnormal) Collected: 08/31/22 1359    Lab Status: Final result Specimen: Blood from Arm, Left Updated: 08/31/22 1437     hs TnI 2hr 1,265 ng/L      Delta 2hr hsTnI -111 ng/L     HS Troponin 0hr (reflex protocol) [[id number]]  (Abnormal) Collected: 08/31/22 1459    Lab Status: Final result Specimen: Blood from Arm, Right Updated: 08/31/22 1231     hs TnI 0hr 1,376 ng/L     Basic metabolic panel [[id number]]  (Abnormal) Collected: 08/31/22 1134    Lab Status: Final result Specimen: Blood from Arm, Right Updated: 08/31/22 1224     Sodium 128 mmol/L      Potassium 3 3 mmol/L      Chloride 90 mmol/L      CO2 26 mmol/L      ANION GAP 12 mmol/L      BUN 9 mg/dL      Creatinine 0 56 mg/dL      Glucose 125 mg/dL      Calcium 9 1 mg/dL      eGFR 87 ml/min/1 73sq m     Narrative:      [place] guidelines for Chronic Kidney Disease (CKD):     Stage 1 with normal or high GFR (GFR > 90 mL/min/1 73 square meters)    Stage 2 Mild CKD (GFR = 60-89 mL/min/1 73 square meters)    Stage 3A Moderate CKD (GFR = 45-59 mL/min/1 73 square meters)    Stage 3B Moderate CKD (GFR = 30-44 mL/min/1 73 square meters)    Stage 4 Severe CKD (GFR = 15-29 mL/min/1 73 square meters)    Stage 5 End Stage CKD (GFR <15 mL/min/1 73 square meters)  Note: GFR calculation is accurate only with a steady state creatinine    Comprehensive metabolic panel [[id number]]  (Abnormal) Collected: 08/31/22 0848    Lab Status: Final result Specimen: Blood from Arm, Right Updated: 08/31/22 0916     Sodium 127 mmol/L      Potassium 3 2 mmol/L      Chloride 90 mmol/L      CO2 24 mmol/L      ANION GAP 13 mmol/L      BUN 8 mg/dL      Creatinine 0 56 mg/dL      Glucose 154 mg/dL      Calcium 9 4 mg/dL      AST 25 U/L      ALT 16 U/L      Alkaline Phosphatase 99 U/L      Total Protein 7 3 g/dL      Albumin 4 2 g/dL      Total Bilirubin 1 01 mg/dL      eGFR 87 ml/min/1 73sq m     Narrative:      [place] guidelines for Chronic Kidney Disease (CKD):     Stage 1 with normal or high GFR (GFR > 90 mL/min/1 73 square meters)    Stage 2 Mild CKD (GFR = 60-89 mL/min/1 73 square meters)    Stage 3A Moderate CKD (GFR = 45-59 mL/min/1 73 square meters)    Stage 3B Moderate CKD (GFR = 30-44 mL/min/1 73 square meters)    Stage 4 Severe CKD (GFR = 15-29 mL/min/1 73 square meters)    Stage 5 End Stage CKD (GFR <15 mL/min/1 73 square meters)  Note: GFR calculation is accurate only with a steady state creatinine    Lipase [[id number]]  (Normal) Collected: 08/31/22 0848    Lab Status: Final result Specimen: Blood from Arm, Right Updated: 08/31/22 0916     Lipase 37 u/L     CBC and differential [[id number]]  (Abnormal) Collected: 08/31/22 0848    Lab Status: Final result Specimen: Blood from Arm, Right Updated: 08/31/22 0904     WBC 9 57 Thousand/uL      RBC 4 98 Million/uL      Hemoglobin 15 9 g/dL      Hematocrit 45 4 %      MCV 91 fL      MCH 31 9 pg      MCHC 35 0 g/dL      RDW 12 3 %      MPV 10 3 fL      Platelets 715 Thousands/uL      nRBC 0 /100 WBCs      Neutrophils Relative 79 %      Immat GRANS % 0 %      Lymphocytes Relative 14 %      Monocytes Relative 7 %      Eosinophils Relative 0 %      Basophils Relative 0 %      Neutrophils Absolute 7 44 Thousands/µL      Immature Grans Absolute 0 04 Thousand/uL      Lymphocytes Absolute 1 36 Thousands/µL      Monocytes Absolute 0 71 Thousand/µL      Eosinophils Absolute 0 00 Thousand/µL      Basophils Absolute 0 02 Thousands/µL     UA w Reflex to Microscopic w Reflex to Culture [[id number]]     Lab Status: No result Specimen: Urine                  US right upper quadrant   Final Result by Venessa Mancia MD (08/31 2844)      Cholelithiasis without sonographic evidence for acute cholecystitis        Workstation performed: [id number]                    Procedures  Procedures  Conscious Sedation Assessment    Flowsheet Row Classification Score   ASA Scale Assessment 2-Mild to moderate systemic disease, medically well controlled, with no functional limitation filed at 09/01/2022 1120   Mallampati Classification Class II: soft palate, uvula, fauces visible - No Difficulty filed at 09/01/2022 1120             ED Course  ED Course as of 09/01/22 1501   Wed Aug 31, 2022   0931 Discussed ultrasound findings with patient and  at bedside  Agreeable to admission for observation  Initial Sepsis Screening     Row Name 08/31/22 1033                Is the patient's history suggestive of a new or worsening infection? No  -EP        Suspected source of infection --        Are two or more of the following signs & symptoms of infection both present and new to the patient? No  -EP        Indicate SIRS criteria --        If the answer is yes to both questions, suspicion of sepsis is present --        If severe sepsis is present AND tissue hypoperfusion perists in the hour after fluid resuscitation or lactate > 4, the patient meets criteria for SEPTIC SHOCK --        Are any of the following organ dysfunction criteria present within 6 hours of suspected infection and SIRS criteria that are NOT considered to be chronic conditions? --        Organ dysfunction --        Date of presentation of severe sepsis --        Time of presentation of severe sepsis --        Tissue hypoperfusion persists in the hour after crystalloid fluid administration, evidenced, by either: --        Was hypotension present within one hour of the conclusion of crystalloid fluid administration? --        Date of presentation of septic shock --        Time of presentation of septic shock --              User Key  (r) = Recorded By, (t) = Taken By, (c) = Cosigned By    [id number]  Name Provider Sandra Milligan MD Physician                SBIRT 22yo+    Aster Hale Most Recent Value   SBIRT (23 yo +)    In order to provide better care to our patients, we are screening all of our patients for alcohol and drug use  Would it be okay to ask you these screening questions? Yes Filed at: 08/31/2022 1010   Initial Alcohol Screen: US AUDIT-C     1  How often do you have a drink containing alcohol? 1 Filed at: 08/31/2022 1010   2   How many drinks containing alcohol do you have on a typical day you are drinking? 0 Filed at: 08/31/2022 1010   3b  FEMALE Any Age, or MALE 65+: How often do you have 4 or more drinks on one occassion? 0 Filed at: 08/31/2022 1010   Audit-C Score 1 Filed at: 08/31/2022 1010   BHASKAR: How many times in the past year have you    Used an illegal drug or used a prescription medication for non-medical reasons? Never Filed at: 08/31/2022 1010                    MDM  Number of Diagnoses or Management Options  Epigastric pain  Nausea and vomiting, unspecified vomiting type  Diagnosis management comments: Differential diagnosis includes but not limited to:  Hiatal hernia, cholelithiasis, cholecystitis, gastritis           Amount and/or Complexity of Data Reviewed  Clinical lab tests: ordered and reviewed  Tests in the radiology section of CPT®: ordered and reviewed  Discuss the patient with other providers: yes  Independent visualization of images, tracings, or specimens: yes        Disposition  Final diagnoses:   Epigastric pain   Nausea and vomiting, unspecified vomiting type     Time reflects when diagnosis was documented in both MDM as applicable and the Disposition within this note     Time User Action Codes Description Comment    8/31/2022  9:55 AM Fidel Spear Add [R10 13] Epigastric pain     8/31/2022  9:55 AM Fidel Spear Add [R11 2] Nausea and vomiting, unspecified vomiting type     8/31/2022  3:34 PM [place] Bravo Add [R10 13] Epigastric abdominal pain     8/31/2022  3:34 PM [place] Bravo Add [R10 13] Dyspepsia     8/31/2022  3:34 PM [place] Bravo Add [K44 9] Paraesophageal hernia     8/31/2022  4:11 PM Spironello, Kimberly Every Add [I25 10,  I25 84] Coronary artery calcification     8/31/2022  4:11 PM Margart Side Add [R77 8] Elevated troponin     8/31/2022  4:30 PM Ragsdale Aland Modify [R10 13] Epigastric abdominal pain     8/31/2022  4:30 PM Ragsdale Aland Modify [I25 10,  I25 84] Coronary artery calcification     8/31/2022  4:30 PM Sandrine Childers Modify [R77 8] Elevated troponin     8/31/2022  8:55 PM Rubinignacia Shah Add [E87 1] Hyponatremia       ED Disposition     ED Disposition   Admit    Condition   Stable    Date/Time   Wed Aug 31, 2022  9:55 AM    Comment   Case was discussed with BA and the patient's admission status was agreed to be Admission Status: observation status to the service of Dr Fili Hart   Follow-up Information    None         Current Discharge Medication List      CONTINUE these medications which have NOT CHANGED    Details   albuterol (PROVENTIL HFA,VENTOLIN HFA) 90 mcg/act inhaler Inhale 2 puffs 4 (four) times a day as needed      Ascorbic Acid (vitamin C) 1000 MG tablet Take 2,000 mg by mouth daily      aspirin (ECOTRIN LOW STRENGTH) 81 mg EC tablet Take 81 mg by mouth daily      atorvastatin (LIPITOR) 10 mg tablet TAKE 1 TABLET BY MOUTH  DAILY AT BEDTIME  Qty: 90 tablet, Refills: 3    Associated Diagnoses: Hyperlipidemia, unspecified hyperlipidemia type      Cholecalciferol (VITAMIN D3) 400 units CAPS Take by mouth      Combigan 0 2-0 5 % INSTILL 1 DROP IN BOTH EYES TWICE DAILY      latanoprost (XALATAN) 0 005 % ophthalmic solution Administer 1 drop to both eyes 2 (two) times a day      levothyroxine 50 mcg tablet Take 0 5 tablets by mouth daily       Multiple Vitamin (MULTIVITAMINS PO) Take 1 tablet by mouth daily      pantoprazole (PROTONIX) 40 mg tablet Take by mouth      amoxicillin (AMOXIL) 500 MG tablet TAKE 4 TABLETS ONE HOUR BEFORE DENTAL PROCEDURE      metoclopramide (Reglan) 10 mg tablet Take 0 5 tablets (5 mg total) by mouth 3 (three) times a day as needed (nausea) for up to 10 doses  Qty: 10 tablet, Refills: 3    Associated Diagnoses: Nausea and vomiting, unspecified vomiting type             No discharge procedures on file      PDMP Review       Value Time User    PDMP Reviewed  Yes 10/17/2021  5:16 AM Jonathan Abarca MD          ED Provider  Electronically Signed by           Issa Martinez PA-C  09/01/22 2376

## 2022-09-01 NOTE — ASSESSMENT & PLAN NOTE
Patient 1 day history of 10/10 abdominal pain  , no radiation, periumbilical/epigastric pain  No nausea no vomiting  Troponins  1376> 1265>1026  EKG nonspecific changes,  CT chest (8/30) - no thoracic aortic aneurysm, coronary artery calcifications, and moderate to large hiatal hernia - fluid within the distal esophagus consistent with reflux  Status post cardiac catheterization 9/1 pending review  Heparin drip and ticagrelor was given but now discontinued  patient on,aspirin, atorvastatin, metoprolol, nitroglycerin b i d

## 2022-09-01 NOTE — PROGRESS NOTES
Patient without complaint  TR band intact to right radial access without bleeding  Care assumed by [facility]

## 2022-09-01 NOTE — ASSESSMENT & PLAN NOTE
Patient has history of GERD status post paraesophageal hernia repair in 2021  Presented with 2 week history of GI discomfort, spitting up clear phlegm  Went to the emergency department on 08/30 was given some anti acids and sent home  After discharge had 10 attend periumbilical/epigastric pain at rest   Went to the emergency department had elevated tropes of 1376> 2248>1687  GI and Cardiology were consulted  GI start the patient on pantoprazole 40 mg b i d , Carafate 1 g b i d    Heparin drip and ticagrelor was given but now discontinued  patient on,aspirin, atorvastatin, metoprolol, nitroglycerin b i d   S/p cath 9/1: pending review

## 2022-09-01 NOTE — PROGRESS NOTES
Progress Note - Genie Greene 79 y o  female MRN: [id number]    Unit/Bed#: S -01 Encounter: [id number]        Subjective:   Patient reports feeling better today, denies any regurgitation episodes, nausea or abdominal pain  Objective:     Vitals: Blood pressure (!) 86/55, pulse 85, temperature 97 6 °F (36 4 °C), resp  rate 18, height 5' 3" (1 6 m), weight 90 3 kg (199 lb), SpO2 97 %, not currently breastfeeding  ,Body mass index is 35 25 kg/m²  Intake/Output Summary (Last 24 hours) at 9/1/2022 1657  Last data filed at 9/1/2022 1159  Gross per 24 hour   Intake 480 ml   Output 5 ml   Net 475 ml       Physical Exam:   General appearance: alert, appears stated age and cooperative  Lungs: clear to auscultation bilaterally, no labored breathing/accessory muscle use  Heart: regular rate and rhythm, S1, S2 normal, no murmur, click, rub or gallop  Abdomen: soft, non-tender; bowel sounds normal; no masses,  no organomegaly  Extremities: no edema    Invasive Devices  Report    Peripheral Intravenous Line  Duration           Peripheral IV 09/01/22 Left Antecubital <1 day    Peripheral IV 09/01/22 Left;Ventral (anterior) Forearm <1 day                Lab, Imaging and other studies: I have personally reviewed pertinent reports      Admission on 08/31/2022   Component Date Value    WBC 08/31/2022 9 57     RBC 08/31/2022 4 98     Hemoglobin 08/31/2022 15 9 (A)    Hematocrit 08/31/2022 45 4     MCV 08/31/2022 91     MCH 08/31/2022 31 9     MCHC 08/31/2022 35 0     RDW 08/31/2022 12 3     MPV 08/31/2022 10 3     Platelets 16/93/5546 288     nRBC 08/31/2022 0     Neutrophils Relative 08/31/2022 79 (A)    Immat GRANS % 08/31/2022 0     Lymphocytes Relative 08/31/2022 14     Monocytes Relative 08/31/2022 7     Eosinophils Relative 08/31/2022 0     Basophils Relative 08/31/2022 0     Neutrophils Absolute 08/31/2022 7 44     Immature Grans Absolute 08/31/2022 0 04     Lymphocytes Absolute 08/31/2022 1 36  Monocytes Absolute 08/31/2022 0 71     Eosinophils Absolute 08/31/2022 0 00     Basophils Absolute 08/31/2022 0 02     Sodium 08/31/2022 127 (A)    Potassium 08/31/2022 3 2 (A)    Chloride 08/31/2022 90 (A)    CO2 08/31/2022 24     ANION GAP 08/31/2022 13     BUN 08/31/2022 8     Creatinine 08/31/2022 0 56 (A)    Glucose 08/31/2022 154 (A)    Calcium 08/31/2022 9 4     AST 08/31/2022 25     ALT 08/31/2022 16     Alkaline Phosphatase 08/31/2022 99     Total Protein 08/31/2022 7 3     Albumin 08/31/2022 4 2     Total Bilirubin 08/31/2022 1 01 (A)    eGFR 08/31/2022 87     Lipase 08/31/2022 37     hs TnI 0hr 08/31/2022 1,376 (A)    Sodium 08/31/2022 128 (A)    Potassium 08/31/2022 3 3 (A)    Chloride 08/31/2022 90 (A)    CO2 08/31/2022 26     ANION GAP 08/31/2022 12     BUN 08/31/2022 9     Creatinine 08/31/2022 0 56 (A)    Glucose 08/31/2022 125     Calcium 08/31/2022 9 1     eGFR 08/31/2022 87     hs TnI 2hr 08/31/2022 1,265 (A)    Delta 2hr hsTnI 08/31/2022 -111     hs TnI 4hr 08/31/2022 1,026 (A)    Delta 4hr hsTnI 08/31/2022 -350     Sodium 08/31/2022 126 (A)    Potassium 08/31/2022 3 4 (A)    Chloride 08/31/2022 92 (A)    CO2 08/31/2022 21     ANION GAP 08/31/2022 13     BUN 08/31/2022 11     Creatinine 08/31/2022 0 52 (A)    Glucose 08/31/2022 135     Glucose, Fasting 08/31/2022 135 (A)    Calcium 08/31/2022 9 1     eGFR 08/31/2022 89     PTT 08/31/2022 28     Protime 08/31/2022 14 2     INR 08/31/2022 1 08     Sodium 08/31/2022 127 (A)    Potassium 08/31/2022 3 4 (A)    Chloride 08/31/2022 93 (A)    CO2 08/31/2022 22     ANION GAP 08/31/2022 12     BUN 08/31/2022 13     Creatinine 08/31/2022 0 63     Glucose 08/31/2022 124     Calcium 08/31/2022 8 7     eGFR 08/31/2022 83     PTT 08/31/2022 96 (A)    Sodium 09/01/2022 129 (A)    Potassium 09/01/2022 4 1     Chloride 09/01/2022 93 (A)    CO2 09/01/2022 26     ANION GAP 09/01/2022 10     BUN 09/01/2022 17     Creatinine 09/01/2022 0 96     Glucose 09/01/2022 108     Glucose, Fasting 09/01/2022 108 (A)    Calcium 09/01/2022 9 0     eGFR 09/01/2022 55     PTT 09/01/2022 87 (A)    Magnesium 09/01/2022 1 3 (A)    WBC 09/01/2022 9 34     RBC 09/01/2022 4 68     Hemoglobin 09/01/2022 15 1     Hematocrit 09/01/2022 44 0     MCV 09/01/2022 94     MCH 09/01/2022 32 3     MCHC 09/01/2022 34 3     RDW 09/01/2022 13 0     Platelets 57/35/4357 272     MPV 09/01/2022 10 2     Protime 09/01/2022 15 3 (A)    INR 09/01/2022 1 18     A4C EF 09/01/2022 66     LVIDd 09/01/2022 3 70     LVIDS 09/01/2022 2 50     IVSd 09/01/2022 1 20     LVPWd 09/01/2022 1 20     FS 09/01/2022 32     MV E' Tissue Velocity Se* 09/01/2022 6     E/A ratio 09/01/2022 0 61     E wave deceleration time 09/01/2022 407     MV Peak E Rashawn 09/01/2022 50     MV Peak A Rashawn 09/01/2022 0 82     MV stenosis pressure 1/2* 09/01/2022 118     MV valve area p 1/2 meth* 09/01/2022 1 90     TR Peak Rashawn 09/01/2022 2 1     Triscuspid Valve Regurgi* 09/01/2022 18 0     Asc Ao 09/01/2022 3 6     Tricuspid valve peak reg* 09/01/2022 2 12     Left ventricular stroke * 09/01/2022 37 00     IVS 09/01/2022 1 2     LEFT VENTRICLE SYSTOLIC * 50/15/3609 22     LV DIASTOLIC VOLUME (MOD* 70/49/8307 60     LVSV, 2D 09/01/2022 37     Sodium 09/01/2022 128 (A)    Potassium 09/01/2022 3 5     Chloride 09/01/2022 93 (A)    CO2 09/01/2022 25     ANION GAP 09/01/2022 10     BUN 09/01/2022 19     Creatinine 09/01/2022 0 96     Glucose 09/01/2022 98     Glucose, Fasting 09/01/2022 98     Calcium 09/01/2022 8 8     eGFR 09/01/2022 55       [facility] Reference Range & Units 08/31/22 23:47 09/01/22 04:46 09/01/22 08:41   Sodium 135 - 147 mmol/L 127 (L) 129 (L) 128 (L)   Potassium 3 5 - 5 3 mmol/L 3 4 (L) 4 1 3 5   Chloride 96 - 108 mmol/L 93 (L) 93 (L) 93 (L)   CO2 21 - 32 mmol/L 22 26 25   Anion Gap 4 - 13 mmol/L 12 10 10   BUN 5 - 25 mg/dL 13 17 19   Creatinine 0 60 - 1 30 mg/dL 0 63 0 96 0 96   Glucose, Random 65 - 140 mg/dL 124 108 98   GLUCOSE FASTING 65 - 99 mg/dL  108 (H) 98   Calcium 8 4 - 10 2 mg/dL 8 7 9 0 8 8   eGFR ml/min/1 73sq m 83 55 55   Magnesium 1 9 - 2 7 mg/dL   1 3 (L)   WBC 4 31 - 10 16 Thousand/uL   9 34   Red Blood Cell Count 3 81 - 5 12 Million/uL   4 68   Hemoglobin 11 5 - 15 4 g/dL   15 1   HCT 34 8 - 46 1 %   44 0   MCV 82 - 98 fL   94   MCH 26 8 - 34 3 pg   32 3   MCHC 31 4 - 37 4 g/dL   34 3   RDW 11 6 - 15 1 %   13 0   Platelet Count 163 - 390 Thousands/uL   272   MPV 8 9 - 12 7 fL   10 2   PROTIME 11 6 - 14 5 seconds   15 3 (H)   POCT INR 0 84 - 1 19    1 18   PTT 23 - 37 seconds 96 (H)  87 (H)   (L): Data is abnormally low  (H): Data is abnormally high      Assessment/Plan:    1  Epigastric pain nausea and vomiting in the setting of large hiatal hernia, despite history of paraesophageal hiatal hernia repair previously in March 2021   She does appear clinically improving at this time and a cardiac catheterization showed no evidence of occlusive disease; question for [facility] lesions or peptic ulcer disease in general, esophageal spasm/dysmotility, esophageal ulcer    - will plan for EGD tomorrow    -EGD was explained in detail to the patient at this time    -continue PPI therapy    -diet as tolerated today, NPO after midnight    - consider upper GI series for further workup if EGD demonstrates no significant pathology and patient has persistent symptoms

## 2022-09-01 NOTE — ASSESSMENT & PLAN NOTE
Patient has known paraesophageal hernia status post robotic repair with recurrence  Patient saw thoracic surgery in the office approximately year ago and at that time risks were thought to outweigh benefits of any potential revisit patient of surgical repair  Since then the patient notes that she has lost 70-80 lb and has had increasing dyspeptic symptoms      Since yesterday she has noted that her symptoms have been   "Intolerable" and comes in with the inability to take anything orally since the day before yesterday and constant belching and discomfort in her epigastrium and chest   GI onboard  PPI BID

## 2022-09-01 NOTE — PROGRESS NOTES
[facility]  Progress Note - Rustam Nevarez 1940, 79 y o  female MRN: [id number]  Unit/Bed#: S -01 Encounter: [id number]  Primary Care Provider: Kristin Gregory MD   Date and time admitted to hospital: 8/31/2022  7:10 AM    * Epigastric abdominal pain  Assessment & Plan  Patient has history of GERD status post paraesophageal hernia repair in 2021  Presented with 2 week history of GI discomfort, spitting up clear phlegm  Went to the emergency department on 08/30 was given some anti acids and sent home  After discharge had 10 attend periumbilical/epigastric pain at rest   Went to the emergency department had elevated tropes of 1376> 3654>3372  GI and Cardiology were consulted  GI start the patient on pantoprazole 40 mg b i d , Carafate 1 g b i d  Heparin drip and ticagrelor was given but now discontinued  patient on,aspirin, atorvastatin, metoprolol, nitroglycerin b i d   S/p cath 9/1: pending review    Elevated troponin  Assessment & Plan  Patient 1 day history of 10/10 abdominal pain  , no radiation, periumbilical/epigastric pain  No nausea no vomiting  Troponins  1376> 1265>1026  EKG nonspecific changes,  CT chest (8/30) - no thoracic aortic aneurysm, coronary artery calcifications, and moderate to large hiatal hernia - fluid within the distal esophagus consistent with reflux  Status post cardiac catheterization 9/1 pending review  Heparin drip and ticagrelor was given but now discontinued  patient on,aspirin, atorvastatin, metoprolol, nitroglycerin b i d     Gallstones  Assessment & Plan  US does not show cholecystitis  GI said no surgical interventions needed    Hyponatremia  Assessment & Plan  Lab Results   Component Value Date    SODIUM 128 (L) 09/01/2022    SODIUM 129 (L) 09/01/2022    SODIUM 127 (L) 08/31/2022      likely secondary to poor oral intake possibly also component of SIADH in the setting of nausea vomiting    Plan  Nephrology onboard  Fluid restrict 1 L per day, stop IV fluids, give sodium chloride tablets 2 g t i d   Possibly will start on diuretic      Hypertension  Assessment & Plan  BP is elevated - particularly diastolic   Will continue to monitor and add PRN BB       Weight loss  Assessment & Plan  70 lb weight loss in the setting of dyspeptic symptoms  GI onboard    Dyspepsia  Assessment & Plan  Patient has known paraesophageal hernia status post robotic repair with recurrence  Patient saw thoracic surgery in the office approximately year ago and at that time risks were thought to outweigh benefits of any potential revisit patient of surgical repair  Since then the patient notes that she has lost 70-80 lb and has had increasing dyspeptic symptoms  Since yesterday she has noted that her symptoms have been   "Intolerable" and comes in with the inability to take anything orally since the day before yesterday and constant belching and discomfort in her epigastrium and chest   GI onboard  PPI BID      Ascending aortic aneurysm [facility])  Assessment & Plan  Patient has 3 9 cm on CT 2022        VTE Pharmacologic Prophylaxis: VTE Score: 4 Moderate Risk (Score 3-4) - Pharmacological DVT Prophylaxis Ordered: heparin drip  Patient Centered Rounds: I performed bedside rounds with nursing staff today  Discussions with Specialists or Other Care Team Provider: None    Education and Discussions with Family / Patient: Updated  () at bedside  Current Length of Stay: 0 day(s)  Current Patient Status: Observation   Discharge Plan: Anticipate discharge in 24-48 hrs to home  Code Status: Level 1 - Full Code    Subjective:   Patient is comfortable  No chest pain, no epigastric pain no nausea no vomiting  No dysuria  Patient was nervous for cardiac catheterization  No other subjective complaints noted      Objective:     Vitals:   Temp (24hrs), Av °F (36 7 °C), Min:97 6 °F (36 4 °C), Max:98 4 °F (36 9 °C)    Temp:  [97 6 °F (36 4 °C)-98 4 °F (36 9 °C)] 97 6 °F (36 4 °C)  HR:  [] 77  Resp:  [16-18] 18  BP: ()/() 88/55  SpO2:  [95 %-99 %] 97 %  Body mass index is 35 25 kg/m²  Input and Output Summary (last 24 hours): Intake/Output Summary (Last 24 hours) at 9/1/2022 1417  Last data filed at 9/1/2022 1159  Gross per 24 hour   Intake 600 ml   Output 5 ml   Net 595 ml       Physical Exam:   Physical Exam  Vitals and nursing note reviewed  Constitutional:       General: She is not in acute distress  Appearance: She is well-developed  HENT:      Head: Normocephalic and atraumatic  Nose: Nose normal       Mouth/Throat:      Mouth: Mucous membranes are moist    Eyes:      Conjunctiva/sclera: Conjunctivae normal    Cardiovascular:      Rate and Rhythm: Normal rate and regular rhythm  Heart sounds: No murmur heard  Pulmonary:      Effort: Pulmonary effort is normal  No respiratory distress  Breath sounds: Normal breath sounds  Abdominal:      Palpations: Abdomen is soft  Tenderness: There is no abdominal tenderness  Musculoskeletal:      Cervical back: Neck supple  Right lower leg: No edema  Left lower leg: No edema  Skin:     General: Skin is warm and dry  Capillary Refill: Capillary refill takes less than 2 seconds  Neurological:      General: No focal deficit present  Mental Status: She is alert and oriented to person, place, and time     Psychiatric:         Mood and Affect: Mood normal           Additional Data:     Labs:  Results from last 7 days   Lab Units 09/01/22  0841 08/31/22  0848   WBC Thousand/uL 9 34 9 57   HEMOGLOBIN g/dL 15 1 15 9*   HEMATOCRIT % 44 0 45 4   PLATELETS Thousands/uL 272 288   NEUTROS PCT %  --  79*   LYMPHS PCT %  --  14   MONOS PCT %  --  7   EOS PCT %  --  0     Results from last 7 days   Lab Units 09/01/22  0841 08/31/22  1134 08/31/22  0848   SODIUM mmol/L 128*   < > 127*   POTASSIUM mmol/L 3 5   < > 3 2*   CHLORIDE mmol/L 93*   < > 90*   CO2 mmol/L 25   < > 24   BUN mg/dL 19   < > 8   CREATININE mg/dL 0 96   < > 0 56*   ANION GAP mmol/L 10   < > 13   CALCIUM mg/dL 8 8   < > 9 4   ALBUMIN g/dL  --   --  4 2   TOTAL BILIRUBIN mg/dL  --   --  1 01*   ALK PHOS U/L  --   --  99   ALT U/L  --   --  16   AST U/L  --   --  25   GLUCOSE RANDOM mg/dL 98   < > 154*    < > = values in this interval not displayed  Results from last 7 days   Lab Units 09/01/22  0841   INR  1 18                   Lines/Drains:  Invasive Devices  Report    Peripheral Intravenous Line  Duration           Peripheral IV 09/01/22 Left Antecubital <1 day    Peripheral IV 09/01/22 Left;Ventral (anterior) Forearm <1 day                  Telemetry:  Telemetry Orders (From admission, onward)             48 Hour Telemetry Monitoring  Continuous x 48 hours        References:    Telemetry Guidelines   Question:  Reason for 48 Hour Telemetry  Answer:  Acute MI, chest pain - R/O MI, or unstable angina                 Telemetry Reviewed: Normal Sinus Rhythm  Indication for Continued Telemetry Use: Acute MI/Unstable Angina/Rule out ACS             Imaging: No pertinent imaging reviewed      Recent Cultures (last 7 days):         Last 24 Hours Medication List:   Current Facility-Administered Medications   Medication Dose Route Frequency Provider Last Rate    albuterol  2 puff Inhalation 4x Daily PRN Zhen Cedeño MD      aluminum-magnesium hydroxide-simethicone  30 mL Oral Q4H PRN Zhen Cedeño MD      vitamin C  2,000 mg Oral Daily Zhen Cedeño MD      aspirin  81 mg Oral Daily Zhen Cedeño MD      atorvastatin  40 mg Oral HS BRAYDEN Solo      dicyclomine  10 mg Oral 4x Daily PRN Zhen Cedeño MD      labetalol  10 mg Intravenous Q6H PRN Zhen Cedeño MD      latanoprost  1 drop Both Eyes BID Zhen Cedeño MD      levothyroxine  25 mcg Oral Daily Zhen Cedeño MD      magnesium sulfate  2 g Intravenous Once BRAYDEN Redman      metoprolol tartrate  25 mg Oral Q12H [address] [place] Spironello, CRNP      morphine injection  2 mg Intravenous Q4H PRN Tammie Barraza MD      nitroglycerin  0 5 inch Topical BID [place] LINH FosterNP      ondansetron  4 mg Intravenous Q6H PRN Tammie Barraza MD      pantoprazole  40 mg Intravenous Q12H [address] Tammie Barraza MD      sodium chloride  125 mL/hr Intravenous Continuous [place] BRAYDEN Finney 125 mL/hr (09/01/22 1245)    sodium chloride  2 g Oral TID With Meals Whit Munguia MD      sucralfate  1 g Oral 4x Daily (AC & HS) Mishel Lazo PA-C          Today, Patient Was Seen By: Tara Crawley MD    **Please Note: This note may have been constructed using a voice recognition system  **

## 2022-09-01 NOTE — CONSULTS
Consultation - Nephrology   Aminta Bautista 79 y o  female MRN: [id number]  Unit/Bed#: S -35 Encounter: [id number]    Inpatient consult to Nephrology  Consult performed by: Krissy Ochoa MD  Consult ordered by: Rosina Luciano MD          History of Present Illness   Physician Requesting Consult: Ira Mcwilliams 20*  Reason for Consult / Principal Problem:  Hyponatremia    HPI: Aminta Bautista is a 79y o  year old female with PMH of paraesophageal hernia status post repair March 2021 with recurrence a year ago/hypertension/dyslipidemia/ascending aortic dilatation/endometrial cancer/ovarian cancer status post JOANNE/BSO/hypothyroidism who presents with 2 days of severe midepigastric pain, some nausea but no vomiting no diarrhea  Pain has resolved  We are asked to see her for hyponatremia  She denies any chest pain or shortness of breath  Apparently she has had some weight loss over the last year  She drinks fairly well up until the last couple days she has not drink much fluids or eat much    CT scan demonstrated hiatal hernia gallstones otherwise unremarkable    Sodium:  On admission was 127 now 2 days later 128; had been normal ove the last year but there were times it was in the low 130 range 1 year ago any even potentially back to 2017 borderline low sodium  She denies any NSAIDs or diuretics    History obtained from the patient, the chart the old records which I completely reviewed        Review of systems:  General:  No fevers or chills otherwise see HPI  Cardiovascular:  No chest pain or shortness of breath or leg swelling  Respiratory:  No significant cough  Gastrointestinal:  See HPI  Genitourinary:  She denies any dysuria hematuria voiding symptoms foamy urine  Neuro:  No significant headaches  All other systems were reviewed and are negative    Historical Information   Past Medical History:   Diagnosis Date    Cancer ([facility] )     Disease of thyroid gland     Endometrial [facility] ([facility] )     age 45  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    Esophageal reflux     GERD (gastroesophageal reflux disease)     History of ovarian cancer 4/28/2022    Hypertension     Hypothyroid     Obesity     Prediabetes 5/12/2022    Rosacea 5/12/2022     Past Surgical History:   Procedure Laterality Date    CATARACT EXTRACTION, BILATERAL      COLONOSCOPY  08/2018    polyp- 5 years    CYSTOSCOPY      FL RETROGRADE PYELOGRAM  10/15/2021    JOINT REPLACEMENT      KNEE ARTHROPLASTY      SC CYSTO/URETERO W/LITHOTRIPSY &INDWELL STENT INSRT Left 10/15/2021    Procedure: CYSTOSCOPY URETEROSCOPY WITH LITHOTRIPSY HOLMIUM LASER, RETROGRADE PYELOGRAM, BASKET STONE EXTRACTION AND EXCHANGE STENT URETERAL;  Surgeon: Julee Peabody, MD;  [facility];  Service: Urology    SC CYSTOURETHROSCOPY,URETER CATHETER Left 8/24/2021    Procedure: CYSTOSCOPY WITH INSERTION STENT URETERAL;  Surgeon: Shady Reyes MD;  [facility];  Service: Urology    SC ESOPHAGOGASTRODUODENOSCOPY TRANSORAL DIAGNOSTIC N/A 3/5/2021    Procedure: ESOPHAGOGASTRODUODENOSCOPY (EGD);   Surgeon: Thomas Pena MD;  [facility];  Service: Thoracic    SC LAP, REPAIR PARAESOPHAGEAL HERNIA, INCL FUNDOPLASTY W/O MESH N/A 3/5/2021    Procedure: REPAIR HERNIA PARAESOPHAGEAL LAPAROSCOPIC W ROBOTICS WITH MESH, DOOR FUNDOPLICATION;  Surgeon: Thomas Pena MD;  [facility];  Service: Thoracic    TOTAL ABDOMINAL HYSTERECTOMY      age 45    TOTAL ABDOMINAL HYSTERECTOMY W/ BILATERAL SALPINGOOPHORECTOMY  1992    endometrial cancer     Social History   Social History     Substance and Sexual Activity   Alcohol Use Not Currently    Comment: socially     Social History     Substance and Sexual Activity   Drug Use No     Social History     Tobacco Use   Smoking Status Former Smoker    Packs/day: 0 25    Years: 10 00    Pack years: 2 50    Types: Cigarettes    Start date: 64    Quit date: 55    Years since quittin 7   Smokeless Tobacco Never Used   Tobacco Comment    Quit 50+ years ago 3/24/21     Family History   Problem Relation Age of Onset    Stomach cancer Mother 71    Pancreatic cancer Mother 71    Heart attack Father     Cancer Maternal Uncle     No Known Problems Daughter     No Known Problems Maternal Grandmother     No Known Problems Maternal Grandfather     No Known Problems Paternal Grandmother     No Known Problems Paternal Grandfather     No Known Problems Maternal Aunt     No Known Problems Maternal Aunt        Meds/Allergies   all current active meds have been reviewed, current meds:   Current Facility-Administered Medications   Medication Dose Route Frequency    albuterol (PROVENTIL HFA,VENTOLIN HFA) inhaler 2 puff  2 puff Inhalation 4x Daily PRN    aluminum-magnesium hydroxide-simethicone (MYLANTA) oral suspension 30 mL  30 mL Oral Q4H PRN    ascorbic acid (VITAMIN C) tablet 2,000 mg  2,000 mg Oral Daily    aspirin (ECOTRIN LOW STRENGTH) EC tablet 81 mg  81 mg Oral Daily    atorvastatin (LIPITOR) tablet 40 mg  40 mg Oral HS    dicyclomine (BENTYL) capsule 10 mg  10 mg Oral 4x Daily PRN    heparin (porcine) 25,000 units in 0 45% NaCl 250 mL infusion (premix)  3-20 Units/kg/hr (Order-Specific) Intravenous Titrated    heparin (porcine) injection 2,000 Units  2,000 Units Intravenous Q1H PRN    heparin (porcine) injection 4,000 Units  4,000 Units Intravenous Q1H PRN    labetalol (NORMODYNE) injection 10 mg  10 mg Intravenous Q6H PRN    latanoprost (XALATAN) 0 005 % ophthalmic solution 1 drop  1 drop Both Eyes BID    levothyroxine tablet 25 mcg  25 mcg Oral Daily    magnesium sulfate 2 g/50 mL IVPB (premix) 2 g  2 g Intravenous Once    metoprolol tartrate (LOPRESSOR) tablet 25 mg  25 mg Oral Q12H ERIK    morphine injection 2 mg  2 mg Intravenous Q4H PRN    nitroglycerin (NITRO-BID) 2 % TD ointment 0 5 inch  0 5 inch Topical BID    ondansetron (ZOFRAN) injection 4 mg  4 mg Intravenous Q6H PRN    pantoprazole (PROTONIX) injection 40 mg  40 mg Intravenous Q12H [address]    potassium chloride (K-DUR,KLOR-CON) CR tablet 40 mEq  40 mEq Oral Once    sodium chloride 0 9 % infusion  125 mL/hr Intravenous Continuous    sucralfate (CARAFATE) tablet 1 g  1 g Oral 4x Daily (AC & HS)    ticagrelor (BRILINTA) tablet 90 mg  90 mg Oral Q12H [address]    and PTA meds:    Medications Prior to Admission   Medication    albuterol (PROVENTIL HFA,VENTOLIN HFA) 90 mcg/act inhaler    Ascorbic Acid (vitamin C) 1000 MG tablet    aspirin (ECOTRIN LOW STRENGTH) 81 mg EC tablet    atorvastatin (LIPITOR) 10 mg tablet    Cholecalciferol (VITAMIN D3) 400 units CAPS    Combigan 0 2-0 5 %    latanoprost (XALATAN) 0 005 % ophthalmic solution    levothyroxine 50 mcg tablet    Multiple Vitamin (MULTIVITAMINS PO)    pantoprazole (PROTONIX) 40 mg tablet    amoxicillin (AMOXIL) 500 MG tablet    metoclopramide (Reglan) 10 mg tablet       No Known Allergies    Objective     Intake/Output Summary (Last 24 hours) at 9/1/2022 1113  Last data filed at 8/31/2022 1937  Gross per 24 hour   Intake 1100 ml   Output --   Net 1100 ml     Patient Vitals for the past 24 hrs:   BP Temp Pulse Resp SpO2 Height Weight   09/01/22 0945 114/73 -- 68 -- -- 5' 3" (1 6 m) 90 3 kg (199 lb)   09/01/22 0805 114/73 97 6 °F (36 4 °C) 65 18 96 % -- --   09/01/22 0000 140/72 -- -- 16 -- -- --   08/31/22 2147 (!) 152/105 98 4 °F (36 9 °C) 80 18 95 % -- --   08/31/22 2120 (!) 143/105 -- 83 -- 95 % -- --   08/31/22 1937 (!) 156/107 -- 87 -- 97 % -- --   08/31/22 1520 (!) 166/119 -- 101 18 97 % -- --   08/31/22 1320 (!) 152/102 -- -- -- -- -- --   08/31/22 1318 150/100 -- -- -- -- -- --   08/31/22 1312 (!) 154/109 98 °F (36 7 °C) 94 -- 96 % -- --       Invasive Devices:      Vitals:    09/01/22 0945   BP: 114/73   Pulse: 68   Resp:    Temp:    SpO2:      General:  Well-developed well-nourished no acute distress at this time no abdominal pain at this time  Skin:  No acute rash  Eyes:  No scleral icterus and noninjected  ENT:  Normocephalic/atraumatic, mucous membranes moist  Neck:  Supple, no jugular distention, no carotid bruits, 1+ carotid upstroke, no thyromegaly and trachea midline  Back:  No CVA tenderness  Chest:  Clear to auscultation percussion, good respiratory effort no use of accessory respiratory muscles  CVS:  Regular rate and rhythm without a murmur rub or gallops appreciable  Abdomen:  Soft and nontender with normal bowel sounds, no hepatosplenomegaly or bruits appreciable  Extremities:  No clubbing cyanosis or edema, 1+ dorsalis pedis pulses but no femoral bruits and no significant arthritic changes  Neuro:  No gross focality  Psych:  Alert, oriented and appropriate    Current Weight: Weight - Scale: 90 3 kg (199 lb)  First Weight: Weight - Scale: 90 6 kg (199 lb 11 8 oz)    Lab Results:  I have personally reviewed pertinent labs    CBC:   Lab Results   Component Value Date    WBC 9 34 09/01/2022    RBC 4 68 09/01/2022     CMP:   Lab Results   Component Value Date    CL 93 (L) 09/01/2022    CO2 25 09/01/2022    CO2 34 (H) 03/05/2021    BUN 19 09/01/2022    CREATININE 0 96 09/01/2022    GLUCOSE 120 03/05/2021    CALCIUM 8 8 09/01/2022    AST 25 08/31/2022    AST 19 01/28/2014    ALT 16 08/31/2022    ALT 25 01/28/2014    ALKPHOS 99 08/31/2022    ALKPHOS 134 01/28/2014    PROT 7 1 01/28/2014    BILITOT 0 34 01/28/2014    EGFR 55 09/01/2022     Phosphorus:   Lab Results   Component Value Date    PHOS 3 1 08/23/2021     Magnesium:   Lab Results   Component Value Date    MG 1 3 (L) 09/01/2022     Urinalysis:   Lab Results   Component Value Date    COLORU Dk Yellow 12/07/2021    CLARITYU Cloudy 12/07/2021    SPECGRAV 1 034 (H) 12/07/2021    PHUR 6 0 12/07/2021    PHUR 6 5 08/22/2021    LEUKOCYTESUR Small (A) 12/07/2021    NITRITE Negative 12/07/2021    GLUCOSEU Negative 12/07/2021    KETONESU Trace (A) 12/07/2021    BILIRUBINUR Negative 12/07/2021 BLOODU Negative 12/07/2021     BMP:   Lab Results   Component Value Date    GLUCOSE 120 03/05/2021    SODIUM 128 (L) 09/01/2022    CO2 25 09/01/2022    CO2 34 (H) 03/05/2021    BUN 19 09/01/2022    CREATININE 0 96 09/01/2022    CALCIUM 8 8 09/01/2022     ABGs:   Lab Results   Component Value Date    PH 7 323 (L) 03/05/2021     Radiology review:     CT scan demonstrates no suspicious mass or acute process but moderate to large hiatal hernia unchanged compared to October 2021 cholelithiasis  Assessment/Plan   79y o  year old female with PMH of paraesophageal hernia status post repair March 2021 with recurrence a year ago/hypertension/dyslipidemia/ascending aortic dilatation/endometrial cancer/ovarian cancer status post JOANNE/BSO/hypothyroidism who presents with 2 days of severe midepigastric pain, some nausea but no vomiting no diarrhea  Pain has resolved  We are asked to see her for hyponatremia      1  Hyponatremia:  Possibly related to acute pain with SIADH  Certainly rule out hypothyroidism/adrenal insufficiency  Also may have a component of decreased solute intake  No other pathology based on the CT the chest abdomen pelvis to explain SIADH  Recommend:  Workup:  · Urine sodium  · Urine osmolality  · Serum osmolality  · TSH/free T4  · A m  Cortisol  · Uric acid  · CT the chest abdomen pelvis without any acute pathology    Treatment:  · Fluid restriction 1000 mL per day  · Stop isotonic saline switch to a sodium chloride tablet 2 g t i d  If patient is going for cardiac catheterization and is NPO we can maintain isotonic saline for now  · Potentially may require small dose loop diuretic for increased water excretion  · Adequate solute intake I would recommend nutritional supplements now that she is eating better  · Monitor sodium acceptable to increase essentially to normal   · Replete hypomagnesemia/hypokalemia borderline      2  Accelerated hypertension: This has improved possibly related to pain  Now low normal   On Lopressor 25 mg b i d  Will continue to monitor    3  Abdominal pain thus far negative workup moderate to large hiatal hernia GI following possible EGD if cardiac workup negative    4  Elevated troponin/coronary artery calcifications/ascending aortic dilatation being followed and worked up by Cardiology  Portions of the record may have been created with voice recognition software  Occasional wrong word or "sound a like" substitutions may have occurred due to the inherent limitations of voice recognition software  Read the chart carefully and recognize, using context, where substitutions have occurred

## 2022-09-01 NOTE — PLAN OF CARE
Problem: Potential for Falls  Goal: Patient will remain free of falls  Description: INTERVENTIONS:  - Educate patient/family on patient safety including physical limitations  - Instruct patient to call for assistance with activity   - Consult OT/PT to assist with strengthening/mobility   - Keep Call bell within reach  - Keep bed low and locked with side rails adjusted as appropriate  - Keep care items and personal belongings within reach  - Initiate and maintain comfort rounds  Outcome: Progressing       Problem: PAIN - ADULT  Goal: Verbalizes/displays adequate comfort level or baseline comfort level  Description: Interventions:  - Encourage patient to monitor pain and request assistance  - Assess pain using appropriate pain scale  - Administer analgesics based on type and severity of pain and evaluate response  - Implement non-pharmacological measures as appropriate and evaluate response  - Consider cultural and social influences on pain and pain management  - Notify physician/advanced practitioner if interventions unsuccessful or patient reports new pain  Outcome: Progressing     Problem: INFECTION - ADULT  Goal: Absence or prevention of progression during hospitalization  Description: INTERVENTIONS:  - Assess and monitor for signs and symptoms of infection  - Monitor lab/diagnostic results  - Monitor all insertion sites, i e  indwelling lines, tubes, and drains  - Monitor endotracheal if appropriate and nasal secretions for changes in amount and color  - [place] appropriate cooling/warming therapies per order  - Administer medications as ordered  - Instruct and encourage patient and family to use good hand hygiene technique  - Identify and instruct in appropriate isolation precautions for identified infection/condition  Outcome: Progressing     Problem: GASTROINTESTINAL - ADULT  Goal: Minimal or absence of nausea and/or vomiting  Description: INTERVENTIONS:  - Administer IV fluids if ordered to ensure adequate hydration  - Maintain NPO status until nausea and vomiting are resolved  - Nasogastric tube if ordered  - Administer ordered antiemetic medications as needed  - Provide nonpharmacologic comfort measures as appropriate  - Advance diet as tolerated, if ordered  - Consider nutrition services referral to assist patient with adequate nutrition and appropriate food choices  Outcome: Progressing  Goal: Maintains adequate nutritional intake  Description: INTERVENTIONS:  - Monitor percentage of each meal consumed  - Identify factors contributing to decreased intake, treat as appropriate  - Assist with meals as needed  - Monitor I&O, weight, and lab values if indicated  - Obtain nutrition services referral as needed  Outcome: Progressing     Problem: METABOLIC, FLUID AND ELECTROLYTES - ADULT  Goal: Electrolytes maintained within normal limits  Description: INTERVENTIONS:  - Monitor labs and assess patient for signs and symptoms of electrolyte imbalances  - Administer electrolyte replacement as ordered  - Monitor response to electrolyte replacements, including repeat lab results as appropriate  - Instruct patient on fluid and nutrition as appropriate  Outcome: Progressing  Goal: Fluid balance maintained  Description: INTERVENTIONS:  - Monitor labs   - Monitor I/O and WT  - Instruct patient on fluid and nutrition as appropriate  - Assess for signs & symptoms of volume excess or deficit  Outcome: Progressing

## 2022-09-01 NOTE — PROGRESS NOTES
Cardiology Progress Note - Team 1  Jacinta Collier 79 y o  female MRN: [id number]  Unit/Bed#: S -01 Encounter: [id number]      Assessment/Plan:  1  Abdominal/epigastric pain   - symptoms are atypical, but cannot completely rule out cardiac chest pain  - CT chest 8/30/2022 - moderate to large hiatal hernia fluid within the distal esophagus consistent with reflux  - GI following given history of hiatal hernia - possible inpatient EGD if cardiac workup negative  - continue carafate, protonix    2  Elevated troponin  - unclear if NSTEMI type 1/type 2 MI (ACS r/o) vs nonischemic myocardial injury in the setting of accelerated hypertension  - EKG (8/30/22) - SB/first-degree AVB, nonspecific ST T-wave abnormalities  - telemetry review - NSR, HR 70s, no significant events noted  - HS troponin - 1376 > 1265 > 1026  - CT chest (8/30/22) - no thoracic aortic aneurysm, coronary artery calcifications, and moderate to large hiatal hernia - fluid within the distal esophagus consistent with reflux  - plan for C today to r/o underlying CAD - further management based upon results  - continue heparin gtt, nitro paste  - continue to monitor on telemetry    3  Accelerated hypertension  - /122 on admission - last recorded at 114/73; improved  - not maintained on any antihypertensives outpatient   - currently on lopressor 25 mg po BID; continue    4  Coronary artery calcifications   - noted on CT imaging (8/30/22)  - continue aspirin 81 mg po daily, brilinta 90 mg po BID, atorvastatin 40 mg po daily, lopressor     5  Ascending aortic dilatation  - 3 9 cm on recent echo (7/2022)  - continue outpatient surveillance     6  Preserved LV systolic function  - appears compensated  - echo (7/2022) - LVEF 60%, wall motion normal, diastolic function mildly abnormal with grade 1 DD, LA mildly dilated, mild AI, trace MR and dilated ascending aorta at 3 9 cm; new echo ordered and pending     7   Dyslipidemia  - lipid profile (03/2022) - / triglycerides 82/ HDL 57/ LDL 65  - continue atorvastatin    8  Hypomagnesemia   - a m Mag 1 3 - replete now with 2 g IV x1  - goal > 2    9  Hyponatremia  - Na on arrival 126 > 128 this morning  - likely secondary to poor oral intake secondary to nausea/vomiting  - currently receiving IVF pre-cath    Subjective:   Patient seen and examined  No significant events overnight  Patient offers no complaints this morning  Dry heaves improved  Understands the risk and benefits of cardiac catheretization and wishes to proceed  Objective:   Vitals: Blood pressure 114/73, pulse 65, temperature 97 6 °F (36 4 °C), resp  rate 18, weight 90 6 kg (199 lb 11 8 oz), SpO2 96 %, not currently breastfeeding , Body mass index is 35 38 kg/m² ,   Orthostatic Blood Pressures    Flowsheet Row Most Recent Value   Blood Pressure 114/73 filed at 09/01/2022 0805   Patient Position - Orthostatic VS Sitting filed at 09/01/2022 0000          Intake/Output Summary (Last 24 hours) at 9/1/2022 0848  Last data filed at 8/31/2022 1937  Gross per 24 hour   Intake 1100 ml   Output --   Net 1100 ml     Physical Exam  Constitutional:       General: She is not in acute distress  Appearance: She is obese  She is not ill-appearing  HENT:      Head: Normocephalic and atraumatic  Nose: Nose normal       Mouth/Throat:      Mouth: Mucous membranes are moist       Pharynx: Oropharynx is clear  Eyes:      Pupils: Pupils are equal, round, and reactive to light  Cardiovascular:      Rate and Rhythm: Normal rate and regular rhythm  Pulses: Normal pulses  Heart sounds: Normal heart sounds  No murmur heard  Pulmonary:      Effort: Pulmonary effort is normal       Breath sounds: Normal breath sounds  Abdominal:      General: Bowel sounds are normal       Palpations: Abdomen is soft  Musculoskeletal:         General: Normal range of motion  Cervical back: Normal range of motion  Right lower leg: No edema        Left lower leg: No edema  Skin:     General: Skin is warm and dry  Capillary Refill: Capillary refill takes less than 2 seconds  Coloration: Skin is pale  Neurological:      General: No focal deficit present  Mental Status: She is alert and oriented to person, place, and time     Psychiatric:         Mood and Affect: Mood normal          Behavior: Behavior normal      Medications:    Current Facility-Administered Medications:     albuterol (PROVENTIL HFA,VENTOLIN HFA) inhaler 2 puff, 2 puff, Inhalation, 4x Daily PRN, Lucas Soliz MD    aluminum-magnesium hydroxide-simethicone (MYLANTA) oral suspension 30 mL, 30 mL, Oral, Q4H PRN, Lucas Soliz MD    ascorbic acid (VITAMIN C) tablet 2,000 mg, 2,000 mg, Oral, Daily, Lucas Soliz MD    aspirin (ECOTRIN LOW STRENGTH) EC tablet 81 mg, 81 mg, Oral, Daily, Lucas Soliz MD    atorvastatin (LIPITOR) tablet 40 mg, 40 mg, Oral, HS, BRAYDEN Kwon, 40 mg at 08/31/22 2116    dicyclomine (BENTYL) capsule 10 mg, 10 mg, Oral, 4x Daily PRN, Lucas Soliz MD, 10 mg at 08/31/22 1355    heparin (porcine) 25,000 units in 0 45% NaCl 250 mL infusion (premix), 3-20 Units/kg/hr (Order-Specific), Intravenous, Titrated, BRAYDEN Kwon, Last Rate: 8 2 mL/hr at 09/01/22 0207, 9 1 Units/kg/hr at 09/01/22 0207    heparin (porcine) injection 2,000 Units, 2,000 Units, Intravenous, Q1H PRN, BRAYDEN Kwon    heparin (porcine) injection 4,000 Units, 4,000 Units, Intravenous, Q1H PRN, BRAYDEN Kwon    labetalol (NORMODYNE) injection 10 mg, 10 mg, Intravenous, Q6H PRN, Lucas Soliz MD, 10 mg at 08/31/22 1944    latanoprost (XALATAN) 0 005 % ophthalmic solution 1 drop, 1 drop, Both Eyes, BID, Lucas Soliz MD, 1 drop at 08/31/22 2116    levothyroxine tablet 25 mcg, 25 mcg, Oral, Daily, Lucas Soliz MD, 25 mcg at 09/01/22 0846    metoprolol tartrate (LOPRESSOR) tablet 25 mg, 25 mg, Oral, Q12H [address], Catherine Penny, CRNP, 25 mg at 09/01/22 0846    morphine injection 2 mg, 2 mg, Intravenous, Q4H PRN, Beth Caceres MD    nitroglycerin (NITRO-BID) 2 % TD ointment 0 5 inch, 0 5 inch, Topical, BID, Kimberly Every Spironello, CRNP, 0 5 inch at 09/01/22 0846    ondansetron (ZOFRAN) injection 4 mg, 4 mg, Intravenous, Q6H PRN, Beth Caceres MD, 4 mg at 08/31/22 1936    pantoprazole (PROTONIX) injection 40 mg, 40 mg, Intravenous, Q12H [address], Beth Caceres MD, 40 mg at 09/01/22 0845    sodium chloride 0 9 % infusion, 50 mL/hr, Intravenous, Continuous, Beth Caceres MD, Last Rate: 50 mL/hr at 08/31/22 1154, 50 mL/hr at 08/31/22 1154    sodium chloride 0 9 % infusion, 125 mL/hr, Intravenous, Continuous, Kimberly Every Spironello, CRNP    sucralfate (CARAFATE) tablet 1 g, 1 g, Oral, 4x Daily (AC & HS), Marquita Adair PA-C, 1 g at 09/01/22 0636    ticagrelor (BRILINTA) tablet 90 mg, 90 mg, Oral, Q12H ERIK, Kimberly Every Spironello, CRNP, 90 mg at 09/01/22 0846     Labs & Results:      Results from last 7 days   Lab Units 08/31/22  0848 08/30/22  1832   WBC Thousand/uL 9 57 9 31   HEMOGLOBIN g/dL 15 9* 15 0   HEMATOCRIT % 45 4 41 4   PLATELETS Thousands/uL 288 255         Results from last 7 days   Lab Units 09/01/22  0446 08/31/22  2347 08/31/22  1639 08/31/22  1134 08/31/22  0848 08/30/22  1832   POTASSIUM mmol/L 4 1 3 4* 3 4*   < > 3 2* 3 5   CHLORIDE mmol/L 93* 93* 92*   < > 90* 93*   CO2 mmol/L 26 22 21   < > 24 23   BUN mg/dL 17 13 11   < > 8 9   CREATININE mg/dL 0 96 0 63 0 52*   < > 0 56* 0 54*   CALCIUM mg/dL 9 0 8 7 9 1   < > 9 4 9 0   ALK PHOS U/L  --   --   --   --  99 96   ALT U/L  --   --   --   --  16 15   AST U/L  --   --   --   --  25 24    < > = values in this interval not displayed       Results from last 7 days   Lab Units 08/31/22  8817 08/31/22  1639   INR   --  1 08   PTT seconds 96* 28

## 2022-09-02 ENCOUNTER — TELEPHONE (OUTPATIENT)
Dept: NEPHROLOGY | Facility: CLINIC | Age: 82
End: 2022-09-02

## 2022-09-02 ENCOUNTER — ANESTHESIA (OUTPATIENT)
Dept: GASTROENTEROLOGY | Facility: HOSPITAL | Age: 82
DRG: 392 | End: 2022-09-02
Payer: MEDICARE

## 2022-09-02 ENCOUNTER — APPOINTMENT (OUTPATIENT)
Dept: GASTROENTEROLOGY | Facility: HOSPITAL | Age: 82
DRG: 392 | End: 2022-09-02
Payer: MEDICARE

## 2022-09-02 ENCOUNTER — ANESTHESIA EVENT (OUTPATIENT)
Dept: GASTROENTEROLOGY | Facility: HOSPITAL | Age: 82
DRG: 392 | End: 2022-09-02
Payer: MEDICARE

## 2022-09-02 VITALS
HEIGHT: 63 IN | BODY MASS INDEX: 35.26 KG/M2 | RESPIRATION RATE: 16 BRPM | WEIGHT: 199 LBS | SYSTOLIC BLOOD PRESSURE: 107 MMHG | OXYGEN SATURATION: 95 % | TEMPERATURE: 98.2 F | DIASTOLIC BLOOD PRESSURE: 62 MMHG | HEART RATE: 89 BPM

## 2022-09-02 DIAGNOSIS — E87.1 HYPONATREMIA: Primary | ICD-10-CM

## 2022-09-02 PROBLEM — Z98.890 PONV (POSTOPERATIVE NAUSEA AND VOMITING): Status: ACTIVE | Noted: 2022-09-02

## 2022-09-02 PROBLEM — R11.2 PONV (POSTOPERATIVE NAUSEA AND VOMITING): Status: ACTIVE | Noted: 2022-09-02

## 2022-09-02 LAB
ANION GAP SERPL CALCULATED.3IONS-SCNC: 7 MMOL/L (ref 4–13)
BASOPHILS # BLD AUTO: 0.04 THOUSANDS/ΜL (ref 0–0.1)
BASOPHILS NFR BLD AUTO: 1 % (ref 0–1)
BUN SERPL-MCNC: 26 MG/DL (ref 5–25)
CALCIUM SERPL-MCNC: 8.4 MG/DL (ref 8.4–10.2)
CHLORIDE SERPL-SCNC: 100 MMOL/L (ref 96–108)
CO2 SERPL-SCNC: 24 MMOL/L (ref 21–32)
CORTIS SERPL-MCNC: 12.8 UG/DL
CREAT SERPL-MCNC: 0.99 MG/DL (ref 0.6–1.3)
EOSINOPHIL # BLD AUTO: 0.05 THOUSAND/ΜL (ref 0–0.61)
EOSINOPHIL NFR BLD AUTO: 1 % (ref 0–6)
ERYTHROCYTE [DISTWIDTH] IN BLOOD BY AUTOMATED COUNT: 13.1 % (ref 11.6–15.1)
GFR SERPL CREATININE-BSD FRML MDRD: 53 ML/MIN/1.73SQ M
GLUCOSE SERPL-MCNC: 85 MG/DL (ref 65–140)
HCT VFR BLD AUTO: 38.8 % (ref 34.8–46.1)
HGB BLD-MCNC: 13.2 G/DL (ref 11.5–15.4)
IMM GRANULOCYTES # BLD AUTO: 0.03 THOUSAND/UL (ref 0–0.2)
IMM GRANULOCYTES NFR BLD AUTO: 0 % (ref 0–2)
LYMPHOCYTES # BLD AUTO: 1.56 THOUSANDS/ΜL (ref 0.6–4.47)
LYMPHOCYTES NFR BLD AUTO: 22 % (ref 14–44)
MAGNESIUM SERPL-MCNC: 1.9 MG/DL (ref 1.9–2.7)
MCH RBC QN AUTO: 33 PG (ref 26.8–34.3)
MCHC RBC AUTO-ENTMCNC: 34 G/DL (ref 31.4–37.4)
MCV RBC AUTO: 97 FL (ref 82–98)
MONOCYTES # BLD AUTO: 1.08 THOUSAND/ΜL (ref 0.17–1.22)
MONOCYTES NFR BLD AUTO: 15 % (ref 4–12)
NEUTROPHILS # BLD AUTO: 4.34 THOUSANDS/ΜL (ref 1.85–7.62)
NEUTS SEG NFR BLD AUTO: 61 % (ref 43–75)
NRBC BLD AUTO-RTO: 0 /100 WBCS
OSMOLALITY UR/SERPL-RTO: 277 MMOL/KG (ref 282–298)
PLATELET # BLD AUTO: 216 THOUSANDS/UL (ref 149–390)
PMV BLD AUTO: 10 FL (ref 8.9–12.7)
POTASSIUM SERPL-SCNC: 3.8 MMOL/L (ref 3.5–5.3)
RBC # BLD AUTO: 4 MILLION/UL (ref 3.81–5.12)
SODIUM 24H UR-SCNC: 12 MOL/L
SODIUM SERPL-SCNC: 131 MMOL/L (ref 135–147)
T4 FREE SERPL-MCNC: 1.03 NG/DL (ref 0.76–1.46)
TSH SERPL DL<=0.05 MIU/L-ACNC: 4.49 UIU/ML (ref 0.45–4.5)
URATE SERPL-MCNC: 4.5 MG/DL (ref 2–7.5)
WBC # BLD AUTO: 7.1 THOUSAND/UL (ref 4.31–10.16)

## 2022-09-02 PROCEDURE — 82088 ASSAY OF ALDOSTERONE: CPT

## 2022-09-02 PROCEDURE — 99239 HOSP IP/OBS DSCHRG MGMT >30: CPT | Performed by: INTERNAL MEDICINE

## 2022-09-02 PROCEDURE — 43235 EGD DIAGNOSTIC BRUSH WASH: CPT | Performed by: INTERNAL MEDICINE

## 2022-09-02 PROCEDURE — 84443 ASSAY THYROID STIM HORMONE: CPT | Performed by: INTERNAL MEDICINE

## 2022-09-02 PROCEDURE — 83735 ASSAY OF MAGNESIUM: CPT | Performed by: INTERNAL MEDICINE

## 2022-09-02 PROCEDURE — C9113 INJ PANTOPRAZOLE SODIUM, VIA: HCPCS | Performed by: INTERNAL MEDICINE

## 2022-09-02 PROCEDURE — 83930 ASSAY OF BLOOD OSMOLALITY: CPT | Performed by: INTERNAL MEDICINE

## 2022-09-02 PROCEDURE — 82533 TOTAL CORTISOL: CPT | Performed by: INTERNAL MEDICINE

## 2022-09-02 PROCEDURE — 0DJ08ZZ INSPECTION OF UPPER INTESTINAL TRACT, VIA NATURAL OR ARTIFICIAL OPENING ENDOSCOPIC: ICD-10-PCS | Performed by: INTERNAL MEDICINE

## 2022-09-02 PROCEDURE — 84300 ASSAY OF URINE SODIUM: CPT | Performed by: INTERNAL MEDICINE

## 2022-09-02 PROCEDURE — 84244 ASSAY OF RENIN: CPT

## 2022-09-02 PROCEDURE — 84439 ASSAY OF FREE THYROXINE: CPT | Performed by: INTERNAL MEDICINE

## 2022-09-02 PROCEDURE — 99232 SBSQ HOSP IP/OBS MODERATE 35: CPT | Performed by: INTERNAL MEDICINE

## 2022-09-02 PROCEDURE — 85025 COMPLETE CBC W/AUTO DIFF WBC: CPT

## 2022-09-02 PROCEDURE — 84550 ASSAY OF BLOOD/URIC ACID: CPT | Performed by: INTERNAL MEDICINE

## 2022-09-02 PROCEDURE — 80048 BASIC METABOLIC PNL TOTAL CA: CPT | Performed by: INTERNAL MEDICINE

## 2022-09-02 RX ORDER — PROPOFOL 10 MG/ML
INJECTION, EMULSION INTRAVENOUS AS NEEDED
Status: DISCONTINUED | OUTPATIENT
Start: 2022-09-02 | End: 2022-09-02

## 2022-09-02 RX ORDER — LIDOCAINE HYDROCHLORIDE 10 MG/ML
INJECTION, SOLUTION EPIDURAL; INFILTRATION; INTRACAUDAL; PERINEURAL AS NEEDED
Status: DISCONTINUED | OUTPATIENT
Start: 2022-09-02 | End: 2022-09-02

## 2022-09-02 RX ORDER — SUCRALFATE 1 G/1
1 TABLET ORAL
Qty: 120 TABLET | Refills: 0 | Status: SHIPPED | OUTPATIENT
Start: 2022-09-02 | End: 2022-09-20

## 2022-09-02 RX ORDER — SODIUM CHLORIDE 1000 MG
2 TABLET, SOLUBLE MISCELLANEOUS
Qty: 126 TABLET | Refills: 0 | Status: SHIPPED | OUTPATIENT
Start: 2022-09-02 | End: 2022-10-04

## 2022-09-02 RX ADMIN — LIDOCAINE HYDROCHLORIDE 50 MG: 10 INJECTION, SOLUTION EPIDURAL; INFILTRATION; INTRACAUDAL; PERINEURAL at 13:58

## 2022-09-02 RX ADMIN — PANTOPRAZOLE SODIUM 40 MG: 40 INJECTION, POWDER, FOR SOLUTION INTRAVENOUS at 10:32

## 2022-09-02 RX ADMIN — SUCRALFATE 1 G: 1 TABLET ORAL at 15:29

## 2022-09-02 RX ADMIN — LEVOTHYROXINE SODIUM 25 MCG: 25 TABLET ORAL at 10:33

## 2022-09-02 RX ADMIN — SODIUM CHLORIDE 2 G: 1 TABLET ORAL at 17:28

## 2022-09-02 RX ADMIN — PROPOFOL 100 MG: 10 INJECTION, EMULSION INTRAVENOUS at 13:58

## 2022-09-02 RX ADMIN — LATANOPROST 1 DROP: 50 SOLUTION OPHTHALMIC at 10:33

## 2022-09-02 RX ADMIN — OXYCODONE HYDROCHLORIDE AND ACETAMINOPHEN 2000 MG: 500 TABLET ORAL at 15:30

## 2022-09-02 NOTE — ASSESSMENT & PLAN NOTE
Patient has known paraesophageal hernia status post robotic repair with recurrence  Patient saw thoracic surgery in the office approximately year ago and at that time risks were thought to outweigh benefits of any potential revisit patient of surgical repair  Since then the patient notes that she has lost 70-80 lb and has had increasing dyspeptic symptoms      "Intolerable" and comes in with the inability to take anything orally since the day before yesterday and constant belching and discomfort in her epigastrium and chest   GI onboard  PPI BID

## 2022-09-02 NOTE — DISCHARGE INSTR - AVS FIRST PAGE
Dear Edison Malin,     It was our pleasure to care for you here at [facility]  It is our hope that we were always able to exceed the expected standards for your care during your stay  You were hospitalized due to abdominal pain  You were cared for on the 3S floor by Elizabeth Yañez MD under the service of Chelsi Guardado* with the [facility] who covers for your primary care physician (PCP), Natali Corey MD, while you were hospitalized  If you have any questions or concerns related to this hospitalization, you may contact us at [phone]  For follow up as well as any medication refills, we recommend that you follow up with your primary care physician  A registered nurse will reach out to you by phone within a few days after your discharge to answer any additional questions that you may have after going home  However, at this time we provide for you here, the most important instructions / recommendations at discharge:     Notable Medication Adjustments -   Please continue taking salt tablets 2 g every 8 hours as instructed by our nephrologist  Please follow-up with nephrologist as an outpatient  Please continue taking your Protonix 40 mg once daily  Please take Carafate as needed once before every meal, and once at bedtime  Testing Required after Discharge -   Please obtain BMP lab work within 1 week  Important follow up information -   Please follow-up with your PCP within 7 days  Please follow-up with your nephrologist  Please follow-up with Gastroenterology as an outpatient to set up for gastric emptying study  Other Instructions -   Please try to avoid eating large or fatty meals    Please also try to avoid eating 3 hours before going to sleep    Please review this entire after visit summary as additional general instructions including medication list, appointments, activity, diet, any pertinent wound care, and other additional recommendations from your care team that may be provided for you        Sincerely,     Woody Brumfield MD

## 2022-09-02 NOTE — ASSESSMENT & PLAN NOTE
Patient has known paraesophageal hernia status post robotic repair with recurrence  Patient saw thoracic surgery in the office approximately year ago and at that time risks were thought to outweigh benefits of any potential revisit patient of surgical repair      Given patient's worsening symptoms of dyspepsia as well as weight loss will have patient re-consult Thoracics as an out patient

## 2022-09-02 NOTE — TELEPHONE ENCOUNTER
Labs in system    ----- Message from Taye العلي MD sent at 9/2/2022 10:26 AM EDT -----  This patient may be discharged from [facility] today  She will need a basic metabolic profile/magnesium 1 week after discharge  She came in with hyponatremia  She may need an appointment in the next 2-4 weeks depending upon her serum sodium with 1 of the advanced practitioner/physician  Thank you

## 2022-09-02 NOTE — ASSESSMENT & PLAN NOTE
likely secondary to poor oral intake possibly also component of SIADH in the setting of nausea  Patient's hyponatremia is resolving, today sodium levels 131    Will have patient continue on sodium 2 g t i d   Per Nephrology recommendations  Will have patient obtain BMP in 1 week next   patient will follow-up with nephrology as an outpatient

## 2022-09-02 NOTE — ASSESSMENT & PLAN NOTE
Pat obstructive plan ient presented with elevated troponins that were down trending    Patient's cardiac catheterization did not identify any obstruction

## 2022-09-02 NOTE — ASSESSMENT & PLAN NOTE
Patient has history of GERD status post paraesophageal hernia repair in 2021  Presented with 2 week history of GI discomfort, spitting up clear phlegm    Patient's troponins were elevated flat on admission  Patient underwent cardiac catheterization yesterday which showed nonobstructive plaques  Patient will continue with EGD today  NPO midnight  Will continue PPI and Carafate

## 2022-09-02 NOTE — PLAN OF CARE
Problem: Potential for Falls  Goal: Patient will remain free of falls  Description: INTERVENTIONS:  - Educate patient/family on patient safety including physical limitations  - Instruct patient to call for assistance with activity   - Consult OT/PT to assist with strengthening/mobility   - Keep Call bell within reach  - Keep bed low and locked with side rails adjusted as appropriate  - Keep care items and personal belongings within reach  - Initiate and maintain comfort rounds  - Make Fall Risk Sign visible to staff  - Offer Toileting every  Hours, in advance of need  - Initiate/Maintain alarm  - Obtain necessary fall risk management equipment:   - Apply yellow socks and bracelet for high fall risk patients  - Consider moving patient to room near nurses station  Outcome: Progressing     Problem: PAIN - ADULT  Goal: Verbalizes/displays adequate comfort level or baseline comfort level  Description: Interventions:  - Encourage patient to monitor pain and request assistance  - Assess pain using appropriate pain scale  - Administer analgesics based on type and severity of pain and evaluate response  - Implement non-pharmacological measures as appropriate and evaluate response  - Consider cultural and social influences on pain and pain management  - Notify physician/advanced practitioner if interventions unsuccessful or patient reports new pain  Outcome: Progressing     Problem: INFECTION - ADULT  Goal: Absence or prevention of progression during hospitalization  Description: INTERVENTIONS:  - Assess and monitor for signs and symptoms of infection  - Monitor lab/diagnostic results  - Monitor all insertion sites, i e  indwelling lines, tubes, and drains  - Monitor endotracheal if appropriate and nasal secretions for changes in amount and color  - [place] appropriate cooling/warming therapies per order  - Administer medications as ordered  - Instruct and encourage patient and family to use good hand hygiene technique  - Identify and instruct in appropriate isolation precautions for identified infection/condition  Outcome: Progressing     Problem: GASTROINTESTINAL - ADULT  Goal: Minimal or absence of nausea and/or vomiting  Description: INTERVENTIONS:  - Administer IV fluids if ordered to ensure adequate hydration  - Maintain NPO status until nausea and vomiting are resolved  - Nasogastric tube if ordered  - Administer ordered antiemetic medications as needed  - Provide nonpharmacologic comfort measures as appropriate  - Advance diet as tolerated, if ordered  - Consider nutrition services referral to assist patient with adequate nutrition and appropriate food choices  Outcome: Progressing  Goal: Maintains adequate nutritional intake  Description: INTERVENTIONS:  - Monitor percentage of each meal consumed  - Identify factors contributing to decreased intake, treat as appropriate  - Assist with meals as needed  - Monitor I&O, weight, and lab values if indicated  - Obtain nutrition services referral as needed  Outcome: Progressing     Problem: METABOLIC, FLUID AND ELECTROLYTES - ADULT  Goal: Electrolytes maintained within normal limits  Description: INTERVENTIONS:  - Monitor labs and assess patient for signs and symptoms of electrolyte imbalances  - Administer electrolyte replacement as ordered  - Monitor response to electrolyte replacements, including repeat lab results as appropriate  - Instruct patient on fluid and nutrition as appropriate  Outcome: Progressing  Goal: Fluid balance maintained  Description: INTERVENTIONS:  - Monitor labs   - Monitor I/O and WT  - Instruct patient on fluid and nutrition as appropriate  - Assess for signs & symptoms of volume excess or deficit  Outcome: Progressing     Problem: MOBILITY - ADULT  Goal: Maintain or return to baseline ADL function  Description: INTERVENTIONS:  -  Assess patient's ability to carry out ADLs; assess patient's baseline for ADL function and identify physical deficits which impact ability to perform ADLs (bathing, care of mouth/teeth, toileting, grooming, dressing, etc )  - Assess/evaluate cause of self-care deficits   - Assess range of motion  - Assess patient's mobility; develop plan if impaired  - Assess patient's need for assistive devices and provide as appropriate  - Encourage maximum independence but intervene and supervise when necessary  - Involve family in performance of ADLs  - Assess for home care needs following discharge   - Consider OT consult to assist with ADL evaluation and planning for discharge  - Provide patient education as appropriate  Outcome: Progressing  Goal: Maintains/Returns to pre admission functional level  Description: INTERVENTIONS:  - Perform BMAT or MOVE assessment daily    - Set and communicate daily mobility goal to care team and patient/family/caregiver  - Collaborate with rehabilitation services on mobility goals if consulted  - Perform Range of Motion  times a day  - Reposition patient every  hours    - Dangle patient  times a day  - Stand patient  times a day  - Ambulate patient  times a day  - Out of bed to anahy times a day   - Out of bed for meals  times a day  - Out of bed for toileting  - Record patient progress and toleration of activity level   Outcome: Progressing

## 2022-09-02 NOTE — ASSESSMENT & PLAN NOTE
likely secondary to poor oral intake possibly also component of SIADH in the setting of nausea      Nephrology onboard  Fluid restrict 1 L per day, give sodium chloride tablets 2 g t i d   Possibly will start on diuretic

## 2022-09-02 NOTE — PLAN OF CARE
Pt is awake, A&O x 4  Denied pain, nausea, SOB, and dizziness  Pt is for EGD today at 13:30 pm  Will continue to monitor

## 2022-09-02 NOTE — ANESTHESIA PREPROCEDURE EVALUATION
Procedure:  EGD    Known paraesophageal hernia s/p robotic repair complicated by recurrence     Patient presents for worsening oral intake with belching and epigastric discomfort    Cardiology eval in the setting of troponin leak and patient had cardiac cath - revealed minimal plaque  Troponin leak likely secondary to accelerated HTN   Rec periodic surveillance as outpatient for ascending aorta dilation    Relevant Problems   ANESTHESIA   (+) PONV (postoperative nausea and vomiting)      CARDIO   (+) Ascending aortic aneurysm (HCC)   (+) Hypercholesterolemia   (+) Hypertension   (+) Hypertension, essential, benign      ENDO   (+) Acquired hypothyroidism      GI/HEPATIC   (+) Gastroesophageal reflux disease with esophagitis without hemorrhage   (+) Paraesophageal hernia      /RENAL   (+) Acute kidney injury (HCC)   (+) Obstruction of left ureteropelvic junction (UPJ) due to stone      NEURO/PSYCH   (+) History of ovarian cancer        Physical Exam    Airway    Mallampati score: III  TM Distance: <3 FB  Neck ROM: full     Dental   No notable dental hx     Cardiovascular      Pulmonary      Other Findings       Latest Reference Range & Units 09/02/22 04:46   Sodium 135 - 147 mmol/L 131 (L)   Potassium 3 5 - 5 3 mmol/L 3 8   Chloride 96 - 108 mmol/L 100   CO2 21 - 32 mmol/L 24   Anion Gap 4 - 13 mmol/L 7   BUN 5 - 25 mg/dL 26 (H)   Creatinine 0 60 - 1 30 mg/dL 0 99   Glucose, Random 65 - 140 mg/dL 85   Calcium 8 4 - 10 2 mg/dL 8 4   eGFR ml/min/1 73sq m 53   Magnesium 1 9 - 2 7 mg/dL 1 9   (L): Data is abnormally low  (H): Data is abnormally high     Latest Reference Range & Units 09/02/22 02:12   WBC 4 31 - 10 16 Thousand/uL 7 10   Red Blood Cell Count 3 81 - 5 12 Million/uL 4 00   Hemoglobin 11 5 - 15 4 g/dL 13 2   HCT 34 8 - 46 1 % 38 8   MCV 82 - 98 fL 97   MCH 26 8 - 34 3 pg 33 0   MCHC 31 4 - 37 4 g/dL 34 0   RDW 11 6 - 15 1 % 13 1   Platelet Count 118 - 390 Thousands/uL 216   MPV 8 9 - 12 7 fL 10 0   nRBC /100 WBCs 0      Latest Reference Range & Units 09/01/22 08:41   PROTIME 11 6 - 14 5 seconds 15 3 (H)   POCT INR 0 84 - 1 19  1 18   PTT 23 - 37 seconds 87 (H)   (H): Data is abnormally high      ECHO (9/2022)    Left Ventricle: Left ventricular cavity size is normal  There is moderate concentric hypertrophy  The left ventricular ejection fraction is 65%  Systolic function is normal  Wall motion is normal  Diastolic function is mildly abnormal, consistent with grade I (abnormal) relaxation    Right Ventricle: Right ventricular cavity size is normal  Systolic function is normal     Aortic Valve: There is trace regurgitation    Mitral Valve: There is trace regurgitation    Aorta: The aortic root is normal in size  The ascending aorta is mildly dilated  EKG  Sinus tachycardia  Left anterior fascicular block  Moderate voltage criteria for LVH, may be normal variant  Possible Lateral infarct , age undetermined  Abnormal ECG  When compared with ECG of 30-AUG-2022 19:03,  Significant changes have occurred      Anesthesia Plan  ASA Score- 3     Anesthesia Type- IV sedation with anesthesia with ASA Monitors  Additional Monitors:   Airway Plan:     Comment: NPO after MN    Hyponatremia noted - starting at 127 on admission likely secondary to SIADH and poor intake  Improving slowly and now over 130  Plan Factors-Exercise tolerance (METS): <4 METS  Chart reviewed  EKG reviewed  Existing labs reviewed  Patient summary reviewed  Patient is not a current smoker  Induction- intravenous  Postoperative Plan-     Informed Consent- Anesthetic plan and risks discussed with patient  I personally reviewed this patient with the CRNA  Discussed and agreed on the Anesthesia Plan with the CRNA  Ally Cox

## 2022-09-02 NOTE — PROGRESS NOTES
[facility]  Progress Note - Nury Zelaya 1940, 79 y o  female MRN: [id number]  Unit/Bed#: S -01 Encounter: [id number]  Primary Care Provider: Garrison Brock MD   Date and time admitted to hospital: 8/31/2022  7:10 AM    * Epigastric abdominal pain  Assessment & Plan  Patient has history of GERD status post paraesophageal hernia repair in 2021  Presented with 2 week history of GI discomfort, spitting up clear phlegm  Patient's troponins were elevated flat on admission  Patient underwent cardiac catheterization yesterday which showed nonobstructive plaques  Patient will continue with EGD today  NPO midnight  Will continue PPI and Carafate    Hyponatremia  Assessment & Plan   likely secondary to poor oral intake possibly also component of SIADH in the setting of nausea  Nephrology onboard  Fluid restrict 1 L per day, give sodium chloride tablets 2 g t i d   Possibly will start on diuretic      Elevated troponin  Assessment & Plan  Patient 1 day history of 10/10 abdominal pain  , no radiation, periumbilical/epigastric pain  No nausea no vomiting  Troponins  1376> 1265>1026  EKG nonspecific changes,  CT chest (8/30) - no thoracic aortic aneurysm, coronary artery calcifications, and moderate to large hiatal hernia - fluid within the distal esophagus consistent with reflux  patient on,aspirin, atorvastatin, metoprolol, nitroglycerin b i d     Gallstones  Assessment & Plan  US does not show cholecystitis  GI said no surgical interventions needed    Hypertension  Assessment & Plan  BP is elevated - particularly diastolic   Will continue to monitor and add PRN BB       Weight loss  Assessment & Plan  70 lb weight loss in the setting of dyspeptic symptoms  GI onboard    Dyspepsia  Assessment & Plan  Patient has known paraesophageal hernia status post robotic repair with recurrence    Patient saw thoracic surgery in the office approximately year ago and at that time risks were thought to outweigh benefits of any potential revisit patient of surgical repair  Since then the patient notes that she has lost 70-80 lb and has had increasing dyspeptic symptoms  "Intolerable" and comes in with the inability to take anything orally since the day before yesterday and constant belching and discomfort in her epigastrium and chest   GI onboard  PPI BID      Ascending aortic aneurysm [facility])  Assessment & Plan  Patient has 3 9 cm on CT 2022          VTE Pharmacologic Prophylaxis:   VTE Score: 4 Moderate Risk (Score 3-4) - Pharmacological DVT Prophylaxis Ordered: Heparin  Mechanical VTE Prophylaxis in Place: Yes    Patient Centered Rounds: I have performed bedside rounds with nursing staff today  Discussions with Specialists or Other Care Team Provider:  Cardiology, Nephrology, GI    Education and Discussions with Family / Patient: Will discuss with patient's family after rounds  Current Length of Stay: 0 day(s)    Current Patient Status: Observation     Discharge Plan / Estimated Discharge Date: Anticipate discharge tomorrow to home  Code Status: Level 1 - Full Code      Subjective:   no overnight events  Patient is not complaining of any abdominal pain, chest pain, nausea or vomiting    Objective:     Vitals:   Temp (24hrs), Av 1 °F (36 7 °C), Min:98 °F (36 7 °C), Max:98 1 °F (36 7 °C)    Temp:  [98 °F (36 7 °C)-98 1 °F (36 7 °C)] 98 1 °F (36 7 °C)  HR:  [53-85] 60  Resp:  [15-18] 18  BP: ()/(52-73) 102/69  SpO2:  [93 %-99 %] 95 %  Body mass index is 35 26 kg/m²  Input and Output Summary (last 24 hours): Intake/Output Summary (Last 24 hours) at 2022 0809  Last data filed at 2022 0451  Gross per 24 hour   Intake --   Output 405 ml   Net -405 ml       Physical Exam:     Physical Exam  Vitals and nursing note reviewed  Constitutional:       General: She is not in acute distress  Appearance: She is well-developed     HENT:      Head: Normocephalic and atraumatic  Mouth/Throat:      Mouth: Mucous membranes are moist       Pharynx: No oropharyngeal exudate  Eyes:      Extraocular Movements: Extraocular movements intact  Conjunctiva/sclera: Conjunctivae normal       Pupils: Pupils are equal, round, and reactive to light  Cardiovascular:      Rate and Rhythm: Normal rate and regular rhythm  Pulses: Normal pulses  Heart sounds: No murmur heard  Pulmonary:      Effort: Pulmonary effort is normal  No respiratory distress  Breath sounds: Normal breath sounds  Abdominal:      Palpations: Abdomen is soft  Tenderness: There is no abdominal tenderness  Musculoskeletal:         General: Normal range of motion  Cervical back: Normal range of motion and neck supple  Right lower leg: No edema  Left lower leg: No edema  Skin:     General: Skin is warm and dry  Neurological:      General: No focal deficit present  Mental Status: She is alert and oriented to person, place, and time  Psychiatric:         Mood and Affect: Mood normal          Behavior: Behavior normal           Additional Data:     Labs:  Results from last 7 days   Lab Units 09/02/22  0212   WBC Thousand/uL 7 10   HEMOGLOBIN g/dL 13 2   HEMATOCRIT % 38 8   PLATELETS Thousands/uL 216   NEUTROS PCT % 61   LYMPHS PCT % 22   MONOS PCT % 15*   EOS PCT % 1     Results from last 7 days   Lab Units 09/02/22  0446 08/31/22  1134 08/31/22  0848   SODIUM mmol/L 131*   < > 127*   POTASSIUM mmol/L 3 8   < > 3 2*   CHLORIDE mmol/L 100   < > 90*   CO2 mmol/L 24   < > 24   BUN mg/dL 26*   < > 8   CREATININE mg/dL 0 99   < > 0 56*   ANION GAP mmol/L 7   < > 13   CALCIUM mg/dL 8 4   < > 9 4   ALBUMIN g/dL  --   --  4 2   TOTAL BILIRUBIN mg/dL  --   --  1 01*   ALK PHOS U/L  --   --  99   ALT U/L  --   --  16   AST U/L  --   --  25   GLUCOSE RANDOM mg/dL 85   < > 154*    < > = values in this interval not displayed       Results from last 7 days   Lab Units 09/01/22  0841   INR  1 18                   Imaging: No pertinent imaging reviewed  Recent Cultures (last 7 days):           Lines/Drains:  Invasive Devices  Report    Peripheral Intravenous Line  Duration           Peripheral IV 09/01/22 Left Antecubital <1 day    Peripheral IV 09/01/22 Left;Ventral (anterior) Forearm <1 day                Telemetry:   Telemetry Orders (From admission, onward)             48 Hour Telemetry Monitoring  Continuous x 48 hours        Expiring   References:    Telemetry Guidelines   Question:  Reason for 48 Hour Telemetry  Answer:  Acute MI, chest pain - R/O MI, or unstable angina                    Last 24 Hours Medication List:   Current Facility-Administered Medications   Medication Dose Route Frequency Provider Last Rate    albuterol  2 puff Inhalation 4x Daily PRN Rosina Luciano MD      aluminum-magnesium hydroxide-simethicone  30 mL Oral Q4H PRN Rosina Luciano MD      vitamin C  2,000 mg Oral Daily Rosina Luciano MD      atorvastatin  40 mg Oral HS BRAYDEN Love      dicyclomine  10 mg Oral 4x Daily PRN Rosina Luciano MD      labetalol  10 mg Intravenous Q6H PRN Rosina Luciano MD      latanoprost  1 drop Both Eyes BID Rosina Luciano MD      levothyroxine  25 mcg Oral Daily Rosina Luciano MD      morphine injection  2 mg Intravenous Q4H PRN Rosina Luciano MD      ondansetron  4 mg Intravenous Q6H PRN Rosina Luciano MD      pantoprazole  40 mg Intravenous Q12H [address] Rosina Luciano MD      sodium chloride  2 g Oral TID With Meals Krissy Ochoa MD      sucralfate  1 g Oral 4x Daily (AC & HS) Ale Izaguirre PA-C          Today, Patient Was Seen By: Karthikeyan Cabrera MD    ** Please Note: This note has been constructed using a voice recognition system   **

## 2022-09-02 NOTE — ASSESSMENT & PLAN NOTE
Patient has history of GERD status post paraesophageal hernia repair in 2021  Presented with 2 week history of GI discomfort, spitting up clear phlegm  Patient's troponins were elevated flat on admission  Patient underwent cardiac catheterization yesterday which showed nonobstructive plaques  Patient's EGD revealed a hiatal hernia    Per GI recommendations: Will have patient continue Carafate and PPI  Patient will follow-up with GI gastric emptying study as an outpatient to rule out gastroparesis  Patient given ambulatory referral to thoracic surgery for possible hiatal hernia repair  Patient's presentation of epigastric abdominal pain likely secondary to hiatal hernia versus gastroparesis

## 2022-09-02 NOTE — ASSESSMENT & PLAN NOTE
Patient 1 day history of 10/10 abdominal pain  , no radiation, periumbilical/epigastric pain  No nausea no vomiting  Troponins  1376> 1265>1026  EKG nonspecific changes,  CT chest (8/30) - no thoracic aortic aneurysm, coronary artery calcifications, and moderate to large hiatal hernia - fluid within the distal esophagus consistent with reflux  patient on,aspirin, atorvastatin, metoprolol, nitroglycerin b i d

## 2022-09-02 NOTE — DISCHARGE SUMMARY
[facility]  Discharge- Genie Greene 1940, 79 y o  female MRN: [id number]  Unit/Bed#: S -01 Encounter: [id number]  Primary Care Provider: Colby Villa MD   Date and time admitted to hospital: 8/31/2022  7:10 AM    * Epigastric abdominal pain  Assessment & Plan  Patient has history of GERD status post paraesophageal hernia repair in 2021  Presented with 2 week history of GI discomfort, spitting up clear phlegm  Patient's troponins were elevated flat on admission  Patient underwent cardiac catheterization yesterday which showed nonobstructive plaques  Patient's EGD revealed a hiatal hernia    Per GI recommendations: Will have patient continue Carafate and PPI  Patient will follow-up with GI gastric emptying study as an outpatient to rule out gastroparesis  Patient given ambulatory referral to thoracic surgery for possible hiatal hernia repair  Patient's presentation of epigastric abdominal pain likely secondary to hiatal hernia versus gastroparesis  Hyponatremia  Assessment & Plan   likely secondary to poor oral intake possibly also component of SIADH in the setting of nausea  Patient's hyponatremia is resolving, today sodium levels 131    Will have patient continue on sodium 2 g t i d  Per Nephrology recommendations  Will have patient obtain BMP in 1 week next   patient will follow-up with nephrology as an outpatient        Elevated troponin  Assessment & Plan  Pat obstructive plan ient presented with elevated troponins that were down trending  Patient's cardiac catheterization did not identify any obstruction    Gallstones  Assessment & Plan  US does not show cholecystitis     GI said no surgical interventions needed    Hypertension  Assessment & Plan  BP is elevated - particularly diastolic   Will continue to monitor and add PRN BB       Weight loss  Assessment & Plan  70 lb weight loss in the setting of dyspeptic symptoms  GI onboard    Dyspepsia  Assessment & Plan  Patient has known paraesophageal hernia status post robotic repair with recurrence  Patient saw thoracic surgery in the office approximately year ago and at that time risks were thought to outweigh benefits of any potential revisit patient of surgical repair  Given patient's worsening symptoms of dyspepsia as well as weight loss will have patient re-consult Thoracics as an out patient    Ascending aortic aneurysm [facility])  Assessment & Plan  Patient has 3 9 cm on CT 7/2022    Discharging Resident Physician: Antonio Schneider MD  Attending: Nadiya Quintana*  PCP: Sukhdev Paiz MD  Admission Date: 8/31/2022  Discharge Date: 09/02/22    Disposition:     Home    Reason for Admission:  Epigastric pain    Consultations During Hospital Stay:  · Cardiology, GI    Procedures Performed:     · EGD    Significant Findings / Test Results:     US right upper quadrant   Final Result by Kamran Roland MD (08/31 [id number])      Cholelithiasis without sonographic evidence for acute cholecystitis  Workstation performed: [id number]             Cardiac catheterization:  Nonobstructive plaque  EGD:  Hiatal hernia plus food bolus, consistent with gastroparesis    Incidental Findings:   ·   Test Results Pending at Discharge (will require follow up): · None     Outpatient Tests Requested:  · Gastric emptying study, per GI    Complications:  None    Hospital Course:     Asha Ochoa is a 79 y o  female patient who originally presented to the hospital on 8/31/2022 due to worsening abdominal pain associated with nausea  Patient has been having several months of dyspepsia, causing difficulty with eating food, causing patient to lose weight  During admission patient's troponins were obtained, which were elevated and trending down, patient had a cardiac catheterization to rule out a late presenting MI  Patient's cardiac catheterization did not identify any obstructive plaques a consistent with an MI    Patient was than taken for EGD which revealed hiatal hernia and food bolus substances with gastroparesis  During patient's hospitalization she did have hyponatremia, this was likely secondary to SIADH with unclear etiology possibly in the setting of nausea  Nephrology was consulted, and patient was recommended 2 g t i d  Salt tabs as well as fluid restriction of 1 5 L  Patient's hyponatremia improved prior to discharge  On the day of discharge patient did not any complaints, and was feeling back to baseline  Condition at Discharge: good     Discharge Day Visit / Exam:     * Please refer to separate progress note for these details *    Discussion with Family:  Updated patient's  at bedside  Discharge instructions/Information to patient and family:   See after visit summary for information provided to patient and family  Provisions for Follow-Up Care:  See after visit summary for information related to follow-up care and any pertinent home health orders  Planned Readmission:  No     Discharge Medications:  See after visit summary for reconciled discharge medications provided to patient and family        ** Please Note: This note has been constructed using a voice recognition system **

## 2022-09-06 ENCOUNTER — TELEPHONE (OUTPATIENT)
Dept: FAMILY MEDICINE CLINIC | Facility: CLINIC | Age: 82
End: 2022-09-06

## 2022-09-06 ENCOUNTER — TRANSITIONAL CARE MANAGEMENT (OUTPATIENT)
Dept: FAMILY MEDICINE CLINIC | Facility: CLINIC | Age: 82
End: 2022-09-06

## 2022-09-06 NOTE — TELEPHONE ENCOUNTER
Spoke with patient and completed TCM call and patient states that she does not feel the need to come in for TCM visit at this time  She has an appointment with Dr Kimmy Irwin in October and she would like to do it at that time

## 2022-09-06 NOTE — PHYSICIAN ADVISOR
Current patient class: Inpatient  The patient is currently on Hospital Day: 3 at [facility]      The patient was admitted to the hospital at  2:32 PM on 9/2/22 for the following diagnosis:  Epigastric pain [R10 13]  Abdominal pain [R10 9]  Nausea and vomiting, unspecified vomiting type [R11 2]       There is documentation in the medical record of an expected length of stay of at least 2 midnights  The patient is therefore expected to satisfy the 2 midnight benchmark and given the 2 midnight presumption is appropriate for INPATIENT ADMISSION  Given this expectation of a satisfying stay, CMS instructs us that the patient is most often appropriate for inpatient admission under part A provided medical necessity is documented in the chart  After review of the relevant documentation, labs, vital signs and test results, the patient is appropriate for INPATIENT ADMISSION  Admission to the hospital as an inpatient is a complex decision making process which requires the practitioner to consider the patients presenting complaint, history and physical examination and all relevant testing  With this in mind, in this case, the patient was deemed appropriate for INPATIENT ADMISSION  After review of the documentation and testing available at the time of the admission I concur with this clinical determination of medical necessity  Rationale is as follows: The patient is a 79 yrs old Female who presented to the ED at 8/31/2022  7:10 AM with a chief complaint of Abdominal Pain (Pt seen here last night w co upper abdominal pain  Pain worsening this morning  +N/V  10/10 pain currently  ) Patient underwent cardiac cath and egd evaluation for sy,ptoms  Cardiac cath did not show obstructive cad  EGD showed HH and evidence of gastroparesis  She was also seen for hyponatremia by nephrology  Based on her hospital course, IP seems appropriate        The patients vitals on arrival were   ED Triage Vitals   Temperature Pulse Respirations Blood Pressure SpO2   08/31/22 0714 08/31/22 0712 08/31/22 0712 08/31/22 0714 08/31/22 0714   97 9 °F (36 6 °C) 79 18 (!) 182/122 100 %      Temp Source Heart Rate Source Patient Position - Orthostatic VS BP Location FiO2 (%)   08/31/22 0714 08/31/22 0712 08/31/22 0712 08/31/22 0712 --   Oral Monitor Lying Right arm       Pain Score       08/31/22 0915       10 - Worst Possible Pain           Past Medical History:   Diagnosis Date    Cancer [facility])     Colon polyp     Disease of thyroid gland     Endometrial cancer [facility])     age 45    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[facility], [place] [address] )     1992    Esophageal reflux     GERD (gastroesophageal reflux disease)     History of ovarian cancer 04/28/2022    Hypertension     Hypothyroid     Obesity     Prediabetes 05/12/2022    Rosacea 05/12/2022     Past Surgical History:   Procedure Laterality Date    CARDIAC CATHETERIZATION Left 9/1/2022    Procedure: Cardiac Left Heart Cath;  Surgeon: Arvind Maravilla MD;  [facility]; Service: Cardiology    CATARACT EXTRACTION, BILATERAL      COLONOSCOPY  08/2018    polyp- 5 years    CYSTOSCOPY      FL RETROGRADE PYELOGRAM  10/15/2021    JOINT REPLACEMENT      KNEE ARTHROPLASTY      DC CYSTO/URETERO W/LITHOTRIPSY &INDWELL STENT INSRT Left 10/15/2021    Procedure: CYSTOSCOPY URETEROSCOPY WITH LITHOTRIPSY HOLMIUM LASER, RETROGRADE PYELOGRAM, BASKET STONE EXTRACTION AND EXCHANGE STENT URETERAL;  Surgeon: John Paul Mccarthy MD;  Location[facility];  Service: Urology    DC CYSTOURETHROSCOPY,URETER CATHETER Left 8/24/2021    Procedure: CYSTOSCOPY WITH INSERTION STENT URETERAL;  Surgeon: Tera Sutton MD;  [facility];  Service: Urology    DC ESOPHAGOGASTRODUODENOSCOPY TRANSORAL DIAGNOSTIC N/A 3/5/2021    Procedure: ESOPHAGOGASTRODUODENOSCOPY (EGD);   Surgeon: Anny Mcadams MD;  [facility];  Service: Thoracic    DC LAP, REPAIR PARAESOPHAGEAL HERNIA, INCL FUNDOPLASTY W/O MESH N/A 3/5/2021    Procedure: REPAIR HERNIA PARAESOPHAGEAL LAPAROSCOPIC W ROBOTICS WITH MESH, DOOR FUNDOPLICATION;  Surgeon: Graham Amaya MD;  [facility];  Service: Thoracic    TOTAL ABDOMINAL HYSTERECTOMY      age 45    TOTAL ABDOMINAL HYSTERECTOMY W/ BILATERAL SALPINGOOPHORECTOMY  1992    endometrial cancer           Consults have been placed to:   IP CONSULT TO GASTROENTEROLOGY  IP CONSULT TO CARDIOLOGY  IP CONSULT TO NEPHROLOGY    Vitals:    09/02/22 1410 09/02/22 1420 09/02/22 1431 09/02/22 1607   BP: 101/70 119/75 125/76 107/62   BP Location:       Pulse: 74 58 63 89   Resp: 18 18 18 16   Temp: 97 5 °F (36 4 °C)   98 2 °F (36 8 °C)   TempSrc: Temporal      SpO2: 98% 99% 99% 95%   Weight:       Height:           Most recent labs:    No results for input(s): WBC, HGB, HCT, PLT, K, NA, CALCIUM, BUN, CREATININE, LIPASE, AMYLASE, INR, TROPONINI, CKTOTAL, AST, ALT, ALKPHOS, BILITOT in the last 72 hours  Scheduled Meds:  Continuous Infusions:No current facility-administered medications for this encounter  PRN Meds:      Surgical procedures (if appropriate):  Procedure(s):  Cardiac Left Heart Cath

## 2022-09-07 LAB
ALDOST SERPL-MCNC: <1 NG/DL (ref 0–30)
ALDOST/RENIN PLAS-RTO: <1.3 {RATIO} (ref 0–30)
RENIN PLAS-CCNC: 0.78 NG/ML/HR (ref 0.17–5.38)

## 2022-09-12 ENCOUNTER — TELEPHONE (OUTPATIENT)
Dept: FAMILY MEDICINE CLINIC | Facility: CLINIC | Age: 82
End: 2022-09-12

## 2022-09-12 NOTE — TELEPHONE ENCOUNTER
Pt was referred to thoracic surgery, and they they pt to have a barium swallow  Can this order please be placed

## 2022-09-13 DIAGNOSIS — I10 ESSENTIAL HYPERTENSION: ICD-10-CM

## 2022-09-13 RX ORDER — AMLODIPINE BESYLATE 5 MG/1
TABLET ORAL
Qty: 90 TABLET | Refills: 3 | Status: SHIPPED | OUTPATIENT
Start: 2022-09-13 | End: 2022-10-04

## 2022-09-14 ENCOUNTER — TELEPHONE (OUTPATIENT)
Dept: NEPHROLOGY | Facility: CLINIC | Age: 82
End: 2022-09-14

## 2022-09-14 NOTE — TELEPHONE ENCOUNTER
Patient called and stated she was supposed to take sodium chloride tablets for 21 days  Stated she had a headache and high bp so she stopped taking her sodium tablets and her bp is normal  Patient would like to know if it is okay for her to stop the sodium tablets and if she still needs to come for her appointment on 10/04

## 2022-09-14 NOTE — TELEPHONE ENCOUNTER
She was started on sodium chloride by Dr Jerome Patino when she was hospitalized   She has to get a new BMP to check her sodium levels

## 2022-09-15 DIAGNOSIS — K44.9 HIATAL HERNIA: Primary | ICD-10-CM

## 2022-09-15 NOTE — TELEPHONE ENCOUNTER
Sofi= GI wanted pt to get this test done=would it be for gastroparesis? You will see her on 9/20  Would you want to see pt first to see what she would need?   Cathy= I would want pt to see GI first to get any testing done

## 2022-09-15 NOTE — TELEPHONE ENCOUNTER
Barium swallow is for her large hiatal hernia which is required prior to office appointment with thoracic surgery    You can place order and have her get this done at her earliest convenience

## 2022-09-19 NOTE — TELEPHONE ENCOUNTER
Patient notified   She stated she wants to wait until she see you before she has anymore test  Abigail Arguello

## 2022-09-20 ENCOUNTER — OFFICE VISIT (OUTPATIENT)
Dept: GASTROENTEROLOGY | Facility: AMBULARY SURGERY CENTER | Age: 82
End: 2022-09-20
Payer: MEDICARE

## 2022-09-20 VITALS
RESPIRATION RATE: 18 BRPM | OXYGEN SATURATION: 100 % | SYSTOLIC BLOOD PRESSURE: 124 MMHG | DIASTOLIC BLOOD PRESSURE: 76 MMHG | HEART RATE: 84 BPM | BODY MASS INDEX: 35.68 KG/M2 | HEIGHT: 63 IN | WEIGHT: 201.4 LBS

## 2022-09-20 DIAGNOSIS — R10.13 DYSPEPSIA: ICD-10-CM

## 2022-09-20 DIAGNOSIS — K21.00 GASTROESOPHAGEAL REFLUX DISEASE WITH ESOPHAGITIS WITHOUT HEMORRHAGE: ICD-10-CM

## 2022-09-20 DIAGNOSIS — K59.00 CONSTIPATION, UNSPECIFIED CONSTIPATION TYPE: Primary | ICD-10-CM

## 2022-09-20 DIAGNOSIS — Z86.010 HISTORY OF COLON POLYPS: ICD-10-CM

## 2022-09-20 DIAGNOSIS — R10.13 EPIGASTRIC ABDOMINAL PAIN: ICD-10-CM

## 2022-09-20 PROCEDURE — 99214 OFFICE O/P EST MOD 30 MIN: CPT | Performed by: PHYSICIAN ASSISTANT

## 2022-09-20 RX ORDER — SODIUM PICOSULFATE, MAGNESIUM OXIDE, AND ANHYDROUS CITRIC ACID 10; 3.5; 12 MG/160ML; G/160ML; G/160ML
1 LIQUID ORAL ONCE
Qty: 320 ML | Refills: 0 | Status: SHIPPED | OUTPATIENT
Start: 2022-09-20 | End: 2022-09-20

## 2022-09-20 NOTE — PATIENT INSTRUCTIONS
Scheduled date of colonoscopy (as of today):12/2/2022  Physician performing colonoscopy:DR DUMONT  Location of colonoscopy:[facility]  Bowel prep reviewed with patient:CLENPIQ-1ST CHOICE/MIRALAX/DULCOLAX-2ND CHOICE PER EVELINA LUIS  Instructions reviewed with patient by:ANTONIO YEBOAH  Clearances:

## 2022-09-20 NOTE — PROGRESS NOTES
[facility] - Outpatient Follow-up Note  Janusz Scott 79 y o  female MRN: [id number]  Encounter: [id number]          ASSESSMENT AND PLAN:      1  Food bolus on EGD  2  Epigastric pain  Epigastric pain has resolved since hospitalization as not return  She is taking PPI and Carafate  Reflux symptoms well controlled  No nausea or vomiting  Denies early satiety  No history of chronic opioids or diabetes  -follow-up gastric emptying scan  Scheduled for 10/2022   -discussed with patient gastroparesis as well as recommended management with diet modifications  At this time she is not having any symptoms related to this including early satiety, decreased appetite, nausea or vomiting  Therefore, would not recommend any significant dietary modifications besides small meals throughout the day   -continue PPI   -can discontinue Carafate  May be contributing to her constipation as well     -advised patient if she begins to have any symptoms related to gastroparesis and a gastric emptying scan is abnormal, could consider further discussion at that time  3  Hiatal hernia  Patient is status post robotic paraesophageal hernia repair 03/2021 with mesh and jessica fundoplication with recurrent hernia  She followed up with cardiothoracic surgery 09/2021 due to recurrent hernia  They considered repeating surgery at that time however she was high risk in the setting of elevated BMI, etc  therefore she did not want to undergo surgery again     -barium swallow was ordered for further evaluation     -she will follow-up with cardiothoracic surgery again for further discussion as she had some questions and concerns regarding possible repeat surgery  4  Constipation  Reports new onset of symptoms over the past 6 months  She has since started taking MiraLax with improvement of her symptoms  No blood in the stool, bloating, unintentional weight loss    Last colonoscopy 2018 with removal of polyp in repeat recommended in 5 years  No family history of colon cancer  Recent hemoglobin normal at 13 2     -will schedule colonoscopy in the setting of recent change in bowel habits without clear etiology, history of polyps  -discussed risks of procedure including bleeding, infection perforation  -clenpiq ordered  Can use MiraLax with Dulcolax if not covered     -continue MiraLax 1 capful daily  Discussed could increase this to 2 cap fulls a day   -continue increased dietary fiber and water intake  5  History of colon polyps  Last colonoscopy 2018 with removal of precancerous polyp in repeat recommended in 5 years  No family history of colon cancer  Hemoglobin normal     -colonoscopy as noted above     -discussed with patient that if colonoscopy is unremarkable, may not need further colonoscopies moving forward  Follow-up after procedure as needed  ______________________________________________________________________    SUBJECTIVE:      Carolann Hankins is a 18-year-old female with past medical history of GERD, hypothyroidism, hypertension, history of ovarian cancer who presents to the office for follow-up after hospitalization  Patient was seen in the hospital 08/31/2022 for epigastric pain with nausea  She underwent EGD during admission with medium-sized hiatal hernia, moderate amount of food bolus noted in the stomach suggesting underlying gastroparesis  She was recommended gastric emptying scan which has been ordered  She was also recommended follow-up with cardiothoracic surgery for hiatal hernia repair which has not been performed yet  Patient reports she has been doing well since discharge  She has not had any recurrence of epigastric pain  Her reflux is well controlled on Protonix 40 mg daily  She is still taking Carafate 4 times a day  She denies any nausea  She occasionally will regurgitate or spit up saliva  She has no dysphagia or odynophagia  Denies any early satiety    She does report new onset of constipation over the past 6 months  She takes MiraLax daily with control of the symptoms  Denies any bloating, decreased appetite or unintentional weight loss  No blood in the stool  Recent lab work showing normal hemoglobin of 13 2 with platelets of 140  BMP relatively unremarkable besides hyponatremia of 131  Liver enzymes normal   CT abdomen and pelvis with contrast notable for large hiatal hernia, cholelithiasis however otherwise unremarkable  No family history of colon cancer  Patient had colonoscopy 2018 with removal of polyp in repeat recommended in 5 years  REVIEW OF SYSTEMS IS OTHERWISE NEGATIVE  Historical Information   Past Medical History:   Diagnosis Date    Cancer [facility])     Colon polyp     Disease of thyroid gland     Endometrial cancer 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    Esophageal reflux     GERD (gastroesophageal reflux disease)     History of ovarian cancer 04/28/2022    Hypertension     Hypothyroid     Obesity     Prediabetes 05/12/2022    Rosacea 05/12/2022     Past Surgical History:   Procedure Laterality Date    CARDIAC CATHETERIZATION Left 9/1/2022    Procedure: Cardiac Left Heart Cath;  Surgeon: Kourtney Garza MD;  [facility];   Service: Cardiology    CATARACT EXTRACTION, BILATERAL      COLONOSCOPY  08/2018    polyp- 5 years    CYSTOSCOPY      FL RETROGRADE PYELOGRAM  10/15/2021    JOINT REPLACEMENT      KNEE ARTHROPLASTY      MT CYSTO/URETERO W/LITHOTRIPSY &INDWELL STENT INSRT Left 10/15/2021    Procedure: CYSTOSCOPY URETEROSCOPY WITH LITHOTRIPSY HOLMIUM LASER, RETROGRADE PYELOGRAM, BASKET STONE EXTRACTION AND EXCHANGE STENT URETERAL;  Surgeon: Epi Ku MD;  Location: [facility];  Service: Urology    MT CYSTOURETHROSCOPY,URETER CATHETER Left 8/24/2021    Procedure: CYSTOSCOPY WITH INSERTION STENT URETERAL;  Surgeon: Ricardo Allen MD;  Location[facility]; Service: Urology    MT ESOPHAGOGASTRODUODENOSCOPY TRANSORAL DIAGNOSTIC N/A 3/5/2021    Procedure: ESOPHAGOGASTRODUODENOSCOPY (EGD);   Surgeon: Amada Astorga MD;  Location[facility];  Service: Thoracic    MT LAP, REPAIR PARAESOPHAGEAL HERNIA, INCL FUNDOPLASTY W/O MESH N/A 3/5/2021    Procedure: REPAIR HERNIA PARAESOPHAGEAL LAPAROSCOPIC W ROBOTICS WITH MESH, DOOR FUNDOPLICATION;  Surgeon: Amada Astorga MD;  [facility];  Service: Thoracic    TOTAL ABDOMINAL HYSTERECTOMY      age 45    TOTAL ABDOMINAL HYSTERECTOMY W/ BILATERAL SALPINGOOPHORECTOMY      endometrial cancer     Social History   Social History     Substance and Sexual Activity   Alcohol Use Not Currently    Comment: socially     Social History     Substance and Sexual Activity   Drug Use No     Social History     Tobacco Use   Smoking Status Former Smoker    Packs/day: 0 25    Years: 10 00    Pack years: 2 50    Types: Cigarettes    Start date: 64    Quit date: 55    Years since quittin 7   Smokeless Tobacco Never Used   Tobacco Comment    Quit 50+ years ago 3/24/21     Family History   Problem Relation Age of Onset    Stomach cancer Mother 71    Pancreatic cancer Mother 71    Heart attack Father     Cancer Maternal Uncle     No Known Problems Daughter     No Known Problems Maternal Grandmother     No Known Problems Maternal Grandfather     No Known Problems Paternal Grandmother     No Known Problems Paternal Grandfather     No Known Problems Maternal Aunt     No Known Problems Maternal Aunt        Meds/Allergies       Current Outpatient Medications:     albuterol (PROVENTIL HFA,VENTOLIN HFA) 90 mcg/act inhaler    amLODIPine (NORVASC) 5 mg tablet    amoxicillin (AMOXIL) 500 MG tablet    Ascorbic Acid (vitamin C) 1000 MG tablet    aspirin (ECOTRIN LOW STRENGTH) 81 mg EC tablet    atorvastatin (LIPITOR) 10 mg tablet    Cholecalciferol (VITAMIN D3) 400 units CAPS    Combigan 0 2-0 5 %   latanoprost (XALATAN) 0 005 % ophthalmic solution    levothyroxine 50 mcg tablet    metoclopramide (Reglan) 10 mg tablet    Multiple Vitamin (MULTIVITAMINS PO)    pantoprazole (PROTONIX) 40 mg tablet    sodium chloride 1 g tablet    sucralfate (CARAFATE) 1 g tablet    No Known Allergies        Objective     Blood pressure 124/76, pulse 84, resp  rate 18, height 5' 3" (1 6 m), weight 91 4 kg (201 lb 6 4 oz), SpO2 100 %, not currently breastfeeding  Body mass index is 35 68 kg/m²  PHYSICAL EXAM:      General Appearance:   Alert, cooperative, no distress   HEENT:   Normocephalic, atraumatic, anicteric      Neck:  Supple, symmetrical, trachea midline   Lungs:   Clear to auscultation bilaterally; no rales, rhonchi or wheezing; respirations unlabored    Heart[de-identified]   Regular rate and rhythm; no murmur, rub, or gallop  Abdomen:   Soft, non-tender, non-distended; normal bowel sounds; no masses, no organomegaly    Genitalia:   Deferred    Rectal:   Deferred    Extremities:  No cyanosis, clubbing or edema    Pulses:  2+ and symmetric    Skin:  No jaundice, rashes, or lesions    Lymph nodes:  No palpable cervical lymphadenopathy        Lab Results:   No visits with results within 1 Day(s) from this visit     Latest known visit with results is:   Admission on 08/31/2022, Discharged on 09/02/2022   Component Date Value    WBC 08/31/2022 9 57     RBC 08/31/2022 4 98     Hemoglobin 08/31/2022 15 9 (A)    Hematocrit 08/31/2022 45 4     MCV 08/31/2022 91     MCH 08/31/2022 31 9     MCHC 08/31/2022 35 0     RDW 08/31/2022 12 3     MPV 08/31/2022 10 3     Platelets 38/27/3322 288     nRBC 08/31/2022 0     Neutrophils Relative 08/31/2022 79 (A)    Immat GRANS % 08/31/2022 0     Lymphocytes Relative 08/31/2022 14     Monocytes Relative 08/31/2022 7     Eosinophils Relative 08/31/2022 0     Basophils Relative 08/31/2022 0     Neutrophils Absolute 08/31/2022 7 44     Immature Grans Absolute 08/31/2022 0 04     Lymphocytes Absolute 08/31/2022 1 36     Monocytes Absolute 08/31/2022 0 71     Eosinophils Absolute 08/31/2022 0 00     Basophils Absolute 08/31/2022 0 02     Sodium 08/31/2022 127 (A)    Potassium 08/31/2022 3 2 (A)    Chloride 08/31/2022 90 (A)    CO2 08/31/2022 24     ANION GAP 08/31/2022 13     BUN 08/31/2022 8     Creatinine 08/31/2022 0 56 (A)    Glucose 08/31/2022 154 (A)    Calcium 08/31/2022 9 4     AST 08/31/2022 25     ALT 08/31/2022 16     Alkaline Phosphatase 08/31/2022 99     Total Protein 08/31/2022 7 3     Albumin 08/31/2022 4 2     Total Bilirubin 08/31/2022 1 01 (A)    eGFR 08/31/2022 87     Lipase 08/31/2022 37     hs TnI 0hr 08/31/2022 1,376 (A)    Sodium 08/31/2022 128 (A)    Potassium 08/31/2022 3 3 (A)    Chloride 08/31/2022 90 (A)    CO2 08/31/2022 26     ANION GAP 08/31/2022 12     BUN 08/31/2022 9     Creatinine 08/31/2022 0 56 (A)    Glucose 08/31/2022 125     Calcium 08/31/2022 9 1     eGFR 08/31/2022 87     hs TnI 2hr 08/31/2022 1,265 (A)    Delta 2hr hsTnI 08/31/2022 -111     hs TnI 4hr 08/31/2022 1,026 (A)    Delta 4hr hsTnI 08/31/2022 -350     Sodium 08/31/2022 126 (A)    Potassium 08/31/2022 3 4 (A)    Chloride 08/31/2022 92 (A)    CO2 08/31/2022 21     ANION GAP 08/31/2022 13     BUN 08/31/2022 11     Creatinine 08/31/2022 0 52 (A)    Glucose 08/31/2022 135     Glucose, Fasting 08/31/2022 135 (A)    Calcium 08/31/2022 9 1     eGFR 08/31/2022 89     PTT 08/31/2022 28     Protime 08/31/2022 14 2     INR 08/31/2022 1 08     Sodium 08/31/2022 127 (A)    Potassium 08/31/2022 3 4 (A)    Chloride 08/31/2022 93 (A)    CO2 08/31/2022 22     ANION GAP 08/31/2022 12     BUN 08/31/2022 13     Creatinine 08/31/2022 0 63     Glucose 08/31/2022 124     Calcium 08/31/2022 8 7     eGFR 08/31/2022 83     PTT 08/31/2022 96 (A)    Sodium 09/01/2022 129 (A)    Potassium 09/01/2022 4 1     Chloride 09/01/2022 93 (A)    CO2 09/01/2022 26  ANION GAP 09/01/2022 10     BUN 09/01/2022 17     Creatinine 09/01/2022 0 96     Glucose 09/01/2022 108     Glucose, Fasting 09/01/2022 108 (A)    Calcium 09/01/2022 9 0     eGFR 09/01/2022 55     PTT 09/01/2022 87 (A)    Magnesium 09/01/2022 1 3 (A)    WBC 09/01/2022 9 34     RBC 09/01/2022 4 68     Hemoglobin 09/01/2022 15 1     Hematocrit 09/01/2022 44 0     MCV 09/01/2022 94     MCH 09/01/2022 32 3     MCHC 09/01/2022 34 3     RDW 09/01/2022 13 0     Platelets 83/68/9716 272     MPV 09/01/2022 10 2     Protime 09/01/2022 15 3 (A)    INR 09/01/2022 1 18     A4C EF 09/01/2022 66     LVIDd 09/01/2022 3 70     LVIDS 09/01/2022 2 50     IVSd 09/01/2022 1 20     LVPWd 09/01/2022 1 20     FS 09/01/2022 32     MV E' Tissue Velocity Se* 09/01/2022 6     E wave deceleration time 09/01/2022 407     MV Peak E Rashawn 09/01/2022 50     MV Peak A Rashawn 09/01/2022 0 82     MV stenosis pressure 1/2* 09/01/2022 118     MV valve area p 1/2 meth* 09/01/2022 1 86     TR Peak Rashawn 09/01/2022 2 1     Triscuspid Valve Regurgi* 09/01/2022 18 0     Asc Ao 09/01/2022 3 6     Tricuspid valve peak reg* 09/01/2022 2 12     Left ventricular stroke * 09/01/2022 37 00     IVS 09/01/2022 1 2     LEFT VENTRICLE SYSTOLIC * 33/35/6576 22     LV DIASTOLIC VOLUME (MOD* 41/40/6771 60     LVSV, 2D 09/01/2022 37     LV EF 09/01/2022 65     Sodium 09/01/2022 128 (A)    Potassium 09/01/2022 3 5     Chloride 09/01/2022 93 (A)    CO2 09/01/2022 25     ANION GAP 09/01/2022 10     BUN 09/01/2022 19     Creatinine 09/01/2022 0 96     Glucose 09/01/2022 98     Glucose, Fasting 09/01/2022 98     Calcium 09/01/2022 8 8     eGFR 09/01/2022 55     Sodium 09/01/2022 131 (A)    Potassium 09/01/2022 3 6     Chloride 09/01/2022 98     CO2 09/01/2022 24     ANION GAP 09/01/2022 9     BUN 09/01/2022 24     Creatinine 09/01/2022 0 99     Glucose 09/01/2022 116     Calcium 09/01/2022 8 2 (A)    eGFR 09/01/2022 53  Ventricular Rate 08/31/2022 103     Atrial Rate 08/31/2022 103     MT Interval 08/31/2022 190     QRSD Interval 08/31/2022 86     QT Interval 08/31/2022 368     QTC Interval 08/31/2022 482     P Axis 08/31/2022 23     QRS [place] 08/31/2022 -50     T Wave Axis 08/31/2022 47     WBC 09/02/2022 7 10     RBC 09/02/2022 4 00     Hemoglobin 09/02/2022 13 2     Hematocrit 09/02/2022 38 8     MCV 09/02/2022 97     MCH 09/02/2022 33 0     MCHC 09/02/2022 34 0     RDW 09/02/2022 13 1     MPV 09/02/2022 10 0     Platelets 05/41/0536 216     nRBC 09/02/2022 0     Neutrophils Relative 09/02/2022 61     Immat GRANS % 09/02/2022 0     Lymphocytes Relative 09/02/2022 22     Monocytes Relative 09/02/2022 15 (A)    Eosinophils Relative 09/02/2022 1     Basophils Relative 09/02/2022 1     Neutrophils Absolute 09/02/2022 4 34     Immature Grans Absolute 09/02/2022 0 03     Lymphocytes Absolute 09/02/2022 1 56     Monocytes Absolute 09/02/2022 1 08     Eosinophils Absolute 09/02/2022 0 05     Basophils Absolute 09/02/2022 0 04     Sodium 09/02/2022 131 (A)    Potassium 09/02/2022 3 8     Chloride 09/02/2022 100     CO2 09/02/2022 24     ANION GAP 09/02/2022 7     BUN 09/02/2022 26 (A)    Creatinine 09/02/2022 0 99     Glucose 09/02/2022 85     Calcium 09/02/2022 8 4     eGFR 09/02/2022 53     Magnesium 09/02/2022 1 9     Cortisol, Random 09/02/2022 12 8     Osmolality Serum 09/02/2022 277 (A)    Free T4 09/02/2022 1 03     TSH 3RD GENERATON 09/02/2022 4 488     Uric Acid 09/02/2022 4 5     Renin 09/02/2022 0 780     Aldosterone 09/02/2022 <1 0     Aldos/Renin Ratio 09/02/2022 <1 3     Sodium, Ur 09/02/2022 12          Radiology Results:   EGD    Result Date: 9/2/2022  Narrative: [address] [place] [address] [phone] DATE OF SERVICE: 9/02/22 PHYSICIAN(S): Attending: Loraine Kirk MD Fellow: No Staff Documented INDICATION: Paraesophageal hernia, Epigastric pain, Nausea and vomiting, unspecified vomiting type POST-OP DIAGNOSIS: See the impression below  PREPROCEDURE: Informed consent was obtained for the procedure, including sedation  Risks of perforation, hemorrhage, adverse drug reaction and aspiration were discussed  The patient was placed in the left lateral decubitus position  Patient was explained about the risks and benefits of the procedure  Risks including but not limited to bleeding, infection, and perforation were explained in detail  Also explained about less than 100% sensitivity with the exam and other alternatives  DETAILS OF PROCEDURE: Patient was taken to the procedure room where a time out was performed to confirm correct patient and correct procedure  The patient underwent monitored anesthesia care, which was administered by an anesthesia professional  The patient's blood pressure, heart rate, level of consciousness, respirations and oxygen were monitored throughout the procedure  The scope was advanced to the second part of the duodenum  Retroflexion was performed in the fundus  The patient experienced no blood loss  The procedure was not difficult  The patient tolerated the procedure well  There were no apparent complications  ANESTHESIA INFORMATION: ASA: III Anesthesia Type: IV Sedation with Anesthesia MEDICATIONS: No administrations occurring from 1351 to 1357 on 09/02/22 FINDINGS: Regular Z-line 35 cm from the incisors Medium sliding hiatal hernia (type I hiatal hernia) without Dannis Plough lesions present - GE junction 35 cm from the incisors, diaphragmatic impression 41 cm from the incisors Moderate amount of food bolus noted in the gastric lumen suggestive of gastroparesis  Exam limited due to food bolus covering at least 30% of the gastric lumen  The duodenal bulb, 1st part of the duodenum and 2nd part of the duodenum appeared normal  SPECIMENS: * No specimens in log *     Impression: 1   Medium-sized hiatal hernia  2  Moderate amount of food bolus noted in the stomach suggestive of underlying gastroparesis  RECOMMENDATION: There is no recommended follow-up for this procedure  Recommend PPI and Carafate  Recommend low residue, low-fat diet  Consider outpatient gastric emptying study  Recommend follow-up with cardiothoracic surgery for hiatal hernia repair  Follow anti-reflux measures  Avoid NSAIDs  Selene Ellsworth MD     Cardiac catheterization    Result Date: 9/2/2022  Narrative: Non-obstructive atherosclerotic plaque  CT chest abdomen pelvis w contrast    Result Date: 8/30/2022  Narrative: CT CHEST, ABDOMEN AND PELVIS WITH IV CONTRAST INDICATION:   PT WITH FUNDOPLICATION/ HIATAL HERNIA-  WITH EPIGASTIC PAIN ADN PERSISTENT VOMITING  COMPARISON:  Multiple priors most recently ultrasound 1/13/2022 TECHNIQUE: CT examination of the chest, abdomen and pelvis was performed  Axial, sagittal, and coronal 2D reformatted images were created from the source data and submitted for interpretation  Radiation dose length product (DLP) for this visit:  355 mGy-cm   This examination, like all CT scans performed in the [facility], was performed utilizing techniques to minimize radiation dose exposure, including the use of iterative reconstruction and automated exposure control  IV Contrast:  80 mL of iohexol (OMNIPAQUE) Enteric Contrast: Enteric contrast was not administered  FINDINGS: CHEST LUNGS:  Cluster of small pulmonary nodules in the medial right upper lobe (series through 2, images 28 through 45), likely postinfectious or inflammatory in nature  Lajean Cassette PLEURA:  Unremarkable  HEART/GREAT VESSELS: Atherosclerotic coronary artery calcification is noted  Heart is otherwise unremarkable  No thoracic aortic aneurysm  MEDIASTINUM AND REGAN:  Moderate to large hiatal hernia, similar to CT of 10/17/2021  Fluid within the distal esophagus consistent with reflux  CHEST WALL AND LOWER NECK:  Unremarkable   ABDOMEN LIVER/BILIARY TREE:  1 0 cm avidly enhancing focus in the central liver likely a flash filling hemangioma  This is unchanged from 8/20/2021  GALLBLADDER:  There are gallstone(s) within the gallbladder, without pericholecystic inflammatory changes  SPLEEN:  Unremarkable  PANCREAS:  Unremarkable  ADRENAL GLANDS:  Unremarkable  KIDNEYS/URETERS:  One or more simple renal cyst(s) is noted  Punctate nonobstructing left lower pole renal calculus     No hydronephrosis  STOMACH AND BOWEL:  There is colonic diverticulosis without evidence of acute diverticulitis  APPENDIX:  A normal appendix was visualized  ABDOMINOPELVIC CAVITY:  No ascites  No pneumoperitoneum  No lymphadenopathy  VESSELS:  Atherosclerotic changes are present  No evidence of aneurysm  PELVIS REPRODUCTIVE ORGANS:  Surgical changes of prior hysterectomy  URINARY BLADDER:  Unremarkable  ABDOMINAL WALL/INGUINAL REGIONS:  Unremarkable  OSSEOUS STRUCTURES:  No acute fracture or destructive osseous lesion  Spinal degenerative changes are noted  Severe bilateral glenohumeral osteoarthritis  Impression: No acute inflammatory process or suspicious mass  Moderate to large hiatal hernia, similar to CT of 10/17/2021  Cholelithiasis without evidence of acute cholecystitis  Additional nonacute findings, as described  Workstation performed: [id number]     US right upper quadrant    Result Date: 8/31/2022  Narrative: RIGHT UPPER QUADRANT ULTRASOUND INDICATION:     pain  COMPARISON:  1/13/2022 TECHNIQUE:   Real-time ultrasound of the right upper quadrant was performed with a curvilinear transducer with both volumetric sweeps and still imaging techniques  FINDINGS: PANCREAS:  Visualized portions of the pancreas are within normal limits  AORTA AND IVC:  Visualized portions are normal for patient age  LIVER: Size:  Within normal range  The liver measures 16 cm in the midclavicular line  Contour:  Surface contour is smooth   Parenchyma:  Echogenicity and echotexture are within normal limits  No liver mass identified  Limited imaging of the main portal vein shows it to be patent and hepatopetal   BILIARY: There is cholelithiasis  There is no gallbladder wall thickening  There is a negative sonographic Merino's sign  No intrahepatic biliary dilatation  CBD measures 7 0 mm  No choledocholithiasis  KIDNEY: Right kidney measures 10 1 x 5 8 x 5 2 cm  Volume 158 7 mL There is a right upper pole renal cyst   There are no ASCITES:   None  Impression: Cholelithiasis without sonographic evidence for acute cholecystitis  Workstation performed: [id number]     Echo follow up/limited w/ contrast if indicated    Result Date: 9/1/2022  Narrative: [facility]  Left Ventricle: Left ventricular cavity size is normal  There is moderate concentric hypertrophy  The left ventricular ejection fraction is 65%  Systolic function is normal  Wall motion is normal  Diastolic function is mildly abnormal, consistent with grade I (abnormal) relaxation    Right Ventricle: Right ventricular cavity size is normal  Systolic function is normal    Aortic Valve: There is trace regurgitation    Mitral Valve: There is trace regurgitation    Aorta: The aortic root is normal in size  The ascending aorta is mildly dilated

## 2022-09-22 DIAGNOSIS — K21.00 GASTROESOPHAGEAL REFLUX DISEASE WITH ESOPHAGITIS WITHOUT HEMORRHAGE: Primary | ICD-10-CM

## 2022-09-22 DIAGNOSIS — E03.9 ACQUIRED HYPOTHYROIDISM: ICD-10-CM

## 2022-09-22 RX ORDER — LEVOTHYROXINE SODIUM 0.05 MG/1
50 TABLET ORAL DAILY
Qty: 90 TABLET | Refills: 1 | Status: SHIPPED | OUTPATIENT
Start: 2022-09-22

## 2022-09-22 RX ORDER — PANTOPRAZOLE SODIUM 40 MG/1
40 TABLET, DELAYED RELEASE ORAL DAILY
Qty: 90 TABLET | Refills: 1 | Status: SHIPPED | OUTPATIENT
Start: 2022-09-22

## 2022-09-22 NOTE — TELEPHONE ENCOUNTER
Spoke to Katie Thao, she needs refills for Levothyroxine 50mcg daily sent to [email or web address] and Pantoprazole 40mg daily sent to [email or web address]  Okay to fill you didn't prescribe?

## 2022-09-22 NOTE — TELEPHONE ENCOUNTER
Pt's spouse called and left msg on machine stating that he needs a call back from the MA for dr Marah Kamara he stated that he has questions regarding her prescripton

## 2022-09-27 ENCOUNTER — APPOINTMENT (OUTPATIENT)
Dept: LAB | Age: 82
End: 2022-09-27
Payer: MEDICARE

## 2022-09-27 DIAGNOSIS — N17.9 ACUTE KIDNEY INJURY (HCC): ICD-10-CM

## 2022-09-27 DIAGNOSIS — E03.9 ACQUIRED HYPOTHYROIDISM: ICD-10-CM

## 2022-09-27 DIAGNOSIS — E87.1 HYPONATREMIA: ICD-10-CM

## 2022-09-27 DIAGNOSIS — E55.9 VITAMIN D DEFICIENCY: ICD-10-CM

## 2022-09-27 DIAGNOSIS — E53.8 CYANOCOBALAMIN DEFICIENCY: ICD-10-CM

## 2022-09-27 DIAGNOSIS — N20.1 OBSTRUCTION OF LEFT URETEROPELVIC JUNCTION (UPJ) DUE TO STONE: ICD-10-CM

## 2022-09-27 LAB
25(OH)D3 SERPL-MCNC: 50.1 NG/ML (ref 30–100)
AMORPH URATE CRY URNS QL MICRO: ABNORMAL
ANION GAP SERPL CALCULATED.3IONS-SCNC: 7 MMOL/L (ref 4–13)
BACTERIA UR QL AUTO: ABNORMAL /HPF
BILIRUB UR QL STRIP: NEGATIVE
BUN SERPL-MCNC: 16 MG/DL (ref 5–25)
CALCIUM SERPL-MCNC: 9.1 MG/DL (ref 8.3–10.1)
CHLORIDE SERPL-SCNC: 102 MMOL/L (ref 96–108)
CLARITY UR: ABNORMAL
CO2 SERPL-SCNC: 26 MMOL/L (ref 21–32)
COLOR UR: YELLOW
CREAT SERPL-MCNC: 0.76 MG/DL (ref 0.6–1.3)
GFR SERPL CREATININE-BSD FRML MDRD: 73 ML/MIN/1.73SQ M
GLUCOSE P FAST SERPL-MCNC: 101 MG/DL (ref 65–99)
GLUCOSE UR STRIP-MCNC: NEGATIVE MG/DL
HGB UR QL STRIP.AUTO: NEGATIVE
KETONES UR STRIP-MCNC: NEGATIVE MG/DL
LEUKOCYTE ESTERASE UR QL STRIP: ABNORMAL
MAGNESIUM SERPL-MCNC: 1.7 MG/DL (ref 1.6–2.6)
MUCOUS THREADS UR QL AUTO: ABNORMAL
NITRITE UR QL STRIP: NEGATIVE
NON-SQ EPI CELLS URNS QL MICRO: ABNORMAL /HPF
PH UR STRIP.AUTO: 5.5 [PH]
POTASSIUM SERPL-SCNC: 4.3 MMOL/L (ref 3.5–5.3)
PROT UR STRIP-MCNC: NEGATIVE MG/DL
PTH-INTACT SERPL-MCNC: 34.9 PG/ML (ref 18.4–80.1)
RBC #/AREA URNS AUTO: ABNORMAL /HPF
SODIUM SERPL-SCNC: 135 MMOL/L (ref 135–147)
SP GR UR STRIP.AUTO: 1.02 (ref 1–1.03)
TSH SERPL DL<=0.05 MIU/L-ACNC: 2.87 UIU/ML (ref 0.45–4.5)
URATE SERPL-MCNC: 3.7 MG/DL (ref 2–7.5)
UROBILINOGEN UR STRIP-ACNC: <2 MG/DL
VIT B12 SERPL-MCNC: 628 PG/ML (ref 100–900)
WBC #/AREA URNS AUTO: ABNORMAL /HPF

## 2022-09-27 PROCEDURE — 36415 COLL VENOUS BLD VENIPUNCTURE: CPT

## 2022-09-27 PROCEDURE — 83970 ASSAY OF PARATHORMONE: CPT

## 2022-09-27 PROCEDURE — 87086 URINE CULTURE/COLONY COUNT: CPT | Performed by: FAMILY MEDICINE

## 2022-09-27 PROCEDURE — 83735 ASSAY OF MAGNESIUM: CPT

## 2022-09-27 PROCEDURE — 84550 ASSAY OF BLOOD/URIC ACID: CPT

## 2022-09-27 PROCEDURE — 80048 BASIC METABOLIC PNL TOTAL CA: CPT

## 2022-09-27 PROCEDURE — 82306 VITAMIN D 25 HYDROXY: CPT

## 2022-09-27 PROCEDURE — 82607 VITAMIN B-12: CPT

## 2022-09-27 PROCEDURE — 84443 ASSAY THYROID STIM HORMONE: CPT

## 2022-09-27 PROCEDURE — 81001 URINALYSIS AUTO W/SCOPE: CPT | Performed by: FAMILY MEDICINE

## 2022-09-28 LAB — BACTERIA UR CULT: NORMAL

## 2022-10-03 NOTE — PATIENT INSTRUCTIONS
Thank you for coming to your visit today  As we discussed your sodium is back to normal, your sodium is 135mEq/L  Melecio Shaffer Please follow the recommendations below         Avoid nonsteroidal anti-inflammatory drugs such as Naprosyn, ibuprofen, Aleve, Advil, Celebrex, Meloxicam (Mobic) etc   You can use Tylenol as needed if you do not have any liver condition to be concerned about    Try to avoid medications such as pantoprazole or  Protonix/Nexium or Esomeprazole)/Prilosec or omeprazole/Prevacid or lansoprazole/AcipHex or Rabeprazole  If you are able to, use Pepcid as this is safer for your kidneys      Try to exercise at least 30 minutes 3 days a week to begin with with an ultimate goal of 5 days a week for at least 30 minutes    Yajaira Aldana MD  Nephrology Attending

## 2022-10-03 NOTE — PROGRESS NOTES
NEPHROLOGY OUTPATIENT PROGRESS NOTE   Gavi Licona 79 y o  female MRN: [id number]  DATE: 10/4/2022    Reason for visit:   Chief Complaint   Patient presents with    Follow-up    Hypertension    Chronic Kidney Disease        Patient Instructions   Thank you for coming to your visit today  As we discussed your sodium is back to normal, your sodium is 135mEq/L  Farheen Desir Please follow the recommendations below          Avoid nonsteroidal anti-inflammatory drugs such as Naprosyn, ibuprofen, Aleve, Advil, Celebrex, Meloxicam (Mobic) etc   You can use Tylenol as needed if you do not have any liver condition to be concerned about     Try to avoid medications such as pantoprazole or  Protonix/Nexium or Esomeprazole)/Prilosec or omeprazole/Prevacid or lansoprazole/AcipHex or Rabeprazole  If you are able to, use Pepcid as this is safer for your kidneys   Try to exercise at least 30 minutes 3 days a week to begin with with an ultimate goal of 5 days a week for at least 30 minutes    Cristina Watt MD  Nephrology Attending                 There are no diagnoses linked to this encounter  Assessment/Plan:  79 yo woman PMH , HTN,  hypothyroidism, GERD, hypercholesterolemia, hx kidney stones, hx endometrial cancer, obesit, BENNY with peak creatinine at 2 98 that completely  resolved   Recent  cystoscopy/ureteroscopy with lithotripsy with retrograde pyelogram and insertion of a left ureteral stent on 10/15   Patient was admitted  for decrease of urinary output   Patient is here for follow up of recent hospitalization for abdominal pain , was found to be hyponatremic       PLAN:     #Hyposomolar Hyponatremia   · Etiology: likely secondary to elevated ADH in the settings of severe pain    Serum osm: 277 (low)   Not on sodium tabs   Current sodium : 135mEq/L   Plan:   No need for more sod tabs   Can continue f/u with PCP      #Nephrolothiasis   · Recent lithotripsy with left ureteral stent in place  · Cluster stones in the lower pole of left kidney no obstruction seen  · Follow-up by a urology     #Acid-base Disorder  · serum HCO3 26 mmol/L   · metabolic alkalosis   · Liley secondary to volume depletion      #Volume status/hypertension:  · Volume:euvolemic   · Blood pressure:normotensive /84 mmhg, goal <140/90mmhg   · Low sodium diet  · Off  Amlodipine       #Anemia:  · Current hemoglobin:13 2mg/dL at goal   · At goal       SUBJECTIVE / INTERVAL HISTORY:  79 y o  female presents in follow up of hyponatremia after hospital discharge  Patient feels well, she stopped taking sodium tab due to high blood pressure  No N/V    Tammie Sierra denies any recent illness/hospitalizations/medication changes since last office visit  Review of Systems   Constitutional: Negative for activity change  Eyes: Negative for discharge  Respiratory: Negative for shortness of breath and stridor  Gastrointestinal: Negative for abdominal pain  Endocrine: Negative for cold intolerance  Genitourinary: Negative for dysuria  Musculoskeletal: Negative for back pain  Skin: Negative for pallor and rash  Neurological: Negative for dizziness  Psychiatric/Behavioral: Negative for agitation  OBJECTIVE:  /84 (BP Location: Left arm, Patient Position: Sitting, Cuff Size: Adult)   Pulse 57   Ht 5' 3" (1 6 m)   Wt 90 7 kg (200 lb)   SpO2 96%   BMI 35 43 kg/m²  Body mass index is 35 43 kg/m²      Physical exam:  Physical Exam   General:  no acute distress at this time  Skin:  No acute rash  Eyes:  No scleral icterus and noninjected  ENT:  mucous membranes moist  Neck:  no carotid bruits  Chest:  Clear to auscultation percussion, good respiratory effort, no use of accessory respiratory muscles  CVS:  Regular rate and rhythm without rub  Abdomen:  soft and nontender   Extremities:  , no significant lower extremity edema  Neuro:  No gross focality  Psych:  Alert , cooperative       Medications:    Current Outpatient Medications:   albuterol (PROVENTIL HFA,VENTOLIN HFA) 90 mcg/act inhaler, Inhale 2 puffs 4 (four) times a day as needed, Disp: , Rfl:     Ascorbic Acid (vitamin C) 1000 MG tablet, Take 2,000 mg by mouth daily, Disp: , Rfl:     atorvastatin (LIPITOR) 10 mg tablet, TAKE 1 TABLET BY MOUTH  DAILY AT BEDTIME, Disp: 90 tablet, Rfl: 3    Cholecalciferol (VITAMIN D3) 400 units CAPS, Take 1,000 Units by mouth, Disp: , Rfl:     latanoprost (XALATAN) 0 005 % ophthalmic solution, Administer 1 drop to both eyes 2 (two) times a day, Disp: , Rfl:     levothyroxine 50 mcg tablet, Take 1 tablet (50 mcg total) by mouth daily, Disp: 90 tablet, Rfl: 1    pantoprazole (PROTONIX) 40 mg tablet, Take 1 tablet (40 mg total) by mouth daily, Disp: 90 tablet, Rfl: 1    amoxicillin (AMOXIL) 500 MG tablet, TAKE 4 TABLETS ONE HOUR BEFORE DENTAL PROCEDURE (Patient not taking: No sig reported), Disp: , Rfl:     Combigan 0 2-0 5 %, INSTILL 1 DROP IN BOTH EYES TWICE DAILY (Patient not taking: Reported on 10/4/2022), Disp: , Rfl:     metoclopramide (Reglan) 10 mg tablet, Take 0 5 tablets (5 mg total) by mouth 3 (three) times a day as needed (nausea) for up to 10 doses (Patient not taking: No sig reported), Disp: 10 tablet, Rfl: 3    Multiple Vitamin (MULTIVITAMINS PO), Take 1 tablet by mouth daily (Patient not taking: No sig reported), Disp: , Rfl:     multivitamin-iron-minerals-folic acid (CENTRUM) chewable tablet, Chew 1 tablet daily (Patient not taking: Reported on 10/4/2022), Disp: , Rfl:     Allergies:   Allergies as of 10/04/2022    (No Known Allergies)       The following portions of the patient's history were reviewed and updated as appropriate: past family history, past surgical history and problem list     Laboratory Results:  Lab Results   Component Value Date    SODIUM 135 09/27/2022    K 4 3 09/27/2022     09/27/2022    CO2 26 09/27/2022    BUN 16 09/27/2022    CREATININE 0 76 09/27/2022    GLUC 85 09/02/2022    CALCIUM 9 1 09/27/2022        Lab Results   Component Value Date    PTH 34 9 09/27/2022    CALCIUM 9 1 09/27/2022    PHOS 3 1 08/23/2021       Portions of the record may have been created with voice recognition software  Occasional wrong word or "sound a like" substitutions may have occurred due to the inherent limitations of voice recognition software  Read the chart carefully and recognize, using context, where substitutions have occurred

## 2022-10-04 ENCOUNTER — OFFICE VISIT (OUTPATIENT)
Dept: NEPHROLOGY | Facility: CLINIC | Age: 82
End: 2022-10-04
Payer: MEDICARE

## 2022-10-04 VITALS
DIASTOLIC BLOOD PRESSURE: 84 MMHG | WEIGHT: 200 LBS | BODY MASS INDEX: 35.44 KG/M2 | HEIGHT: 63 IN | OXYGEN SATURATION: 96 % | SYSTOLIC BLOOD PRESSURE: 134 MMHG | HEART RATE: 57 BPM

## 2022-10-04 DIAGNOSIS — E87.1 HYPONATREMIA: Primary | ICD-10-CM

## 2022-10-04 PROCEDURE — 99213 OFFICE O/P EST LOW 20 MIN: CPT | Performed by: STUDENT IN AN ORGANIZED HEALTH CARE EDUCATION/TRAINING PROGRAM

## 2022-10-06 ENCOUNTER — HOSPITAL ENCOUNTER (OUTPATIENT)
Dept: RADIOLOGY | Facility: HOSPITAL | Age: 82
Discharge: HOME | End: 2022-10-06
Attending: FAMILY MEDICINE
Payer: MEDICARE

## 2022-10-06 DIAGNOSIS — K44.9 HIATAL HERNIA: ICD-10-CM

## 2022-10-06 PROCEDURE — 74220 X-RAY XM ESOPHAGUS 1CNTRST: CPT

## 2022-10-12 ENCOUNTER — RA CDI HCC (OUTPATIENT)
Dept: OTHER | Facility: HOSPITAL | Age: 82
End: 2022-10-12

## 2022-10-12 PROBLEM — N39.0 UTI (URINARY TRACT INFECTION), UNCOMPLICATED: Status: RESOLVED | Noted: 2021-10-19 | Resolved: 2022-10-12

## 2022-10-12 NOTE — PROGRESS NOTES
[address]  coding opportunities       Chart reviewed, no opportunity found: CHART REVIEWED, NO OPPORTUNITY FOUND        Patients Insurance     Medicare Insurance: Medicare

## 2022-10-20 ENCOUNTER — OFFICE VISIT (OUTPATIENT)
Dept: FAMILY MEDICINE CLINIC | Facility: CLINIC | Age: 82
End: 2022-10-20
Payer: MEDICARE

## 2022-10-20 VITALS
BODY MASS INDEX: 35.35 KG/M2 | DIASTOLIC BLOOD PRESSURE: 76 MMHG | HEIGHT: 63 IN | SYSTOLIC BLOOD PRESSURE: 122 MMHG | HEART RATE: 53 BPM | WEIGHT: 199.5 LBS | TEMPERATURE: 97 F | OXYGEN SATURATION: 96 %

## 2022-10-20 DIAGNOSIS — E87.1 HYPONATREMIA: ICD-10-CM

## 2022-10-20 DIAGNOSIS — U07.1 COVID-19: ICD-10-CM

## 2022-10-20 DIAGNOSIS — I10 HYPERTENSION, ESSENTIAL, BENIGN: ICD-10-CM

## 2022-10-20 DIAGNOSIS — E03.9 ACQUIRED HYPOTHYROIDISM: ICD-10-CM

## 2022-10-20 DIAGNOSIS — K44.9 LARGE HIATAL HERNIA: ICD-10-CM

## 2022-10-20 DIAGNOSIS — I10 PRIMARY HYPERTENSION: ICD-10-CM

## 2022-10-20 DIAGNOSIS — Z85.43 HISTORY OF OVARIAN CANCER: ICD-10-CM

## 2022-10-20 DIAGNOSIS — K21.00 GASTROESOPHAGEAL REFLUX DISEASE WITH ESOPHAGITIS WITHOUT HEMORRHAGE: ICD-10-CM

## 2022-10-20 DIAGNOSIS — Z23 IMMUNIZATION DUE: Primary | ICD-10-CM

## 2022-10-20 DIAGNOSIS — I71.21 ANEURYSM OF ASCENDING AORTA WITHOUT RUPTURE: ICD-10-CM

## 2022-10-20 PROBLEM — N17.9 ACUTE KIDNEY INJURY (HCC): Status: RESOLVED | Noted: 2021-10-17 | Resolved: 2022-10-20

## 2022-10-20 PROCEDURE — 90662 IIV NO PRSV INCREASED AG IM: CPT

## 2022-10-20 PROCEDURE — G0008 ADMIN INFLUENZA VIRUS VAC: HCPCS

## 2022-10-20 PROCEDURE — 99215 OFFICE O/P EST HI 40 MIN: CPT

## 2022-10-20 RX ORDER — PREDNISOLONE ACETATE 10 MG/ML
SUSPENSION/ DROPS OPHTHALMIC
COMMUNITY
Start: 2022-10-10

## 2022-10-20 NOTE — PROGRESS NOTES
Name: Ankit Bals      : 1940      MRN: [id number]  Encounter Provider: Mary Duenas MD  Encounter Date: 10/20/2022   Encounter department: [address] [place] [address]       Discussed with patient and family results finding in the hospital in detail and follow-up blood test show resolved sodium kidney function back to normal urine cultures negative  Discussed  results with patient which is normal   Proceed with gastric emptying study if test normal then we might not need colonoscopy  Flu vaccine given today  She get COVID booster in 6 months with recent COVID infection  Suspect COVID infection did flare up the condition that she has a make it worse  Advised for for small frequent meal     I have spent 40 minutes with pt and family today in which greater than 50% of this time was spent in counseling/coordination of care regarding Diagnostic results, Prognosis, Risks and benefits of tx options, Intructions for management, Patient and family education, Importance of tx compliance, Risk factor reductions and Impressions  1  Immunization due  -     influenza vaccine, high-dose, PF 0 7 mL (FLUZONE HIGH-DOSE)    2  Gastroesophageal reflux disease with esophagitis without hemorrhage    3  Acquired hypothyroidism    4  Hypertension, essential, benign    5  Aneurysm of ascending aorta without rupture    6  Primary hypertension    7  History of ovarian cancer    8  COVID-19    9  Hyponatremia    10  Large hiatal hernia         Subjective      54-year-old female follow-up hypertension hypothyroidism  she has a large hiatal hernia= 2021 she had paraesophageal hernia repaired  She had COVID infection  she was treated with Paxlovid she felt better but then  she went to the ER because she was not feeling well with nausea high blood pressure    She was sent home after medication and imaging was normal however she went back to the ER  was admitted until September 2nd she was found to have significant electrolyte abnormality sodium level is very low troponin was elevated she had seen cardiologist had cardiac catheterization showed nonobstructive plaque  She had EGD showed gastric paresis retain fluid bolus  She was all with GI also low sodium level thought to be due to dehydration she was seen with nephrologist she was put on sodium tablet  She was recommend to see outpatient cardiothoracic surgeon and get barium swallow done  Since she has been home she has been doing better  Parents swallow show moderate size hiatal hernia good swallowing function  She has upcoming gastric emptying study and schedule colonoscopy in December  She had colonoscopy done before showed pre cancers polyp  She follow-up nephrologist for chronic kidney disease  She had kidney stone with stent placed in the left side that was removed  She does not want another surgery nor colonoscopy if she does not have to  She is up-to-date COVID vaccine and booster  Review of Systems   Constitutional: Negative for activity change, appetite change, fatigue and unexpected weight change  HENT: Negative for ear pain, sore throat, trouble swallowing and voice change  Eyes: Negative for photophobia and visual disturbance  Respiratory: Negative for cough, chest tightness, shortness of breath and wheezing  Cardiovascular: Negative for chest pain, palpitations and leg swelling  Gastrointestinal: Negative for abdominal pain, constipation, diarrhea, nausea, rectal pain and vomiting  Endocrine: Negative for cold intolerance, polydipsia and polyuria  Genitourinary: Negative for difficulty urinating, dysuria, flank pain, menstrual problem and pelvic pain  Musculoskeletal: Negative for arthralgias, joint swelling and myalgias  Skin: Negative for color change and rash  Allergic/Immunologic: Negative for environmental allergies and immunocompromised state  Neurological: Negative for dizziness, weakness, numbness and headaches  Hematological: Negative for adenopathy  Does not bruise/bleed easily  Psychiatric/Behavioral: Negative for decreased concentration, dysphoric mood, self-injury, sleep disturbance and suicidal ideas  The patient is not nervous/anxious          Current Outpatient Medications on File Prior to Visit   Medication Sig   • Ascorbic Acid (vitamin C) 1000 MG tablet Take 2,000 mg by mouth daily   • atorvastatin (LIPITOR) 10 mg tablet TAKE 1 TABLET BY MOUTH  DAILY AT BEDTIME   • Cholecalciferol (VITAMIN D3) 400 units CAPS Take 1,000 Units by mouth   • latanoprost (XALATAN) 0 005 % ophthalmic solution Administer 1 drop to both eyes 2 (two) times a day   • levothyroxine 50 mcg tablet Take 1 tablet (50 mcg total) by mouth daily   • multivitamin-iron-minerals-folic acid (CENTRUM) chewable tablet Chew 1 tablet daily   • pantoprazole (PROTONIX) 40 mg tablet Take 1 tablet (40 mg total) by mouth daily   • prednisoLONE acetate (PRED FORTE) 1 % ophthalmic suspension SHAKE LIQUID AND INSTILL 1 DROP IN RIGHT EYE EVERY HOUR WHILE AWAKE   • albuterol (PROVENTIL HFA,VENTOLIN HFA) 90 mcg/act inhaler Inhale 2 puffs 4 (four) times a day as needed (Patient not taking: Reported on 10/20/2022)   • amoxicillin (AMOXIL) 500 MG tablet TAKE 4 TABLETS ONE HOUR BEFORE DENTAL PROCEDURE (Patient not taking: No sig reported)   • Combigan 0 2-0 5 % INSTILL 1 DROP IN BOTH EYES TWICE DAILY (Patient not taking: Reported on 10/20/2022)   • metoclopramide (Reglan) 10 mg tablet Take 0 5 tablets (5 mg total) by mouth 3 (three) times a day as needed (nausea) for up to 10 doses (Patient not taking: No sig reported)   • [DISCONTINUED] Multiple Vitamin (MULTIVITAMINS PO) Take 1 tablet by mouth daily (Patient not taking: No sig reported)       Objective     /76 (BP Location: Left arm, Patient Position: Sitting, Cuff Size: Adult)   Pulse (!) 53   Temp (!) 97 °F (36 1 °C) (Temporal) Ht 5' 3" (1 6 m)   Wt 90 5 kg (199 lb 8 oz)   SpO2 96%   BMI 35 34 kg/m²     Physical Exam  Vitals and nursing note reviewed  Constitutional:       Appearance: Normal appearance  She is well-developed  She is obese  HENT:      Head: Normocephalic and atraumatic  Right Ear: Tympanic membrane, ear canal and external ear normal       Left Ear: Tympanic membrane, ear canal and external ear normal       Nose: Nose normal       Mouth/Throat:      Mouth: Mucous membranes are moist       Pharynx: Oropharynx is clear  No oropharyngeal exudate  Eyes:      Pupils: Pupils are equal, round, and reactive to light  Neck:      Thyroid: No thyromegaly  Cardiovascular:      Rate and Rhythm: Normal rate and regular rhythm  Pulses: Normal pulses  Heart sounds: Normal heart sounds  No murmur heard  Pulmonary:      Effort: Pulmonary effort is normal  No respiratory distress  Breath sounds: Normal breath sounds  No wheezing or rales  Abdominal:      General: Abdomen is flat  Bowel sounds are normal  There is no distension  Palpations: Abdomen is soft  Tenderness: There is no abdominal tenderness  There is no guarding  Genitourinary:     Vagina: Normal    Musculoskeletal:         General: No tenderness  Normal range of motion  Cervical back: Normal range of motion and neck supple  Skin:     General: Skin is warm and dry  Capillary Refill: Capillary refill takes less than 2 seconds  Findings: No rash  Neurological:      General: No focal deficit present  Mental Status: She is alert and oriented to person, place, and time  Mental status is at baseline  Psychiatric:         Mood and Affect: Mood normal          Behavior: Behavior normal          Thought Content:  Thought content normal          Judgment: Judgment normal        Natali Martinez MD

## 2022-10-22 ENCOUNTER — HOSPITAL ENCOUNTER (OUTPATIENT)
Dept: RADIOLOGY | Facility: HOSPITAL | Age: 82
Discharge: HOME | End: 2022-10-22
Payer: MEDICARE

## 2022-10-22 DIAGNOSIS — R10.13 EPIGASTRIC ABDOMINAL PAIN: ICD-10-CM

## 2022-10-22 DIAGNOSIS — R10.13 DYSPEPSIA: ICD-10-CM

## 2022-10-22 PROCEDURE — 78264 GASTRIC EMPTYING IMG STUDY: CPT

## 2022-10-22 PROCEDURE — A9541 TC99M SULFUR COLLOID: HCPCS

## 2022-10-22 PROCEDURE — G1004 CDSM NDSC: HCPCS

## 2022-10-25 ENCOUNTER — TELEPHONE (OUTPATIENT)
Dept: HEMATOLOGY ONCOLOGY | Facility: CLINIC | Age: 82
End: 2022-10-25

## 2022-10-27 ENCOUNTER — TELEPHONE (OUTPATIENT)
Dept: FAMILY MEDICINE CLINIC | Facility: CLINIC | Age: 82
End: 2022-10-27

## 2022-10-27 NOTE — TELEPHONE ENCOUNTER
Thank you, I spoke with her  She seems to be managing her constipation relatively well with MiraLax as needed; I did advise her that given her change in bowel habits we cannot exclude underlying cancerous/precancerous colon polyp unless we do a colonoscopy, she expressed preference to keep herself on the schedule for the colonoscopy in December for now and let us know if she would like to cancel  Thank you

## 2022-10-27 NOTE — TELEPHONE ENCOUNTER
Patient had gastro procedure on Saturday and wants to know if she still needs a colonoscopy and where she goes from here  She would like you to call her

## 2022-10-27 NOTE — TELEPHONE ENCOUNTER
Pt had EGD last yr, then gastric emptying done recently confirm gastroparesis  Pt wants to know if she still needs to f/u w colonoscopy scheduled for 12/2022  She really doesn't want to get the colonoscopy if she doesn't have to  Can you please give the recommendation?

## 2022-11-10 ENCOUNTER — APPOINTMENT (EMERGENCY)
Dept: CT IMAGING | Facility: HOSPITAL | Age: 82
End: 2022-11-10

## 2022-11-10 ENCOUNTER — APPOINTMENT (EMERGENCY)
Dept: ULTRASOUND IMAGING | Facility: HOSPITAL | Age: 82
End: 2022-11-10

## 2022-11-10 ENCOUNTER — HOSPITAL ENCOUNTER (INPATIENT)
Facility: HOSPITAL | Age: 82
LOS: 2 days | Discharge: HOME | End: 2022-11-12
Attending: EMERGENCY MEDICINE | Admitting: INTERNAL MEDICINE

## 2022-11-10 DIAGNOSIS — R10.11 RIGHT UPPER QUADRANT PAIN: ICD-10-CM

## 2022-11-10 DIAGNOSIS — K80.20 CHOLELITHIASIS: Primary | ICD-10-CM

## 2022-11-10 DIAGNOSIS — K31.84 GASTROPARESIS: ICD-10-CM

## 2022-11-10 DIAGNOSIS — E83.42 HYPOMAGNESEMIA: ICD-10-CM

## 2022-11-10 DIAGNOSIS — E87.1 HYPONATREMIA: ICD-10-CM

## 2022-11-10 DIAGNOSIS — R11.10 INTRACTABLE VOMITING: ICD-10-CM

## 2022-11-10 LAB
2HR DELTA HS TROPONIN: 13 NG/L
4HR DELTA HS TROPONIN: 30 NG/L
ALBUMIN SERPL BCP-MCNC: 4.3 G/DL (ref 3.5–5)
ALP SERPL-CCNC: 118 U/L (ref 34–104)
ALT SERPL W P-5'-P-CCNC: 17 U/L (ref 7–52)
ANION GAP SERPL CALCULATED.3IONS-SCNC: 12 MMOL/L (ref 4–13)
AST SERPL W P-5'-P-CCNC: 25 U/L (ref 13–39)
ATRIAL RATE: 61 BPM
BACTERIA UR QL AUTO: ABNORMAL /HPF
BASOPHILS # BLD AUTO: 0.02 THOUSANDS/ÂΜL (ref 0–0.1)
BASOPHILS NFR BLD AUTO: 0 % (ref 0–1)
BILIRUB SERPL-MCNC: 1.04 MG/DL (ref 0.2–1)
BILIRUB UR QL STRIP: NEGATIVE
BUN SERPL-MCNC: 9 MG/DL (ref 5–25)
CALCIUM SERPL-MCNC: 9.3 MG/DL (ref 8.4–10.2)
CARDIAC TROPONIN I PNL SERPL HS: 20 NG/L
CARDIAC TROPONIN I PNL SERPL HS: 33 NG/L
CARDIAC TROPONIN I PNL SERPL HS: 50 NG/L
CARDIAC TROPONIN I PNL SERPL HS: 76 NG/L (ref 8–18)
CHLORIDE SERPL-SCNC: 90 MMOL/L (ref 96–108)
CLARITY UR: CLEAR
CO2 SERPL-SCNC: 24 MMOL/L (ref 21–32)
COLOR UR: ABNORMAL
CREAT SERPL-MCNC: 0.53 MG/DL (ref 0.6–1.3)
EOSINOPHIL # BLD AUTO: 0.04 THOUSAND/ÂΜL (ref 0–0.61)
EOSINOPHIL NFR BLD AUTO: 0 % (ref 0–6)
ERYTHROCYTE [DISTWIDTH] IN BLOOD BY AUTOMATED COUNT: 12.2 % (ref 11.6–15.1)
GFR SERPL CREATININE-BSD FRML MDRD: 88 ML/MIN/1.73SQ M
GLUCOSE SERPL-MCNC: 130 MG/DL (ref 65–140)
GLUCOSE UR STRIP-MCNC: NEGATIVE MG/DL
HCT VFR BLD AUTO: 46.8 % (ref 34.8–46.1)
HGB BLD-MCNC: 16.6 G/DL (ref 11.5–15.4)
HGB UR QL STRIP.AUTO: NEGATIVE
HYALINE CASTS #/AREA URNS LPF: ABNORMAL /LPF
IMM GRANULOCYTES # BLD AUTO: 0.05 THOUSAND/UL (ref 0–0.2)
IMM GRANULOCYTES NFR BLD AUTO: 1 % (ref 0–2)
KETONES UR STRIP-MCNC: ABNORMAL MG/DL
LEUKOCYTE ESTERASE UR QL STRIP: NEGATIVE
LIPASE SERPL-CCNC: 18 U/L (ref 11–82)
LYMPHOCYTES # BLD AUTO: 1.5 THOUSANDS/ÂΜL (ref 0.6–4.47)
LYMPHOCYTES NFR BLD AUTO: 15 % (ref 14–44)
MCH RBC QN AUTO: 33.3 PG (ref 26.8–34.3)
MCHC RBC AUTO-ENTMCNC: 35.5 G/DL (ref 31.4–37.4)
MCV RBC AUTO: 94 FL (ref 82–98)
MONOCYTES # BLD AUTO: 0.63 THOUSAND/ÂΜL (ref 0.17–1.22)
MONOCYTES NFR BLD AUTO: 6 % (ref 4–12)
NEUTROPHILS # BLD AUTO: 7.67 THOUSANDS/ÂΜL (ref 1.85–7.62)
NEUTS SEG NFR BLD AUTO: 78 % (ref 43–75)
NITRITE UR QL STRIP: NEGATIVE
NON-SQ EPI CELLS URNS QL MICRO: ABNORMAL /HPF
NRBC BLD AUTO-RTO: 0 /100 WBCS
P AXIS: 19 DEGREES
PH UR STRIP.AUTO: 7 [PH]
PLATELET # BLD AUTO: 226 THOUSANDS/UL (ref 149–390)
PLATELET # BLD AUTO: 231 THOUSANDS/UL (ref 149–390)
PMV BLD AUTO: 10.7 FL (ref 8.9–12.7)
PMV BLD AUTO: 11 FL (ref 8.9–12.7)
POTASSIUM SERPL-SCNC: 4.1 MMOL/L (ref 3.5–5.3)
PR INTERVAL: 204 MS
PROT SERPL-MCNC: 7.6 G/DL (ref 6.4–8.4)
PROT UR STRIP-MCNC: ABNORMAL MG/DL
QRS AXIS: -50 DEGREES
QRSD INTERVAL: 92 MS
QT INTERVAL: 468 MS
QTC INTERVAL: 471 MS
RBC # BLD AUTO: 4.99 MILLION/UL (ref 3.81–5.12)
RBC #/AREA URNS AUTO: ABNORMAL /HPF
SODIUM SERPL-SCNC: 126 MMOL/L (ref 135–147)
SP GR UR STRIP.AUTO: 1.02 (ref 1–1.03)
T WAVE AXIS: 63 DEGREES
UROBILINOGEN UR STRIP-ACNC: <2 MG/DL
VENTRICULAR RATE: 61 BPM
WBC # BLD AUTO: 9.91 THOUSAND/UL (ref 4.31–10.16)
WBC #/AREA URNS AUTO: ABNORMAL /HPF

## 2022-11-10 RX ORDER — SODIUM CHLORIDE 9 MG/ML
100 INJECTION, SOLUTION INTRAVENOUS CONTINUOUS
Status: DISCONTINUED | OUTPATIENT
Start: 2022-11-10 | End: 2022-11-11

## 2022-11-10 RX ORDER — ACETAMINOPHEN 325 MG/1
650 TABLET ORAL EVERY 6 HOURS PRN
Status: DISCONTINUED | OUTPATIENT
Start: 2022-11-10 | End: 2022-11-12 | Stop reason: HOSPADM

## 2022-11-10 RX ORDER — DIPHENHYDRAMINE HYDROCHLORIDE 50 MG/ML
12.5 INJECTION INTRAMUSCULAR; INTRAVENOUS ONCE
Status: COMPLETED | OUTPATIENT
Start: 2022-11-10 | End: 2022-11-10

## 2022-11-10 RX ORDER — ONDANSETRON 2 MG/ML
4 INJECTION INTRAMUSCULAR; INTRAVENOUS ONCE
Status: COMPLETED | OUTPATIENT
Start: 2022-11-10 | End: 2022-11-10

## 2022-11-10 RX ORDER — SODIUM CHLORIDE 9 MG/ML
125 INJECTION, SOLUTION INTRAVENOUS CONTINUOUS
Status: DISCONTINUED | OUTPATIENT
Start: 2022-11-10 | End: 2022-11-10

## 2022-11-10 RX ORDER — BRIMONIDINE TARTRATE 2 MG/ML
1 SOLUTION/ DROPS OPHTHALMIC EVERY 8 HOURS SCHEDULED
Status: DISCONTINUED | OUTPATIENT
Start: 2022-11-10 | End: 2022-11-12 | Stop reason: HOSPADM

## 2022-11-10 RX ORDER — METOCLOPRAMIDE HYDROCHLORIDE 5 MG/ML
5 INJECTION INTRAMUSCULAR; INTRAVENOUS ONCE
Status: COMPLETED | OUTPATIENT
Start: 2022-11-10 | End: 2022-11-10

## 2022-11-10 RX ORDER — ALBUTEROL SULFATE 90 UG/1
2 AEROSOL, METERED RESPIRATORY (INHALATION) 4 TIMES DAILY PRN
Status: DISCONTINUED | OUTPATIENT
Start: 2022-11-10 | End: 2022-11-12 | Stop reason: HOSPADM

## 2022-11-10 RX ORDER — LEVOTHYROXINE SODIUM 0.05 MG/1
50 TABLET ORAL
Status: DISCONTINUED | OUTPATIENT
Start: 2022-11-11 | End: 2022-11-12 | Stop reason: HOSPADM

## 2022-11-10 RX ORDER — PANTOPRAZOLE SODIUM 40 MG/10ML
40 INJECTION, POWDER, LYOPHILIZED, FOR SOLUTION INTRAVENOUS EVERY 12 HOURS
Status: DISCONTINUED | OUTPATIENT
Start: 2022-11-11 | End: 2022-11-12 | Stop reason: HOSPADM

## 2022-11-10 RX ORDER — HYDROMORPHONE HCL/PF 1 MG/ML
0.5 SYRINGE (ML) INJECTION EVERY 6 HOURS PRN
Status: DISCONTINUED | OUTPATIENT
Start: 2022-11-10 | End: 2022-11-12

## 2022-11-10 RX ORDER — TIMOLOL MALEATE 5 MG/ML
1 SOLUTION/ DROPS OPHTHALMIC 2 TIMES DAILY
Status: DISCONTINUED | OUTPATIENT
Start: 2022-11-10 | End: 2022-11-12 | Stop reason: HOSPADM

## 2022-11-10 RX ORDER — METOCLOPRAMIDE HYDROCHLORIDE 5 MG/ML
10 INJECTION INTRAMUSCULAR; INTRAVENOUS EVERY 6 HOURS PRN
Status: DISCONTINUED | OUTPATIENT
Start: 2022-11-10 | End: 2022-11-12 | Stop reason: HOSPADM

## 2022-11-10 RX ORDER — ONDANSETRON 2 MG/ML
4 INJECTION INTRAMUSCULAR; INTRAVENOUS EVERY 4 HOURS PRN
Status: DISCONTINUED | OUTPATIENT
Start: 2022-11-10 | End: 2022-11-12

## 2022-11-10 RX ORDER — PROMETHAZINE HYDROCHLORIDE 25 MG/ML
12.5 INJECTION, SOLUTION INTRAMUSCULAR; INTRAVENOUS EVERY 6 HOURS PRN
Status: DISCONTINUED | OUTPATIENT
Start: 2022-11-10 | End: 2022-11-12

## 2022-11-10 RX ORDER — PANTOPRAZOLE SODIUM 40 MG/10ML
40 INJECTION, POWDER, LYOPHILIZED, FOR SOLUTION INTRAVENOUS
Status: DISCONTINUED | OUTPATIENT
Start: 2022-11-10 | End: 2022-11-10

## 2022-11-10 RX ORDER — LATANOPROST 50 UG/ML
1 SOLUTION/ DROPS OPHTHALMIC 2 TIMES DAILY
Status: DISCONTINUED | OUTPATIENT
Start: 2022-11-10 | End: 2022-11-12 | Stop reason: HOSPADM

## 2022-11-10 RX ORDER — ENOXAPARIN SODIUM 100 MG/ML
40 INJECTION SUBCUTANEOUS DAILY
Status: DISCONTINUED | OUTPATIENT
Start: 2022-11-10 | End: 2022-11-12 | Stop reason: HOSPADM

## 2022-11-10 RX ORDER — ATORVASTATIN CALCIUM 10 MG/1
10 TABLET, FILM COATED ORAL
Status: DISCONTINUED | OUTPATIENT
Start: 2022-11-10 | End: 2022-11-12 | Stop reason: HOSPADM

## 2022-11-10 RX ADMIN — HYDROMORPHONE HYDROCHLORIDE 0.5 MG: 1 INJECTION, SOLUTION INTRAMUSCULAR; INTRAVENOUS; SUBCUTANEOUS at 17:17

## 2022-11-10 RX ADMIN — ATORVASTATIN CALCIUM 10 MG: 10 TABLET, FILM COATED ORAL at 21:53

## 2022-11-10 RX ADMIN — IOHEXOL 100 ML: 350 INJECTION, SOLUTION INTRAVENOUS at 11:16

## 2022-11-10 RX ADMIN — METOCLOPRAMIDE 10 MG: 5 INJECTION, SOLUTION INTRAMUSCULAR; INTRAVENOUS at 15:53

## 2022-11-10 RX ADMIN — SODIUM CHLORIDE 100 ML/HR: 0.9 INJECTION, SOLUTION INTRAVENOUS at 15:29

## 2022-11-10 RX ADMIN — DIPHENHYDRAMINE HYDROCHLORIDE 12.5 MG: 50 INJECTION, SOLUTION INTRAMUSCULAR; INTRAVENOUS at 10:37

## 2022-11-10 RX ADMIN — PANTOPRAZOLE SODIUM 40 MG: 40 INJECTION, POWDER, FOR SOLUTION INTRAVENOUS at 15:46

## 2022-11-10 RX ADMIN — METOCLOPRAMIDE 5 MG: 5 INJECTION, SOLUTION INTRAMUSCULAR; INTRAVENOUS at 10:10

## 2022-11-10 RX ADMIN — SODIUM CHLORIDE 500 ML: 0.9 INJECTION, SOLUTION INTRAVENOUS at 08:26

## 2022-11-10 RX ADMIN — ONDANSETRON 4 MG: 2 INJECTION INTRAMUSCULAR; INTRAVENOUS at 09:18

## 2022-11-10 RX ADMIN — LATANOPROST 1 DROP: 50 SOLUTION OPHTHALMIC at 15:47

## 2022-11-10 RX ADMIN — ONDANSETRON 4 MG: 2 INJECTION INTRAMUSCULAR; INTRAVENOUS at 08:28

## 2022-11-10 RX ADMIN — ENOXAPARIN SODIUM 40 MG: 40 INJECTION SUBCUTANEOUS at 15:46

## 2022-11-10 RX ADMIN — MORPHINE SULFATE 2 MG: 2 INJECTION, SOLUTION INTRAMUSCULAR; INTRAVENOUS at 08:29

## 2022-11-10 RX ADMIN — METOCLOPRAMIDE 5 MG: 5 INJECTION, SOLUTION INTRAMUSCULAR; INTRAVENOUS at 11:35

## 2022-11-10 NOTE — PROGRESS NOTES
Progress Note - Ruthie Grandchild 79 y o  female MRN: [id number]    Unit/Bed#: W -01 Encounter: [id number]      Assessment:  80 y/o F p/w epigastric abd pain, s/p PEH repair 3/2021 c/b recurrence surgery c/s for incidental gallstones  Vss  Afebrile  abd soft, nontender, non distended  Wbc: 9-> 7    Plan:  Advance diet as tolerated  Trend troponin  Follow up outpatient for h/o cholelithiasis  No acute surgical intervention  Will sign off  Reach out with questions or concerns  Rest of care per primary service    Subjective:   Denied abdominal pain  Denied fever, chills, chest pain, shortness of breath, nausea, vomiting, or abdominal pain this morning  Objective:     Vitals: Blood pressure 111/79, pulse 59, temperature 97 7 °F (36 5 °C), resp  rate 18, SpO2 92 %, not currently breastfeeding  ,There is no height or weight on file to calculate BMI  Intake/Output Summary (Last 24 hours) at 11/11/2022 0701  Last data filed at 11/11/2022 0141  Gross per 24 hour   Intake 1420 ml   Output 300 ml   Net 1120 ml     Physical Exam  General: NAD  HEENT: NC/AT  MMM  Cv: RRR     Lungs: normal effort  Ab: Soft, NT/ND  Ex: no CCE  Neuro: AAOx3    Scheduled Meds:  Current Facility-Administered Medications   Medication Dose Route Frequency Provider Last Rate   • acetaminophen  650 mg Oral Q6H PRN Pearl Bardales PA-C     • albuterol  2 puff Inhalation 4x Daily PRN Pearl Bardales PA-C     • atorvastatin  10 mg Oral HS Pearl Bardales PA-C     • brimonidine tartrate  1 drop Both Eyes Q8H [address] Hina Guthrie PA-C     • enoxaparin  40 mg Subcutaneous Daily Pearl Bardales PA-C     • HYDROmorphone  0 5 mg Intravenous Q6H PRN Pearl Bardales PA-C     • latanoprost  1 drop Both Eyes BID Pearl Bardales PA-C     • levothyroxine  50 mcg Oral Early Morning Pearl Bardales PA-C     • metoclopramide  10 mg Intravenous Q6H PRN Pearl Bardales PA-C     • ondansetron  4 mg Intravenous Q4H PRN Mackenzie Casandra Keller MD     • pantoprazole  40 mg Intravenous Q12H Panda Lazaro DO     • promethazine  12 5 mg Intravenous Q6H PRN Mackenzie Keller MD     • sodium chloride  100 mL/hr Intravenous Continuous Minus Mediate, PA-C 100 mL/hr (11/11/22 0141)   • timolol  1 drop Both Eyes BID Abi Ray PA-C       Continuous Infusions:sodium chloride, 100 mL/hr, Last Rate: 100 mL/hr (11/11/22 0141)      PRN Meds: •  acetaminophen  •  albuterol  •  HYDROmorphone  •  metoclopramide  •  ondansetron  •  promethazine      Invasive Devices  Report    Peripheral Intravenous Line  Duration           Peripheral IV 11/10/22 Left Forearm <1 day                Lab, Imaging and other studies: I have personally reviewed pertinent reports      VTE Pharmacologic Prophylaxis: Sequential compression device (Venodyne)   VTE Mechanical Prophylaxis: sequential compression device

## 2022-11-10 NOTE — ED PROVIDER NOTES
History  Chief Complaint   Patient presents with   • Vomiting     Pt presents to the ED with c/o vomiting/abd pain since last night     This is an 24-year-old female patient who has sore yesterday at 1500 hours with right upper quadrant pain intractable vomiting  She points directly to her right upper quadrant she still has her gallbladder  She denies any fever chills headache blurred vision double vision cultures of sore throat no chest pain or shortness of breath positive nausea positive vomiting no diarrhea does states she is passing gas  Nothing makes it better or worse  Has tried nothing over-the-counter  Differential diagnosis includes not limited to cholecystitis, pancreatitis, gastritis, bowel obstruction less likely, ACS cause less likely          Prior to Admission Medications   Prescriptions Last Dose Informant Patient Reported? Taking?    Ascorbic Acid (vitamin C) 1000 MG tablet 11/9/2022 at Unknown time Self Yes Yes   Sig: Take 2,000 mg by mouth daily   Cholecalciferol (VITAMIN D3) 400 units CAPS 11/9/2022 at Unknown time Self Yes Yes   Sig: Take 1,000 Units by mouth   Combigan 0 2-0 5 % 11/9/2022 at Unknown time  Yes Yes   albuterol (PROVENTIL HFA,VENTOLIN HFA) 90 mcg/act inhaler Past Week at Unknown time  Yes Yes   Sig: Inhale 2 puffs 4 (four) times a day as needed   atorvastatin (LIPITOR) 10 mg tablet 11/9/2022 at Unknown time Self No Yes   Sig: TAKE 1 TABLET BY MOUTH  DAILY AT BEDTIME   latanoprost (XALATAN) 0 005 % ophthalmic solution 11/9/2022 at Unknown time Self Yes Yes   Sig: Administer 1 drop to both eyes 2 (two) times a day   levothyroxine 50 mcg tablet 11/9/2022 at Unknown time  No Yes   Sig: Take 1 tablet (50 mcg total) by mouth daily   multivitamin-iron-minerals-folic acid (CENTRUM) chewable tablet 11/9/2022 at Unknown time  Yes Yes   Sig: Chew 1 tablet daily   pantoprazole (PROTONIX) 40 mg tablet 11/9/2022 at Unknown time  No Yes   Sig: Take 1 tablet (40 mg total) by mouth daily prednisoLONE acetate (PRED FORTE) 1 % ophthalmic suspension 11/9/2022 at Unknown time  Yes Yes   Sig: SHAKE LIQUID AND INSTILL 1 DROP IN RIGHT EYE EVERY HOUR WHILE AWAKE      Facility-Administered Medications: None       Past Medical History:   Diagnosis Date   • Cancer [facility])    • Colon polyp    • Disease of thyroid gland    • Endometrial cancer [facility])     age 45   • 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[facility] [address] )     1992   • Esophageal reflux    • GERD (gastroesophageal reflux disease)    • History of ovarian cancer 04/28/2022   • Hypertension    • Hypothyroid    • Kidney stone    • Obesity    • Prediabetes 05/12/2022   • Rosacea 05/12/2022       Past Surgical History:   Procedure Laterality Date   • CARDIAC CATHETERIZATION Left 09/01/2022    Procedure: Cardiac Left Heart Cath;  Surgeon: Melania Polk MD;  [facility]; Service: Cardiology   • CATARACT EXTRACTION, BILATERAL     • COLONOSCOPY  08/2018    polyp- 5 years   • CYSTOSCOPY     • FL RETROGRADE PYELOGRAM  10/15/2021   • HERNIA REPAIR     • JOINT REPLACEMENT     • KNEE ARTHROPLASTY     • NH CYSTO/URETERO W/LITHOTRIPSY &INDWELL STENT INSRT Left 10/15/2021    Procedure: CYSTOSCOPY URETEROSCOPY WITH LITHOTRIPSY HOLMIUM LASER, RETROGRADE PYELOGRAM, BASKET STONE EXTRACTION AND EXCHANGE STENT URETERAL;  Surgeon: Joyce Coley MD;  Location: [facility];  Service: Urology   • NH CYSTOURETHROSCOPY,URETER CATHETER Left 08/24/2021    Procedure: CYSTOSCOPY WITH INSERTION STENT URETERAL;  Surgeon: Maritza Price MD;  Location[facility];  Service: Urology   • NH ESOPHAGOGASTRODUODENOSCOPY TRANSORAL DIAGNOSTIC N/A 03/05/2021    Procedure: ESOPHAGOGASTRODUODENOSCOPY (EGD);   Surgeon: Keith Arrington MD;  Location[facility];  Service: Thoracic   • NH LAP, REPAIR PARAESOPHAGEAL HERNIA, INCL FUNDOPLASTY W/O MESH N/A 03/05/2021    Procedure: REPAIR HERNIA PARAESOPHAGEAL LAPAROSCOPIC W ROBOTICS WITH MESH, DOOR FUNDOPLICATION;  Surgeon: Keith Arrington MD;  Location[facility];  Service: Thoracic   • TOTAL ABDOMINAL HYSTERECTOMY      age 45   • TOTAL ABDOMINAL HYSTERECTOMY W/ BILATERAL SALPINGOOPHORECTOMY      endometrial cancer       Family History   Problem Relation Age of Onset   • Stomach cancer Mother 71   • Pancreatic cancer Mother 71   • Heart attack Father    • Cancer Maternal Uncle    • No Known Problems Daughter    • No Known Problems Maternal Grandmother    • No Known Problems Maternal Grandfather    • No Known Problems Paternal Grandmother    • No Known Problems Paternal Grandfather    • No Known Problems Maternal Aunt    • No Known Problems Maternal Aunt      I have reviewed and agree with the history as documented  E-Cigarette/Vaping   • E-Cigarette Use Never User      E-Cigarette/Vaping Substances   • Nicotine No    • THC No    • CBD No    • Flavoring No    • Other No    • Unknown No      Social History     Tobacco Use   • Smoking status: Former Smoker     Packs/day: 0 25     Years: 5 00     Pack years: 1 25     Types: Cigarettes     Start date: 1959     Quit date: 1964     Years since quittin 8   • Smokeless tobacco: Never Used   • Tobacco comment: Quit 50+ years ago 3/24/21   Vaping Use   • Vaping Use: Never used   Substance Use Topics   • Alcohol use: Not Currently     Comment: socially   • Drug use: No       Review of Systems   Constitutional: Negative for chills, diaphoresis, fatigue and fever  HENT: Negative for congestion, ear pain, nosebleeds and sore throat  Eyes: Negative for photophobia, pain, discharge and visual disturbance  Respiratory: Negative for cough, choking, chest tightness, shortness of breath and wheezing  Cardiovascular: Negative for chest pain and palpitations  Gastrointestinal: Positive for abdominal pain, nausea and vomiting  Negative for abdominal distention, anal bleeding, blood in stool, constipation, diarrhea and rectal pain     Genitourinary: Negative for dysuria, flank pain, frequency and hematuria  Musculoskeletal: Negative for arthralgias, back pain, gait problem and joint swelling  Skin: Negative for color change and rash  Neurological: Negative for dizziness, seizures, syncope and headaches  Psychiatric/Behavioral: Negative for behavioral problems and confusion  The patient is not nervous/anxious  All other systems reviewed and are negative  Physical Exam  Physical Exam  Vitals and nursing note reviewed  Constitutional:       General: She is not in acute distress  Appearance: She is obese  She is ill-appearing  She is not toxic-appearing or diaphoretic  HENT:      Head: Normocephalic and atraumatic  Right Ear: Tympanic membrane, ear canal and external ear normal       Left Ear: Tympanic membrane, ear canal and external ear normal       Nose: Nose normal  No congestion or rhinorrhea  Mouth/Throat:      Mouth: Mucous membranes are moist       Pharynx: Oropharynx is clear  No oropharyngeal exudate or posterior oropharyngeal erythema  Eyes:      Extraocular Movements: Extraocular movements intact  Conjunctiva/sclera: Conjunctivae normal       Pupils: Pupils are equal, round, and reactive to light  Cardiovascular:      Rate and Rhythm: Normal rate and regular rhythm  Pulmonary:      Effort: Pulmonary effort is normal  No respiratory distress  Breath sounds: Normal breath sounds  Abdominal:      General: Bowel sounds are normal       Palpations: Abdomen is soft  Tenderness: There is abdominal tenderness in the right upper quadrant and epigastric area  There is guarding  There is no right CVA tenderness, left CVA tenderness or rebound  Positive signs include Merino's sign  Musculoskeletal:         General: Normal range of motion  Cervical back: Normal range of motion and neck supple  No rigidity or tenderness  Right lower leg: No edema  Left lower leg: No edema  Lymphadenopathy:      Cervical: No cervical adenopathy  Skin:     General: Skin is warm and dry  Capillary Refill: Capillary refill takes less than 2 seconds  Findings: No rash  Neurological:      General: No focal deficit present  Mental Status: She is alert and oriented to person, place, and time  Mental status is at baseline     Psychiatric:         Mood and Affect: Mood normal          Behavior: Behavior normal          Vital Signs  ED Triage Vitals   Temperature Pulse Respirations Blood Pressure SpO2   11/10/22 0746 11/10/22 0745 11/10/22 0745 11/10/22 0745 11/10/22 0745   97 6 °F (36 4 °C) 62 22 (!) 188/109 98 %      Temp Source Heart Rate Source Patient Position - Orthostatic VS BP Location FiO2 (%)   11/10/22 0746 11/10/22 0745 11/10/22 1009 11/10/22 0745 --   Oral Monitor Lying Left arm       Pain Score       11/10/22 0745       8           Vitals:    11/10/22 1304 11/10/22 1358 11/10/22 1522 11/10/22 1522   BP: (!) 191/98 (!) 176/112 (!) 158/113 (!) 158/113   Pulse: 78 61 80 69   Patient Position - Orthostatic VS: Lying            Visual Acuity      ED Medications  Medications   sodium chloride 0 9 % infusion (100 mL/hr Intravenous New Bag 11/10/22 1529)   latanoprost (XALATAN) 0 005 % ophthalmic solution 1 drop (1 drop Both Eyes Given 11/10/22 1547)   levothyroxine tablet 50 mcg (has no administration in time range)   albuterol (PROVENTIL HFA,VENTOLIN HFA) inhaler 2 puff (has no administration in time range)   atorvastatin (LIPITOR) tablet 10 mg (has no administration in time range)   brimonidine tartrate 0 2 % ophthalmic solution 1 drop (1 drop Both Eyes Refused 11/10/22 1546)   timolol (TIMOPTIC) 0 5 % ophthalmic solution 1 drop (has no administration in time range)   enoxaparin (LOVENOX) subcutaneous injection 40 mg (40 mg Subcutaneous Given 11/10/22 1546)   metoclopramide (REGLAN) injection 10 mg (10 mg Intravenous Given 11/10/22 1553)   pantoprazole (PROTONIX) injection 40 mg (has no administration in time range)   morphine injection 2 mg (2 mg Intravenous Given 11/10/22 0829)   ondansetron (ZOFRAN) injection 4 mg (4 mg Intravenous Given 11/10/22 0828)   ondansetron (ZOFRAN) injection 4 mg (4 mg Intravenous Given 11/10/22 0918)   metoclopramide (REGLAN) injection 5 mg (5 mg Intravenous Given 11/10/22 1010)   diphenhydrAMINE (BENADRYL) injection 12 5 mg (12 5 mg Intravenous Given 11/10/22 1037)   iohexol (OMNIPAQUE) 350 MG/ML injection (SINGLE-DOSE) 100 mL (100 mL Intravenous Given 11/10/22 1116)   metoclopramide (REGLAN) injection 5 mg (5 mg Intravenous Given 11/10/22 1135)       Diagnostic Studies  Results Reviewed     Procedure Component Value Units Date/Time    HS Troponin I 4hr [[id number]]  (Abnormal) Collected: 11/10/22 1256    Lab Status: Final result Specimen: Blood Updated: 11/10/22 1356     hs TnI 4hr 50 ng/L      Delta 4hr hsTnI 30 ng/L     HS Troponin I 2hr [[id number]]  (Normal) Collected: 11/10/22 1041    Lab Status: Final result Specimen: Blood from Arm, Left Updated: 11/10/22 1118     hs TnI 2hr 33 ng/L      Delta 2hr hsTnI 13 ng/L     Urine Microscopic [[id number]]  (Abnormal) Collected: 11/10/22 1028    Lab Status: Final result Specimen: Urine, Clean Catch Updated: 11/10/22 1047     RBC, UA 2-4 /hpf      WBC, UA 2-4 /hpf      Epithelial Cells Occasional /hpf      Bacteria, UA Occasional /hpf      Hyaline Casts, UA 0-3 /lpf     UA w Reflex to Microscopic w Reflex to Culture [[id number]]  (Abnormal) Collected: 11/10/22 1028    Lab Status: Final result Specimen: Urine, Clean Catch Updated: 11/10/22 1040     Color, UA Light Yellow     Clarity, UA Clear     Specific Gravity, UA 1 017     pH, UA 7 0     Leukocytes, UA Negative     Nitrite, UA Negative     Protein,  (2+) mg/dl      Glucose, UA Negative mg/dl      Ketones, UA 60 (2+) mg/dl      Urobilinogen, UA <2 0 mg/dl      Bilirubin, UA Negative     Occult Blood, UA Negative    [place] draw [[id number]] Collected: 11/10/22 0809    Lab Status: Final result Specimen: Blood from Arm, Left Updated: 11/10/22 1003    Narrative: The following orders were created for panel order [place] draw  Procedure                               Abnormality         Status                     ---------                               -----------         ------                     [place] Top on NVAO[[id number]]                           Final result               Gold top on DPQG[[id number]]                                 Final result                 Please view results for these tests on the individual orders      HS Troponin 0hr (reflex protocol) [[id number]]  (Normal) Collected: 11/10/22 0836    Lab Status: Final result Specimen: Blood from Arm, Left Updated: 11/10/22 0930     hs TnI 0hr 20 ng/L     Comprehensive metabolic panel [[id number]]  (Abnormal) Collected: 11/10/22 0809    Lab Status: Final result Specimen: Blood from Arm, Left Updated: 11/10/22 0910     Sodium 126 mmol/L      Potassium 4 1 mmol/L      Chloride 90 mmol/L      CO2 24 mmol/L      ANION GAP 12 mmol/L      BUN 9 mg/dL      Creatinine 0 53 mg/dL      Glucose 130 mg/dL      Calcium 9 3 mg/dL      AST 25 U/L      ALT 17 U/L      Alkaline Phosphatase 118 U/L      Total Protein 7 6 g/dL      Albumin 4 3 g/dL      Total Bilirubin 1 04 mg/dL      eGFR 88 ml/min/1 73sq m     Narrative:      Carole guidelines for Chronic Kidney Disease (CKD):   •  Stage 1 with normal or high GFR (GFR > 90 mL/min/1 73 square meters)  •  Stage 2 Mild CKD (GFR = 60-89 mL/min/1 73 square meters)  •  Stage 3A Moderate CKD (GFR = 45-59 mL/min/1 73 square meters)  •  Stage 3B Moderate CKD (GFR = 30-44 mL/min/1 73 square meters)  •  Stage 4 Severe CKD (GFR = 15-29 mL/min/1 73 square meters)  •  Stage 5 End Stage CKD (GFR <15 mL/min/1 73 square meters)  Note: GFR calculation is accurate only with a steady state creatinine    Lipase [[id number]]  (Normal) Collected: 11/10/22 0809    Lab Status: Final result Specimen: Blood from Arm, Left Updated: 11/10/22 0910     Lipase 18 u/L     CBC and differential [[id number]]  (Abnormal) Collected: 11/10/22 0809    Lab Status: Final result Specimen: Blood from Arm, Left Updated: 11/10/22 0822     WBC 9 91 Thousand/uL      RBC 4 99 Million/uL      Hemoglobin 16 6 g/dL      Hematocrit 46 8 %      MCV 94 fL      MCH 33 3 pg      MCHC 35 5 g/dL      RDW 12 2 %      MPV 11 0 fL      Platelets 170 Thousands/uL      nRBC 0 /100 WBCs      Neutrophils Relative 78 %      Immat GRANS % 1 %      Lymphocytes Relative 15 %      Monocytes Relative 6 %      Eosinophils Relative 0 %      Basophils Relative 0 %      Neutrophils Absolute 7 67 Thousands/µL      Immature Grans Absolute 0 05 Thousand/uL      Lymphocytes Absolute 1 50 Thousands/µL      Monocytes Absolute 0 63 Thousand/µL      Eosinophils Absolute 0 04 Thousand/µL      Basophils Absolute 0 02 Thousands/µL                  CT abdomen pelvis with contrast   Final Result by Lynn Nagy MD (11/10 1139)      1  No acute abnormality in the abdomen or pelvis  2   Small to moderate hiatal hernia with findings suggesting gastroesophageal reflux and esophagitis, similar to CT 8/30/2022       3   Mildly dilated gallbladder containing stones without evidence of acute cholecystitis, as seen on same day right upper quadrant ultrasound  Workstation performed: [id number]         US right upper quadrant   Final Result by Shauna Fernandez DO (11/10 1035)      Cholelithiasis without ultrasonographic evidence of cholecystitis  1 cm echogenic focus left lobe seen only on cine imaging may correspond to suspected hemangioma on previous CT imaging        Small right renal cyst       Workstation performed: [id number]                    Procedures  Procedures         ED Course  ED Course as of 11/10/22 1555   Thu Nov 10, 2022   0820 Initial EKG interpreted by me 61 beats per minute sinus rhythm with 1 unifocal PAC no ST elevation normal axis  similar to previous except for [facility]                                             MDM    Disposition  Final diagnoses:   Cholelithiasis   Right upper quadrant pain   Intractable vomiting   Hyponatremia     Time reflects when diagnosis was documented in both MDM as applicable and the Disposition within this note     Time User Action Codes Description Comment    11/10/2022 11:35 AM Thuan Pardo Add [K80 20] Cholelithiasis     11/10/2022 11:35 AM CharPhillipThuan Add [R10 11] Right upper quadrant pain     11/10/2022 12:47 PM CharPhillipThuan Add [R11 10] Intractable vomiting     11/10/2022 12:47 PM [place], [address] [E87 1] Hyponatremia     11/10/2022  2:33 PM Lodema Cristiana Add [Z55 32] Gastroparesis       ED Disposition     ED Disposition   Admit    Condition   Stable    Date/Time   u Nov 10, 2022 12:47 PM    Comment   Case was discussed with Dr Humberto Salinas and the patient's admission status was agreed to be Admission Status: inpatient status to the service of Dr Humberto Salinas              Follow-up Information    None         Current Discharge Medication List      CONTINUE these medications which have NOT CHANGED    Details   albuterol (PROVENTIL HFA,VENTOLIN HFA) 90 mcg/act inhaler Inhale 2 puffs 4 (four) times a day as needed      Ascorbic Acid (vitamin C) 1000 MG tablet Take 2,000 mg by mouth daily      atorvastatin (LIPITOR) 10 mg tablet TAKE 1 TABLET BY MOUTH  DAILY AT BEDTIME  Qty: 90 tablet, Refills: 3    Associated Diagnoses: Hyperlipidemia, unspecified hyperlipidemia type      Cholecalciferol (VITAMIN D3) 400 units CAPS Take 1,000 Units by mouth      Combigan 0 2-0 5 %       latanoprost (XALATAN) 0 005 % ophthalmic solution Administer 1 drop to both eyes 2 (two) times a day      levothyroxine 50 mcg tablet Take 1 tablet (50 mcg total) by mouth daily  Qty: 90 tablet, Refills: 1    Associated Diagnoses: Acquired hypothyroidism      multivitamin-iron-minerals-folic acid (CENTRUM) chewable tablet Chew 1 tablet daily pantoprazole (PROTONIX) 40 mg tablet Take 1 tablet (40 mg total) by mouth daily  Qty: 90 tablet, Refills: 1    Associated Diagnoses: Gastroesophageal reflux disease with esophagitis without hemorrhage      prednisoLONE acetate (PRED FORTE) 1 % ophthalmic suspension SHAKE LIQUID AND INSTILL 1 DROP IN RIGHT EYE EVERY HOUR WHILE AWAKE             No discharge procedures on file      PDMP Review       Value Time User    PDMP Reviewed  Yes 9/2/2022  4:37 PM Juan Ramon Larkin MD          ED Provider  Electronically Signed by           Constance Mesa PA-C  11/10/22 [id number]

## 2022-11-10 NOTE — H&P
[facility]  H&P- Asha Fan 1940, 79 y o  female MRN: [id number]  Unit/Bed#:  W -01 Encounter: [id number]  Primary Care Provider: Sukhdev Paiz MD   Date and time admitted to hospital: 11/10/2022  7:38 AM    Gastroparesis  Assessment & Plan  · Complained of epigastric pain during hospitalization in September 2022, has been undergoing workup with GI as outpatient  · Gastric emptying study Oct 2022 confirmed gastroparesis  · Advised PPI for any GERD symptoms and to eat small meals    Elevated troponin  Assessment & Plan  · EKG non ischemic  · Cardiac cath in Sep 2022 with clean coronaries  · Will trend until flat    Hyponatremia  Assessment & Plan  · Acute on chronic, hyponatremic on recent admission suspected sepsis secondary to elevated ADH from pain  · Was originally discharged on sodium tabs but were discontinued d/t hypertension, Na had normalized on outpatient labs regardless  · Sodium 126 on admission  · Continue IV NS for now in setting of vomiting and dehydration, if Na worsening will stop IVF  · Follows with Nephrology  · Serial BMPs    Hypertension  Assessment & Plan  · Blood pressure elevated on admission, [id number] systolic  · Suspect secondary to vomiting and pain  · Patient on antihypertensives prior to arrival  · Will monitor for now as suspect BP will improve with improvement of nausea and pain    Acquired hypothyroidism  Assessment & Plan  · Continue levothyroxine    Gastroesophageal reflux disease with esophagitis without hemorrhage  Assessment & Plan  · S/p repair of paraesophageal hernia in March 2021 with recurrent hernia  · Evaluated by thoracic surgery September of this year but was high risk due to elevated BMI and is deferring repair  · Continue PPI    Left upper quadrant abdominal pain, nausea and vomiting  Assessment & Plan  · Presents with intractable nausea and vomiting, LUQ pain  · Both CT and RUQ US show choledocholithiasis without evidence of cholecystitis, mild GB distention  · No leukocytosis, afebrile  · 10/22/22: gastric emptying studying confirmed gastroparesis on outpatient workup with GI, possible contributor  · Received multiple rounds of IV anti-emetics in the ED  · CLD   · Will continue supportive care, IVF, anti-emetics, pain control  · Avoid opiates in setting of gastroparesis  · Consult GI   · Surgery advising no intervention at this time    VTE Prophylaxis: Enoxaparin (Lovenox)  / sequential compression device   Code Status: FULL  POLST: POLST form is not discussed and not completed at this time  Discussion with family: discussed with  at length    Anticipated Length of Stay:  Patient will be admitted on an Inpatient basis with an anticipated length of stay of  > 2 midnights  Justification for Hospital Stay: intractable n/v    Total Time for Visit, including Counseling / Coordination of Care: 45 minutes  Greater than 50% of this total time spent on direct patient counseling and coordination of care  Chief Complaint:   Intractable nausea, vomiting and RUQ pain    History of Present Illness:    Ankit Blas is a 79 y o  female a past medical history of hypertension, gastroparesis, ascending aortic aneurysm, hypothyroidism who presents with nausea, vomiting and right upper quadrant pain  Patient reports sudden onset of right upper quadrant pain associated with nausea and vomiting that began last evening, nonbloody/nonbilious  At the time of interview, patient noted that her pain had migrated to her left upper quadrant at this time  She denies any fever or chills  She tried taking a Tylenol last evening which helped her tension headache but did not alleviate her abdominal pain  She cannot identify any triggers or change in diet  She endorses headache but denies dizziness  Denies chest pain or shortness of breath     states that she has modified her diet 2 smaller more frequent meals as recommended by GI   Denies diarrhea or constipation  Denies recent alcohol, illicit drug use or marijuana use  Denies smoking or any tobacco use  She also states she recently saw thoracic surgery in regards to her repeat repair of her hernia but she decided to hold off on surgery as they informed her she is high risk due to her BMI  Review of Systems:    Review of Systems   Constitutional: Positive for appetite change  Negative for chills, diaphoresis, fatigue and fever  HENT: Negative for rhinorrhea and sore throat  Respiratory: Negative for cough and shortness of breath  Cardiovascular: Negative for chest pain and leg swelling  Gastrointestinal: Positive for abdominal pain, nausea and vomiting  Negative for abdominal distention, blood in stool, constipation and diarrhea  Genitourinary: Negative for dysuria, flank pain, frequency and urgency  Musculoskeletal: Negative for back pain  Neurological: Positive for headaches  Negative for dizziness, weakness and light-headedness  Hematological: Negative  Psychiatric/Behavioral: Negative  Past Medical and Surgical History:     Past Medical History:   Diagnosis Date   • Cancer [facility])    • Colon polyp    • Disease of thyroid gland    • Endometrial cancer [facility])     age 45   • 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[facility] [address] )     1992   • Esophageal reflux    • GERD (gastroesophageal reflux disease)    • History of ovarian cancer 04/28/2022   • Hypertension    • Hypothyroid    • Kidney stone    • Obesity    • Prediabetes 05/12/2022   • Rosacea 05/12/2022       Past Surgical History:   Procedure Laterality Date   • CARDIAC CATHETERIZATION Left 09/01/2022    Procedure: Cardiac Left Heart Cath;  Surgeon: Cameron Rizo MD;  [facility];   Service: Cardiology   • CATARACT EXTRACTION, BILATERAL     • COLONOSCOPY  08/2018    polyp- 5 years   • CYSTOSCOPY     • FL RETROGRADE PYELOGRAM  10/15/2021   • HERNIA REPAIR     • JOINT REPLACEMENT     • KNEE ARTHROPLASTY     • MA CYSTO/URETERO W/LITHOTRIPSY &INDWELL STENT INSRT Left 10/15/2021    Procedure: CYSTOSCOPY URETEROSCOPY WITH LITHOTRIPSY HOLMIUM LASER, RETROGRADE PYELOGRAM, BASKET STONE EXTRACTION AND EXCHANGE STENT URETERAL;  Surgeon: Caitie Salter MD;  Location: [facility];  Service: Urology   • MA CYSTOURETHROSCOPY,URETER CATHETER Left 08/24/2021    Procedure: CYSTOSCOPY WITH INSERTION STENT URETERAL;  Surgeon: Balwinder Gore MD;  Location: [facility];  Service: Urology   • MA ESOPHAGOGASTRODUODENOSCOPY TRANSORAL DIAGNOSTIC N/A 03/05/2021    Procedure: ESOPHAGOGASTRODUODENOSCOPY (EGD); Surgeon: Dayana Gore MD;  Location: [facility];  Service: Thoracic   • MA LAP, REPAIR PARAESOPHAGEAL HERNIA, INCL FUNDOPLASTY W/O MESH N/A 03/05/2021    Procedure: REPAIR HERNIA PARAESOPHAGEAL LAPAROSCOPIC W ROBOTICS WITH MESH, DOOR FUNDOPLICATION;  Surgeon: Dayana Gore MD;  Location: [facility];  Service: Thoracic   • TOTAL ABDOMINAL HYSTERECTOMY      age 45   • TOTAL ABDOMINAL HYSTERECTOMY W/ BILATERAL SALPINGOOPHORECTOMY  1992    endometrial cancer       Meds/Allergies:    Prior to Admission medications    Medication Sig Start Date End Date Taking?  Authorizing Provider   albuterol (PROVENTIL HFA,VENTOLIN HFA) 90 mcg/act inhaler Inhale 2 puffs 4 (four) times a day as needed 4/21/22  Yes Historical Provider, MD   Ascorbic Acid (vitamin C) 1000 MG tablet Take 2,000 mg by mouth daily   Yes Historical Provider, MD   atorvastatin (LIPITOR) 10 mg tablet TAKE 1 TABLET BY MOUTH  DAILY AT BEDTIME 7/18/22  Yes Antoni Hyman MD   Cholecalciferol (VITAMIN D3) 400 units CAPS Take 1,000 Units by mouth 7/21/11  Yes Historical Provider, MD   Combigan 0 2-0 5 %  2/6/21  Yes Historical Provider, MD   latanoprost (XALATAN) 0 005 % ophthalmic solution Administer 1 drop to both eyes 2 (two) times a day   Yes Historical Provider, MD   levothyroxine 50 mcg tablet Take 1 tablet (50 mcg total) by mouth daily 9/22/22  Yes Natali Haze Nissen, MD   multivitamin-iron-minerals-folic acid (CENTRUM) chewable tablet Chew 1 tablet daily   Yes Historical Provider, MD   pantoprazole (PROTONIX) 40 mg tablet Take 1 tablet (40 mg total) by mouth daily 22  Yes Hanh-Dung Haze Nissen, MD   prednisoLONE acetate (PRED FORTE) 1 % ophthalmic suspension SHAKE LIQUID AND INSTILL 1 DROP IN RIGHT EYE EVERY HOUR WHILE AWAKE 10/10/22  Yes Historical Provider, MD   amoxicillin (AMOXIL) 500 MG tablet TAKE 4 TABLETS ONE HOUR BEFORE DENTAL PROCEDURE  Patient not taking: No sig reported 22   Historical Provider, MD   metoclopramide (Reglan) 10 mg tablet Take 0 5 tablets (5 mg total) by mouth 3 (three) times a day as needed (nausea) for up to 10 doses  Patient not taking: No sig reported 22   Kaila Meter, MD     I have reviewed home medications with patient personally  Allergies: No Known Allergies    Social History:     Marital Status: /Civil Union   Occupation: retired  Patient Pre-hospital Living Situation: home with   Substance Use History:   Social History     Substance and Sexual Activity   Alcohol Use Not Currently    Comment: socially     Social History     Tobacco Use   Smoking Status Former Smoker   • Packs/day: 0 25   • Years: 5 00   • Pack years: 1 25   • Types: Cigarettes   • Start date: 1959   • Quit date: 1964   • Years since quittin 8   Smokeless Tobacco Never Used   Tobacco Comment    Quit 50+ years ago 3/24/21     Social History     Substance and Sexual Activity   Drug Use No       Family History:    non-contributory    Physical Exam:     Vitals:   Blood Pressure: (!) 176/112 (11/10/22 1358)  Pulse: 61 (11/10/22 1358)  Temperature: 97 8 °F (36 6 °C) (11/10/22 1358)  Temp Source: Oral (11/10/22 0746)  Respirations: 20 (11/10/22 1304)  SpO2: 95 % (11/10/22 1358)    Physical Exam  Constitutional:       General: She is not in acute distress  Appearance: She is normal weight  She is ill-appearing   She is not toxic-appearing  Comments: Dry heaving intermittently, frequently changing positions to get comfortable   HENT:      Head: Normocephalic and atraumatic  Right Ear: External ear normal       Left Ear: External ear normal       Nose: Nose normal       Mouth/Throat:      Mouth: Mucous membranes are moist    Eyes:      Conjunctiva/sclera: Conjunctivae normal    Cardiovascular:      Rate and Rhythm: Normal rate and regular rhythm  Pulses: Normal pulses  Heart sounds: Normal heart sounds  No murmur heard  No friction rub  No gallop  Pulmonary:      Effort: Pulmonary effort is normal  No respiratory distress  Breath sounds: Normal breath sounds  No stridor  No wheezing, rhonchi or rales  Abdominal:      General: Abdomen is flat  Bowel sounds are normal  There is no distension  Palpations: Abdomen is soft  There is no mass  Tenderness: There is no abdominal tenderness  There is no guarding or rebound  Hernia: No hernia is present  Comments: Localizes pain to LUQ now (migrated from RUQ) but denies that pain is worse with palpation   Musculoskeletal:      Cervical back: Normal range of motion  Right lower leg: No edema  Left lower leg: No edema  Skin:     General: Skin is warm and dry  Capillary Refill: Capillary refill takes less than 2 seconds  Coloration: Skin is not jaundiced or pale  Findings: No bruising, erythema or lesion  Neurological:      General: No focal deficit present  Mental Status: She is alert  Mental status is at baseline  Sensory: No sensory deficit  Motor: No weakness  Additional Data:     Lab Results: I have personally reviewed pertinent reports        Results from last 7 days   Lab Units 11/10/22  0809   WBC Thousand/uL 9 91   HEMOGLOBIN g/dL 16 6*   HEMATOCRIT % 46 8*   PLATELETS Thousands/uL 231   NEUTROS PCT % 78*   LYMPHS PCT % 15   MONOS PCT % 6   EOS PCT % 0     Results from last 7 days   Lab Units 11/10/22  0809   SODIUM mmol/L 126*   POTASSIUM mmol/L 4 1   CHLORIDE mmol/L 90*   CO2 mmol/L 24   BUN mg/dL 9   CREATININE mg/dL 0 53*   ANION GAP mmol/L 12   CALCIUM mg/dL 9 3   ALBUMIN g/dL 4 3   TOTAL BILIRUBIN mg/dL 1 04*   ALK PHOS U/L 118*   ALT U/L 17   AST U/L 25   GLUCOSE RANDOM mg/dL 130                       Imaging: I have personally reviewed pertinent reports  CT abdomen pelvis with contrast   Final Result by Ryan Still MD (11/10 6023)      1  No acute abnormality in the abdomen or pelvis  2   Small to moderate hiatal hernia with findings suggesting gastroesophageal reflux and esophagitis, similar to CT 8/30/2022       3   Mildly dilated gallbladder containing stones without evidence of acute cholecystitis, as seen on same day right upper quadrant ultrasound  Workstation performed: [id number]         US right upper quadrant   Final Result by Mikala Adan DO (11/10 2625)      Cholelithiasis without ultrasonographic evidence of cholecystitis  1 cm echogenic focus left lobe seen only on cine imaging may correspond to suspected hemangioma on previous CT imaging  Small right renal cyst       Workstation performed: [id number]             EKG, Pathology, and Other Studies Reviewed on Admission:   · EKG: SR with PACs, 61 BPM    Allscripts / Epic Records Reviewed: Yes     ** Please Note: This note has been constructed using a voice recognition system   **

## 2022-11-10 NOTE — ASSESSMENT & PLAN NOTE
· Complained of epigastric pain during hospitalization in September 2022, has been undergoing workup with GI as outpatient  · Gastric emptying study Oct 2022 confirmed gastroparesis  · Advised PPI for any GERD symptoms and to eat small meals

## 2022-11-10 NOTE — ASSESSMENT & PLAN NOTE
· Acute on chronic, hyponatremic on recent admission suspected sepsis secondary to elevated ADH from pain  · Was originally discharged on sodium tabs but were discontinued d/t hypertension, Na had normalized on outpatient labs regardless  · Sodium 126 on admission  · Continue IV NS for now in setting of vomiting and dehydration, if Na worsening will stop IVF  · Follows with Nephrology  · Serial BMPs

## 2022-11-10 NOTE — ASSESSMENT & PLAN NOTE
· S/p repair of paraesophageal hernia in March 2021 with recurrent hernia  · Evaluated by thoracic surgery September of this year but was high risk due to elevated BMI and is deferring repair  · Continue PPI

## 2022-11-10 NOTE — ASSESSMENT & PLAN NOTE
· Presents with intractable nausea and vomiting, LUQ pain  · Both CT and RUQ US show choledocholithiasis without evidence of cholecystitis, mild GB distention  · No leukocytosis, afebrile  · 10/22/22: gastric emptying studying confirmed gastroparesis on outpatient workup with GI, possible contributor  · Received multiple rounds of IV anti-emetics in the ED  · CLD   · Will continue supportive care, IVF, anti-emetics, pain control  · Avoid opiates in setting of gastroparesis  · Consult GI   · Surgery advising no intervention at this time

## 2022-11-10 NOTE — CONSULTS
Consultation -General Surgery  Luis Raygoza 79 y o  female MRN: [id number]  Unit/Bed#: W -01 Encounter: [id number]      ASSESSMENT:  80 yo F with abdominal pain, r/o biliary etiology  Hyponatremia, Na 126  AF, WBC 9  Tbili 1 04  Lipase 18  Elevated troponin      Plan:  - no acute surgical intervention warranted at this time  - pt's sx are somewhat unusual for biliary etiology  Pain is now described as LUQ  Pt is not tender in RUQ on my exam, even to deep palpation  Does have some tenderness in epigastrium  CT scan and RUQ u/s reveal cholelithiasis but no other signs of cholecystitis  Given severe gastric dysmotility and hiatal hernia, rec GI c/s  - consider cardiology c/s given troponin elevation  - Hyponatremia per SLIM  - agree with PPI  - prn pain, antiemetic regimen   - cont supportive care  - rec recheck CMP, CBC in the am    Rest of care per primary    *Given grossly benign abdominal exam and pt still seemingly uncomfortable on physical exam, personally discussed CT scan with in house radiologist, paying specific attention to hiatal hernia  Per radiologist, there does not appear to be any findings concerning for volvulus or ischemia of hiatal hernia  Does appear to have some component of esophagitis  Reason for Consult / Principal Problem:    HPI: Luis Raygoza is a 79y o  year old female PMH endometrial cancer s/p JOANNE BSO, HTN, GERD, hiatal hernia s/p paraesophageal ejssica fundoplication (6/[id number]- [place]) who presents with abdominal pain, nausea since yesterday  When initially seeing pt she states the pain is mainly in epigastrium/RUQ  Started yesterday, does not state any associated or relieving factors  Pt was here approximately 2 months ago with similar complaints and was seen by GI  She had a gastric emptying study done as outpt which revealed "Severely decreased rate of gastric emptying"  Of note, pt is noted to have a recurrent hiatal hernia   Reportedly, she followed with thoracic surgery who discussed a redo fundoplication but pt refused  Pt does endorse nausea, believes it is improving with prn antiemetics while here  Was reportedly vomiting by ED however upon discussion with pt and , states she is spitting up and not actually having emesis  Denies fevers, chills, cp, sob, changes to bladder habits  Does tell me she has been having constipation  Last BM yesterday  After revaluating the pt, she now states pain has moved to LUQ  She denies any GERD-like sx  Still denies cp, sob  Review of Systems   Constitutional: Positive for appetite change  Negative for chills and fever  Respiratory: Negative  Cardiovascular: Negative  Gastrointestinal: Positive for abdominal pain, constipation and nausea  Negative for diarrhea and vomiting  Genitourinary: Negative  Skin: Negative  All other systems reviewed and are negative  Historical Information   Past Medical History:   Diagnosis Date   • Cancer [facility])    • Colon polyp    • Disease of thyroid gland    • Endometrial cancer [facility])     age 45   • 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[facility] [address] )     1992   • Esophageal reflux    • GERD (gastroesophageal reflux disease)    • History of ovarian cancer 04/28/2022   • Hypertension    • Hypothyroid    • Kidney stone    • Obesity    • Prediabetes 05/12/2022   • Rosacea 05/12/2022     Past Surgical History:   Procedure Laterality Date   • CARDIAC CATHETERIZATION Left 09/01/2022    Procedure: Cardiac Left Heart Cath;  Surgeon: Olive Sandoval MD;  [facility] CARD[facility]ATH [facility];   Service: Cardiology   • CATARACT EXTRACTION, BILATERAL     • COLONOSCOPY  08/2018    polyp- 5 years   • CYSTOSCOPY     • FL RETROGRADE PYELOGRAM  10/15/2021   • HERNIA REPAIR     • JOINT REPLACEMENT     • KNEE ARTHROPLASTY     • WV CYSTO/URETERO W/LITHOTRIPSY &INDWELL STENT INSRT Left 10/15/2021    Procedure: CYSTOSCOPY URETEROSCOPY WITH LITHOTRIPSY HOLMIUM LASER, RETROGRADE PYELOGRAM, BASKET STONE EXTRACTION AND EXCHANGE STENT URETERAL;  Surgeon: Eva Mosqueda MD;  Location: [facility] MAIN OR;  Service: Urology   • ND CYSTOURETHROSCOPY,URETER CATHETER Left 2021    Procedure: CYSTOSCOPY WITH INSERTION STENT URETERAL;  Surgeon: Michaela Shah MD;  Location: [facility] MA[facility];  Service: Urology   • ND ESOPHAGOGASTRODUODENOSCOPY TRANSORAL DIAGNOSTIC N/A 2021    Procedure: ESOPHAGOGASTRODUODENOSCOPY (EGD);   Surgeon: Lottie Bruce MD;  Location: [facility] MA[facility];  Service: Thoracic   • ND LAP, REPAIR PARAESOPHAGEAL HERNIA, INCL FUNDOPLASTY W/O MESH N/A 2021    Procedure: REPAIR HERNIA PARAESOPHAGEAL LAPAROSCOPIC W ROBOTICS WITH MESH, DOOR FUNDOPLICATION;  Surgeon: Lottie Bruce MD;  Location: [facility] MA[facility];  Service: Thoracic   • TOTAL ABDOMINAL HYSTERECTOMY      age 45   • TOTAL ABDOMINAL HYSTERECTOMY W/ BILATERAL SALPINGOOPHORECTOMY      endometrial cancer     Social History   Social History     Substance and Sexual Activity   Alcohol Use Not Currently    Comment: socially     Social History     Substance and Sexual Activity   Drug Use No     Social History     Tobacco Use   Smoking Status Former Smoker   • Packs/day: 0 25   • Years: 5 00   • Pack years: 1 25   • Types: Cigarettes   • Start date: 1959   • Quit date: 1964   • Years since quittin 8   Smokeless Tobacco Never Used   Tobacco Comment    Quit 50+ years ago 3/24/21     Family History   Problem Relation Age of Onset   • Stomach cancer Mother 71   • Pancreatic cancer Mother 71   • Heart attack Father    • Cancer Maternal Uncle    • No Known Problems Daughter    • No Known Problems Maternal Grandmother    • No Known Problems Maternal Grandfather    • No Known Problems Paternal Grandmother    • No Known Problems Paternal Grandfather    • No Known Problems Maternal Aunt    • No Known Problems Maternal Aunt        Meds/Allergies     Medications Prior to Admission   Medication   • albuterol (PROVENTIL HFA,VENTOLIN HFA) 90 mcg/act inhaler   • Ascorbic Acid (vitamin C) 1000 MG tablet   • atorvastatin (LIPITOR) 10 mg tablet   • Cholecalciferol (VITAMIN D3) 400 units CAPS   • Combigan 0 2-0 5 %   • latanoprost (XALATAN) 0 005 % ophthalmic solution   • levothyroxine 50 mcg tablet   • multivitamin-iron-minerals-folic acid (CENTRUM) chewable tablet   • pantoprazole (PROTONIX) 40 mg tablet   • prednisoLONE acetate (PRED FORTE) 1 % ophthalmic suspension   • amoxicillin (AMOXIL) 500 MG tablet   • metoclopramide (Reglan) 10 mg tablet     No current facility-administered medications for this encounter  No Known Allergies    Objective     Blood pressure (!) 176/112, pulse 61, temperature 97 8 °F (36 6 °C), resp  rate 20, SpO2 95 %, not currently breastfeeding  Intake/Output Summary (Last 24 hours) at 11/10/2022 1400  Last data filed at 11/10/2022 1043  Gross per 24 hour   Intake 400 ml   Output 300 ml   Net 100 ml       PHYSICAL EXAM  Physical Exam  Vitals and nursing note reviewed  Constitutional:       General: She is in acute distress  Appearance: She is normal weight  She is not ill-appearing, toxic-appearing or diaphoretic  Comments: Appears uncomfortable    HENT:      Head: Normocephalic and atraumatic  Eyes:      Extraocular Movements: Extraocular movements intact  Cardiovascular:      Rate and Rhythm: Normal rate  Pulmonary:      Effort: Pulmonary effort is normal  No respiratory distress  Abdominal:      General: Abdomen is flat  Palpations: Abdomen is soft  Comments: Pt is soft, she is nontender even on deep palpation of RUQ  She has mild tenderness in epigastrium, and is nontender everywhere else on her abdomen  She does not have any guarding, rebound tenderness or signs of peritonitis  Skin:     General: Skin is warm and dry  Capillary Refill: Capillary refill takes less than 2 seconds  Neurological:      General: No focal deficit present  Mental Status: She is alert and oriented to person, place, and time  Psychiatric:         Mood and Affect: Mood normal          Behavior: Behavior normal            Lab Results:   I have personally reviewed pertinent lab results  , CBC:   Lab Results   Component Value Date    WBC 9 91 11/10/2022    HGB 16 6 (H) 11/10/2022    HCT 46 8 (H) 11/10/2022    MCV 94 11/10/2022     11/10/2022    MCH 33 3 11/10/2022    MCHC 35 5 11/10/2022    RDW 12 2 11/10/2022    MPV 11 0 11/10/2022    NRBC 0 11/10/2022   , CMP:   Lab Results   Component Value Date    SODIUM 126 (L) 11/10/2022    K 4 1 11/10/2022    CL 90 (L) 11/10/2022    CO2 24 11/10/2022    BUN 9 11/10/2022    CREATININE 0 53 (L) 11/10/2022    CALCIUM 9 3 11/10/2022    AST 25 11/10/2022    ALT 17 11/10/2022    ALKPHOS 118 (H) 11/10/2022    EGFR 88 11/10/2022   , Coagulation: No results found for: PT, INR, APTT, Urinalysis:   Lab Results   Component Value Date    COLORU Light Yellow 11/10/2022    CLARITYU Clear 11/10/2022    SPECGRAV 1 017 11/10/2022    PHUR 7 0 11/10/2022    LEUKOCYTESUR Negative 11/10/2022    NITRITE Negative 11/10/2022    GLUCOSEU Negative 11/10/2022    KETONESU 60 (2+) (A) 11/10/2022    BILIRUBINUR Negative 11/10/2022    BLOODU Negative 11/10/2022   , Lipase:   Lab Results   Component Value Date    LIPASE 18 11/10/2022     Imaging: I have personally reviewed pertinent reports  11/10 CTAP: IMPRESSION:  1  No acute abnormality in the abdomen or pelvis  2   Small to moderate hiatal hernia with findings suggesting gastroesophageal reflux and esophagitis, similar to CT 8/30/2022   3   Mildly dilated gallbladder containing stones without evidence of acute cholecystitis, as seen on same day right upper quadrant ultrasound  EKG, Pathology, and Other Studies: I have personally reviewed pertinent reports  Counseling / Coordination of Care  Total time spent today  30 minutes  Greater than 50% of total time was spent with the patient and / or family counseling and / or coordination of care  Francisca Tirado PA-C  11/10/2022 2:00 PM

## 2022-11-10 NOTE — ASSESSMENT & PLAN NOTE
· Blood pressure elevated on admission, 765A-108B systolic  · Suspect secondary to vomiting and pain  · Patient on antihypertensives prior to arrival  · Will monitor for now as suspect BP will improve with improvement of nausea and pain

## 2022-11-11 PROBLEM — E83.42 HYPOMAGNESEMIA: Status: ACTIVE | Noted: 2022-11-11

## 2022-11-11 LAB
ALBUMIN SERPL BCP-MCNC: 3.6 G/DL (ref 3.5–5)
ALP SERPL-CCNC: 93 U/L (ref 34–104)
ALT SERPL W P-5'-P-CCNC: 14 U/L (ref 7–52)
ANION GAP SERPL CALCULATED.3IONS-SCNC: 8 MMOL/L (ref 4–13)
ANION GAP SERPL CALCULATED.3IONS-SCNC: 9 MMOL/L (ref 4–13)
AST SERPL W P-5'-P-CCNC: 20 U/L (ref 13–39)
BASOPHILS # BLD AUTO: 0.02 THOUSANDS/ÂΜL (ref 0–0.1)
BASOPHILS NFR BLD AUTO: 0 % (ref 0–1)
BILIRUB SERPL-MCNC: 0.8 MG/DL (ref 0.2–1)
BUN SERPL-MCNC: 13 MG/DL (ref 5–25)
BUN SERPL-MCNC: 15 MG/DL (ref 5–25)
CALCIUM SERPL-MCNC: 8.1 MG/DL (ref 8.4–10.2)
CALCIUM SERPL-MCNC: 8.7 MG/DL (ref 8.4–10.2)
CHLORIDE SERPL-SCNC: 93 MMOL/L (ref 96–108)
CHLORIDE SERPL-SCNC: 95 MMOL/L (ref 96–108)
CO2 SERPL-SCNC: 23 MMOL/L (ref 21–32)
CO2 SERPL-SCNC: 26 MMOL/L (ref 21–32)
CREAT SERPL-MCNC: 0.65 MG/DL (ref 0.6–1.3)
CREAT SERPL-MCNC: 0.75 MG/DL (ref 0.6–1.3)
EOSINOPHIL # BLD AUTO: 0.02 THOUSAND/ÂΜL (ref 0–0.61)
EOSINOPHIL NFR BLD AUTO: 0 % (ref 0–6)
ERYTHROCYTE [DISTWIDTH] IN BLOOD BY AUTOMATED COUNT: 12.7 % (ref 11.6–15.1)
GFR SERPL CREATININE-BSD FRML MDRD: 73 ML/MIN/1.73SQ M
GFR SERPL CREATININE-BSD FRML MDRD: 82 ML/MIN/1.73SQ M
GLUCOSE SERPL-MCNC: 136 MG/DL (ref 65–140)
GLUCOSE SERPL-MCNC: 95 MG/DL (ref 65–140)
HCT VFR BLD AUTO: 42.5 % (ref 34.8–46.1)
HGB BLD-MCNC: 14.8 G/DL (ref 11.5–15.4)
IMM GRANULOCYTES # BLD AUTO: 0.03 THOUSAND/UL (ref 0–0.2)
IMM GRANULOCYTES NFR BLD AUTO: 0 % (ref 0–2)
LYMPHOCYTES # BLD AUTO: 2.3 THOUSANDS/ÂΜL (ref 0.6–4.47)
LYMPHOCYTES NFR BLD AUTO: 31 % (ref 14–44)
MAGNESIUM SERPL-MCNC: 1.3 MG/DL (ref 1.9–2.7)
MCH RBC QN AUTO: 33.2 PG (ref 26.8–34.3)
MCHC RBC AUTO-ENTMCNC: 34.8 G/DL (ref 31.4–37.4)
MCV RBC AUTO: 95 FL (ref 82–98)
MONOCYTES # BLD AUTO: 0.74 THOUSAND/ÂΜL (ref 0.17–1.22)
MONOCYTES NFR BLD AUTO: 10 % (ref 4–12)
NEUTROPHILS # BLD AUTO: 4.38 THOUSANDS/ÂΜL (ref 1.85–7.62)
NEUTS SEG NFR BLD AUTO: 59 % (ref 43–75)
NRBC BLD AUTO-RTO: 0 /100 WBCS
OSMOLALITY UR/SERPL-RTO: 266 MMOL/KG (ref 282–298)
OSMOLALITY UR: 582 MMOL/KG
PHOSPHATE SERPL-MCNC: 3.2 MG/DL (ref 2.3–4.1)
PLATELET # BLD AUTO: 234 THOUSANDS/UL (ref 149–390)
PMV BLD AUTO: 10.3 FL (ref 8.9–12.7)
POTASSIUM SERPL-SCNC: 3.1 MMOL/L (ref 3.5–5.3)
POTASSIUM SERPL-SCNC: 4 MMOL/L (ref 3.5–5.3)
PROT SERPL-MCNC: 6.2 G/DL (ref 6.4–8.4)
RBC # BLD AUTO: 4.46 MILLION/UL (ref 3.81–5.12)
SODIUM 24H UR-SCNC: 16 MOL/L
SODIUM SERPL-SCNC: 127 MMOL/L (ref 135–147)
SODIUM SERPL-SCNC: 127 MMOL/L (ref 135–147)
WBC # BLD AUTO: 7.49 THOUSAND/UL (ref 4.31–10.16)

## 2022-11-11 RX ORDER — MAGNESIUM SULFATE 1 G/100ML
1 INJECTION INTRAVENOUS ONCE
Status: COMPLETED | OUTPATIENT
Start: 2022-11-11 | End: 2022-11-12

## 2022-11-11 RX ORDER — MAGNESIUM SULFATE HEPTAHYDRATE 40 MG/ML
2 INJECTION, SOLUTION INTRAVENOUS ONCE
Status: COMPLETED | OUTPATIENT
Start: 2022-11-11 | End: 2022-11-11

## 2022-11-11 RX ORDER — SODIUM CHLORIDE 1000 MG
1 TABLET, SOLUBLE MISCELLANEOUS 3 TIMES DAILY
Qty: 90 TABLET | Refills: 0 | Status: SHIPPED | OUTPATIENT
Start: 2022-11-11 | End: 2022-11-12

## 2022-11-11 RX ORDER — SODIUM CHLORIDE 1000 MG
1 TABLET, SOLUBLE MISCELLANEOUS 2 TIMES DAILY WITH MEALS
Status: DISCONTINUED | OUTPATIENT
Start: 2022-11-11 | End: 2022-11-12 | Stop reason: HOSPADM

## 2022-11-11 RX ORDER — SODIUM CHLORIDE 9 MG/ML
75 INJECTION, SOLUTION INTRAVENOUS ONCE
Status: COMPLETED | OUTPATIENT
Start: 2022-11-11 | End: 2022-11-11

## 2022-11-11 RX ADMIN — SODIUM CHLORIDE 75 ML/HR: 0.9 INJECTION, SOLUTION INTRAVENOUS at 11:57

## 2022-11-11 RX ADMIN — LEVOTHYROXINE SODIUM 50 MCG: 50 TABLET ORAL at 05:19

## 2022-11-11 RX ADMIN — LATANOPROST 1 DROP: 50 SOLUTION OPHTHALMIC at 09:22

## 2022-11-11 RX ADMIN — SODIUM CHLORIDE 100 ML/HR: 0.9 INJECTION, SOLUTION INTRAVENOUS at 01:41

## 2022-11-11 RX ADMIN — SODIUM CHLORIDE 1 G: 1 TABLET ORAL at 16:07

## 2022-11-11 RX ADMIN — TIMOLOL MALEATE 1 DROP: 5 SOLUTION OPHTHALMIC at 19:02

## 2022-11-11 RX ADMIN — MAGNESIUM SULFATE HEPTAHYDRATE 2 G: 40 INJECTION, SOLUTION INTRAVENOUS at 11:51

## 2022-11-11 RX ADMIN — PANTOPRAZOLE SODIUM 40 MG: 40 INJECTION, POWDER, FOR SOLUTION INTRAVENOUS at 02:14

## 2022-11-11 RX ADMIN — ENOXAPARIN SODIUM 40 MG: 40 INJECTION SUBCUTANEOUS at 09:22

## 2022-11-11 RX ADMIN — MAGNESIUM SULFATE HEPTAHYDRATE 1 G: 1 INJECTION, SOLUTION INTRAVENOUS at 13:51

## 2022-11-11 RX ADMIN — PANTOPRAZOLE SODIUM 40 MG: 40 INJECTION, POWDER, FOR SOLUTION INTRAVENOUS at 14:51

## 2022-11-11 RX ADMIN — ACETAMINOPHEN 650 MG: 325 TABLET ORAL at 13:58

## 2022-11-11 RX ADMIN — ATORVASTATIN CALCIUM 10 MG: 10 TABLET, FILM COATED ORAL at 21:17

## 2022-11-11 RX ADMIN — ACETAMINOPHEN 650 MG: 325 TABLET ORAL at 07:43

## 2022-11-11 NOTE — CONSULTS
Consultation - Nephrology   Edison Malin 79 y o  female MRN: [id number]  Unit/Bed#: W -01 Encounter: [id number]    ASSESSMENT AND PLAN:  79 y o  woman PMH of [facility] BSO, hypertension, GERD, hernia repair, history of SIADH who was admitted to [facility] after presenting with nausea, vomiting, poor intake for 1 day  Nephrology is consulted for management of hyponatremia    PLAN    #Hyposomolar Hyponatremia   · Sodium on admission: 126  • Serum osm:  Order  • Urine osm:  Order   • Urine Sodium:  Order  • Etiology:  Likely multifactorial secondary to hypervolemia in the settings of poor oral intake and vomiting with the background of SIADH  • Rate of correction: 1237>127 in ~24h = 0 04mEq/L) slow correction  • Status post IV fluids   • Plan:  • Repeat BMP  • Avoid hypotonic fluids  • For solute and fluid intake  • Agree with starting 1 g of sodium chloride b i d  For 2-3 days  • Monitor BMP q 12 hours, transition to Q 241 sodium is 130  • Avoid overcorrection: no more than 6-8mEq in the next 24hr, goal </=133  • Please monitor UOP  • Will monitor labs, call renal if SNa+ <126 or >133  • Risk of ODS:  ? Advance age     # abdominal pain/N/V  · Resolved  · Management as per primary team    # hypo magnesemia  · 1 3 mg/dL  · Currently receiving IV magnesium  · Monitor replete as needed    #Volume status/hypertension:  • Volume:  Appears euvolemic  • Blood pressure:  Normotensive /79mmhg, goal< 140/90  • Recommend:  • Monitor BP      HISTORY OF PRESENT ILLNESS:  Requesting Physician: Leydi Burnett MD  Reason for Consult:  Hyponatremia    Edison Malin is a 79 y o  female with PMH of [facility] BSO, hypertension, GERD, hernia repair, history of SIADH who was admitted to [facility] after presenting with nausea, vomiting, poor intake for 1 day  A renal consultation is requested today for assistance in the management of hyponatremia      PAST MEDICAL HISTORY:  Past Medical History:   Diagnosis Date   • Cancer ([facility] [address] ) • Colon polyp    • Disease of thyroid gland    • Endometrial cancer [facility])     age 45   • 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[facility] [address] )     1992   • Esophageal reflux    • GERD (gastroesophageal reflux disease)    • History of ovarian cancer 04/28/2022   • Hypertension    • Hypothyroid    • Kidney stone    • Obesity    • Prediabetes 05/12/2022   • Rosacea 05/12/2022       PAST SURGICAL HISTORY:  Past Surgical History:   Procedure Laterality Date   • CARDIAC CATHETERIZATION Left 09/01/2022    Procedure: Cardiac Left Heart Cath;  Surgeon: Yolanda Burnham MD;  [facility]: [facility] CARDIAC [facility]ATH [facility]; Service: Cardiology   • CATARACT EXTRACTION, BILATERAL     • COLONOSCOPY  08/2018    polyp- 5 years   • CYSTOSCOPY     • FL RETROGRADE PYELOGRAM  10/15/2021   • HERNIA REPAIR     • JOINT REPLACEMENT     • KNEE ARTHROPLASTY     • DC CYSTO/URETERO W/LITHOTRIPSY &INDWELL STENT INSRT Left 10/15/2021    Procedure: CYSTOSCOPY URETEROSCOPY WITH LITHOTRIPSY HOLMIUM LASER, RETROGRADE PYELOGRAM, BASKET STONE EXTRACTION AND EXCHANGE STENT URETERAL;  Surgeon: Meryl Mckeon MD;  Location: [facility];  Service: Urology   • DC CYSTOURETHROSCOPY,URETER CATHETER Left 08/24/2021    Procedure: CYSTOSCOPY WITH INSERTION STENT URETERAL;  Surgeon: Phuong Enamorado MD;  [facility];  Service: Urology   • DC ESOPHAGOGASTRODUODENOSCOPY TRANSORAL DIAGNOSTIC N/A 03/05/2021    Procedure: ESOPHAGOGASTRODUODENOSCOPY (EGD);   Surgeon: Dina Hoang MD;  Location[facility];  Service: Thoracic   • DC LAP, REPAIR PARAESOPHAGEAL HERNIA, INCL FUNDOPLASTY W/O MESH N/A 03/05/2021    Procedure: REPAIR HERNIA PARAESOPHAGEAL LAPAROSCOPIC W ROBOTICS WITH MESH, DOOR FUNDOPLICATION;  Surgeon: Dina Hoang MD;  Location[facility];  Service: Thoracic   • TOTAL ABDOMINAL HYSTERECTOMY      age 45   • TOTAL ABDOMINAL HYSTERECTOMY W/ BILATERAL SALPINGOOPHORECTOMY  1992    endometrial cancer       ALLERGIES:  No Known Allergies    SOCIAL HISTORY:  Social History Substance and Sexual Activity   Alcohol Use Not Currently    Comment: socially     Social History     Substance and Sexual Activity   Drug Use No     Social History     Tobacco Use   Smoking Status Former Smoker   • Packs/day: 0 25   • Years: 5 00   • Pack years: 1 25   • Types: Cigarettes   • Start date: 1959   • Quit date: 1964   • Years since quittin 9   Smokeless Tobacco Never Used   Tobacco Comment    Quit 50+ years ago 3/24/21       FAMILY HISTORY:  Family History   Problem Relation Age of Onset   • Stomach cancer Mother 71   • Pancreatic cancer Mother 71   • Heart attack Father    • Cancer Maternal Uncle    • No Known Problems Daughter    • No Known Problems Maternal Grandmother    • No Known Problems Maternal Grandfather    • No Known Problems Paternal Grandmother    • No Known Problems Paternal Grandfather    • No Known Problems Maternal Aunt    • No Known Problems Maternal Aunt        MEDICATIONS:    Current Facility-Administered Medications:   •  acetaminophen (TYLENOL) tablet 650 mg, 650 mg, Oral, Q6H PRN, Lajune Carrel, PA-C, 650 mg at 22 0743  •  albuterol (PROVENTIL HFA,VENTOLIN HFA) inhaler 2 puff, 2 puff, Inhalation, 4x Daily PRN, Lajune Carrel, PA-C  •  atorvastatin (LIPITOR) tablet 10 mg, 10 mg, Oral, HS, Hina Ray PA-C, 10 mg at 11/10/22 2153  •  brimonidine tartrate 0 2 % ophthalmic solution 1 drop, 1 drop, Both Eyes, Q8H [address], Hina Ray PA-C  •  enoxaparin (LOVENOX) subcutaneous injection 40 mg, 40 mg, Subcutaneous, Daily, Lajune Carrel, PA-C, 40 mg at 22 3903  •  HYDROmorphone (DILAUDID) injection 0 5 mg, 0 5 mg, Intravenous, Q6H PRN, Lajune Carrel, PA-C, 0 5 mg at 11/10/22 1717  •  latanoprost (XALATAN) 0 005 % ophthalmic solution 1 drop, 1 drop, Both Eyes, BID, Hina Ray PA-C, 1 drop at 22 8886  •  levothyroxine tablet 50 mcg, 50 mcg, Oral, Early Morning, Lajune Carrel, PA-C, 50 mcg at 22 0519  • magnesium sulfate 2 g/50 mL IVPB (premix) 2 g, 2 g, Intravenous, Once, 2 g at 11/11/22 1151 **FOLLOWED BY** magnesium sulfate IVPB (premix) SOLN 1 g, 1 g, Intravenous, Once, Nicolette Yen PA-C  •  metoclopramide (REGLAN) injection 10 mg, 10 mg, Intravenous, Q6H PRN, Dominic Roman PA-C, 10 mg at 11/10/22 1553  •  ondansetron (ZOFRAN) injection 4 mg, 4 mg, Intravenous, Q4H PRN, Natty Damon MD  •  pantoprazole (PROTONIX) injection 40 mg, 40 mg, Intravenous, Q12H, Panda Lazaro DO, 40 mg at 11/11/22 0214  •  promethazine (PHENERGAN) injection 12 5 mg, 12 5 mg, Intravenous, Q6H PRN, Natty Damon MD  •  sodium chloride tablet 1 g, 1 g, Oral, BID With Meals, Nicolette Yen PA-C  •  timolol (TIMOPTIC) 0 5 % ophthalmic solution 1 drop, 1 drop, Both Eyes, BID, Dominic Roman PA-C    REVIEW OF SYSTEMS:  Complete 10 point review of systems were obtained and discussed in length with the patient  Complete review of systems were negative / unremarkable except mentioned in the HPI section       Review of Systems - Psychological ROS: negative  Ophthalmic ROS: negative  ENT ROS: negative  Hematological and Lymphatic ROS: negative  Endocrine ROS: negative  Respiratory ROS: no cough, shortness of breath, or wheezing  Cardiovascular ROS: no chest pain or dyspnea on exertion  Gastrointestinal ROS: no abdominal pain, change in bowel habits, or black or bloody stools  Genito-Urinary ROS: no dysuria, trouble voiding, or hematuria  Musculoskeletal ROS: negative  Neurological ROS: positive for - headaches  Dermatological ROS: negative     PHYSICAL EXAM:  Current Weight:    First Weight:    Vitals:    11/11/22 0741   BP: 121/79   Pulse: 69   Resp: 17   Temp: (!) 97 4 °F (36 3 °C)   SpO2: 95%       Intake/Output Summary (Last 24 hours) at 11/11/2022 1235  Last data filed at 11/11/2022 0900  Gross per 24 hour   Intake 1410 ml   Output --   Net 1410 ml     Wt Readings from Last 3 Encounters:   10/20/22 90 5 kg (199 lb 8 oz)   10/04/22 90 7 kg (200 lb)   09/20/22 91 2 kg (201 lb)     Temp Readings from Last 3 Encounters:   11/11/22 (!) 97 4 °F (36 3 °C) (Oral)   10/20/22 (!) 97 °F (36 1 °C) (Temporal)   09/20/22 97 5 °F (36 4 °C)     BP Readings from Last 3 Encounters:   11/11/22 121/79   10/20/22 122/76   10/04/22 134/84     Pulse Readings from Last 3 Encounters:   11/11/22 69   10/20/22 (!) 53   10/04/22 57        General:  no acute distress at this time  Skin:  No acute rash  Eyes:  No scleral icterus and noninjected  ENT:  mucous membranes moist  Neck:  no carotid bruits  Chest:  Clear to auscultation percussion, good respiratory effort, no use of accessory respiratory muscles  CVS:  Regular rate and rhythm without rub  Abdomen:  soft and nontender   Extremities:  no significant lower extremity edema  Neuro:  No gross focality  Psych:  Alert , cooperative       Invasive Devices:      Lab Results:   Results from last 7 days   Lab Units 11/11/22  0543 11/10/22  1739 11/10/22  0809   WBC Thousand/uL 7 49  --  9 91   HEMOGLOBIN g/dL 14 8  --  16 6*   HEMATOCRIT % 42 5  --  46 8*   PLATELETS Thousands/uL 234 226 231   POTASSIUM mmol/L 4 0  --  4 1   CHLORIDE mmol/L 93*  --  90*   CO2 mmol/L 26  --  24   BUN mg/dL 13  --  9   CREATININE mg/dL 0 75  --  0 53*   CALCIUM mg/dL 8 7  --  9 3   MAGNESIUM mg/dL 1 3*  --   --    PHOSPHORUS mg/dL 3 2  --   --        Other Studies:   CT abdomen pelvis with contrast   Final Result by Lenora Salazar MD (11/10 6439)      1  No acute abnormality in the abdomen or pelvis  2   Small to moderate hiatal hernia with findings suggesting gastroesophageal reflux and esophagitis, similar to CT 8/30/2022       3   Mildly dilated gallbladder containing stones without evidence of acute cholecystitis, as seen on same day right upper quadrant ultrasound              Workstation performed: [id number]         US right upper quadrant   Final Result by Deacon Hernandez DO (11/10 3695) Cholelithiasis without ultrasonographic evidence of cholecystitis  1 cm echogenic focus left lobe seen only on cine imaging may correspond to suspected hemangioma on previous CT imaging  Small right renal cyst       Workstation performed: [id number]             Portions of the record may have been created with voice recognition software  Occasional wrong word or "sound a like" substitutions may have occurred due to the inherent limitations of voice recognition software  Read the chart carefully and recognize, using context, where substitutions have occurred

## 2022-11-11 NOTE — ASSESSMENT & PLAN NOTE
· Acute on chronic, hyponatremic on recent admission suspected sepsis secondary to elevated ADH from pain  · Was originally discharged on sodium tabs but were discontinued d/t hypertension, Na had normalized on outpatient labs to 135  · Given IV NS on admission  · Sodium 126 on admission, 127 now   Recheck later today and in am  · Resume low dose NaCl tabs and FR?   · Follows with Nephrology outpatient, will request consult  · Serial BMPs

## 2022-11-11 NOTE — PROGRESS NOTES
[facility]  Progress Note - Shayne Martínez 1940, 79 y o  female MRN: [id number]  Unit/Bed#: W -01 Encounter: [id number]  Primary Care Provider: Jenny Bird MD   Date and time admitted to hospital: 11/10/2022  7:38 AM    * Left upper quadrant abdominal pain, nausea and vomiting  Assessment & Plan  · Presented with intractable nausea and vomiting, LUQ pain-- symptoms now resolving  · Both CT and RUQ US showed choledocholithiasis without evidence of cholecystitis, mild GB distention  CT also with small to moderate hiatal hernia with findings suggesting gastroesophageal reflux and esophagitis  · No leukocytosis, afebrile  · 10/22/22: gastric emptying studying confirmed severe gastroparesis on outpatient workup with GI, possible contributor  · Appreciate GI and surgery input  Also consider possible issue of hiatal hernia playing a role  · Clear liquid diet to be advanced as tolerated; continue supportive medications      Hyponatremia  Assessment & Plan  · Acute on chronic, hyponatremic on recent admission suspected sepsis secondary to elevated ADH from pain  · Was originally discharged on sodium tabs but were discontinued d/t hypertension, Na had normalized on outpatient labs to 135  · Given IV NS on admission  · Sodium 126 on admission, 127 now   Recheck later today and in am  · Resume low dose NaCl tabs and FR?   · Follows with Nephrology outpatient, will request consult  · Serial BMPs    Hypomagnesemia  Assessment & Plan  · Mag 1 3--replete and recheck in am    Elevated troponin  Assessment & Plan  · Likely non MI related troponin elevation  · EKG non ischemic  · Cardiac cath in Sep 2022 with clean coronaries  · Peak trop 76    Hypertension  Assessment & Plan  · Blood pressure significantly elevated on admission, [id number] systolic  · Suspect secondary to vomiting and pain  · Patient not on any antihypertensives prior to arrival, should be monitoring BP outpatient      VTE Pharmacologic Prophylaxis: VTE Score: 6 lovenox    Patient Centered Rounds: I performed bedside rounds with nursing staff today  Discussions with Specialists or Other Care Team Provider: GI, renal    Education and Discussions with Family / Patient: Updated  () at bedside  Time Spent for Care: 30 minutes  More than 50% of total time spent on counseling and coordination of care as described above  Current Length of Stay: 1 day(s)  Current Patient Status: Inpatient   Certification Statement: The patient will continue to require additional inpatient hospital stay due to hyponatremia  Discharge Plan: Anticipate discharge tomorrow to Home if she tolerates advancement in diet and improvement in electrolytes    Code Status: Level 1 - Full Code    Subjective:   Patient reports she is feeling much better at this time with no nausea, vomiting, abdominal pain  She tolerated clear liquid diet  Objective:     Vitals:   Temp (24hrs), Av 7 °F (36 5 °C), Min:97 4 °F (36 3 °C), Max:97 8 °F (36 6 °C)    Temp:  [97 4 °F (36 3 °C)-97 8 °F (36 6 °C)] 97 4 °F (36 3 °C)  HR:  [59-80] 69  Resp:  [17-20] 17  BP: (111-191)/() 121/79  SpO2:  [92 %-97 %] 95 %  There is no height or weight on file to calculate BMI  Input and Output Summary (last 24 hours): Intake/Output Summary (Last 24 hours) at 2022 1145  Last data filed at 2022 0900  Gross per 24 hour   Intake 1410 ml   Output --   Net 1410 ml       Physical Exam:   Physical Exam  Vitals reviewed  Constitutional:       General: She is not in acute distress  Appearance: Normal appearance  She is obese  She is not ill-appearing, toxic-appearing or diaphoretic  Eyes:      General: No scleral icterus  Right eye: No discharge  Left eye: No discharge  Conjunctiva/sclera: Conjunctivae normal    Cardiovascular:      Rate and Rhythm: Normal rate and regular rhythm        Heart sounds: No murmur heard   Pulmonary:      Effort: No respiratory distress  Breath sounds: No stridor  No wheezing, rhonchi or rales  Abdominal:      General: There is no distension  Palpations: Abdomen is soft  Tenderness: There is no abdominal tenderness  There is no guarding  Musculoskeletal:      Right lower leg: No edema  Left lower leg: No edema  Skin:     General: Skin is warm and dry  Coloration: Skin is not jaundiced or pale  Findings: No bruising, erythema, lesion or rash  Neurological:      General: No focal deficit present  Mental Status: She is alert  Mental status is at baseline  Psychiatric:         Mood and Affect: Mood normal          Thought Content:  Thought content normal           Additional Data:     Labs:  Results from last 7 days   Lab Units 11/11/22  0543   WBC Thousand/uL 7 49   HEMOGLOBIN g/dL 14 8   HEMATOCRIT % 42 5   PLATELETS Thousands/uL 234   NEUTROS PCT % 59   LYMPHS PCT % 31   MONOS PCT % 10   EOS PCT % 0     Results from last 7 days   Lab Units 11/11/22  0543   SODIUM mmol/L 127*   POTASSIUM mmol/L 4 0   CHLORIDE mmol/L 93*   CO2 mmol/L 26   BUN mg/dL 13   CREATININE mg/dL 0 75   ANION GAP mmol/L 8   CALCIUM mg/dL 8 7   ALBUMIN g/dL 3 6   TOTAL BILIRUBIN mg/dL 0 80   ALK PHOS U/L 93   ALT U/L 14   AST U/L 20   GLUCOSE RANDOM mg/dL 95                       Lines/Drains:  Invasive Devices  Report    Peripheral Intravenous Line  Duration           Peripheral IV 11/10/22 Left Forearm 1 day              Imaging: CT, gastric emptying scan    Recent Cultures (last 7 days):         Last 24 Hours Medication List:   Current Facility-Administered Medications   Medication Dose Route Frequency Provider Last Rate   • acetaminophen  650 mg Oral Q6H PRN Maureen Wolfe PA-C     • albuterol  2 puff Inhalation 4x Daily PRN Maureen Wolfe PA-C     • atorvastatin  10 mg Oral HS Hina Ray PA-C     • brimonidine tartrate  1 drop Both Eyes Q8H [address] Hina YEBOAH Betzy Sanchez PA-C     • enoxaparin  40 mg Subcutaneous Daily Daria Valle PA-C     • HYDROmorphone  0 5 mg Intravenous Q6H PRN Daria Valle PA-C     • latanoprost  1 drop Both Eyes BID Daria Valle PA-C     • levothyroxine  50 mcg Oral Early Morning Daria Valle PA-C     • magnesium sulfate  2 g Intravenous Once Nicolette Yen PA-C      Followed by   • magnesium sulfate  1 g Intravenous Once Nicolette Yen PA-C     • metoclopramide  10 mg Intravenous Q6H PRN Daria Valle PA-C     • ondansetron  4 mg Intravenous Q4H PRN Vikin Denver Bake, MD     • pantoprazole  40 mg Intravenous Q12H Panda Lazaro DO     • promethazine  12 5 mg Intravenous Q6H PRN Yan Lees MD     • sodium chloride  75 mL/hr Intravenous Once Nicolette Yen PA-C     • sodium chloride  1 g Oral BID With Meals Nicolette Yen PA-C     • timolol  1 drop Both Eyes BID Daria Valle PA-C          Today, Patient Was Seen By: Dustin Khoury    **Please Note: This note may have been constructed using a voice recognition system  **

## 2022-11-11 NOTE — ASSESSMENT & PLAN NOTE
· Presented with intractable nausea and vomiting, LUQ pain-- symptoms now resolving  · Both CT and RUQ US showed choledocholithiasis without evidence of cholecystitis, mild GB distention  CT also with small to moderate hiatal hernia with findings suggesting gastroesophageal reflux and esophagitis  · No leukocytosis, afebrile  · 10/22/22: gastric emptying studying confirmed severe gastroparesis on outpatient workup with GI, possible contributor  · Appreciate GI and surgery input    Also consider possible issue of hiatal hernia playing a role  · Clear liquid diet to be advanced as tolerated; continue supportive medications

## 2022-11-11 NOTE — ASSESSMENT & PLAN NOTE
· Blood pressure significantly elevated on admission, 119K-001Z systolic  · Suspect secondary to vomiting and pain  · Patient not on any antihypertensives prior to arrival, should be monitoring BP outpatient

## 2022-11-11 NOTE — CONSULTS
Consultation - 126 [facility]  Dick Bustamante 79 y o  female MRN: [id number]  Unit/Bed#: W -01 Encounter: [id number]        Consults    Reason for Consult / Principal Problem:  Upper abdominal pain, nausea    HPI: Dick Bustamante is a 79y o  year old female with history of endometrial cancer, hypothyroidism, parous off a MARK ANTHONY hiatal hernia repair in March 2021 who presented to the hospital yesterday with complaint acute onset of nausea vomiting and epigastric/right upper quadrant pain since the previous day  She does not remember eating any suspicious foods or having any sick contacts recently, she thinks soup was lasting she had before symptom onset  She tells me she was feeling well prior to this and was not having any issues in general with postprandial abdominal discomfort, nausea or pain  Right upper quadrant ultrasound here showed gallbladder stones and top-normal CBD of 7 mm caliber, her liver enzymes appeared within normal limits as did her CBC  CT scan showed evidence of hiatal hernia and possible mild distal esophagitis, she did recently have an EGD in September showing a 6 cm sliding hiatal hernia as well as a moderate amount of retained food within the stomach; the subsequent gastric emptying study done about 3 weeks ago actually showed less than 5% gastric emptying after the 4th hour  She denies any use of opioid pain medications or marijuana  She says she has intentionally lost about 50 lb over the last year, she has plan for colonoscopy in December for evaluation of history of colon polyps and some constipation she has been dealing with lately, she says she takes MiraLax every day however and with this has been able to produce bowel movements regularly and comfortably  She denies any rectal bleeding or melena  Denies any hematemesis  Reports only very rare use of NSAIDs, and says she takes Protonix daily      REVIEW OF SYSTEMS:    CONSTITUTIONAL: Denies any fever, chills, or rigors  Good appetite, and no recent weight loss  HEENT: No earache or tinnitus  Denies hearing loss or visual disturbances  CARDIOVASCULAR: No chest pain or palpitations  RESPIRATORY: Denies any cough, hemoptysis, shortness of breath or dyspnea on exertion  GASTROINTESTINAL: As noted in the History of Present Illness  GENITOURINARY: No problems with urination  Denies any hematuria or dysuria  NEUROLOGIC: No dizziness or vertigo, denies headaches  MUSCULOSKELETAL: Denies any muscle or joint pain  SKIN: Denies skin rashes or itching  ENDOCRINE: Denies excessive thirst  Denies intolerance to heat or cold  PSYCHOSOCIAL: Denies depression or anxiety  Denies any recent memory loss  Historical Information   Past Medical History:   Diagnosis Date   • Cancer [facility])    • Colon polyp    • Disease of thyroid gland    • Endometrial cancer [facility])     age 45   • 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[facility] [address] )     1992   • Esophageal reflux    • GERD (gastroesophageal reflux disease)    • History of ovarian cancer 04/28/2022   • Hypertension    • Hypothyroid    • Kidney stone    • Obesity    • Prediabetes 05/12/2022   • Rosacea 05/12/2022     Past Surgical History:   Procedure Laterality Date   • CARDIAC CATHETERIZATION Left 09/01/2022    Procedure: Cardiac Left Heart Cath;  Surgeon: Marly Hong MD;  [facility]: [facility];   Service: Cardiology   • CATARACT EXTRACTION, BILATERAL     • COLONOSCOPY  08/2018    polyp- 5 years   • CYSTOSCOPY     • FL RETROGRADE PYELOGRAM  10/15/2021   • HERNIA REPAIR     • JOINT REPLACEMENT     • KNEE ARTHROPLASTY     • LA CYSTO/URETERO W/LITHOTRIPSY &INDWELL STENT INSRT Left 10/15/2021    Procedure: CYSTOSCOPY URETEROSCOPY WITH LITHOTRIPSY HOLMIUM LASER, RETROGRADE PYELOGRAM, BASKET STONE EXTRACTION AND EXCHANGE STENT URETERAL;  Surgeon: Marzena Weiner MD;  Location: [facility] MAIN OR;  Service: Urology   • LA CYSTOURETHROSCOPY,URETER CATHETER Left 08/24/2021 Procedure: CYSTOSCOPY WITH INSERTION STENT URETERAL;  Surgeon: Benton Pandya MD;  Location: [facility];  Service: Urology   • ID ESOPHAGOGASTRODUODENOSCOPY TRANSORAL DIAGNOSTIC N/A 2021    Procedure: ESOPHAGOGASTRODUODENOSCOPY (EGD);   Surgeon: Kym Fajardo MD;  Location: [facility];  Service: Thoracic   • ID LAP, REPAIR PARAESOPHAGEAL HERNIA, INCL FUNDOPLASTY W/O MESH N/A 2021    Procedure: REPAIR HERNIA PARAESOPHAGEAL LAPAROSCOPIC W ROBOTICS WITH MESH, DOOR FUNDOPLICATION;  Surgeon: Kym Fajardo MD;  Location: [facility];  Service: Thoracic   • TOTAL ABDOMINAL HYSTERECTOMY      age 45   • TOTAL ABDOMINAL HYSTERECTOMY W/ BILATERAL SALPINGOOPHORECTOMY      endometrial cancer     Social History   Social History     Substance and Sexual Activity   Alcohol Use Not Currently    Comment: socially     Social History     Substance and Sexual Activity   Drug Use No     Social History     Tobacco Use   Smoking Status Former Smoker   • Packs/day: 0 25   • Years: 5 00   • Pack years: 1 25   • Types: Cigarettes   • Start date: 1959   • Quit date: 1964   • Years since quittin 9   Smokeless Tobacco Never Used   Tobacco Comment    Quit 50+ years ago 3/24/21     Family History   Problem Relation Age of Onset   • Stomach cancer Mother 71   • Pancreatic cancer Mother 71   • Heart attack Father    • Cancer Maternal Uncle    • No Known Problems Daughter    • No Known Problems Maternal Grandmother    • No Known Problems Maternal Grandfather    • No Known Problems Paternal Grandmother    • No Known Problems Paternal Grandfather    • No Known Problems Maternal Aunt    • No Known Problems Maternal Aunt        Meds/Allergies     Medications Prior to Admission   Medication   • albuterol (PROVENTIL HFA,VENTOLIN HFA) 90 mcg/act inhaler   • Ascorbic Acid (vitamin C) 1000 MG tablet   • atorvastatin (LIPITOR) 10 mg tablet   • Cholecalciferol (VITAMIN D3) 400 units CAPS   • Combigan 0 2-0 5 %   • latanoprost (XALATAN) 0 005 % ophthalmic solution   • levothyroxine 50 mcg tablet   • multivitamin-iron-minerals-folic acid (CENTRUM) chewable tablet   • pantoprazole (PROTONIX) 40 mg tablet   • prednisoLONE acetate (PRED FORTE) 1 % ophthalmic suspension     Current Facility-Administered Medications   Medication Dose Route Frequency   • acetaminophen (TYLENOL) tablet 650 mg  650 mg Oral Q6H PRN   • albuterol (PROVENTIL HFA,VENTOLIN HFA) inhaler 2 puff  2 puff Inhalation 4x Daily PRN   • atorvastatin (LIPITOR) tablet 10 mg  10 mg Oral HS   • brimonidine tartrate 0 2 % ophthalmic solution 1 drop  1 drop Both Eyes Q8H [facility]   • enoxaparin (LOVENOX) subcutaneous injection 40 mg  40 mg Subcutaneous Daily   • HYDROmorphone (DILAUDID) injection 0 5 mg  0 5 mg Intravenous Q6H PRN   • latanoprost (XALATAN) 0 005 % ophthalmic solution 1 drop  1 drop Both Eyes BID   • levothyroxine tablet 50 mcg  50 mcg Oral Early Morning   • metoclopramide (REGLAN) injection 10 mg  10 mg Intravenous Q6H PRN   • ondansetron (ZOFRAN) injection 4 mg  4 mg Intravenous Q4H PRN   • pantoprazole (PROTONIX) injection 40 mg  40 mg Intravenous Q12H   • promethazine (PHENERGAN) injection 12 5 mg  12 5 mg Intravenous Q6H PRN   • sodium chloride 0 9 % infusion  100 mL/hr Intravenous Continuous   • timolol (TIMOPTIC) 0 5 % ophthalmic solution 1 drop  1 drop Both Eyes BID       No Known Allergies        Objective     Blood pressure 121/79, pulse 69, temperature (!) 97 4 °F (36 3 °C), temperature source Oral, resp  rate 17, SpO2 95 %, not currently breastfeeding        Intake/Output Summary (Last 24 hours) at 11/11/2022 0946  Last data filed at 11/11/2022 0900  Gross per 24 hour   Intake 1410 ml   Output 300 ml   Net 1110 ml         PHYSICAL EXAM     General Appearance:   Alert, cooperative, no distress, appears stated age    HEENT:   Normocephalic, atraumatic, anicteric      Neck:  Supple, symmetrical, trachea midline, no adenopathy;    thyroid: no enlargement/tenderness/nodules; no carotid  bruit or JVD    Lungs:   Clear to auscultation bilaterally; no rales, rhonchi or wheezing; respirations unlabored    Heart[de-identified]   S1 and S2 normal; regular rate and rhythm; no murmur, rub, or gallop     Abdomen:   Soft, non-tender, non-distended; normal bowel sounds; no masses, no organomegaly    Genitalia:   Deferred    Rectal:   Deferred    Extremities:  No cyanosis, clubbing or edema    Pulses:  2+ and symmetric all extremities    Skin:  Skin color, texture, turgor normal, no rashes or lesions    Lymph nodes:  No palpable cervical, axillary or inguinal lymphadenopathy        Lab Results:   Admission on 11/10/2022   Component Date Value   • Ventricular Rate 11/10/2022 61    • Atrial Rate 11/10/2022 61    • AK Interval 11/10/2022 204    • QRSD Interval 11/10/2022 92    • QT Interval 11/10/2022 468    • QTC Interval 11/10/2022 471    • P Axis 11/10/2022 19    • QRS Axis 11/10/2022 -50    • T Wave Axis 11/10/2022 63    • WBC 11/10/2022 9 91    • RBC 11/10/2022 4 99    • Hemoglobin 11/10/2022 16 6 (A)   • Hematocrit 11/10/2022 46 8 (A)   • MCV 11/10/2022 94    • MCH 11/10/2022 33 3    • MCHC 11/10/2022 35 5    • RDW 11/10/2022 12 2    • MPV 11/10/2022 11 0    • Platelets 72/32/1932 231    • nRBC 11/10/2022 0    • Neutrophils Relative 11/10/2022 78 (A)   • Immat GRANS % 11/10/2022 1    • Lymphocytes Relative 11/10/2022 15    • Monocytes Relative 11/10/2022 6    • Eosinophils Relative 11/10/2022 0    • Basophils Relative 11/10/2022 0    • Neutrophils Absolute 11/10/2022 7 67 (A)   • Immature Grans Absolute 11/10/2022 0 05    • Lymphocytes Absolute 11/10/2022 1 50    • Monocytes Absolute 11/10/2022 0 63    • Eosinophils Absolute 11/10/2022 0 04    • Basophils Absolute 11/10/2022 0 02    • Sodium 11/10/2022 126 (A)   • Potassium 11/10/2022 4 1    • Chloride 11/10/2022 90 (A)   • CO2 11/10/2022 24    • ANION GAP 11/10/2022 12    • BUN 11/10/2022 9    • Creatinine 11/10/2022 0 53 (A) • Glucose 11/10/2022 130    • Calcium 11/10/2022 9 3    • AST 11/10/2022 25    • ALT 11/10/2022 17    • Alkaline Phosphatase 11/10/2022 118 (A)   • Total Protein 11/10/2022 7 6    • Albumin 11/10/2022 4 3    • Total Bilirubin 11/10/2022 1 04 (A)   • eGFR 11/10/2022 88    • Lipase 11/10/2022 18    • Color, UA 11/10/2022 Light Yellow    • Clarity, UA 11/10/2022 Clear    • Specific [place], UA 11/10/2022 1 017    • pH, UA 11/10/2022 7 0    • Leukocytes, UA 11/10/2022 Negative    • Nitrite, UA 11/10/2022 Negative    • Protein, UA 11/10/2022 100 (2+) (A)   • Glucose, UA 11/10/2022 Negative    • Ketones, UA 11/10/2022 60 (2+) (A)   • Urobilinogen, UA 11/10/2022 <2 0    • Bilirubin, UA 11/10/2022 Negative    • Occult Blood, UA 11/10/2022 Negative    • hs TnI 0hr 11/10/2022 20    • hs TnI 2hr 11/10/2022 33    • Delta 2hr hsTnI 11/10/2022 13    • hs TnI 4hr 11/10/2022 50 (A)   • Delta 4hr hsTnI 11/10/2022 30 (A)   • RBC, UA 11/10/2022 2-4 (A)   • WBC, UA 11/10/2022 2-4 (A)   • Epithelial Cells 11/10/2022 Occasional    • Bacteria, UA 11/10/2022 Occasional    • Hyaline Casts, UA 11/10/2022 0-3 (A)   • Platelets 60/91/7879 226    • MPV 11/10/2022 10 7    • HS TnI random 11/10/2022 76 (A)   • Sodium 11/11/2022 127 (A)   • Potassium 11/11/2022 4 0    • Chloride 11/11/2022 93 (A)   • CO2 11/11/2022 26    • ANION GAP 11/11/2022 8    • BUN 11/11/2022 13    • Creatinine 11/11/2022 0 75    • Glucose 11/11/2022 95    • Calcium 11/11/2022 8 7    • AST 11/11/2022 20    • ALT 11/11/2022 14    • Alkaline Phosphatase 11/11/2022 93    • Total Protein 11/11/2022 6 2 (A)   • Albumin 11/11/2022 3 6    • Total Bilirubin 11/11/2022 0 80    • eGFR 11/11/2022 73    • Magnesium 11/11/2022 1 3 (A)   • Phosphorus 11/11/2022 3 2    • WBC 11/11/2022 7 49    • RBC 11/11/2022 4 46    • Hemoglobin 11/11/2022 14 8    • Hematocrit 11/11/2022 42 5    • MCV 11/11/2022 95    • MCH 11/11/2022 33 2    • MCHC 11/11/2022 34 8    • RDW 11/11/2022 12 7    • MPV 11/11/2022 10 3    • Platelets 26/73/2842 234    • nRBC 11/11/2022 0    • Neutrophils Relative 11/11/2022 59    • Immat GRANS % 11/11/2022 0    • Lymphocytes Relative 11/11/2022 31    • Monocytes Relative 11/11/2022 10    • Eosinophils Relative 11/11/2022 0    • Basophils Relative 11/11/2022 0    • Neutrophils Absolute 11/11/2022 4 38    • Immature Grans Absolute 11/11/2022 0 03    • Lymphocytes Absolute 11/11/2022 2 30    • Monocytes Absolute 11/11/2022 0 74    • Eosinophils Absolute 11/11/2022 0 02    • Basophils Absolute 11/11/2022 0 02        Imaging Studies: I have personally reviewed pertinent reports  CT ABDOMEN AND PELVIS WITH IV CONTRAST     INDICATION:   Right upper quadrant pain intractable vomiting      COMPARISON:  CT chest/abdomen/pelvis 8/30/2022  Same day right upper quadrant ultrasound      TECHNIQUE:  CT examination of the abdomen and pelvis was performed  Axial, sagittal, and coronal 2D reformatted images were created from the source data and submitted for interpretation      Radiation dose length product (DLP) for this visit:  635 mGy-cm   This examination, like all CT scans performed in the [facility], was performed utilizing techniques to minimize radiation dose exposure, including the use of iterative   reconstruction and automated exposure control      IV Contrast:  100 mL of iohexol (OMNIPAQUE)  Enteric Contrast:  Enteric contrast was not administered      FINDINGS:     ABDOMEN     LOWER CHEST:  Small to moderate hiatal hernia with fluid in the distal esophagus and mild distal esophageal wall thickening, similar to the prior study      LIVER/BILIARY TREE:  Stable avidly enhancing 1 cm segment 5/8 lesion, corresponding to the hyperechoic lesion seen on recent ultrasound, likely hemangioma  Otherwise, unremarkable      GALLBLADDER:  Mildly dilated and containing gallstones   No pericholecystic inflammatory change      SPLEEN:  Unremarkable      PANCREAS: Unremarkable      ADRENAL GLANDS:  Unremarkable      KIDNEYS/URETERS:  Bilateral simple cysts  Punctate left renal lower pole nonobstructing calculus  No hydronephrosis      STOMACH AND BOWEL:  There is colonic diverticulosis without evidence of acute diverticulitis      APPENDIX:  No findings to suggest appendicitis      ABDOMINOPELVIC CAVITY:  No ascites  No pneumoperitoneum  No lymphadenopathy      VESSELS:  Atherosclerotic changes are present  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      PELVIS     REPRODUCTIVE ORGANS:  Surgical changes of prior hysterectomy      URINARY BLADDER:  Unremarkable      ABDOMINAL WALL/INGUINAL REGIONS:  Unremarkable      OSSEOUS STRUCTURES:  No acute fracture or destructive osseous lesion  Spinal degenerative changes are noted  L4 vertebral body hemangioma      IMPRESSION:     1  No acute abnormality in the abdomen or pelvis      2   Small to moderate hiatal hernia with findings suggesting gastroesophageal reflux and esophagitis, similar to CT 8/30/2022      3   Mildly dilated gallbladder containing stones without evidence of acute cholecystitis, as seen on same day right upper quadrant ultrasound        RIGHT UPPER QUADRANT ULTRASOUND     INDICATION:  Right upper quadrant pain and vomiting with history of gallstones      COMPARISON:  Right upper quadrant ultrasound 8/31/2022, CT chest, abdomen and pelvis 8/30/2022 and 8/22/2021     TECHNIQUE:   Real-time ultrasound of the right upper quadrant was performed with a curvilinear transducer with both volumetric sweeps and still imaging techniques      FINDINGS:     PANCREAS:  Visualized portions of the pancreas are within normal limits      AORTA AND IVC:  Visualized portions are normal for patient age      LIVER:  Size:  Within normal range  The liver measures 15 1 cm in the midclavicular line  Contour:  Surface contour is smooth  Parenchyma:  Echogenicity and echotexture are within normal limits    1 cm echogenic focus in the left lobe seen only on cine imaging, may correspond to suspected hemangioma with flash enhancement dating back to August 2021  Limited imaging of the main portal vein shows it to be patent and hepatopetal      BILIARY:  Cholelithiasis again seen  Degree of gallbladder distention similar to prior study  Gallbladder wall top normal thickness  No pericholecystic fluid  Negative ultrasonographic Merino's sign  No intrahepatic biliary dilatation  CBD measures 7 0 mm which is likely normal for patient age  No choledocholithiasis      KIDNEY:   Right kidney measures 11 0 x 5 3 x 4 9 cm  Volume 148 6 mL  2 6 cm upper pole simple cyst   Cortical scarring or lobulation lower pole  No hydronephrosis or shadowing calculi      ASCITES:   None         IMPRESSION:     Cholelithiasis without ultrasonographic evidence of cholecystitis     1 cm echogenic focus left lobe seen only on cine imaging may correspond to suspected hemangioma on previous CT imaging      Small right renal cyst        ASSESSMENT/PLAN:     1  Acute onset of nausea, vomiting and abdominal pain which appears resolved at this point; patient with known gastroparesis as well as cholelithiasis; does not appear to have acute cholecystitis at this time    Possible gastroparesis exacerbation versus acute viral gastroenteritis    - symptomatic management, antiemetics as needed    -will advance to full liquid diet with dosed and crackers, low-fat modifier    -continue to advance diet as tolerated    -patient has outpatient office visit as well as colonoscopy already planned (due to change in bowel habits/constipation)    - advised patient about gastroparesis diet, particularly keeping to small frequent meals, and exercising caution with high fat and high roughage foods; if patient does develop more persistent issues with postprandial abdominal pain/dyspeptic symptoms, we can consider therapeutic options such as pyloric Botox injection, domperidone, etcetera    The patient was seen and examined by Dr Marybel Bowman, all estrada medical decisions were made with Dr Marybel Bowman  Thank you for allowing us to participate in the care of this pleasant patient  We will follow up with you closely

## 2022-11-11 NOTE — PLAN OF CARE
Problem: Potential for Falls  Goal: Patient will remain free of falls  Description: INTERVENTIONS:  - Educate patient/family on patient safety including physical limitations  - Instruct patient to call for assistance with activity   - Consult OT/PT to assist with strengthening/mobility   - Keep Call bell within reach  - Keep bed low and locked with side rails adjusted as appropriate  - Keep care items and personal belongings within reach  - Initiate and maintain comfort rounds  - Make Fall Risk Sign visible to staff  - Offer Toileting every  Hours, in advance of need  - Initiate/Maintain alarm  - Obtain necessary fall risk management equipment:   - Apply yellow socks and bracelet for high fall risk patients  - Consider moving patient to room near nurses station  Outcome: Completed     Problem: PAIN - ADULT  Goal: Verbalizes/displays adequate comfort level or baseline comfort level  Description: Interventions:  - Encourage patient to monitor pain and request assistance  - Assess pain using appropriate pain scale  - Administer analgesics based on type and severity of pain and evaluate response  - Implement non-pharmacological measures as appropriate and evaluate response  - Consider cultural and social influences on pain and pain management  - Notify physician/advanced practitioner if interventions unsuccessful or patient reports new pain  Outcome: Completed     Problem: INFECTION - ADULT  Goal: Absence or prevention of progression during hospitalization  Description: INTERVENTIONS:  - Assess and monitor for signs and symptoms of infection  - Monitor lab/diagnostic results  - Monitor all insertion sites, i e  indwelling lines, tubes, and drains  - Monitor endotracheal if appropriate and nasal secretions for changes in amount and color  - [place] appropriate cooling/warming therapies per order  - Administer medications as ordered  - Instruct and encourage patient and family to use good hand hygiene technique  - Identify and instruct in appropriate isolation precautions for identified infection/condition  Outcome: Completed  Goal: Absence of fever/infection during neutropenic period  Description: INTERVENTIONS:  - Monitor WBC    Outcome: Completed     Problem: SAFETY ADULT  Goal: Patient will remain free of falls  Description: INTERVENTIONS:  - Educate patient/family on patient safety including physical limitations  - Instruct patient to call for assistance with activity   - Consult OT/PT to assist with strengthening/mobility   - Keep Call bell within reach  - Keep bed low and locked with side rails adjusted as appropriate  - Keep care items and personal belongings within reach  - Initiate and maintain comfort rounds  - Make Fall Risk Sign visible to staff  - Offer Toileting every  Hours, in advance of need  - Initiate/Maintain alarm  - Obtain necessary fall risk management equipment:   - Apply yellow socks and bracelet for high fall risk patients  - Consider moving patient to room near nurses station  Outcome: Completed  Goal: Maintain or return to baseline ADL function  Description: INTERVENTIONS:  -  Assess patient's ability to carry out ADLs; assess patient's baseline for ADL function and identify physical deficits which impact ability to perform ADLs (bathing, care of mouth/teeth, toileting, grooming, dressing, etc )  - Assess/evaluate cause of self-care deficits   - Assess range of motion  - Assess patient's mobility; develop plan if impaired  - Assess patient's need for assistive devices and provide as appropriate  - Encourage maximum independence but intervene and supervise when necessary  - Involve family in performance of ADLs  - Assess for home care needs following discharge   - Consider OT consult to assist with ADL evaluation and planning for discharge  - Provide patient education as appropriate  Outcome: Completed  Goal: Maintains/Returns to pre admission functional level  Description: INTERVENTIONS:  - Perform BMAT or MOVE assessment daily    - Set and communicate daily mobility goal to care team and patient/family/caregiver  - Collaborate with rehabilitation services on mobility goals if consulted  - Perform Range of Motion  times a day  - Reposition patient every  hours    - Dangle patient  times a day  - Stand patient  times a day  - Ambulate patient  times a day  - Out of bed to chair  times a day   - Out of bed for meals  times a day  - Out of bed for toileting  - Record patient progress and toleration of activity level   Outcome: Completed

## 2022-11-11 NOTE — ASSESSMENT & PLAN NOTE
· Likely non MI related troponin elevation  · EKG non ischemic  · Cardiac cath in Sep 2022 with clean coronaries  · Peak trop 76

## 2022-11-12 VITALS
DIASTOLIC BLOOD PRESSURE: 93 MMHG | SYSTOLIC BLOOD PRESSURE: 143 MMHG | TEMPERATURE: 97.8 F | HEART RATE: 72 BPM | OXYGEN SATURATION: 95 % | RESPIRATION RATE: 16 BRPM

## 2022-11-12 LAB
ANION GAP SERPL CALCULATED.3IONS-SCNC: 8 MMOL/L (ref 4–13)
BUN SERPL-MCNC: 11 MG/DL (ref 5–25)
CALCIUM SERPL-MCNC: 8.4 MG/DL (ref 8.4–10.2)
CHLORIDE SERPL-SCNC: 98 MMOL/L (ref 96–108)
CO2 SERPL-SCNC: 25 MMOL/L (ref 21–32)
CREAT SERPL-MCNC: 0.59 MG/DL (ref 0.6–1.3)
GFR SERPL CREATININE-BSD FRML MDRD: 85 ML/MIN/1.73SQ M
GLUCOSE SERPL-MCNC: 92 MG/DL (ref 65–140)
MAGNESIUM SERPL-MCNC: 1.9 MG/DL (ref 1.9–2.7)
POTASSIUM SERPL-SCNC: 3.6 MMOL/L (ref 3.5–5.3)
SODIUM SERPL-SCNC: 131 MMOL/L (ref 135–147)

## 2022-11-12 RX ORDER — POLYETHYLENE GLYCOL 3350 17 G/17G
17 POWDER, FOR SOLUTION ORAL DAILY PRN
Status: DISCONTINUED | OUTPATIENT
Start: 2022-11-12 | End: 2022-11-12 | Stop reason: HOSPADM

## 2022-11-12 RX ORDER — SODIUM CHLORIDE 1000 MG
1 TABLET, SOLUBLE MISCELLANEOUS 2 TIMES DAILY WITH MEALS
Qty: 60 TABLET | Refills: 0 | Status: SHIPPED | OUTPATIENT
Start: 2022-11-12

## 2022-11-12 RX ORDER — POLYETHYLENE GLYCOL 3350 17 G/17G
17 POWDER, FOR SOLUTION ORAL DAILY PRN
Refills: 0
Start: 2022-11-12

## 2022-11-12 RX ADMIN — ENOXAPARIN SODIUM 40 MG: 40 INJECTION SUBCUTANEOUS at 08:16

## 2022-11-12 RX ADMIN — LATANOPROST 1 DROP: 50 SOLUTION OPHTHALMIC at 08:15

## 2022-11-12 RX ADMIN — POLYETHYLENE GLYCOL 3350 17 G: 17 POWDER, FOR SOLUTION ORAL at 08:33

## 2022-11-12 RX ADMIN — LEVOTHYROXINE SODIUM 50 MCG: 50 TABLET ORAL at 05:06

## 2022-11-12 RX ADMIN — SODIUM CHLORIDE 1 G: 1 TABLET ORAL at 08:16

## 2022-11-12 RX ADMIN — PANTOPRAZOLE SODIUM 40 MG: 40 INJECTION, POWDER, FOR SOLUTION INTRAVENOUS at 05:06

## 2022-11-12 NOTE — ASSESSMENT & PLAN NOTE
· Acute on chronic, hyponatremic on recent admission suspected sepsis secondary to elevated ADH from pain +/- low sodium intake  · Was originally discharged on sodium tabs but were discontinued d/t hypertension, Na had normalized on outpatient labs to 135  · Given IV NS on admission  · Sodium 126 on admission, 127 on repeat, 131 today  · Resumed low dose NaCl tabs 1 g BID and continued FR   · Need to encourage increased sodium in diet  · Follow with Nephrology outpatient, appreciate their input here  · Outpatient labs in 1 week

## 2022-11-12 NOTE — ASSESSMENT & PLAN NOTE
· Blood pressure significantly elevated on admission, 904H-805P systolic  · Suspect secondary to vomiting and pain  · Patient not on any antihypertensives prior to arrival, should be monitoring BP outpatient

## 2022-11-12 NOTE — ASSESSMENT & PLAN NOTE
· Presented with intractable nausea and vomiting, LUQ pain-- symptoms completely resolved  · Both CT and RUQ US showed choledocholithiasis without evidence of cholecystitis, mild GB distention  CT also with small to moderate hiatal hernia with findings suggesting gastroesophageal reflux and esophagitis  · No leukocytosis, afebrile  · 10/22/22: gastric emptying studying confirmed severe gastroparesis on outpatient workup with GI, will need follow-up  · Appreciate GI and surgery input  Also consider possible issue of hiatal hernia playing a role  · Advanced to ulcer free diet   OBINNA r n   Reglan

## 2022-11-12 NOTE — PROGRESS NOTES
NEPHROLOGY PROGRESS NOTE   Mari Lee 79 y o  female MRN: [id number]  Unit/Bed#: W -01 Encounter: [id number]  Reason for Consult:  Hyponatremia    ASSESSMENT AND PLAN:  79 y o  woman PMH of [facility] BSO, hypertension, GERD, hernia repair, history of SIADH who was admitted to [facility] after presenting with nausea, vomiting, poor intake for 1 day  Nephrology is consulted for management of hyponatremia     PLAN     #Hyposomolar Hyponatremia   · Sodium on admission: 126  · Serum osm:   266  · Urine osm:   582  · Urine Sodium:   16  · Current sodium 131 mEq per L  · Etiology:  Likely multifactorial secondary to hypervolemia in the settings of poor oral intake and vomiting with the background of SIADH  · Rate of correction: 123>127>131 in ~24h = 0 04mEq/L)   · Status post IV fluids   · Plan:  · Advised patient to continue sodium chloride 1 g b i d  For 2 days and then go down to 1 tablet a day  · Plan to repeat BMP within a week and follow-up in the office       # abdominal pain/N/V  · Resolved  · Management as per primary team     # hypo magnesemia resolved  · 1 9 mg/dL  · Currently receiving IV magnesium  · Monitor replete as needed     #Volume status/hypertension:  · Volume:  euvolemic  · Blood pressure:   borderline hypertensive 143/93mmhg, goal< 140/90  · Recommend:  · Monitor BP  · Patient will contact the office with BP reading advised to stop sodium tablets if becomes hypertensive      SUBJECTIVE:  Patient seen and examined at bedside  No chest pain, shortness of breath, nausea, vomiting, abdominal pain or diarrhea  No urinary complaints       OBJECTIVE:  Current Weight:    Vitals:    11/12/22 0830   BP:    Pulse:    Resp:    Temp:    SpO2: 95%       Intake/Output Summary (Last 24 hours) at 11/12/2022 1237  Last data filed at 11/12/2022 0900  Gross per 24 hour   Intake 370 ml   Output 650 ml   Net -280 ml     Wt Readings from Last 3 Encounters:   10/20/22 90 5 kg (199 lb 8 oz)   10/04/22 90 7 kg (200 lb) 09/20/22 91 2 kg (201 lb)     Temp Readings from Last 3 Encounters:   11/12/22 97 8 °F (36 6 °C)   10/20/22 (!) 97 °F (36 1 °C) (Temporal)   09/20/22 97 5 °F (36 4 °C)     BP Readings from Last 3 Encounters:   11/12/22 143/93   10/20/22 122/76   10/04/22 134/84     Pulse Readings from Last 3 Encounters:   11/12/22 72   10/20/22 (!) 53   10/04/22 57        General:  Obese, no acute distress at this time  Skin:  No acute rash  Eyes:  No scleral icterus and noninjected  ENT:  mucous membranes moist  Neck:  no carotid bruits  Chest:  Clear to auscultation percussion, good respiratory effort, no use of accessory respiratory muscles  CVS:  Regular rate and rhythm without a rub ,  Abdomen:  soft and nontender   Extremities:  cyanosis, no significant lower extremity edema  Neuro:  No gross focality  Psych:  Alert , cooperative       Medications:    Current Facility-Administered Medications:   •  acetaminophen (TYLENOL) tablet 650 mg, 650 mg, Oral, Q6H PRN, Alex Hart PA-C, 650 mg at 11/11/22 1358  •  albuterol (PROVENTIL HFA,VENTOLIN HFA) inhaler 2 puff, 2 puff, Inhalation, 4x Daily PRN, Alex Hart PA-C  •  atorvastatin (LIPITOR) tablet 10 mg, 10 mg, Oral, HS, Hina Ray PA-C, 10 mg at 11/11/22 2117  •  brimonidine tartrate 0 2 % ophthalmic solution 1 drop, 1 drop, Both Eyes, Q8H [address], Hina Ray PA-C  •  enoxaparin (LOVENOX) subcutaneous injection 40 mg, 40 mg, Subcutaneous, Daily, Hina Ray PA-C, 40 mg at 11/12/22 0816  •  latanoprost (XALATAN) 0 005 % ophthalmic solution 1 drop, 1 drop, Both Eyes, BID, Hina Ray PA-C, 1 drop at 11/12/22 0815  •  levothyroxine tablet 50 mcg, 50 mcg, Oral, Early Morning, Hina Ray PA-C, 50 mcg at 11/12/22 0506  •  metoclopramide (REGLAN) injection 10 mg, 10 mg, Intravenous, Q6H PRN, Sunil Mian Ray PA-C, 10 mg at 11/10/22 1553  •  pantoprazole (PROTONIX) injection 40 mg, 40 mg, Intravenous, Q12H, Panda Lazaro DO, 40 mg at 11/12/22 0506  •  polyethylene glycol (MIRALAX) packet 17 g, 17 g, Oral, Daily PRN, Nicolette Yen PA-C, 17 g at 11/12/22 9641  •  sodium chloride tablet 1 g, 1 g, Oral, BID With Meals, Nicolette Yen PA-C, 1 g at 11/12/22 0816  •  timolol (TIMOPTIC) 0 5 % ophthalmic solution 1 drop, 1 drop, Both Eyes, BID, Daria Valle PA-C, 1 drop at 11/11/22 1902    Current Outpatient Medications:   •  albuterol (PROVENTIL HFA,VENTOLIN HFA) 90 mcg/act inhaler, Inhale 2 puffs 4 (four) times a day as needed, Disp: , Rfl:   •  atorvastatin (LIPITOR) 10 mg tablet, TAKE 1 TABLET BY MOUTH  DAILY AT BEDTIME, Disp: 90 tablet, Rfl: 3  •  Cholecalciferol (VITAMIN D3) 400 units CAPS, Take 1,000 Units by mouth, Disp: , Rfl:   •  Combigan 0 2-0 5 %, , Disp: , Rfl:   •  latanoprost (XALATAN) 0 005 % ophthalmic solution, Administer 1 drop to both eyes 2 (two) times a day, Disp: , Rfl:   •  levothyroxine 50 mcg tablet, Take 1 tablet (50 mcg total) by mouth daily, Disp: 90 tablet, Rfl: 1  •  multivitamin-iron-minerals-folic acid (CENTRUM) chewable tablet, Chew 1 tablet daily, Disp: , Rfl:   •  pantoprazole (PROTONIX) 40 mg tablet, Take 1 tablet (40 mg total) by mouth daily, Disp: 90 tablet, Rfl: 1  •  polyethylene glycol (MIRALAX) 17 g packet, Take 17 g by mouth daily as needed (constipation), Disp: , Rfl: 0  •  prednisoLONE acetate (PRED FORTE) 1 % ophthalmic suspension, SHAKE LIQUID AND INSTILL 1 DROP IN RIGHT EYE EVERY HOUR WHILE AWAKE, Disp: , Rfl:   •  sodium chloride 1 g tablet, Take 1 tablet (1 g total) by mouth 2 (two) times a day with meals, Disp: 60 tablet, Rfl: 0    Laboratory Results:  Results from last 7 days   Lab Units 11/12/22  0802 11/11/22  1355 11/11/22  0543 11/10/22  1739 11/10/22  0809   WBC Thousand/uL  --   --  7 49  --  9 91   HEMOGLOBIN g/dL  --   --  14 8  --  16 6*   HEMATOCRIT %  --   --  42 5  --  46 8*   PLATELETS Thousands/uL  --   --  234 226 231   SODIUM mmol/L 131* 127* 127*  --  126*   POTASSIUM mmol/L 3 6 3 1* 4 0  --  4 1   CHLORIDE mmol/L 98 95* 93*  --  90*   CO2 mmol/L 25 23 26  --  24   BUN mg/dL 11 15 13  --  9   CREATININE mg/dL 0 59* 0 65 0 75  --  0 53*   CALCIUM mg/dL 8 4 8 1* 8 7  --  9 3   MAGNESIUM mg/dL 1 9  --  1 3*  --   --    PHOSPHORUS mg/dL  --   --  3 2  --   --        CT abdomen pelvis with contrast   Final Result by Kee Davis MD (11/10 1139)      1  No acute abnormality in the abdomen or pelvis  2   Small to moderate hiatal hernia with findings suggesting gastroesophageal reflux and esophagitis, similar to CT 8/30/2022       3   Mildly dilated gallbladder containing stones without evidence of acute cholecystitis, as seen on same day right upper quadrant ultrasound  Workstation performed: [id number]         US right upper quadrant   Final Result by Julisa Ragsdale DO (11/10 1035)      Cholelithiasis without ultrasonographic evidence of cholecystitis  1 cm echogenic focus left lobe seen only on cine imaging may correspond to suspected hemangioma on previous CT imaging  Small right renal cyst       Workstation performed: [id number]             Portions of the record may have been created with voice recognition software  Occasional wrong word or "sound a like" substitutions may have occurred due to the inherent limitations of voice recognition software  Read the chart carefully and recognize, using context, where substitutions have occurred

## 2022-11-12 NOTE — DISCHARGE INSTR - AVS FIRST PAGE
Dear Asha Ochoa,     It was our pleasure to care for you here at [facility]  It is our hope that we were always able to exceed the expected standards for your care during your stay  You were hospitalized due to abdominal pain and low sodium  You were cared for on the 4th floor by Radha Syed PA-C under the service of Margeret Bence, MD with the [facility] who covers for your primary care physician (PCP), Natali Johns MD, while you were hospitalized  If you have any questions or concerns related to this hospitalization, you may contact us at [phone]  For follow up as well as any medication refills, we recommend that you follow up with your primary care physician  A registered nurse will reach out to you by phone within a few days after your discharge to answer any additional questions that you may have after going home  However, at this time we provide for you here, the most important instructions / recommendations at discharge:     Notable Medication Adjustments -   Reglan as needed before meals and at bedtime if you have nausea, bloating, abdominal pain  Testing Required after Discharge -   Please get a repeat BMP and magnesium in 1 week and follow up with Dr Robby Walker follow up information -   Follow-up with GI  Follow-up with your kidney doctor  Follow-up with your family doctor  Other Instructions -   Low fiber low residue ulcer free diet, small frequent meals  Stay upright after eating and do not wear tight fitting clothing or eat before bed  Continue 1500 mL fluid restriction for now until repeat labs can be reviewed by your kidney doctor  Please review this entire after visit summary as additional general instructions including medication list, appointments, activity, diet, any pertinent wound care, and other additional recommendations from your care team that may be provided for you        Sincerely,     Lara Gasca JANELLE Yen

## 2022-11-12 NOTE — DISCHARGE SUMMARY
[facility]  Discharge- Asha Fan 1940, 79 y o  female MRN: [id number]  Unit/Bed#: W -01 Encounter: [id number]  Primary Care Provider: Sukhdev Paiz MD   Date and time admitted to hospital: 11/10/2022  7:38 AM    * Left upper quadrant abdominal pain, nausea and vomiting  Assessment & Plan  · Presented with intractable nausea and vomiting, LUQ pain-- symptoms completely resolved  · Both CT and RUQ US showed choledocholithiasis without evidence of cholecystitis, mild GB distention  CT also with small to moderate hiatal hernia with findings suggesting gastroesophageal reflux and esophagitis  · No leukocytosis, afebrile  · 10/22/22: gastric emptying studying confirmed severe gastroparesis on outpatient workup with GI, will need follow-up  · Appreciate GI and surgery input  Also consider possible issue of hiatal hernia playing a role  · Advanced to ulcer free diet   P r n   Reglan      Hyponatremia  Assessment & Plan  · Acute on chronic, hyponatremic on recent admission suspected sepsis secondary to elevated ADH from pain +/- low sodium intake  · Was originally discharged on sodium tabs but were discontinued d/t hypertension, Na had normalized on outpatient labs to 135  · Given IV NS on admission  · Sodium 126 on admission, 127 on repeat, 131 today  · Resumed low dose NaCl tabs 1 g BID and continued FR   · Need to encourage increased sodium in diet  · Follow with Nephrology outpatient, appreciate their input here  · Outpatient labs in 1 week    Hypomagnesemia  Assessment & Plan  · Mag 1 3--repleted    Elevated troponin  Assessment & Plan  · Likely non MI related troponin elevation  · EKG non ischemic  · Cardiac cath in Sep 2022 with clean coronaries  · Peak trop 76    Hypertension  Assessment & Plan  · Blood pressure significantly elevated on admission, 999B-595H systolic  · Suspect secondary to vomiting and pain  · Patient not on any antihypertensives prior to arrival, should be monitoring BP outpatient      Hypokalemia  Assessment & Plan  · Repletion provided  Follow outpatient    Medical Problems             Resolved Problems  Date Reviewed: 11/12/2022   None               Discharging Physician / Practitioner: Nya Mari  PCP: Genny Matthew MD  Admission Date:   Admission Orders (From admission, onward)     Ordered        11/10/22 Claudinea  Pablo 81  Once                      Discharge Date: 11/12/22    Consultations During Hospital Stay:  · GI  · Nephrology  · General surgery    Procedures Performed:   · Right upper quadrant ultrasound  · CT abdomen and pelvis    Significant Findings / Test Results:   · As above    Incidental Findings:   · Mildly elevated troponin     Test Results Pending at Discharge (will require follow up): · None     Outpatient Tests Requested:  · BMP, magnesium    Complications:  None    Reason for Admission:  Abdominal pain    Hospital Course:   Shefali Carlisle is a 79 y o  female patient with recently diagnosed severe gastroparesis who originally presented to the hospital on 11/10/2022 due to severe abdominal pain and nausea  CT scan and ultrasound showed cholelithiasis without evidence of cholecystitis  She was evaluated by GI and General surgery  She was given IV Protonix, IV fluids and supportive care with antiemetics  She improved quite quickly  Unfortunately she also had significant electrolyte abnormalities including hyponatremia, hypokalemia, and hypomagnesemia  She was seen in consultation by Nephrology who was recently managing her as an outpatient for hyponatremia which previously required salt tabs which were subsequently discontinued  Patient was started back on salt tablets 1 g b i d  and appeared to be improving  Outpatient labs will be recommended  She was counseled on treatment of gastroparesis and advised to follow-up with GI  Again she was feeling very well and eager for discharge today      Please see above list of diagnoses and related plan for additional information  Condition at Discharge: stable    Discharge Day Visit / Exam:   Subjective:  Patient reports she continues to feel very well at this time with no nausea, vomiting, abdominal pain  Reiterates that she feels even better today than she did yesterday  After my visit this morning she did mention to her nurse that she has not had a bowel movement and requested MiraLax  She denies any headache or other new events  She would really like to go home today  Admits to me that she hardly eats any salt in her diet and also expresses concern about which she is supposed to eat moving forward with her gastroparesis  Vitals: Blood Pressure: 143/93 (11/12/22 0803)  Pulse: 72 (11/12/22 0803)  Temperature: 97 8 °F (36 6 °C) (11/12/22 0803)  Temp Source: Oral (11/11/22 0741)  Respirations: 16 (11/12/22 0803)  SpO2: 95 % (11/12/22 0830)  Exam:   Physical Exam  Vitals reviewed  Constitutional:       General: She is not in acute distress  Appearance: Normal appearance  She is obese  She is not ill-appearing, toxic-appearing or diaphoretic  Comments: Quite well appearing   HENT:      Head: Normocephalic  Eyes:      General: No scleral icterus  Right eye: No discharge  Left eye: No discharge  Conjunctiva/sclera: Conjunctivae normal    Cardiovascular:      Rate and Rhythm: Normal rate and regular rhythm  Heart sounds: No murmur heard  Pulmonary:      Effort: Pulmonary effort is normal  No respiratory distress  Breath sounds: No stridor  No wheezing, rhonchi or rales  Abdominal:      General: Bowel sounds are normal  There is no distension  Palpations: Abdomen is soft  Tenderness: There is no abdominal tenderness  There is no guarding  Musculoskeletal:      Right lower leg: No edema  Left lower leg: No edema  Skin:     General: Skin is warm and dry  Coloration: Skin is not jaundiced or pale  Findings: No bruising, erythema, lesion or rash  Neurological:      General: No focal deficit present  Mental Status: She is alert  Comments: Awake alert interactive she is an excellent historian with no confusion noted   Psychiatric:         Mood and Affect: Mood normal          Thought Content: Thought content normal          Judgment: Judgment normal           Discussion with Family: Updated  () at bedside  Discharge instructions/Information to patient and family:   See after visit summary for information provided to patient and family  Provisions for Follow-Up Care:  See after visit summary for information related to follow-up care and any pertinent home health orders  Disposition:   Home    Planned Readmission:      Discharge Statement:  I spent 35 minutes discharging the patient  This time was spent on the day of discharge  I had direct contact with the patient on the day of discharge  Greater than 50% of the total time was spent examining patient, answering all patient questions, arranging and discussing plan of care with patient as well as directly providing post-discharge instructions  Additional time then spent on discharge activities  Case was discussed with Nephrology, GI, and nursing    Discharge Medications:  See after visit summary for reconciled discharge medications provided to patient and/or family        **Please Note: This note may have been constructed using a voice recognition system**

## 2022-11-14 ENCOUNTER — TELEPHONE (OUTPATIENT)
Dept: NEPHROLOGY | Facility: CLINIC | Age: 82
End: 2022-11-14

## 2022-11-14 ENCOUNTER — TRANSITIONAL CARE MANAGEMENT (OUTPATIENT)
Dept: FAMILY MEDICINE CLINIC | Facility: CLINIC | Age: 82
End: 2022-11-14

## 2022-11-14 ENCOUNTER — TELEPHONE (OUTPATIENT)
Dept: GASTROENTEROLOGY | Facility: CLINIC | Age: 82
End: 2022-11-14

## 2022-11-14 NOTE — TELEPHONE ENCOUNTER
Spoke with Raleigh Kaur and pt  All questions and concerns addressed in regards to gastroparesis, upcoming colonoscopy and recommendations they were given in the hospital  Raleigh Kaur is agreeable and verbalized understanding of all information

## 2022-11-14 NOTE — TELEPHONE ENCOUNTER
Patients GI provider:  Dr Renetta Corado    Number to return call: [phone]    Reason for call: Pt's  Abdias  calling to discuss pt's diagnosis  Pt was seen in the hospital and needs a follow up appointment   She has an appointment schedule in January as well as a colonoscopy    Scheduled procedure/appointment date if applicable: Procedure 18/2/70

## 2022-11-14 NOTE — TELEPHONE ENCOUNTER
Spoke with patient and  - patient just released from hospital and was to schedule HFU with Dr Desean Bacon  Given appt for 11/30 at 2:40  Will have labs drawn next week

## 2022-11-23 ENCOUNTER — APPOINTMENT (OUTPATIENT)
Dept: LAB | Age: 82
End: 2022-11-23

## 2022-11-23 DIAGNOSIS — E87.1 HYPONATREMIA: ICD-10-CM

## 2022-11-23 DIAGNOSIS — E83.42 HYPOMAGNESEMIA: ICD-10-CM

## 2022-11-23 LAB
ANION GAP SERPL CALCULATED.3IONS-SCNC: 10 MMOL/L (ref 4–13)
BUN SERPL-MCNC: 14 MG/DL (ref 5–25)
CALCIUM SERPL-MCNC: 9.5 MG/DL (ref 8.3–10.1)
CHLORIDE SERPL-SCNC: 103 MMOL/L (ref 96–108)
CO2 SERPL-SCNC: 23 MMOL/L (ref 21–32)
CREAT SERPL-MCNC: 0.68 MG/DL (ref 0.6–1.3)
GFR SERPL CREATININE-BSD FRML MDRD: 81 ML/MIN/1.73SQ M
GLUCOSE P FAST SERPL-MCNC: 90 MG/DL (ref 65–99)
MAGNESIUM SERPL-MCNC: 1.8 MG/DL (ref 1.6–2.6)
POTASSIUM SERPL-SCNC: 4.1 MMOL/L (ref 3.5–5.3)
SODIUM SERPL-SCNC: 136 MMOL/L (ref 135–147)

## 2022-11-30 ENCOUNTER — OFFICE VISIT (OUTPATIENT)
Dept: NEPHROLOGY | Facility: CLINIC | Age: 82
End: 2022-11-30

## 2022-11-30 VITALS
BODY MASS INDEX: 35.26 KG/M2 | HEIGHT: 63 IN | WEIGHT: 199 LBS | SYSTOLIC BLOOD PRESSURE: 120 MMHG | DIASTOLIC BLOOD PRESSURE: 80 MMHG

## 2022-11-30 DIAGNOSIS — E87.1 HYPONATREMIA: Primary | ICD-10-CM

## 2022-11-30 NOTE — PROGRESS NOTES
NEPHROLOGY OUTPATIENT PROGRESS NOTE   Ernestina Zambrano 79 y o  female MRN: [id number]  DATE: 12/2/2022    Reason for visit:   Chief Complaint   Patient presents with   • Follow-up   • Hypokalemia        Patient Instructions   Thank you for coming to your visit today  As we discussed you kidney function is normal  Your sodium is 136mEq/L   Please follow the recommendations below       • Avoid nonsteroidal anti-inflammatory drugs such as Naprosyn, ibuprofen, Aleve, Advil, Celebrex, Meloxicam (Mobic) etc   You can use Tylenol as needed if you do not have any liver condition to be concerned about    • Try to avoid medications such as pantoprazole or  Protonix/Nexium or Esomeprazole)/Prilosec or omeprazole/Prevacid or lansoprazole/AcipHex or Rabeprazole  If you are able to, use Pepcid as this is safer for your kidneys  • Try to exercise at least 30 minutes 3 days a week to begin with with an ultimate goal of 5 days a week for at least 30 minutes    • Decrease to 1gr a day         Priscilla Ríos MD  Nephrology Attending     •           Darby Freeman was seen today for follow-up and hypokalemia  Diagnoses and all orders for this visit:    Hyponatremia  -     Basic metabolic panel; Future        Assessment/Plan:  81 yo woman PMH , HTN,  hypothyroidism, GERD, hypercholesterolemia, hx kidney stones, hx endometrial cancer, BENNY (peak creatinine at 2 98), nephrolithiasis status post lithotripsy   Recent admission with gastroparesis complicated with hyponatremia    Patient is here for follow-up after hospital discharge       PLAN:      #Acute on chronic hyposomolar Hyponatremia   • Etiology: likely secondary to elevated ADH in the settings of severe pain complicated with N/V and poor intake   • Serum osm:  266    • Recent admission with gastroparesis , started on sodium tabs  • Current sodium : 136mEq/L  • Plan:  • Decrease sodium chloride to 1 g a day  • Repeat BMP before PCP appointment  • If stable discontinue sodium chloride   • continue follow-up with primary care       #Nephrolothiasis   • S/p lithotripsy with left ureteral stent in place  • Cluster stones in the lower pole of left kidney no obstruction seen  • Follow-up by a urology     #Acid-base Disorder  • serum HCO3 23 mmol/L   • At goal     #Volume status/hypertension:  • Volume:euvolemic   • Blood pressure:normotensive /80 mmhg, goal <140/90mmhg   • Low sodium diet      #Anemia:  • Current hemoglobin:  14 8 mg/dL at goal   • At goal        SUBJECTIVE / INTERVAL HISTORY:  79 y o  female presents in follow up of hyponatremia  Patient was recently admitted with gastroparesis complicated with hyponatremia  Feels well, no shortness of breath, no chest pain, no nausea vomiting        Review of Systems   Constitutional: Negative for activity change  HENT: Negative for congestion  Eyes: Negative for discharge  Respiratory: Negative for shortness of breath  Cardiovascular: Negative for palpitations and leg swelling  Gastrointestinal: Negative for abdominal pain  Endocrine: Negative for cold intolerance  Genitourinary: Negative for difficulty urinating  Musculoskeletal: Negative for arthralgias  Skin: Negative for pallor and rash  Neurological: Negative for dizziness  Psychiatric/Behavioral: Negative for agitation  OBJECTIVE:  /80 (BP Location: Left arm, Patient Position: Sitting, Cuff Size: Large)   Ht 5' 3" (1 6 m)   Wt 90 3 kg (199 lb)   BMI 35 25 kg/m²  Body mass index is 35 25 kg/m²      Physical exam:  Physical Exam  General:  no acute distress at this time  Skin:  No acute rash  Eyes:  No scleral icterus and noninjected  ENT:  mucous membranes moist  Neck:  no carotid bruits  Chest:  Clear to auscultation percussion, good respiratory effort, no use of accessory respiratory muscles  CVS:  Regular rate and rhythm without rub  Abdomen:  soft and nontender   Extremities:  No clubbing, no cyanosis, no significant lower extremity edema  Neuro:  No gross focality  Psych:  Alert , cooperative     Medications:    Current Outpatient Medications:   •  albuterol (PROVENTIL HFA,VENTOLIN HFA) 90 mcg/act inhaler, Inhale 2 puffs 4 (four) times a day as needed, Disp: , Rfl:   •  atorvastatin (LIPITOR) 10 mg tablet, TAKE 1 TABLET BY MOUTH  DAILY AT BEDTIME, Disp: 90 tablet, Rfl: 3  •  Cholecalciferol (VITAMIN D3) 400 units CAPS, Take 1,000 Units by mouth, Disp: , Rfl:   •  Combigan 0 2-0 5 %, , Disp: , Rfl:   •  levothyroxine 50 mcg tablet, Take 1 tablet (50 mcg total) by mouth daily, Disp: 90 tablet, Rfl: 1  •  multivitamin-iron-minerals-folic acid (CENTRUM) chewable tablet, Chew 1 tablet daily, Disp: , Rfl:   •  pantoprazole (PROTONIX) 40 mg tablet, Take 1 tablet (40 mg total) by mouth daily, Disp: 90 tablet, Rfl: 1  •  polyethylene glycol (MIRALAX) 17 g packet, Take 17 g by mouth daily as needed (constipation), Disp: , Rfl: 0  •  sodium chloride 1 g tablet, Take 1 tablet (1 g total) by mouth 2 (two) times a day with meals, Disp: 60 tablet, Rfl: 0  •  latanoprost (XALATAN) 0 005 % ophthalmic solution, Administer 1 drop to both eyes 2 (two) times a day, Disp: , Rfl:   No current facility-administered medications for this visit  Allergies: Allergies as of 11/30/2022   • (No Known Allergies)       The following portions of the patient's history were reviewed and updated as appropriate: past family history, past surgical history and problem list     Laboratory Results:  Lab Results   Component Value Date    SODIUM 136 11/23/2022    K 4 1 11/23/2022     11/23/2022    CO2 23 11/23/2022    BUN 14 11/23/2022    CREATININE 0 68 11/23/2022    GLUC 92 11/12/2022    CALCIUM 9 5 11/23/2022        Lab Results   Component Value Date    PTH 34 9 09/27/2022    CALCIUM 9 5 11/23/2022    PHOS 3 2 11/11/2022       Portions of the record may have been created with voice recognition software    Occasional wrong word or "sound a like" substitutions may have occurred due to the inherent limitations of voice recognition software  Read the chart carefully and recognize, using context, where substitutions have occurred

## 2022-11-30 NOTE — PATIENT INSTRUCTIONS
Thank you for coming to your visit today  As we discussed you kidney function is normal  Your sodium is 136mEq/L   Please follow the recommendations below       Avoid nonsteroidal anti-inflammatory drugs such as Naprosyn, ibuprofen, Aleve, Advil, Celebrex, Meloxicam (Mobic) etc   You can use Tylenol as needed if you do not have any liver condition to be concerned about    Try to avoid medications such as pantoprazole or  Protonix/Nexium or Esomeprazole)/Prilosec or omeprazole/Prevacid or lansoprazole/AcipHex or Rabeprazole  If you are able to, use Pepcid as this is safer for your kidneys      Try to exercise at least 30 minutes 3 days a week to begin with with an ultimate goal of 5 days a week for at least 30 minutes    Decrease to 1gr a day         Lakeshia Mcmahan MD  Nephrology Attending

## 2022-12-02 ENCOUNTER — HOSPITAL ENCOUNTER (OUTPATIENT)
Dept: GASTROENTEROLOGY | Facility: AMBULARY SURGERY CENTER | Age: 82
Setting detail: OUTPATIENT SURGERY
End: 2022-12-02

## 2022-12-02 ENCOUNTER — ANESTHESIA (OUTPATIENT)
Dept: GASTROENTEROLOGY | Facility: AMBULARY SURGERY CENTER | Age: 82
End: 2022-12-02

## 2022-12-02 ENCOUNTER — ANESTHESIA EVENT (OUTPATIENT)
Dept: GASTROENTEROLOGY | Facility: AMBULARY SURGERY CENTER | Age: 82
End: 2022-12-02

## 2022-12-02 VITALS
RESPIRATION RATE: 18 BRPM | HEART RATE: 49 BPM | BODY MASS INDEX: 34.38 KG/M2 | SYSTOLIC BLOOD PRESSURE: 121 MMHG | HEIGHT: 63 IN | DIASTOLIC BLOOD PRESSURE: 70 MMHG | TEMPERATURE: 96.7 F | OXYGEN SATURATION: 98 % | WEIGHT: 194 LBS

## 2022-12-02 DIAGNOSIS — K59.00 CONSTIPATION, UNSPECIFIED CONSTIPATION TYPE: ICD-10-CM

## 2022-12-02 DIAGNOSIS — Z86.010 HISTORY OF COLON POLYPS: ICD-10-CM

## 2022-12-02 RX ORDER — PROPOFOL 10 MG/ML
INJECTION, EMULSION INTRAVENOUS AS NEEDED
Status: DISCONTINUED | OUTPATIENT
Start: 2022-12-02 | End: 2022-12-02

## 2022-12-02 RX ORDER — SODIUM CHLORIDE, SODIUM LACTATE, POTASSIUM CHLORIDE, CALCIUM CHLORIDE 600; 310; 30; 20 MG/100ML; MG/100ML; MG/100ML; MG/100ML
INJECTION, SOLUTION INTRAVENOUS CONTINUOUS PRN
Status: DISCONTINUED | OUTPATIENT
Start: 2022-12-02 | End: 2022-12-02

## 2022-12-02 RX ORDER — GLYCOPYRROLATE 0.2 MG/ML
INJECTION INTRAMUSCULAR; INTRAVENOUS AS NEEDED
Status: DISCONTINUED | OUTPATIENT
Start: 2022-12-02 | End: 2022-12-02

## 2022-12-02 RX ADMIN — PROPOFOL 50 MG: 10 INJECTION, EMULSION INTRAVENOUS at 09:17

## 2022-12-02 RX ADMIN — SODIUM CHLORIDE, SODIUM LACTATE, POTASSIUM CHLORIDE, AND CALCIUM CHLORIDE: .6; .31; .03; .02 INJECTION, SOLUTION INTRAVENOUS at 08:58

## 2022-12-02 RX ADMIN — PROPOFOL 50 MG: 10 INJECTION, EMULSION INTRAVENOUS at 09:08

## 2022-12-02 RX ADMIN — PROPOFOL 70 MG: 10 INJECTION, EMULSION INTRAVENOUS at 09:01

## 2022-12-02 RX ADMIN — PROPOFOL 30 MG: 10 INJECTION, EMULSION INTRAVENOUS at 09:04

## 2022-12-02 RX ADMIN — GLYCOPYRROLATE 0.2 MG: 0.2 INJECTION, SOLUTION INTRAMUSCULAR; INTRAVENOUS at 09:05

## 2022-12-02 RX ADMIN — PROPOFOL 50 MG: 10 INJECTION, EMULSION INTRAVENOUS at 09:12

## 2022-12-02 NOTE — ANESTHESIA POSTPROCEDURE EVALUATION
Post-Op Assessment Note    CV Status:  Stable  Pain Score: 0    Pain management: adequate     Mental Status:  Alert and awake   Hydration Status:  Euvolemic   PONV Controlled:  Controlled   Airway Patency:  Patent      Post Op Vitals Reviewed: Yes      Staff: CRNA         No notable events documented      BP   90/56   Temp     Pulse 54   Resp   14   SpO2   99

## 2022-12-02 NOTE — H&P
History and Physical - [facility]  Marco Kelly 79 y o  female MRN: [id number]    HPI: Marco Kelly is a 79y o  year old female who presents for evaluation of change in bowel habits  Review of Systems    Historical Information   Past Medical History:   Diagnosis Date   • Cancer [facility])    • Colon polyp    • Disease of thyroid gland    • Endometrial cancer [facility])     age 45   • 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   • Esophageal reflux    • GERD (gastroesophageal reflux disease)    • History of ovarian cancer 04/28/2022   • Hypertension    • Hypothyroid    • Kidney stone    • Obesity    • Prediabetes 05/12/2022   • Rosacea 05/12/2022     Past Surgical History:   Procedure Laterality Date   • CARDIAC CATHETERIZATION Left 09/01/2022    Procedure: Cardiac Left Heart Cath;  Surgeon: Randall Ramos MD;  [facility]; Service: Cardiology   • CATARACT EXTRACTION     • CATARACT EXTRACTION, BILATERAL     • COLONOSCOPY  08/2018    polyp- 5 years   • CYSTOSCOPY     • FL RETROGRADE PYELOGRAM  10/15/2021   • HERNIA REPAIR     • JOINT REPLACEMENT     • KNEE ARTHROPLASTY     • FL CYSTO/URETERO W/LITHOTRIPSY &INDWELL STENT INSRT Left 10/15/2021    Procedure: CYSTOSCOPY URETEROSCOPY WITH LITHOTRIPSY HOLMIUM LASER, RETROGRADE PYELOGRAM, BASKET STONE EXTRACTION AND EXCHANGE STENT URETERAL;  Surgeon: Sallie Quiroz MD;  Location: [facility];  Service: Urology   • FL CYSTOURETHROSCOPY,URETER CATHETER Left 08/24/2021    Procedure: CYSTOSCOPY WITH INSERTION STENT URETERAL;  Surgeon: Edy Orellana MD;  Location: [facility];  Service: Urology   • FL ESOPHAGOGASTRODUODENOSCOPY TRANSORAL DIAGNOSTIC N/A 03/05/2021    Procedure: ESOPHAGOGASTRODUODENOSCOPY (EGD);   Surgeon: Edy Sen MD;  [facility];  Service: Thoracic   • FL LAP, REPAIR PARAESOPHAGEAL HERNIA, INCL FUNDOPLASTY W/O MESH N/A 03/05/2021    Procedure: REPAIR HERNIA PARAESOPHAGEAL LAPAROSCOPIC W ROBOTICS WITH MESH, DOOR FUNDOPLICATION;  Surgeon: Arlin Karimi MD;  Location: [facility];  Service: Thoracic   • TOTAL ABDOMINAL HYSTERECTOMY      age 45   • TOTAL ABDOMINAL HYSTERECTOMY W/ BILATERAL SALPINGOOPHORECTOMY      endometrial cancer     Social History   Social History     Substance and Sexual Activity   Alcohol Use Not Currently    Comment: socially     Social History     Substance and Sexual Activity   Drug Use No     Social History     Tobacco Use   Smoking Status Former   • Packs/day: 0 25   • Years: 5 00   • Pack years: 1 25   • Types: Cigarettes   • Start date: 1959   • Quit date: 1964   • Years since quittin 9   Smokeless Tobacco Never   Tobacco Comments    Quit 50+ years ago 3/24/21     Family History   Problem Relation Age of Onset   • Stomach cancer Mother 71   • Pancreatic cancer Mother 71   • Heart attack Father    • Cancer Maternal Uncle    • No Known Problems Daughter    • No Known Problems Maternal Grandmother    • No Known Problems Maternal Grandfather    • No Known Problems Paternal Grandmother    • No Known Problems Paternal Grandfather    • No Known Problems Maternal Aunt    • No Known Problems Maternal Aunt        Meds/Allergies     (Not in a hospital admission)      No Known Allergies    Objective     /73   Pulse (!) 48   Temp (!) 97 °F (36 1 °C) (Temporal)   Resp 12   Ht 5' 3" (1 6 m)   Wt 88 kg (194 lb)   SpO2 97%   BMI 34 37 kg/m²       PHYSICAL EXAM    Gen: NAD  CV: RRR  CHEST: Clear  ABD: soft, NT/ND  EXT: no edema  Neuro: AAO      ASSESSMENT/PLAN:  This is a 79y o  year old female here for evaluation of change in bowel habits, previous colonoscopy in 2018 with polyps  PLAN:   Procedure:  Colonoscopy

## 2022-12-02 NOTE — ANESTHESIA PREPROCEDURE EVALUATION
Procedure:  COLONOSCOPY    Relevant Problems   ANESTHESIA   (+) PONV (postoperative nausea and vomiting)      CARDIO   (+) Ascending aortic aneurysm   (+) Hypercholesterolemia   (+) Hypertension   (+) Hypertension, essential, benign      ENDO   (+) Acquired hypothyroidism      GI/HEPATIC   (+) Gastroesophageal reflux disease with esophagitis without hemorrhage   (+) Large hiatal hernia      /RENAL   (+) Obstruction of left ureteropelvic junction (UPJ) due to stone      NEURO/PSYCH   (+) History of ovarian cancer        Physical Exam    Airway    Mallampati score: II  TM Distance: >3 FB  Neck ROM: full     Dental       Cardiovascular  Cardiovascular exam normal    Pulmonary  Pulmonary exam normal     Other Findings        Anesthesia Plan  ASA Score- 3     Anesthesia Type- IV sedation with anesthesia with ASA Monitors  Additional Monitors:   Airway Plan:           Plan Factors-Exercise tolerance (METS): >4 METS  Chart reviewed  EKG reviewed  Imaging results reviewed  Existing labs reviewed  Patient summary reviewed  Induction- intravenous  Postoperative Plan- Plan for postoperative opioid use  Planned trial extubation    Informed Consent- Anesthetic plan and risks discussed with patient  I personally reviewed this patient with the CRNA  Discussed and agreed on the Anesthesia Plan with the CRNA  Stevan Lynn

## 2022-12-05 ENCOUNTER — TELEPHONE (OUTPATIENT)
Dept: HEMATOLOGY ONCOLOGY | Facility: CLINIC | Age: 82
End: 2022-12-05

## 2022-12-05 NOTE — TELEPHONE ENCOUNTER
2nd attempt at trying to reach the patient  Left message for patient to call the office to schedule a NP appt with Thoracic Surgery

## 2022-12-06 ENCOUNTER — HOSPITAL ENCOUNTER (OUTPATIENT)
Dept: RADIOLOGY | Age: 82
Discharge: HOME | End: 2022-12-06

## 2022-12-06 DIAGNOSIS — Z78.0 POST-MENOPAUSAL: ICD-10-CM

## 2022-12-07 ENCOUNTER — TELEPHONE (OUTPATIENT)
Dept: FAMILY MEDICINE CLINIC | Facility: CLINIC | Age: 82
End: 2022-12-07

## 2022-12-07 NOTE — TELEPHONE ENCOUNTER
----- Message from Natali Garnica MD sent at 12/7/2022  8:18 AM EST -----  Please let pt know the bone density result show weak bone mass, osteopenia, compare to 2018 there has been 8% decreased   Will discuss treatmetn at office visit 1/26

## 2022-12-27 ENCOUNTER — TELEPHONE (OUTPATIENT)
Dept: GASTROENTEROLOGY | Facility: CLINIC | Age: 82
End: 2022-12-27

## 2022-12-27 NOTE — TELEPHONE ENCOUNTER
----- Message from Vivian Pedroza sent at 12/20/2022  9:59 AM EST -----    ----- Message -----  From: Desi Lama MD  Sent: 12/20/2022   8:04 AM EST  To: [facility]    Good morning,  I reviewed the results of the biopsies  He had multiple polyps that were precancerous  They were removed completely  No focus of cancer  Given age greater than 76, would hold off on any further colonoscopies      Thank you  Dr Serna

## 2023-01-10 ENCOUNTER — OFFICE VISIT (OUTPATIENT)
Dept: CARDIOLOGY CLINIC | Facility: CLINIC | Age: 83
End: 2023-01-10

## 2023-01-10 VITALS
WEIGHT: 197.8 LBS | BODY MASS INDEX: 35.04 KG/M2 | OXYGEN SATURATION: 99 % | SYSTOLIC BLOOD PRESSURE: 115 MMHG | DIASTOLIC BLOOD PRESSURE: 70 MMHG | HEART RATE: 56 BPM

## 2023-01-10 DIAGNOSIS — R94.31 ABNORMAL EKG: ICD-10-CM

## 2023-01-10 DIAGNOSIS — E78.00 HYPERCHOLESTEROLEMIA: ICD-10-CM

## 2023-01-10 DIAGNOSIS — I10 PRIMARY HYPERTENSION: ICD-10-CM

## 2023-01-10 DIAGNOSIS — I10 HYPERTENSION, ESSENTIAL, BENIGN: Primary | ICD-10-CM

## 2023-01-10 DIAGNOSIS — I71.21 ANEURYSM OF ASCENDING AORTA WITHOUT RUPTURE: ICD-10-CM

## 2023-01-10 NOTE — PROGRESS NOTES
Cardiology Follow Up    Asha Don  1940  [id number]  [address]  [phone]    1  Hypertension, essential, benign        2  Aneurysm of ascending aorta without rupture        3  Primary hypertension        4  Hypercholesterolemia        5  Abnormal EKG            Interval History: Cardiology follow-up  Patient was last seen by myself on 12/20  Patient was recently admitted on 11/22 with cholelithiasis she does have significant gastroparesis, no surgery was done  She is considering hiatal hernia surgery  Currently patient has not no pain or dyspnea  She rides a stationary bike almost every day no exertional symptoms  She did undergo catheterization last year, personally reviewed because of non MI troponin elevations and atypical discomfort  There is mild atherosclerosis in the LAD and RCA system nonobstructive  Patient was hyponatremic, this improved with sodium supplementation  States been compliant with low-cholesterol diet  We will lipid profile cholesterol 138 HDL 57, LDL 65, adequate control on medium intensity statin therapy      Patient Active Problem List   Diagnosis   • Hypertension, essential, benign   • Hypercholesterolemia   • Ascending aortic aneurysm   • Abnormal EKG   • Large hiatal hernia   • Morbid obesity (HCC)   • Left upper quadrant abdominal pain, nausea and vomiting   • Gastroesophageal reflux disease with esophagitis without hemorrhage   • Acquired hypothyroidism   • Obstruction of left ureteropelvic junction (UPJ) due to stone   • Hypokalemia   • Disorder of uvula   • History of ovarian cancer   • Rosacea   • Constipation   • Bilateral hearing loss   • COVID-19   • Epigastric abdominal pain   • Dyspepsia   • Weight loss   • Hypertension   • Hyponatremia   • Gallstones   • Elevated troponin   • PONV (postoperative nausea and vomiting)   • Gastroparesis   • Hypomagnesemia     Past Medical History:   Diagnosis Date   • Cancer [facility])    • Colon polyp    • Disease of thyroid gland    • Endometrial cancer [facility])     age 45   • 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[facility] [address] )        • Esophageal reflux    • GERD (gastroesophageal reflux disease)    • History of ovarian cancer 2022   • Hypertension    • Hypothyroid    • Kidney stone    • Obesity    • Prediabetes 2022   • Rosacea 2022     Social History     Socioeconomic History   • Marital status: /Civil Union     Spouse name: Not on file   • Number of children: Not on file   • Years of education: Not on file   • Highest education level: Not on file   Occupational History   • Not on file   Tobacco Use   • Smoking status: Former     Packs/day: 0 25     Years: 5 00     Pack years: 1 25     Types: Cigarettes     Start date: 1959     Quit date: 1964     Years since quittin 0   • Smokeless tobacco: Never   • Tobacco comments:     Quit 50+ years ago 3/24/21   Vaping Use   • Vaping Use: Never used   Substance and Sexual Activity   • Alcohol use: Not Currently     Comment: socially   • Drug use: No   • Sexual activity: Not Currently     Partners: Male     Birth control/protection: Post-menopausal     Comment:    Other Topics Concern   • Not on file   Social History Narrative    Exercising regularly     Social Determinants of Health     Financial Resource Strain: Not on file   Food Insecurity: Not on file   Transportation Needs: Not on file   Physical Activity: Not on file   Stress: Not on file   Social Connections: Not on file   Intimate Partner Violence: Not on file   Housing Stability: Not on file      Family History   Problem Relation Age of Onset   • Stomach cancer Mother 71   • Pancreatic cancer Mother 71   • Heart attack Father    • Cancer Maternal Uncle    • No Known Problems Daughter    • No Known Problems Maternal Grandmother    • No Known Problems Maternal Grandfather    • No Known Problems Paternal Grandmother    • No Known Problems Paternal Grandfather    • No Known Problems Maternal Aunt    • No Known Problems Maternal Aunt      Past Surgical History:   Procedure Laterality Date   • CARDIAC CATHETERIZATION Left 09/01/2022    Procedure: Cardiac Left Heart Cath;  Surgeon: Wil Sam MD;  [facility]; Service: Cardiology   • CATARACT EXTRACTION     • CATARACT EXTRACTION, BILATERAL     • COLONOSCOPY  08/2018    polyp- 5 years   • CYSTOSCOPY     • FL RETROGRADE PYELOGRAM  10/15/2021   • HERNIA REPAIR     • JOINT REPLACEMENT     • KNEE ARTHROPLASTY     • KS CYSTO/URETERO W/LITHOTRIPSY &INDWELL STENT INSRT Left 10/15/2021    Procedure: CYSTOSCOPY URETEROSCOPY WITH LITHOTRIPSY HOLMIUM LASER, RETROGRADE PYELOGRAM, BASKET STONE EXTRACTION AND EXCHANGE STENT URETERAL;  Surgeon: Julee Peabody, MD;  Location: [facility];  Service: Urology   • KS CYSTOURETHROSCOPY,URETER CATHETER Left 08/24/2021    Procedure: CYSTOSCOPY WITH INSERTION STENT URETERAL;  Surgeon: Shady Reyes MD;  Location[facility];  Service: Urology   • KS ESOPHAGOGASTRODUODENOSCOPY TRANSORAL DIAGNOSTIC N/A 03/05/2021    Procedure: ESOPHAGOGASTRODUODENOSCOPY (EGD);   Surgeon: Thomas Pena MD;  [facility];  Service: Thoracic   • KS LAP, REPAIR PARAESOPHAGEAL HERNIA, INCL FUNDOPLASTY W/O MESH N/A 03/05/2021    Procedure: REPAIR HERNIA PARAESOPHAGEAL LAPAROSCOPIC W ROBOTICS WITH MESH, DOOR FUNDOPLICATION;  Surgeon: Thomas Pena MD;  Location[facility];  Service: Thoracic   • TOTAL ABDOMINAL HYSTERECTOMY      age 45   • TOTAL ABDOMINAL HYSTERECTOMY W/ BILATERAL SALPINGOOPHORECTOMY  1992    endometrial cancer       Current Outpatient Medications:   •  albuterol (PROVENTIL HFA,VENTOLIN HFA) 90 mcg/act inhaler, Inhale 2 puffs 4 (four) times a day as needed, Disp: , Rfl:   •  atorvastatin (LIPITOR) 10 mg tablet, TAKE 1 TABLET BY MOUTH  DAILY AT BEDTIME, Disp: 90 tablet, Rfl: 3  •  Cholecalciferol (VITAMIN D3) 400 units CAPS, Take 1,000 Units by mouth, Disp: , Rfl:   •  Combigan 0 2-0 5 %, , Disp: , Rfl:   •  latanoprost (XALATAN) 0 005 % ophthalmic solution, Administer 1 drop to both eyes 2 (two) times a day, Disp: , Rfl:   •  levothyroxine 50 mcg tablet, Take 1 tablet (50 mcg total) by mouth daily, Disp: 90 tablet, Rfl: 1  •  multivitamin-iron-minerals-folic acid (CENTRUM) chewable tablet, Chew 1 tablet daily, Disp: , Rfl:   •  pantoprazole (PROTONIX) 40 mg tablet, Take 1 tablet (40 mg total) by mouth daily, Disp: 90 tablet, Rfl: 1  •  polyethylene glycol (MIRALAX) 17 g packet, Take 17 g by mouth daily as needed (constipation), Disp: , Rfl: 0  •  sodium chloride 1 g tablet, Take 1 tablet (1 g total) by mouth 2 (two) times a day with meals, Disp: 60 tablet, Rfl: 0  No Known Allergies    Labs:  Hospital Outpatient Visit on 12/02/2022   Component Date Value   • Case Report 12/02/2022                      Value:Surgical Pathology Report                         Case: [id number]                                   Authorizing Provider:  Selene Ellsworth MD       Collected:           12/02/2022 0913              Ordering Location:     [address] [place] [address]        Received:            12/02/2022 [address] [place] [address]                                                    Pathologist:           Michelle Negrete MD                                                         Specimens:   A) - Polyp, Colorectal, hot polypectomy: hepatic flexure polyp x2                                   B) - Polyp, Colorectal, hot polypectomy: transverse colon polyp                                     C) - Polyp, Colorectal, cold polypectomy: descending colon polyp                          • Final Diagnosis 12/02/2022                      Value: This result contains rich text formatting which cannot be displayed here     • Additional Information 12/02/2022 Value:This result contains rich text formatting which cannot be displayed here  • Synoptic Checklist 12/02/2022                      Value:                            COLON/RECTUM POLYP FORM - GI - All Specimens                                                                                     :    Adenoma(s)     • Gross Description 12/02/2022                      Value: This result contains rich text formatting which cannot be displayed here     • Clinical Information 12/02/2022                      Value:· Two sessile, adenomatous-appearing polyps measuring from 5 mm up to 15 mm in the hepatic flexure; completely removed en bloc by hot snare and retrieved specimen  · One sessile, adenomatous-appearing polyp measuring 5-9 mm in the transverse colon; completely removed en bloc by hot snare and retrieved specimen  · One flat, adenomatous-appearing polyp measuring smaller than 5 mm in the descending colon; completely removed en bloc by cold snare and retrieved specimen   Appointment on 11/23/2022   Component Date Value   • Sodium 11/23/2022 136    • Potassium 11/23/2022 4 1    • Chloride 11/23/2022 103    • CO2 11/23/2022 23    • ANION GAP 11/23/2022 10    • BUN 11/23/2022 14    • Creatinine 11/23/2022 0 68    • Glucose, Fasting 11/23/2022 90    • Calcium 11/23/2022 9 5    • eGFR 11/23/2022 81    • Magnesium 11/23/2022 1 8    Admission on 11/10/2022, Discharged on 11/12/2022   Component Date Value   • Ventricular Rate 11/10/2022 61    • Atrial Rate 11/10/2022 61    • LA Interval 11/10/2022 204    • QRSD Interval 11/10/2022 92    • QT Interval 11/10/2022 468    • QTC Interval 11/10/2022 471    • P Axis 11/10/2022 19    • QRS Axis 11/10/2022 -50    • T Wave Axis 11/10/2022 63    • WBC 11/10/2022 9 91    • RBC 11/10/2022 4 99    • Hemoglobin 11/10/2022 16 6 (H)    • Hematocrit 11/10/2022 46 8 (H)    • MCV 11/10/2022 94    • MCH 11/10/2022 33 3    • MCHC 11/10/2022 35 5    • RDW 11/10/2022 12 2    • MPV 11/10/2022 11 0 • Platelets 21/79/2880 231    • nRBC 11/10/2022 0    • Neutrophils Relative 11/10/2022 78 (H)    • Immat GRANS % 11/10/2022 1    • Lymphocytes Relative 11/10/2022 15    • Monocytes Relative 11/10/2022 6    • Eosinophils Relative 11/10/2022 0    • Basophils Relative 11/10/2022 0    • Neutrophils Absolute 11/10/2022 7 67 (H)    • Immature Grans Absolute 11/10/2022 0 05    • Lymphocytes Absolute 11/10/2022 1 50    • Monocytes Absolute 11/10/2022 0 63    • Eosinophils Absolute 11/10/2022 0 04    • Basophils Absolute 11/10/2022 0 02    • Sodium 11/10/2022 126 (L)    • Potassium 11/10/2022 4 1    • Chloride 11/10/2022 90 (L)    • CO2 11/10/2022 24    • ANION GAP 11/10/2022 12    • BUN 11/10/2022 9    • Creatinine 11/10/2022 0 53 (L)    • Glucose 11/10/2022 130    • Calcium 11/10/2022 9 3    • AST 11/10/2022 25    • ALT 11/10/2022 17    • Alkaline Phosphatase 11/10/2022 118 (H)    • Total Protein 11/10/2022 7 6    • Albumin 11/10/2022 4 3    • Total Bilirubin 11/10/2022 1 04 (H)    • eGFR 11/10/2022 88    • Lipase 11/10/2022 18    • Color, UA 11/10/2022 Light Yellow    • Clarity, UA 11/10/2022 Clear    • Specific [place], UA 11/10/2022 1 017    • pH, UA 11/10/2022 7 0    • Leukocytes, UA 11/10/2022 Negative    • Nitrite, UA 11/10/2022 Negative    • Protein, UA 11/10/2022 100 (2+) (A)    • Glucose, UA 11/10/2022 Negative    • Ketones, UA 11/10/2022 60 (2+) (A)    • Urobilinogen, UA 11/10/2022 <2 0    • Bilirubin, UA 11/10/2022 Negative    • Occult Blood, UA 11/10/2022 Negative    • hs TnI 0hr 11/10/2022 20    • hs TnI 2hr 11/10/2022 33    • Delta 2hr hsTnI 11/10/2022 13    • hs TnI 4hr 11/10/2022 50 (H)    • Delta 4hr hsTnI 11/10/2022 30 (H)    • RBC, UA 11/10/2022 2-4 (A)    • WBC, UA 11/10/2022 2-4 (A)    • Epithelial Cells 11/10/2022 Occasional    • Bacteria, UA 11/10/2022 Occasional    • Hyaline Casts, UA 11/10/2022 0-3 (A)    • Platelets 17/50/7822 226    • MPV 11/10/2022 10 7    • HS TnI random 11/10/2022 76 (H) • Sodium 11/11/2022 127 (L)    • Potassium 11/11/2022 4 0    • Chloride 11/11/2022 93 (L)    • CO2 11/11/2022 26    • ANION GAP 11/11/2022 8    • BUN 11/11/2022 13    • Creatinine 11/11/2022 0 75    • Glucose 11/11/2022 95    • Calcium 11/11/2022 8 7    • AST 11/11/2022 20    • ALT 11/11/2022 14    • Alkaline Phosphatase 11/11/2022 93    • Total Protein 11/11/2022 6 2 (L)    • Albumin 11/11/2022 3 6    • Total Bilirubin 11/11/2022 0 80    • eGFR 11/11/2022 73    • Magnesium 11/11/2022 1 3 (L)    • Phosphorus 11/11/2022 3 2    • WBC 11/11/2022 7 49    • RBC 11/11/2022 4 46    • Hemoglobin 11/11/2022 14 8    • Hematocrit 11/11/2022 42 5    • MCV 11/11/2022 95    • MCH 11/11/2022 33 2    • MCHC 11/11/2022 34 8    • RDW 11/11/2022 12 7    • MPV 11/11/2022 10 3    • Platelets 54/20/9770 234    • nRBC 11/11/2022 0    • Neutrophils Relative 11/11/2022 59    • Immat GRANS % 11/11/2022 0    • Lymphocytes Relative 11/11/2022 31    • Monocytes Relative 11/11/2022 10    • Eosinophils Relative 11/11/2022 0    • Basophils Relative 11/11/2022 0    • Neutrophils Absolute 11/11/2022 4 38    • Immature Grans Absolute 11/11/2022 0 03    • Lymphocytes Absolute 11/11/2022 2 30    • Monocytes Absolute 11/11/2022 0 74    • Eosinophils Absolute 11/11/2022 0 02    • Basophils Absolute 11/11/2022 0 02    • Sodium 11/11/2022 127 (L)    • Potassium 11/11/2022 3 1 (L)    • Chloride 11/11/2022 95 (L)    • CO2 11/11/2022 23    • ANION GAP 11/11/2022 9    • BUN 11/11/2022 15    • Creatinine 11/11/2022 0 65    • Glucose 11/11/2022 136    • Calcium 11/11/2022 8 1 (L)    • eGFR 11/11/2022 82    • Osmolality Serum 11/11/2022 266 (L)    • Osmolality, Ur 11/11/2022 582    • Sodium, Ur 11/11/2022 16    • Sodium 11/12/2022 131 (L)    • Potassium 11/12/2022 3 6    • Chloride 11/12/2022 98    • CO2 11/12/2022 25    • ANION GAP 11/12/2022 8    • BUN 11/12/2022 11    • Creatinine 11/12/2022 0 59 (L)    • Glucose 11/12/2022 92    • Calcium 11/12/2022 8 4 • eGFR 11/12/2022 85    • Magnesium 11/12/2022 1 9    Appointment on 09/27/2022   Component Date Value   • TSH 3RD GENERATON 09/27/2022 2 870    • Uric Acid 09/27/2022 3 7    • PTH 09/27/2022 34 9    • Vitamin B-12 09/27/2022 628    • Vit D, 25-Hydroxy 09/27/2022 50 1    • Sodium 09/27/2022 135    • Potassium 09/27/2022 4 3    • Chloride 09/27/2022 102    • CO2 09/27/2022 26    • ANION GAP 09/27/2022 7    • BUN 09/27/2022 16    • Creatinine 09/27/2022 0 76    • Glucose, Fasting 09/27/2022 101 (H)    • Calcium 09/27/2022 9 1    • eGFR 09/27/2022 73    • Magnesium 09/27/2022 1 7    Admission on 08/31/2022, Discharged on 09/02/2022   Component Date Value   • WBC 08/31/2022 9 57    • RBC 08/31/2022 4 98    • Hemoglobin 08/31/2022 15 9 (H)    • Hematocrit 08/31/2022 45 4    • MCV 08/31/2022 91    • MCH 08/31/2022 31 9    • MCHC 08/31/2022 35 0    • RDW 08/31/2022 12 3    • MPV 08/31/2022 10 3    • Platelets 44/39/1668 288    • nRBC 08/31/2022 0    • Neutrophils Relative 08/31/2022 79 (H)    • Immat GRANS % 08/31/2022 0    • Lymphocytes Relative 08/31/2022 14    • Monocytes Relative 08/31/2022 7    • Eosinophils Relative 08/31/2022 0    • Basophils Relative 08/31/2022 0    • Neutrophils Absolute 08/31/2022 7 44    • Immature Grans Absolute 08/31/2022 0 04    • Lymphocytes Absolute 08/31/2022 1 36    • Monocytes Absolute 08/31/2022 0 71    • Eosinophils Absolute 08/31/2022 0 00    • Basophils Absolute 08/31/2022 0 02    • Sodium 08/31/2022 127 (L)    • Potassium 08/31/2022 3 2 (L)    • Chloride 08/31/2022 90 (L)    • CO2 08/31/2022 24    • ANION GAP 08/31/2022 13    • BUN 08/31/2022 8    • Creatinine 08/31/2022 0 56 (L)    • Glucose 08/31/2022 154 (H)    • Calcium 08/31/2022 9 4    • AST 08/31/2022 25    • ALT 08/31/2022 16    • Alkaline Phosphatase 08/31/2022 99    • Total Protein 08/31/2022 7 3    • Albumin 08/31/2022 4 2    • Total Bilirubin 08/31/2022 1 01 (H)    • eGFR 08/31/2022 87    • Lipase 08/31/2022 37    • hs TnI 0hr 08/31/2022 1,376 (H)    • Sodium 08/31/2022 128 (L)    • Potassium 08/31/2022 3 3 (L)    • Chloride 08/31/2022 90 (L)    • CO2 08/31/2022 26    • ANION GAP 08/31/2022 12    • BUN 08/31/2022 9    • Creatinine 08/31/2022 0 56 (L)    • Glucose 08/31/2022 125    • Calcium 08/31/2022 9 1    • eGFR 08/31/2022 87    • hs TnI 2hr 08/31/2022 1,265 (H)    • Delta 2hr hsTnI 08/31/2022 -111    • hs TnI 4hr 08/31/2022 1,026 (H)    • Delta 4hr hsTnI 08/31/2022 -350    • Sodium 08/31/2022 126 (L)    • Potassium 08/31/2022 3 4 (L)    • Chloride 08/31/2022 92 (L)    • CO2 08/31/2022 21    • ANION GAP 08/31/2022 13    • BUN 08/31/2022 11    • Creatinine 08/31/2022 0 52 (L)    • Glucose 08/31/2022 135    • Glucose, Fasting 08/31/2022 135 (H)    • Calcium 08/31/2022 9 1    • eGFR 08/31/2022 89    • PTT 08/31/2022 28    • Protime 08/31/2022 14 2    • INR 08/31/2022 1 08    • Sodium 08/31/2022 127 (L)    • Potassium 08/31/2022 3 4 (L)    • Chloride 08/31/2022 93 (L)    • CO2 08/31/2022 22    • ANION GAP 08/31/2022 12    • BUN 08/31/2022 13    • Creatinine 08/31/2022 0 63    • Glucose 08/31/2022 124    • Calcium 08/31/2022 8 7    • eGFR 08/31/2022 83    • PTT 08/31/2022 96 (H)    • Sodium 09/01/2022 129 (L)    • Potassium 09/01/2022 4 1    • Chloride 09/01/2022 93 (L)    • CO2 09/01/2022 26    • ANION GAP 09/01/2022 10    • BUN 09/01/2022 17    • Creatinine 09/01/2022 0 96    • Glucose 09/01/2022 108    • Glucose, Fasting 09/01/2022 108 (H)    • Calcium 09/01/2022 9 0    • eGFR 09/01/2022 55    • PTT 09/01/2022 87 (H)    • Magnesium 09/01/2022 1 3 (L)    • WBC 09/01/2022 9 34    • RBC 09/01/2022 4 68    • Hemoglobin 09/01/2022 15 1    • Hematocrit 09/01/2022 44 0    • MCV 09/01/2022 94    • MCH 09/01/2022 32 3    • MCHC 09/01/2022 34 3    • RDW 09/01/2022 13 0    • Platelets 82/70/4137 272    • MPV 09/01/2022 10 2    • Protime 09/01/2022 15 3 (H)    • INR 09/01/2022 1 18    • A4C EF 09/01/2022 66    • LVIDd 09/01/2022 3 70    • LVIDS 09/01/2022 2 50    • IVSd 09/01/2022 1 20    • LVPWd 09/01/2022 1 20    • FS 09/01/2022 32    • MV E' Tissue Velocity Se* 09/01/2022 6    • E wave deceleration time 09/01/2022 407    • MV Peak E [place] 09/01/2022 50    • MV Peak A [place] 09/01/2022 0 82    • MV stenosis pressure 1/2* 09/01/2022 118    • MV valve area p 1/2 meth* 09/01/2022 1 86    • TR Peak [place] 09/01/2022 2 1    • Triscuspid Valve Regurgi* 09/01/2022 18 0    • Asc Ao 09/01/2022 3 6    • Tricuspid valve peak reg* 09/01/2022 2 12    • Left ventricular stroke * 09/01/2022 37 00    • IVS 09/01/2022 1 2    • LEFT VENTRICLE SYSTOLIC * 46/93/7849 22    • LV DIASTOLIC VOLUME (MOD* 84/55/1043 60    • LVSV, 2D 09/01/2022 37    • LV EF 09/01/2022 65    • Sodium 09/01/2022 128 (L)    • Potassium 09/01/2022 3 5    • Chloride 09/01/2022 93 (L)    • CO2 09/01/2022 25    • ANION GAP 09/01/2022 10    • BUN 09/01/2022 19    • Creatinine 09/01/2022 0 96    • Glucose 09/01/2022 98    • Glucose, Fasting 09/01/2022 98    • Calcium 09/01/2022 8 8    • eGFR 09/01/2022 55    • Sodium 09/01/2022 131 (L)    • Potassium 09/01/2022 3 6    • Chloride 09/01/2022 98    • CO2 09/01/2022 24    • ANION GAP 09/01/2022 9    • BUN 09/01/2022 24    • Creatinine 09/01/2022 0 99    • Glucose 09/01/2022 116    • Calcium 09/01/2022 8 2 (L)    • eGFR 09/01/2022 53    • Ventricular Rate 08/31/2022 103    • Atrial Rate 08/31/2022 103    • MT Interval 08/31/2022 190    • QRSD Interval 08/31/2022 86    • QT Interval 08/31/2022 368    • QTC Interval 08/31/2022 482    • P Axis 08/31/2022 23    • QRS Axis 08/31/2022 -50    • T Wave Axis 08/31/2022 47    • WBC 09/02/2022 7 10    • RBC 09/02/2022 4 00    • Hemoglobin 09/02/2022 13 2    • Hematocrit 09/02/2022 38 8    • MCV 09/02/2022 97    • MCH 09/02/2022 33 0    • MCHC 09/02/2022 34 0    • RDW 09/02/2022 13 1    • MPV 09/02/2022 10 0    • Platelets 12/19/9932 216    • nRBC 09/02/2022 0    • Neutrophils Relative 09/02/2022 61    • Immat GRANS % 09/02/2022 0    • Lymphocytes Relative 09/02/2022 22    • Monocytes Relative 09/02/2022 15 (H)    • Eosinophils Relative 09/02/2022 1    • Basophils Relative 09/02/2022 1    • Neutrophils Absolute 09/02/2022 4 34    • Immature Grans Absolute 09/02/2022 0 03    • Lymphocytes Absolute 09/02/2022 1 56    • Monocytes Absolute 09/02/2022 1 08    • Eosinophils Absolute 09/02/2022 0 05    • Basophils Absolute 09/02/2022 0 04    • Sodium 09/02/2022 131 (L)    • Potassium 09/02/2022 3 8    • Chloride 09/02/2022 100    • CO2 09/02/2022 24    • ANION GAP 09/02/2022 7    • BUN 09/02/2022 26 (H)    • Creatinine 09/02/2022 0 99    • Glucose 09/02/2022 85    • Calcium 09/02/2022 8 4    • eGFR 09/02/2022 53    • Magnesium 09/02/2022 1 9    • Cortisol, Random 09/02/2022 12 8    • Osmolality Serum 09/02/2022 277 (L)    • Free T4 09/02/2022 1 03    • TSH 3RD GENERATON 09/02/2022 4 488    • Uric Acid 09/02/2022 4 5    • Renin 09/02/2022 0 780    • Aldosterone 09/02/2022 <1 0    • Aldos/Renin Ratio 09/02/2022 <1 3    • Sodium, Ur 09/02/2022 12    Admission on 08/30/2022, Discharged on 08/30/2022   Component Date Value   • WBC 08/30/2022 9 31    • RBC 08/30/2022 4 50    • Hemoglobin 08/30/2022 15 0    • Hematocrit 08/30/2022 41 4    • MCV 08/30/2022 92    • MCH 08/30/2022 33 3    • MCHC 08/30/2022 36 2    • RDW 08/30/2022 12 4    • MPV 08/30/2022 10 6    • Platelets 37/22/1112 255    • nRBC 08/30/2022 0    • Neutrophils Relative 08/30/2022 83 (H)    • Immat GRANS % 08/30/2022 1    • Lymphocytes Relative 08/30/2022 11 (L)    • Monocytes Relative 08/30/2022 5    • Eosinophils Relative 08/30/2022 0    • Basophils Relative 08/30/2022 0    • Neutrophils Absolute 08/30/2022 7 73 (H)    • Immature Grans Absolute 08/30/2022 0 05    • Lymphocytes Absolute 08/30/2022 1 02    • Monocytes Absolute 08/30/2022 0 47    • Eosinophils Absolute 08/30/2022 0 01    • Basophils Absolute 08/30/2022 0 03    • Sodium 08/30/2022 127 (L)    • Potassium 08/30/2022 3 5    • Chloride 08/30/2022 93 (L)    • CO2 08/30/2022 23    • ANION GAP 08/30/2022 11    • BUN 08/30/2022 9    • Creatinine 08/30/2022 0 54 (L)    • Glucose 08/30/2022 152 (H)    • Calcium 08/30/2022 9 0    • AST 08/30/2022 24    • ALT 08/30/2022 15    • Alkaline Phosphatase 08/30/2022 96    • Total Protein 08/30/2022 6 8    • Albumin 08/30/2022 4 0    • Total Bilirubin 08/30/2022 0 92    • eGFR 08/30/2022 88    • Lipase 08/30/2022 16    • Ventricular Rate 08/30/2022 59    • Atrial Rate 08/30/2022 59    • MD Interval 08/30/2022 216    • QRSD Interval 08/30/2022 92    • QT Interval 08/30/2022 470    • QTC Interval 08/30/2022 465    • P Axis 08/30/2022 28    • QRS Axis 08/30/2022 -30    • T Wave [place] 08/30/2022 60    Hospital Outpatient Visit on 07/26/2022   Component Date Value   • LA size 07/26/2022 3 4    • LVPWd 07/26/2022 1 20    • Left Atrium Area-systoli* 07/26/2022 23 9    • Left Atrium Area-systoli* 07/26/2022 19 8    • MV E' Tissue Velocity Se* 07/26/2022 6    • IVSd 07/26/2022 5 55    • LV DIASTOLIC VOLUME (MOD* 30/40/4329 45    • LEFT VENTRICLE SYSTOLIC * 52/95/7198 12    • Left ventricular stroke * 07/26/2022 32 00    • A4C EF 07/26/2022 71    • LA length (A2C) 07/26/2022 6 40    • LVIDd 07/26/2022 3 30    • IVS 07/26/2022 1 2    • LVIDS 07/26/2022 2 00    • FS 07/26/2022 39    • Ao root 07/26/2022 3 40    • RVID d 07/26/2022 3 3    • MV valve area p 1/2 meth* 07/26/2022 2 72    • E wave deceleration time 07/26/2022 280    • MV Peak E Rashawn 07/26/2022 55    • MV Peak A Rashawn 07/26/2022 0 88    • WERNER A4C 07/26/2022 17 9    • RAA A4C 07/26/2022 10 3    • MV stenosis pressure 1/2* 07/26/2022 81    • LVSV, 2D 07/26/2022 32    • LV EF 07/26/2022 60    • Asc Ao 07/26/2022 3 9      Imaging: No results found  Review of Systems:  Review of Systems   Respiratory: Negative for apnea, shortness of breath, wheezing and stridor  Cardiovascular: Negative for chest pain, palpitations and leg swelling  Gastrointestinal: Positive for abdominal pain  Musculoskeletal: Positive for arthralgias and gait problem  Neurological: Negative for syncope  Hematological: Does not bruise/bleed easily  Psychiatric/Behavioral: Negative for sleep disturbance  Physical Exam:  Physical Exam  Vitals reviewed  Constitutional:       General: She is not in acute distress  Appearance: Normal appearance  She is obese  She is not ill-appearing, toxic-appearing or diaphoretic  Eyes:      General: No scleral icterus  Neck:      Vascular: No carotid bruit  Cardiovascular:      Rate and Rhythm: Normal rate and regular rhythm  Pulses: Normal pulses  Heart sounds: Normal heart sounds  No murmur heard  No friction rub  No gallop  Pulmonary:      Effort: Pulmonary effort is normal  No respiratory distress  Breath sounds: Normal breath sounds  No stridor  No wheezing, rhonchi or rales  Musculoskeletal:      Right lower leg: No edema  Left lower leg: No edema  Skin:     General: Skin is warm and dry  Capillary Refill: Capillary refill takes less than 2 seconds  Coloration: Skin is not jaundiced or pale  Findings: No bruising or erythema  Neurological:      Mental Status: She is alert  Psychiatric:         Mood and Affect: Mood normal          Discussion/Summary: Dyslipidemia, well controlled on current medical regimen  Mild coronary atherosclerosis on catheterization last year as described above  Previous stress test few years prior submaximal only 3 minutes on a [place] protocol nondiagnostic  Echocardiography last year during her hospitalization, revealed normal left diastolic function with reported mildly dilated ascending aorta normal aortic root     39 mm CAT scan 2021 revealed coronary calcifications, aorta was described as normal however  Regular exercise with weight management recommended  No new recommendations  Patient will be cleared for abdominal surgery    They decided to go forth  This note was completed in part utilizing m-modal fluency direct voice recognition software  Grammatical errors, random word insertion, spelling mistakes, and incomplete sentences may be an occasional consequence of the system secondary to software limitations, ambient noise and hardware issues  At the time of dictation, efforts were made to edit, clarify and /or correct errors  Please read the chart carefully and recognize, using context, where substitutions have occurred  If you have any questions or concerns about the context, text or information contained within the body of this dictation, please contact myself, the provider, for further clarification

## 2023-01-11 ENCOUNTER — OFFICE VISIT (OUTPATIENT)
Dept: CARDIAC SURGERY | Facility: CLINIC | Age: 83
End: 2023-01-11

## 2023-01-11 VITALS
WEIGHT: 196.21 LBS | TEMPERATURE: 97.9 F | DIASTOLIC BLOOD PRESSURE: 83 MMHG | BODY MASS INDEX: 34.77 KG/M2 | HEIGHT: 63 IN | SYSTOLIC BLOOD PRESSURE: 140 MMHG | RESPIRATION RATE: 17 BRPM

## 2023-01-11 DIAGNOSIS — K44.9 HIATAL HERNIA: ICD-10-CM

## 2023-01-11 DIAGNOSIS — K44.9 LARGE HIATAL HERNIA: Primary | ICD-10-CM

## 2023-01-11 DIAGNOSIS — R10.13 EPIGASTRIC PAIN: ICD-10-CM

## 2023-01-11 NOTE — ASSESSMENT & PLAN NOTE
We had a discussion with Amanda Rock after personally reviewing her testing and imaging  Her GERD-HRQL score today was a 1  She really has no symptoms at all, relating to this recurrent hernia, which is significantly smaller than what it was before her original surgery, which was almost 2 years ago  Since she is not having any symptoms, Dr Ana Payne is not recommending any surgical intervention at this time  She should return to our office if she develops any of the symptoms mentioned in the HPI, such as early satiety, regurgitation, nausea, vomiting, or dysphagia  The patient is in complete agreement with the plan

## 2023-01-11 NOTE — PROGRESS NOTES
Thoracic Consult  Assessment/Plan:    Large hiatal hernia  We had a discussion with Marina Bowles after personally reviewing her testing and imaging  Her GERD-HRQL score today was a 1  She really has no symptoms at all, relating to this recurrent hernia, which is significantly smaller than what it was before her original surgery, which was almost 2 years ago  Since she is not having any symptoms, Dr Catarino Sapp is not recommending any surgical intervention at this time  She should return to our office if she develops any of the symptoms mentioned in the HPI, such as early satiety, regurgitation, nausea, vomiting, or dysphagia  The patient is in complete agreement with the plan  Diagnoses and all orders for this visit:    Large hiatal hernia    Epigastric pain  -     Ambulatory Referral to Thoracic Surgery    Hiatal hernia  -     Ambulatory Referral to Thoracic Surgery             Thoracic History      Problem:Paraesophageal hernia   Procedure: EGD, Robotic repair of paraesophageal hernia with mesh and Albino fundoplication on 3/5/72  Pathology: Anterior fat pad and portion of paraesophageal hernia sac revealed mesothelium lined fibrovascular tissue, consistent with hernia sac  3 benign lymph nodes               Patient ID: Rhea Leslie is a 79 y o  female  ECOG 1     HPI     Ms Sandy Ureña is an 80 yo female with a history of GERD, hypothyroidism, HTN, Ascending Aortic Aneurysm, b/l hearing loss, HLD, h/o Ovarian cancer who was referred to our office by Dr Marshall Ahmadi for epigastric pain and a hiatal hernia  However, she did undergo a robotic PEH repair with Albino and mesh on 3/5/21 by Dr Catarino Sapp  Barium swallow from 10/6/22 revealed reflux to the level of proximal esophagus and a moderate to large hiatal hernia  Gastric emptying study from 10/22/22 revealed severely decreased rate of gastric emptying, with only minimal appreciable gastric emptying demonstrated by 4 hours   CT scan of the A/P from 11/10/22 revealed a small to moderate hiatal hernia with findings suggesting GERD and esophagitis, similar to CT from 8/30/22  EGD from 9/2/22 revealed medium sized hiatal hernia and moderate amt of food bolus in the stomach, suggestive of underlying gastroparesis  On discussion, she does spit up mucous only at night, approximately 3x a week  She otherwise eats whatever she wants  She is eating smaller meals at a slower pace, which helps with her symptoms  She denies dysphagia, weight loss, early satiety, nausea, vomiting, abdominal bloating, regurgitation, or heartburn  She did have two admissions for abdominal pain since August 2022  She is not on any blood thinners  She has not had any other abdominal surgeries  The following portions of the patient's history were reviewed and updated as appropriate: allergies, current medications, past family history, past medical history, past social history, past surgical history and problem list     Past Medical History:   Diagnosis Date   • Cancer [facility])    • Colon polyp    • Disease of thyroid gland    • Endometrial cancer [facility])     age 45   • 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   • Esophageal reflux    • GERD (gastroesophageal reflux disease)    • History of ovarian cancer 04/28/2022   • Hypertension    • Hypothyroid    • Kidney stone    • Obesity    • Prediabetes 05/12/2022   • Rosacea 05/12/2022      Past Surgical History:   Procedure Laterality Date   • CARDIAC CATHETERIZATION Left 09/01/2022    Procedure: Cardiac Left Heart Cath;  Surgeon: Karen Marino MD;  [facility];   Service: Cardiology   • CATARACT EXTRACTION     • CATARACT EXTRACTION, BILATERAL     • COLONOSCOPY  08/2018    polyp- 5 years   • CYSTOSCOPY     • FL RETROGRADE PYELOGRAM  10/15/2021   • HERNIA REPAIR     • JOINT REPLACEMENT     • KNEE ARTHROPLASTY     • ND CYSTO BLADDER W/URETERAL CATHETERIZATION Left 08/24/2021    Procedure: CYSTOSCOPY WITH INSERTION STENT URETERAL; Surgeon: Maria Victoria Valdes MD;  Location: [facility];  Service: Urology   • IA CYSTO/URETERO W/LITHOTRIPSY &INDWELL STENT INSRT Left 10/15/2021    Procedure: CYSTOSCOPY URETEROSCOPY WITH LITHOTRIPSY HOLMIUM LASER, RETROGRADE PYELOGRAM, BASKET STONE EXTRACTION AND EXCHANGE STENT URETERAL;  Surgeon: Cosme Sheikh MD;  Location: [facility];  Service: Urology   • IA ESOPHAGOGASTRODUODENOSCOPY TRANSORAL DIAGNOSTIC N/A 2021    Procedure: ESOPHAGOGASTRODUODENOSCOPY (EGD);   Surgeon: Denys Jauregui MD;  Location[facility];  Service: Thoracic   • IA LAPS RPR PARAESPHGL HRNA INCL FUNDPLSTY W/O MESH N/A 2021    Procedure: REPAIR HERNIA PARAESOPHAGEAL LAPAROSCOPIC W ROBOTICS WITH MESH, DOOR FUNDOPLICATION;  Surgeon: Denys Jauregui MD;  Location[facility];  Service: Thoracic   • TOTAL ABDOMINAL HYSTERECTOMY      age 45   • TOTAL ABDOMINAL HYSTERECTOMY W/ BILATERAL SALPINGOOPHORECTOMY      endometrial cancer      Family History   Problem Relation Age of Onset   • Stomach cancer Mother 71   • Pancreatic cancer Mother 71   • Heart attack Father    • Cancer Maternal Uncle    • No Known Problems Daughter    • No Known Problems Maternal Grandmother    • No Known Problems Maternal Grandfather    • No Known Problems Paternal Grandmother    • No Known Problems Paternal Grandfather    • No Known Problems Maternal Aunt    • No Known Problems Maternal Aunt       Social History     Socioeconomic History   • Marital status: /Civil Union     Spouse name: Not on file   • Number of children: Not on file   • Years of education: Not on file   • Highest education level: Not on file   Occupational History   • Not on file   Tobacco Use   • Smoking status: Former     Packs/day: 0 25     Years: 5 00     Pack years: 1 25     Types: Cigarettes     Start date: 1959     Quit date: 1964     Years since quittin 0   • Smokeless tobacco: Never   • Tobacco comments:     Quit 50+ years ago 3/24/21   Vaping Use   • Vaping Use: Never used   Substance and Sexual Activity   • Alcohol use: Not Currently     Comment: socially   • Drug use: No   • Sexual activity: Not Currently     Partners: Male     Birth control/protection: Post-menopausal     Comment:    Other Topics Concern   • Not on file   Social History Narrative    Exercising regularly     Social Determinants of Health     Financial Resource Strain: Not on file   Food Insecurity: Not on file   Transportation Needs: Not on file   Physical Activity: Not on file   Stress: Not on file   Social Connections: Not on file   Intimate Partner Violence: Not on file   Housing Stability: Not on file      No Known Allergies  Current Outpatient Medications on File Prior to Visit   Medication Sig Dispense Refill   • albuterol (PROVENTIL HFA,VENTOLIN HFA) 90 mcg/act inhaler Inhale 2 puffs 4 (four) times a day as needed     • atorvastatin (LIPITOR) 10 mg tablet TAKE 1 TABLET BY MOUTH  DAILY AT BEDTIME 90 tablet 3   • Cholecalciferol (VITAMIN D3) 400 units CAPS Take 1,000 Units by mouth     • Combigan 0 2-0 5 %      • latanoprost (XALATAN) 0 005 % ophthalmic solution Administer 1 drop to both eyes 2 (two) times a day     • levothyroxine 50 mcg tablet Take 1 tablet (50 mcg total) by mouth daily 90 tablet 1   • multivitamin-iron-minerals-folic acid (CENTRUM) chewable tablet Chew 1 tablet daily     • pantoprazole (PROTONIX) 40 mg tablet Take 1 tablet (40 mg total) by mouth daily 90 tablet 1   • polyethylene glycol (MIRALAX) 17 g packet Take 17 g by mouth daily as needed (constipation)  0   • sodium chloride 1 g tablet Take 1 tablet (1 g total) by mouth 2 (two) times a day with meals 60 tablet 0     No current facility-administered medications on file prior to visit  Review of Systems   Constitutional: Negative for chills, fatigue, fever and unexpected weight change  HENT: Negative for congestion, postnasal drip, sore throat, trouble swallowing and voice change      Eyes: Negative for visual disturbance  Respiratory: Negative for cough, chest tightness and shortness of breath  Cardiovascular: Negative for chest pain and leg swelling  Gastrointestinal: Positive for abdominal pain (2 episodes within the past 6 months  )  Negative for nausea and vomiting  Musculoskeletal: Positive for gait problem  Skin: Negative for pallor  Neurological: Negative for dizziness, syncope, light-headedness and headaches  Balance issues  Uses a cane     Hematological: Negative for adenopathy  Psychiatric/Behavioral: Negative for agitation and behavioral problems  The patient is not nervous/anxious  All other systems reviewed and are negative  Objective:   Physical Exam  Vitals reviewed  Constitutional:       General: She is not in acute distress  Appearance: Normal appearance  She is well-developed  She is not diaphoretic  HENT:      Head: Normocephalic and atraumatic  Mouth/Throat:      Comments: Masked   Eyes:      General: No scleral icterus  Extraocular Movements: Extraocular movements intact  Comments: + glasses   Neck:      Trachea: No tracheal deviation  Cardiovascular:      Rate and Rhythm: Normal rate and regular rhythm  Heart sounds: Normal heart sounds  No murmur heard  Pulmonary:      Effort: Pulmonary effort is normal  No respiratory distress  Breath sounds: Normal breath sounds  No wheezing  Abdominal:      General: Bowel sounds are normal  There is no distension  Palpations: Abdomen is soft  Musculoskeletal:         General: Normal range of motion  Cervical back: Normal range of motion and neck supple  Right lower leg: Edema present  Left lower leg: Edema present  Comments: B/l LE trace edema  Skin:     General: Skin is warm and dry  Neurological:      Mental Status: She is alert and oriented to person, place, and time  Cranial Nerves: No cranial nerve deficit        Comments: Uses a cane for balance  Psychiatric:         Mood and Affect: Mood normal          Behavior: Behavior normal          Thought Content: Thought content normal      /83 (BP Location: Left arm, Patient Position: Sitting, Cuff Size: Standard)   Temp 97 9 °F (36 6 °C) (Temporal)   Resp 17   Ht 5' 3" (1 6 m)   Wt 89 kg (196 lb 3 4 oz)   BMI 34 76 kg/m²     CT chest abdomen pelvis w contrast    Result Date: 8/30/2022  Narrative CT CHEST, ABDOMEN AND PELVIS WITH IV CONTRAST INDICATION:   PT WITH FUNDOPLICATION/ HIATAL HERNIA-  WITH EPIGASTIC PAIN ADN PERSISTENT VOMITING  COMPARISON:  Multiple priors most recently ultrasound 1/13/2022 TECHNIQUE: CT examination of the chest, abdomen and pelvis was performed  Axial, sagittal, and coronal 2D reformatted images were created from the source data and submitted for interpretation  Radiation dose length product (DLP) for this visit:  355 mGy-cm   This examination, like all CT scans performed in the [facility], was performed utilizing techniques to minimize radiation dose exposure, including the use of iterative reconstruction and automated exposure control  IV Contrast:  80 mL of iohexol (OMNIPAQUE) Enteric Contrast: Enteric contrast was not administered  FINDINGS: CHEST LUNGS:  Cluster of small pulmonary nodules in the medial right upper lobe (series through 2, images 28 through 45), likely postinfectious or inflammatory in nature  Nataliia Blend PLEURA:  Unremarkable  HEART/GREAT VESSELS: Atherosclerotic coronary artery calcification is noted  Heart is otherwise unremarkable  No thoracic aortic aneurysm  MEDIASTINUM AND REGAN:  Moderate to large hiatal hernia, similar to CT of 10/17/2021  Fluid within the distal esophagus consistent with reflux  CHEST WALL AND LOWER NECK:  Unremarkable  ABDOMEN LIVER/BILIARY TREE:  1 0 cm avidly enhancing focus in the central liver likely a flash filling hemangioma  This is unchanged from 8/20/2021   GALLBLADDER:  There are gallstone(s) within the gallbladder, without pericholecystic inflammatory changes  SPLEEN:  Unremarkable  PANCREAS:  Unremarkable  ADRENAL GLANDS:  Unremarkable  KIDNEYS/URETERS:  One or more simple renal cyst(s) is noted  Punctate nonobstructing left lower pole renal calculus     No hydronephrosis  STOMACH AND BOWEL:  There is colonic diverticulosis without evidence of acute diverticulitis  APPENDIX:  A normal appendix was visualized  ABDOMINOPELVIC CAVITY:  No ascites  No pneumoperitoneum  No lymphadenopathy  VESSELS:  Atherosclerotic changes are present  No evidence of aneurysm  PELVIS REPRODUCTIVE ORGANS:  Surgical changes of prior hysterectomy  URINARY BLADDER:  Unremarkable  ABDOMINAL WALL/INGUINAL REGIONS:  Unremarkable  OSSEOUS STRUCTURES:  No acute fracture or destructive osseous lesion  Spinal degenerative changes are noted  Severe bilateral glenohumeral osteoarthritis  Impression No acute inflammatory process or suspicious mass  Moderate to large hiatal hernia, similar to CT of 10/17/2021  Cholelithiasis without evidence of acute cholecystitis  Additional nonacute findings, as described  Workstation performed: [id number]        FL barium swallow ROUTINE esophagus    Result Date: 10/6/2022  Narrative BARIUM SWALLOW-ESOPHAGRAM INDICATION:   K44 [id number]: Diaphragmatic hernia without obstruction or gangrene patient has past history of paraesophageal hernia with surgical repair in March 2021  Patient presents with prescription for routine barium swallow  COMPARISON:  Barium swallow motility study dated 9/14/2021, barium swallow dated 3/6/2021 IMAGES:  59 FLUOROSCOPY TIME:   2 4 min  TECHNIQUE: The patient was given barium by mouth and images of the esophagus were obtained in both erect and recumbent positioning  Martin Berry FINDINGS: The esophagus is normal in caliber  Esophageal motility is normal and emptying of contrast from the esophagus is prompt    No mucosal lesion, ulceration or evidence of fold thickening is seen on this single contrast esophagram  Gastroesophageal reflux was was observed to the level of proximal esophagus (series 8 image 3)  There is a moderate to large hiatal hernia demonstrated  (Series 5 image 1) (Series 7 image 2 and 3)     Impression Patient history and images were reviewed with Dr Omar Osborn prior to patient being released from radiology department  Gastroesophageal reflux was was observed to the level of proximal esophagus (series 8 image 3)  There is a moderate to large hiatal hernia demonstrated  (Series 5 image 1) (Series 7 image 2 and 3) Procedure was performed by Valerio MOLINA under the direct supervision of Dr Omar Osborn    Workstation performed: [id number]

## 2023-01-19 ENCOUNTER — RA CDI HCC (OUTPATIENT)
Dept: OTHER | Facility: HOSPITAL | Age: 83
End: 2023-01-19

## 2023-01-24 ENCOUNTER — HOSPITAL ENCOUNTER (OUTPATIENT)
Dept: RADIOLOGY | Age: 83
Discharge: HOME | End: 2023-01-24

## 2023-01-24 VITALS — HEIGHT: 63 IN | WEIGHT: 196 LBS | BODY MASS INDEX: 34.73 KG/M2

## 2023-01-24 DIAGNOSIS — Z12.31 ENCOUNTER FOR SCREENING MAMMOGRAM FOR MALIGNANT NEOPLASM OF BREAST: ICD-10-CM

## 2023-01-26 ENCOUNTER — OFFICE VISIT (OUTPATIENT)
Dept: FAMILY MEDICINE CLINIC | Facility: CLINIC | Age: 83
End: 2023-01-26

## 2023-01-26 VITALS
RESPIRATION RATE: 16 BRPM | HEIGHT: 63 IN | WEIGHT: 196.6 LBS | TEMPERATURE: 97.8 F | BODY MASS INDEX: 34.84 KG/M2 | SYSTOLIC BLOOD PRESSURE: 120 MMHG | HEART RATE: 60 BPM | DIASTOLIC BLOOD PRESSURE: 70 MMHG | OXYGEN SATURATION: 98 %

## 2023-01-26 DIAGNOSIS — H91.93 BILATERAL HEARING LOSS, UNSPECIFIED HEARING LOSS TYPE: ICD-10-CM

## 2023-01-26 DIAGNOSIS — I71.21 ANEURYSM OF ASCENDING AORTA WITHOUT RUPTURE: ICD-10-CM

## 2023-01-26 DIAGNOSIS — I10 HYPERTENSION, ESSENTIAL, BENIGN: Primary | ICD-10-CM

## 2023-01-26 DIAGNOSIS — E78.00 HYPERCHOLESTEROLEMIA: ICD-10-CM

## 2023-01-26 DIAGNOSIS — K21.00 GASTROESOPHAGEAL REFLUX DISEASE WITH ESOPHAGITIS WITHOUT HEMORRHAGE: ICD-10-CM

## 2023-01-26 DIAGNOSIS — K44.9 LARGE HIATAL HERNIA: ICD-10-CM

## 2023-01-26 DIAGNOSIS — E03.9 ACQUIRED HYPOTHYROIDISM: ICD-10-CM

## 2023-01-26 DIAGNOSIS — E66.2 MORBID (SEVERE) OBESITY WITH ALVEOLAR HYPOVENTILATION (HCC): ICD-10-CM

## 2023-01-26 DIAGNOSIS — M85.80 OSTEOPENIA, UNSPECIFIED LOCATION: ICD-10-CM

## 2023-01-26 DIAGNOSIS — Z85.43 HISTORY OF OVARIAN CANCER: ICD-10-CM

## 2023-01-26 RX ORDER — ALENDRONATE SODIUM 70 MG/1
70 TABLET ORAL
Qty: 12 TABLET | Refills: 0 | Status: SHIPPED | OUTPATIENT
Start: 2023-01-26

## 2023-01-26 RX ORDER — LEVOTHYROXINE SODIUM 0.05 MG/1
50 TABLET ORAL DAILY
Qty: 90 TABLET | Refills: 3 | Status: SHIPPED | OUTPATIENT
Start: 2023-01-26

## 2023-01-26 RX ORDER — PANTOPRAZOLE SODIUM 40 MG/1
40 TABLET, DELAYED RELEASE ORAL DAILY
Qty: 90 TABLET | Refills: 3 | Status: SHIPPED | OUTPATIENT
Start: 2023-01-26

## 2023-01-26 NOTE — PROGRESS NOTES
Name: Chayito Martins      : 1940      MRN: [id number]  Encounter Provider: Tatyana Carvalho MD  Encounter Date: 2023   Encounter department: [address] [place] [address]       Discussed with patient and family results finding in the hospital in detail and follow-up blood test show resolved sodium kidney function back to normal urine cultures negative  Discussed  results with patient which is normal   Proceed with gastric emptying study if test normal then we might not need colonoscopy  Flu vaccine given today  She get COVID booster in 6 months with recent COVID infection  Suspect COVID infection did flare up the condition that she has a make it worse  Advised for for small frequent meal   Start fosamax for worsening osteopenia=if can't tolerate will switch to prolia  Advised for tdap vaccine at pharmacy  Collagen peptide for constipation prevention  meds refilled    I have spent 40 minutes with pt and family today in which greater than 50% of this time was spent in counseling/coordination of care regarding Diagnostic results, Prognosis, Risks and benefits of tx options, Intructions for management, Patient and family education, Importance of tx compliance, Risk factor reductions and Impressions  1  Hypertension, essential, benign    2  Acquired hypothyroidism  -     levothyroxine 50 mcg tablet; Take 1 tablet (50 mcg total) by mouth daily    3  Gastroesophageal reflux disease with esophagitis without hemorrhage  -     pantoprazole (PROTONIX) 40 mg tablet; Take 1 tablet (40 mg total) by mouth daily    4  Aneurysm of ascending aorta without rupture    5  Bilateral hearing loss, unspecified hearing loss type    6  Hypercholesterolemia    7  History of ovarian cancer    8  Large hiatal hernia    9  Osteopenia, unspecified location  -     alendronate (Fosamax) 70 mg tablet; Take 1 tablet (70 mg total) by mouth every 7 days    10   Morbid (severe) obesity with alveolar hypoventilation ([address] )         Subjective      79year-old female follow-up hypertension hypothyroidism  she has a large hiatal hernia= March 2021 she had paraesophageal hernia repaired  She had COVID infection August 8th she was treated with Paxlovid she felt better but then August 30th she went to the ER because she was not feeling well with nausea high blood pressure  She was sent home after medication and imaging was normal however she went back to the ER August 31st was admitted until September 2nd she was found to have significant electrolyte abnormality sodium level is very low troponin was elevated she had seen cardiologist had cardiac catheterization showed nonobstructive plaque  She had EGD showed gastric paresis retain fluid bolus  She was all with GI also low sodium level thought to be due to dehydration she was seen with nephrologist she was put on sodium tablet  She was recommend to see outpatient cardiothoracic surgeon and get barium swallow done  Since she has been home she has been doing better  Parents swallow show moderate size hiatal hernia good swallowing function  She has upcoming gastric emptying study and schedule colonoscopy in December  She had colonoscopy done before showed pre cancers polyp  She follow-up nephrologist for chronic kidney disease  She had kidney stone with stent placed in the left side that was removed  She does not want another surgery nor colonoscopy if she does not have to  She is up-to-date COVID vaccine and booster  Pt had colonoscopy recently showed tubular adenoma=GI rec no more colonoscopy due to pt age  Saw cardiothoracic sx who rec monitor, no more sx needed for large hiatal hernia  Recent dexa show osteopenia=9% decrease in bone strength from 2018  Saw nephro=sodium tablet was decreased to daily  Review of Systems   Constitutional: Negative for activity change, appetite change, fatigue and unexpected weight change     HENT: Negative for ear pain, sore throat, trouble swallowing and voice change  Eyes: Negative for photophobia and visual disturbance  Respiratory: Negative for cough, chest tightness, shortness of breath and wheezing  Cardiovascular: Negative for chest pain, palpitations and leg swelling  Gastrointestinal: Negative for abdominal pain, constipation, diarrhea, nausea, rectal pain and vomiting  Endocrine: Negative for cold intolerance, polydipsia and polyuria  Genitourinary: Negative for difficulty urinating, dysuria, flank pain, menstrual problem and pelvic pain  Musculoskeletal: Negative for arthralgias, joint swelling and myalgias  Skin: Negative for color change and rash  Allergic/Immunologic: Negative for environmental allergies and immunocompromised state  Neurological: Negative for dizziness, weakness, numbness and headaches  Hematological: Negative for adenopathy  Does not bruise/bleed easily  Psychiatric/Behavioral: Negative for decreased concentration, dysphoric mood, self-injury, sleep disturbance and suicidal ideas  The patient is not nervous/anxious          Current Outpatient Medications on File Prior to Visit   Medication Sig   • albuterol (PROVENTIL HFA,VENTOLIN HFA) 90 mcg/act inhaler Inhale 2 puffs 4 (four) times a day as needed   • atorvastatin (LIPITOR) 10 mg tablet TAKE 1 TABLET BY MOUTH  DAILY AT BEDTIME   • Cholecalciferol (VITAMIN D3) 400 units CAPS Take 1,000 Units by mouth   • Combigan 0 2-0 5 %    • multivitamin-iron-minerals-folic acid (CENTRUM) chewable tablet Chew 1 tablet daily   • sodium chloride 1 g tablet Take 1 tablet (1 g total) by mouth 2 (two) times a day with meals   • [DISCONTINUED] levothyroxine 50 mcg tablet Take 1 tablet (50 mcg total) by mouth daily   • [DISCONTINUED] pantoprazole (PROTONIX) 40 mg tablet Take 1 tablet (40 mg total) by mouth daily   • latanoprost (XALATAN) 0 005 % ophthalmic solution Administer 1 drop to both eyes 2 (two) times a day   • polyethylene glycol (MIRALAX) 17 g packet Take 17 g by mouth daily as needed (constipation) (Patient not taking: Reported on 1/26/2023)       Objective     /70 (BP Location: Left arm, Patient Position: Sitting, Cuff Size: Standard)   Pulse 60   Temp 97 8 °F (36 6 °C) (Temporal)   Resp 16   Ht 5' 3" (1 6 m)   Wt 89 2 kg (196 lb 9 6 oz)   SpO2 98%   BMI 34 83 kg/m²     Physical Exam  Vitals and nursing note reviewed  Constitutional:       Appearance: Normal appearance  She is well-developed  She is obese  HENT:      Head: Normocephalic and atraumatic  Right Ear: Tympanic membrane, ear canal and external ear normal       Left Ear: Tympanic membrane, ear canal and external ear normal       Nose: Nose normal       Mouth/Throat:      Mouth: Mucous membranes are moist       Pharynx: Oropharynx is clear  No oropharyngeal exudate  Eyes:      Pupils: Pupils are equal, round, and reactive to light  Neck:      Thyroid: No thyromegaly  Cardiovascular:      Rate and Rhythm: Normal rate and regular rhythm  Pulses: Normal pulses  Heart sounds: Normal heart sounds  No murmur heard  Pulmonary:      Effort: Pulmonary effort is normal  No respiratory distress  Breath sounds: Normal breath sounds  No wheezing or rales  Abdominal:      General: Abdomen is flat  Bowel sounds are normal  There is no distension  Palpations: Abdomen is soft  Tenderness: There is no abdominal tenderness  There is no guarding  Genitourinary:     Vagina: Normal    Musculoskeletal:         General: No tenderness  Normal range of motion  Cervical back: Normal range of motion and neck supple  Skin:     General: Skin is warm and dry  Capillary Refill: Capillary refill takes less than 2 seconds  Findings: No rash  Neurological:      General: No focal deficit present  Mental Status: She is alert and oriented to person, place, and time  Mental status is at baseline     Psychiatric:         Mood and Affect: Mood normal          Behavior: Behavior normal          Thought Content: Thought content normal          Judgment: Judgment normal        Natali Christensen MD Assessment and Plan:     Problem List Items Addressed This Visit        Digestive    Gastroesophageal reflux disease with esophagitis without hemorrhage    Relevant Medications    pantoprazole (PROTONIX) 40 mg tablet       Endocrine    Acquired hypothyroidism    Relevant Medications    levothyroxine 50 mcg tablet       Cardiovascular and Mediastinum    Hypertension, essential, benign - Primary    Ascending aortic aneurysm       Nervous and Auditory    Bilateral hearing loss       Musculoskeletal and Integument    Osteopenia    Relevant Medications    alendronate (Fosamax) 70 mg tablet       Other    Hypercholesterolemia    Large hiatal hernia    History of ovarian cancer   Other Visit Diagnoses     Morbid (severe) obesity with alveolar hypoventilation (HCC)               Preventive health issues were discussed with patient, and age appropriate screening tests were ordered as noted in patient's After Visit Summary  Personalized health advice and appropriate referrals for health education or preventive services given if needed, as noted in patient's After Visit Summary  History of Present Illness:     Patient presents for a Medicare Wellness Visit    HPI   Patient Care Team:  Natali Christensen MD as PCP - General (Family Medicine)  Moraima Bean MD (Nephrology)     Review of Systems:     Review of Systems   Constitutional: Negative for activity change, appetite change, fatigue and unexpected weight change  HENT: Negative for ear pain, sore throat, trouble swallowing and voice change  Eyes: Negative for photophobia and visual disturbance  Respiratory: Negative for cough, chest tightness, shortness of breath and wheezing  Cardiovascular: Negative for chest pain, palpitations and leg swelling     Gastrointestinal: Negative for abdominal pain, constipation, diarrhea, nausea, rectal pain and vomiting  Endocrine: Negative for cold intolerance, polydipsia and polyuria  Genitourinary: Negative for difficulty urinating, dysuria, flank pain, menstrual problem and pelvic pain  Musculoskeletal: Negative for arthralgias, joint swelling and myalgias  Skin: Negative for color change and rash  Allergic/Immunologic: Negative for environmental allergies and immunocompromised state  Neurological: Negative for dizziness, weakness, numbness and headaches  Hematological: Negative for adenopathy  Does not bruise/bleed easily  Psychiatric/Behavioral: Negative for decreased concentration, dysphoric mood, self-injury, sleep disturbance and suicidal ideas  The patient is not nervous/anxious           Problem List:     Patient Active Problem List   Diagnosis   • Hypertension, essential, benign   • Hypercholesterolemia   • Ascending aortic aneurysm   • Abnormal EKG   • Large hiatal hernia   • Morbid obesity (HCC)   • Left upper quadrant abdominal pain, nausea and vomiting   • Gastroesophageal reflux disease with esophagitis without hemorrhage   • Acquired hypothyroidism   • Obstruction of left ureteropelvic junction (UPJ) due to stone   • Hypokalemia   • Disorder of uvula   • History of ovarian cancer   • Rosacea   • Constipation   • Bilateral hearing loss   • COVID-19   • Epigastric abdominal pain   • Dyspepsia   • Weight loss   • Hypertension   • Hyponatremia   • Gallstones   • Elevated troponin   • PONV (postoperative nausea and vomiting)   • Gastroparesis   • Hypomagnesemia   • Osteopenia      Past Medical and Surgical History:     Past Medical History:   Diagnosis Date   • Cancer [facility])    • Colon polyp    • Disease of thyroid gland    • Endometrial cancer [facility])     age 45   • 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   • Esophageal reflux    • GERD (gastroesophageal reflux disease)    • History of ovarian cancer 04/28/2022   • Hypertension • Hypothyroid    • Kidney stone    • Obesity    • Prediabetes 05/12/2022   • Rosacea 05/12/2022     Past Surgical History:   Procedure Laterality Date   • CARDIAC CATHETERIZATION Left 09/01/2022    Procedure: Cardiac Left Heart Cath;  Surgeon: Ifeanyi Mcadams MD;  [facility]; Service: Cardiology   • CATARACT EXTRACTION     • CATARACT EXTRACTION, BILATERAL     • COLONOSCOPY  08/2018    polyp- 5 years   • CYSTOSCOPY     • FL RETROGRADE PYELOGRAM  10/15/2021   • HERNIA REPAIR     • JOINT REPLACEMENT     • KNEE ARTHROPLASTY     • VT CYSTO BLADDER W/URETERAL CATHETERIZATION Left 08/24/2021    Procedure: CYSTOSCOPY WITH INSERTION STENT URETERAL;  Surgeon: Maria M Sheth MD;  Location: [facility];  Service: Urology   • VT CYSTO/URETERO W/LITHOTRIPSY &INDWELL STENT INSRT Left 10/15/2021    Procedure: CYSTOSCOPY URETEROSCOPY WITH LITHOTRIPSY HOLMIUM LASER, RETROGRADE PYELOGRAM, BASKET STONE EXTRACTION AND EXCHANGE STENT URETERAL;  Surgeon: Chris Leija MD;  Location: [facility];  Service: Urology   • VT ESOPHAGOGASTRODUODENOSCOPY TRANSORAL DIAGNOSTIC N/A 03/05/2021    Procedure: ESOPHAGOGASTRODUODENOSCOPY (EGD);   Surgeon: Nadeem Peres MD;  Location: [facility];  Service: Thoracic   • VT LAPS RPR PARAESPHGL HRNA INCL FUNDPLSTY W/O MESH N/A 03/05/2021    Procedure: REPAIR HERNIA PARAESOPHAGEAL LAPAROSCOPIC W ROBOTICS WITH MESH, DOOR FUNDOPLICATION;  Surgeon: Nadeem Peres MD;  Location: [facility];  Service: Thoracic   • TOTAL ABDOMINAL HYSTERECTOMY      age 45   • TOTAL ABDOMINAL HYSTERECTOMY W/ BILATERAL SALPINGOOPHORECTOMY  1992    endometrial cancer      Family History:     Family History   Problem Relation Age of Onset   • Stomach cancer Mother 71   • Pancreatic cancer Mother 71   • Heart attack Father    • No Known Problems Daughter    • No Known Problems Maternal Grandmother    • No Known Problems Maternal Grandfather    • No Known Problems Paternal Grandmother    • No Known Problems Paternal Grandfather    • No Known Problems Maternal Aunt    • No Known Problems Maternal Aunt    • Cancer Maternal Uncle         unknown type      Social History:     Social History     Socioeconomic History   • Marital status: /Civil Union     Spouse name: None   • Number of children: None   • Years of education: None   • Highest education level: None   Occupational History   • None   Tobacco Use   • Smoking status: Former     Packs/day: 0 25     Years: 5 00     Pack years: 1 25     Types: Cigarettes     Start date: 1959     Quit date: 1964     Years since quittin 1   • Smokeless tobacco: Never   • Tobacco comments:     Quit 50+ years ago 3/24/21   Vaping Use   • Vaping Use: Never used   Substance and Sexual Activity   • Alcohol use: Not Currently     Comment: socially   • Drug use: No   • Sexual activity: Not Currently     Partners: Male     Birth control/protection: Post-menopausal     Comment:    Other Topics Concern   • None   Social History Narrative    Exercising regularly     Social Determinants of Health     Financial Resource Strain: Low Risk    • Difficulty of Paying Living Expenses: Not hard at all   Food Insecurity: Not on file   Transportation Needs: No Transportation Needs   • Lack of Transportation (Medical): No   • Lack of Transportation (Non-Medical):  No   Physical Activity: Not on file   Stress: Not on file   Social Connections: Not on file   Intimate Partner Violence: Not on file   Housing Stability: Not on file      Medications and Allergies:     Current Outpatient Medications   Medication Sig Dispense Refill   • albuterol (PROVENTIL HFA,VENTOLIN HFA) 90 mcg/act inhaler Inhale 2 puffs 4 (four) times a day as needed     • alendronate (Fosamax) 70 mg tablet Take 1 tablet (70 mg total) by mouth every 7 days 12 tablet 0   • atorvastatin (LIPITOR) 10 mg tablet TAKE 1 TABLET BY MOUTH  DAILY AT BEDTIME 90 tablet 3   • Cholecalciferol (VITAMIN D3) 400 units CAPS Take 1,000 Units by mouth     • Combigan 0 2-0 5 %      • levothyroxine 50 mcg tablet Take 1 tablet (50 mcg total) by mouth daily 90 tablet 3   • multivitamin-iron-minerals-folic acid (CENTRUM) chewable tablet Chew 1 tablet daily     • pantoprazole (PROTONIX) 40 mg tablet Take 1 tablet (40 mg total) by mouth daily 90 tablet 3   • sodium chloride 1 g tablet Take 1 tablet (1 g total) by mouth 2 (two) times a day with meals 60 tablet 0   • latanoprost (XALATAN) 0 005 % ophthalmic solution Administer 1 drop to both eyes 2 (two) times a day     • polyethylene glycol (MIRALAX) 17 g packet Take 17 g by mouth daily as needed (constipation) (Patient not taking: Reported on 1/26/2023)  0     No current facility-administered medications for this visit  No Known Allergies   Immunizations:     Immunization History   Administered Date(s) Administered   • COVID-19 PFIZER VACCINE 0 3 ML IM 01/22/2021, 02/12/2021, 10/09/2021   • Influenza, high dose seasonal 0 7 mL 10/20/2022   • Pneumococcal Conjugate Vaccine 20-valent (Pcv20), Polysace 07/07/2022   • Zoster Vaccine Recombinant 01/02/2020, 06/17/2020      Health Maintenance: There are no preventive care reminders to display for this patient  Topic Date Due   • COVID-19 Vaccine (4 - Booster for Pfizer series) 12/04/2021      Medicare Screening Tests and Risk Assessments:     Ruperto Matthew is here for her Subsequent Wellness visit  Last Medicare Wellness visit information reviewed, patient interviewed and updates made to the record today  Health Risk Assessment:   Patient rates overall health as very good  Patient feels that their physical health rating is slightly better  Patient is very satisfied with their life  Eyesight was rated as same  Hearing was rated as same  Patient feels that their emotional and mental health rating is slightly better  Patients states they are never, rarely angry  Patient states they are sometimes unusually tired/fatigued   Pain experienced in the last 7 days has been none  Patient states that she has experienced no weight loss or gain in last 6 months  Depression Screening:   PHQ-2 Score: 0      Fall Risk Screening: In the past year, patient has experienced: no history of falling in past year      Urinary Incontinence Screening:   Patient has not leaked urine accidently in the last six months  Home Safety:  Patient does not have trouble with stairs inside or outside of their home  Patient has working smoke alarms and has working carbon monoxide detector  Home safety hazards include: none  Nutrition:   Current diet is Low Saturated Fat  Medications:   Patient is currently taking over-the-counter supplements  OTC medications include: vitamins  Patient is able to manage medications  Activities of Daily Living (ADLs)/Instrumental Activities of Daily Living (IADLs):   Walk and transfer into and out of bed and chair?: Yes  Dress and groom yourself?: Yes    Bathe or shower yourself?: Yes    Feed yourself? Yes  Do your laundry/housekeeping?: Yes  Manage your money, pay your bills and track your expenses?: Yes  Make your own meals?: Yes    Do your own shopping?: Yes    Previous Hospitalizations:   Any hospitalizations or ED visits within the last 12 months?: Yes    How many hospitalizations have you had in the last year?: 1-2    Advance Care Planning:   Living will: Yes    Durable POA for healthcare:  Yes    Advanced directive: Yes    Provider agrees with end of life decisions: Yes      Cognitive Screening:   Provider or family/friend/caregiver concerned regarding cognition?: No    PREVENTIVE SCREENINGS      Cardiovascular Screening:    General: History Lipid Disorder, Risks and Benefits Discussed and Screening Current      Diabetes Screening:     General: Screening Current and Risks and Benefits Discussed      Colorectal Cancer Screening:     General: Risks and Benefits Discussed and Screening Current      Breast Cancer Screening:     General: Screening Current and Risks and Benefits Discussed      Cervical Cancer Screening:    General: Risks and Benefits Discussed and Screening Current      Osteoporosis Screening:    General: Risks and Benefits Discussed and Screening Current      Abdominal Aortic Aneurysm (AAA) Screening:        General: Risks and Benefits Discussed and Screening Not Indicated      Lung Cancer Screening:     General: Screening Not Indicated and Risks and Benefits Discussed      Hepatitis C Screening:    General: Risks and Benefits Discussed    Screening, Brief Intervention, and Referral to Treatment (SBIRT)    Screening  Typical number of drinks in a day: 0  Typical number of drinks in a week: 0  Interpretation: Low risk drinking behavior  AUDIT-C Screenin) How often did you have a drink containing alcohol in the past year? never  2) How many drinks did you have on a typical day when you were drinking in the past year? 0  3) How often did you have 6 or more drinks on one occasion in the past year? never    AUDIT-C Score: 0  Interpretation: Score 0-2 (female): Negative screen for alcohol misuse    Single Item Drug Screening:  How often have you used an illegal drug (including marijuana) or a prescription medication for non-medical reasons in the past year? never    Single Item Drug Screen Score: 0  Interpretation: Negative screen for possible drug use disorder    Brief Intervention  Alcohol & drug use screenings were reviewed  No concerns regarding substance use disorder identified  Healthy alcohol use/limits discussed  Other Counseling Topics:   Car/seat belt/driving safety, skin self-exam, sunscreen and calcium and vitamin D intake and regular weightbearing exercise  No results found       Physical Exam:     /70 (BP Location: Left arm, Patient Position: Sitting, Cuff Size: Standard)   Pulse 60   Temp 97 8 °F (36 6 °C) (Temporal)   Resp 16   Ht 5' 3" (1 6 m)   Wt 89 2 kg (196 lb 9 6 oz)   SpO2 98%   BMI 34 83 kg/m²     Physical Exam  Vitals and nursing note reviewed  Constitutional:       General: She is not in acute distress  Appearance: Normal appearance  She is well-developed and normal weight  HENT:      Head: Normocephalic and atraumatic  Right Ear: Tympanic membrane, ear canal and external ear normal       Left Ear: Tympanic membrane, ear canal and external ear normal       Nose: Nose normal       Mouth/Throat:      Mouth: Mucous membranes are moist       Pharynx: Oropharynx is clear  Eyes:      Extraocular Movements: Extraocular movements intact  Conjunctiva/sclera: Conjunctivae normal       Pupils: Pupils are equal, round, and reactive to light  Cardiovascular:      Rate and Rhythm: Normal rate and regular rhythm  Pulses: Normal pulses  Heart sounds: Normal heart sounds  No murmur heard  Pulmonary:      Effort: Pulmonary effort is normal  No respiratory distress  Breath sounds: Normal breath sounds  Abdominal:      General: Abdomen is flat  Bowel sounds are normal       Palpations: Abdomen is soft  Tenderness: There is no abdominal tenderness  Musculoskeletal:         General: No swelling  Normal range of motion  Cervical back: Normal range of motion and neck supple  Skin:     General: Skin is warm and dry  Capillary Refill: Capillary refill takes less than 2 seconds  Neurological:      General: No focal deficit present  Mental Status: She is alert and oriented to person, place, and time  Mental status is at baseline     Psychiatric:         Mood and Affect: Mood normal          Behavior: Behavior normal           Natali Palacio MD

## 2023-01-27 ENCOUNTER — TELEPHONE (OUTPATIENT)
Dept: FAMILY MEDICINE CLINIC | Facility: CLINIC | Age: 83
End: 2023-01-27

## 2023-01-27 DIAGNOSIS — M81.0 AGE-RELATED OSTEOPOROSIS WITHOUT CURRENT PATHOLOGICAL FRACTURE: Primary | ICD-10-CM

## 2023-01-27 DIAGNOSIS — K21.00 GASTROESOPHAGEAL REFLUX DISEASE WITH ESOPHAGITIS WITHOUT HEMORRHAGE: ICD-10-CM

## 2023-01-27 DIAGNOSIS — K44.9 LARGE HIATAL HERNIA: ICD-10-CM

## 2023-01-27 RX ORDER — DENOSUMAB 60 MG/ML
60 INJECTION SUBCUTANEOUS ONCE
Qty: 1 ML | Refills: 1 | Status: SHIPPED | OUTPATIENT
Start: 2023-01-27 | End: 2023-01-27

## 2023-01-27 NOTE — TELEPHONE ENCOUNTER
I'm starting her on prolia, please help with prior authorization   Please let pt know that she has to pickup the fosamax/alendronate medication first, we'll try for the prolia

## 2023-01-27 NOTE — TELEPHONE ENCOUNTER
Called pt and lvm stating that Alonso Zavaleta should  fosamax/alendronate Rx  Also advised pt to return call so we can begin working on the prior authorization for prolia

## 2023-01-27 NOTE — TELEPHONE ENCOUNTER
Pt's spouse called and left msg on machine stating that AFTER READING ABOUT THE MEDICATION ,SHE IS NOT INTERESTED IN  TAKING THE MEDICATION THAT YOU PRESCRIBED , HE STATED THAT SHE WOULD LIKE TO TRY THE OTHER ONE THAT YOU RECOMMENDED  HE STATED ANY QUESTIONS FEEL FREE TO CALL IM

## 2023-03-28 ENCOUNTER — OFFICE VISIT (OUTPATIENT)
Dept: INTERNAL MEDICINE CLINIC | Facility: CLINIC | Age: 83
End: 2023-03-28

## 2023-03-28 VITALS
OXYGEN SATURATION: 99 % | HEART RATE: 53 BPM | HEIGHT: 63 IN | WEIGHT: 196.8 LBS | SYSTOLIC BLOOD PRESSURE: 128 MMHG | DIASTOLIC BLOOD PRESSURE: 74 MMHG | BODY MASS INDEX: 34.87 KG/M2

## 2023-03-28 DIAGNOSIS — M85.832 OSTEOPENIA OF LEFT FOREARM: ICD-10-CM

## 2023-03-28 DIAGNOSIS — E03.9 ACQUIRED HYPOTHYROIDISM: Primary | ICD-10-CM

## 2023-03-28 DIAGNOSIS — E87.1 HYPONATREMIA: ICD-10-CM

## 2023-03-28 DIAGNOSIS — F51.04 PSYCHOPHYSIOLOGICAL INSOMNIA: ICD-10-CM

## 2023-03-28 DIAGNOSIS — K31.84 GASTROPARESIS: ICD-10-CM

## 2023-03-28 DIAGNOSIS — Z85.43 HISTORY OF OVARIAN CANCER: ICD-10-CM

## 2023-03-28 DIAGNOSIS — K21.00 GASTROESOPHAGEAL REFLUX DISEASE WITH ESOPHAGITIS WITHOUT HEMORRHAGE: ICD-10-CM

## 2023-03-28 DIAGNOSIS — I71.21 ANEURYSM OF ASCENDING AORTA WITHOUT RUPTURE (HCC): ICD-10-CM

## 2023-03-28 DIAGNOSIS — E55.9 VITAMIN D DEFICIENCY: ICD-10-CM

## 2023-03-28 DIAGNOSIS — I10 HYPERTENSION, ESSENTIAL, BENIGN: ICD-10-CM

## 2023-03-28 DIAGNOSIS — E78.00 HYPERCHOLESTEROLEMIA: ICD-10-CM

## 2023-03-28 PROBLEM — N20.1 OBSTRUCTION OF LEFT URETEROPELVIC JUNCTION (UPJ) DUE TO STONE: Status: RESOLVED | Noted: 2021-08-23 | Resolved: 2023-03-28

## 2023-03-28 PROBLEM — U07.1 COVID-19: Status: RESOLVED | Noted: 2022-08-08 | Resolved: 2023-03-28

## 2023-03-28 PROBLEM — R79.89 ELEVATED TROPONIN: Status: RESOLVED | Noted: 2022-09-01 | Resolved: 2023-03-28

## 2023-03-28 PROBLEM — K44.9 LARGE HIATAL HERNIA: Status: RESOLVED | Noted: 2021-02-24 | Resolved: 2023-03-28

## 2023-03-28 PROBLEM — K59.00 CONSTIPATION: Status: RESOLVED | Noted: 2022-07-07 | Resolved: 2023-03-28

## 2023-03-28 PROBLEM — R10.13 EPIGASTRIC ABDOMINAL PAIN: Status: RESOLVED | Noted: 2022-08-31 | Resolved: 2023-03-28

## 2023-03-28 PROBLEM — R63.4 WEIGHT LOSS: Status: RESOLVED | Noted: 2022-08-31 | Resolved: 2023-03-28

## 2023-03-28 PROBLEM — E83.42 HYPOMAGNESEMIA: Status: RESOLVED | Noted: 2022-11-11 | Resolved: 2023-03-28

## 2023-03-28 PROBLEM — R77.8 ELEVATED TROPONIN: Status: RESOLVED | Noted: 2022-09-01 | Resolved: 2023-03-28

## 2023-03-28 PROBLEM — R10.12 LEFT UPPER QUADRANT ABDOMINAL PAIN: Status: RESOLVED | Noted: 2021-08-22 | Resolved: 2023-03-28

## 2023-03-28 PROBLEM — E87.6 HYPOKALEMIA: Status: RESOLVED | Noted: 2021-08-24 | Resolved: 2023-03-28

## 2023-03-28 PROBLEM — R11.2 PONV (POSTOPERATIVE NAUSEA AND VOMITING): Status: RESOLVED | Noted: 2022-09-02 | Resolved: 2023-03-28

## 2023-03-28 PROBLEM — Z98.890 PONV (POSTOPERATIVE NAUSEA AND VOMITING): Status: RESOLVED | Noted: 2022-09-02 | Resolved: 2023-03-28

## 2023-03-28 NOTE — PROGRESS NOTES
Assessment/Plan:    Gastroesophageal reflux disease with esophagitis without hemorrhage  Generally controlled on pantoprazole    Gastroparesis  Managing symptoms  She is not interested in treatment    Acquired hypothyroidism  Continue levothyroxine 50mcg    Osteopenia  She is not a good candidate for an oral bisphosphonate due to GERD, hiatal hernia  Prolia not indicated for osteopenia  Continue vitamin D supplement  DXA in Dec 2024    Hypercholesterolemia  Controlled on atorvastatin    Hyponatremia  She was taking salt tablets at one point   Check BMP off salt tabs and will decide if this needs to be resumed    Psychophysiological insomnia  Disscussed treatment options, ananya trazodone but declined mainly out of concern for glaucoma  Advised to check with her ophthalmologist if they have a specific medication that is safest to avoid progression of glaucoma         Problem List Items Addressed This Visit        Digestive    Gastroesophageal reflux disease with esophagitis without hemorrhage     Generally controlled on pantoprazole         Gastroparesis     Managing symptoms  She is not interested in treatment            Endocrine    Acquired hypothyroidism - Primary     Continue levothyroxine 50mcg         Relevant Orders    CBC and differential    Comprehensive metabolic panel    TSH, 3rd generation with Free T4 reflex       Cardiovascular and Mediastinum    RESOLVED: Hypertension, essential, benign    Relevant Orders    CBC and differential    Comprehensive metabolic panel    RESOLVED: Ascending aortic aneurysm ([address] )       Musculoskeletal and Integument    Osteopenia     She is not a good candidate for an oral bisphosphonate due to GERD, hiatal hernia  Prolia not indicated for osteopenia  Continue vitamin D supplement  DXA in Dec 2024         Relevant Orders    CBC and differential    Comprehensive metabolic panel    Vitamin D 25 hydroxy       Other    History of ovarian cancer    Relevant Orders    CBC and differential    Vitamin D deficiency    Relevant Orders    Vitamin D 25 hydroxy    Hypercholesterolemia     Controlled on atorvastatin         Relevant Orders    Comprehensive metabolic panel    Lipid Panel with Direct LDL reflex    Hyponatremia     She was taking salt tablets at one point  Check BMP off salt tabs and will decide if this needs to be resumed         Relevant Orders    Comprehensive metabolic panel    Psychophysiological insomnia     Disscussed treatment options, ananya trazodone but declined mainly out of concern for glaucoma  Advised to check with her ophthalmologist if they have a specific medication that is safest to avoid progression of glaucoma              Subjective:      Patient ID: Leodan Laird is a 79 y o  female  HPI  Patient here with her  to establish are  Osteopenia, prescribed Fosamax but she did not take due to underlying GERD on pantoprazole  DXA was performed in January Tscore in left forearm -2 2  Compared to 2018, there was statistically significant decrease in bone density  FRAX score 2 9% for hip fracture, 12 % for major osteoporotic fracture  She has no h/o osteoporosis related fracture  She has a recurrent hiatal hernia h/o repair in 2021  C/o poor sleep-  trouble staying asleep  She has taken melatonin without relief  She is also concerned about glaucoma from the medication  She has glaucoma OD and uses eye drops     The following portions of the patient's history were reviewed and updated as appropriate: allergies, current medications, past family history, past medical history, past social history, past surgical history and problem list     Review of Systems   Constitutional: Negative for fever and unexpected weight change  Respiratory: Negative for shortness of breath  Cardiovascular: Negative for chest pain and palpitations  Gastrointestinal: Negative for constipation and diarrhea  Psychiatric/Behavioral: Positive for suicidal ideas  "    Objective:      /74 (BP Location: Left arm, Patient Position: Sitting, Cuff Size: Standard)   Pulse (!) 53   Ht 5' 3\" (1 6 m)   Wt 89 3 kg (196 lb 12 8 oz)   SpO2 99%   BMI 34 86 kg/m²          Physical Exam  Constitutional:       General: She is not in acute distress  Appearance: She is well-developed  She is not ill-appearing, toxic-appearing or diaphoretic  Eyes:      Conjunctiva/sclera: Conjunctivae normal    Cardiovascular:      Rate and Rhythm: Normal rate and regular rhythm  Heart sounds: Normal heart sounds  No murmur heard  Pulmonary:      Effort: Pulmonary effort is normal  No respiratory distress  Breath sounds: Normal breath sounds  No wheezing or rales  Abdominal:      General: There is no distension  Palpations: Abdomen is soft  There is no mass  Tenderness: There is no abdominal tenderness  There is no guarding or rebound  Musculoskeletal:      Cervical back: Neck supple  Neurological:      Mental Status: She is alert and oriented to person, place, and time  Psychiatric:         Mood and Affect: Mood normal          Behavior: Behavior normal          Thought Content:  Thought content normal          Judgment: Judgment normal          "

## 2023-04-02 PROBLEM — I71.21 ASCENDING AORTIC ANEURYSM (HCC): Status: RESOLVED | Noted: 2018-10-23 | Resolved: 2023-04-02

## 2023-04-02 PROBLEM — F51.04 PSYCHOPHYSIOLOGICAL INSOMNIA: Status: ACTIVE | Noted: 2023-04-02

## 2023-04-02 PROBLEM — I10 HYPERTENSION, ESSENTIAL, BENIGN: Status: RESOLVED | Noted: 2018-10-23 | Resolved: 2023-04-02

## 2023-04-03 NOTE — ASSESSMENT & PLAN NOTE
Disscussed treatment options, ananya trazodone but declined mainly out of concern for glaucoma  Advised to check with her ophthalmologist if they have a specific medication that is safest to avoid progression of glaucoma

## 2023-04-03 NOTE — ASSESSMENT & PLAN NOTE
She is not a good candidate for an oral bisphosphonate due to GERD, hiatal hernia  Prolia not indicated for osteopenia  Continue vitamin D supplement  DXA in Dec 2024

## 2023-04-03 NOTE — ASSESSMENT & PLAN NOTE
She was taking salt tablets at one point   Check BMP off salt tabs and will decide if this needs to be resumed

## 2023-05-31 ENCOUNTER — ANNUAL EXAM (OUTPATIENT)
Dept: OBGYN CLINIC | Facility: CLINIC | Age: 83
End: 2023-05-31

## 2023-05-31 VITALS
WEIGHT: 196.2 LBS | HEIGHT: 63 IN | SYSTOLIC BLOOD PRESSURE: 132 MMHG | BODY MASS INDEX: 34.76 KG/M2 | DIASTOLIC BLOOD PRESSURE: 82 MMHG

## 2023-05-31 DIAGNOSIS — C54.1 ENDOMETRIAL CANCER (HCC): ICD-10-CM

## 2023-05-31 DIAGNOSIS — Z91.89 GYN EXAM FOR HIGH-RISK MEDICARE PATIENT: Primary | ICD-10-CM

## 2023-05-31 NOTE — PROGRESS NOTES
Carol Chapman   1940    CC:  Yearly exam    S:  79 y o  female here for yearly exam  She is s/p JOANNE-BSO for uterine cancer >20yrs ago  She denies vaginal bleeding  She denies vaginal discharge, itching, odor or dryness  SHELL resolved with weight loss  Some constipation issues with calcium use - using some miralax to help with this  She is not sexually active      Last Mammo  1/24/23 - BIRad 2, scheduled 2024  Last Colonoscopy  12/2/22 - polyps, screening complete due to age  Last DEXA 12/6/22 - osteopenia    Family hx of breast cancer: no  Family hx of ovarian cancer: no  Family hx of colon cancer: no      Current Outpatient Medications:   •  albuterol (PROVENTIL HFA,VENTOLIN HFA) 90 mcg/act inhaler, Inhale 2 puffs 4 (four) times a day as needed, Disp: , Rfl:   •  atorvastatin (LIPITOR) 10 mg tablet, TAKE 1 TABLET BY MOUTH  DAILY AT BEDTIME, Disp: 90 tablet, Rfl: 3  •  Cholecalciferol (VITAMIN D3) 400 units CAPS, Take 1,000 Units by mouth, Disp: , Rfl:   •  Combigan 0 2-0 5 %, , Disp: , Rfl:   •  levothyroxine 50 mcg tablet, Take 1 tablet (50 mcg total) by mouth daily, Disp: 90 tablet, Rfl: 3  •  multivitamin-iron-minerals-folic acid (CENTRUM) chewable tablet, Chew 1 tablet daily, Disp: , Rfl:   •  pantoprazole (PROTONIX) 40 mg tablet, Take 1 tablet (40 mg total) by mouth daily, Disp: 90 tablet, Rfl: 3  •  polyethylene glycol (MIRALAX) 17 g packet, Take 17 g by mouth daily as needed (constipation), Disp: , Rfl: 0     Patient Active Problem List   Diagnosis   • Hypercholesterolemia   • Abnormal EKG   • Morbid obesity (HCC)   • Gastroesophageal reflux disease with esophagitis without hemorrhage   • Acquired hypothyroidism   • Disorder of uvula   • History of ovarian cancer   • Rosacea   • Bilateral hearing loss   • Dyspepsia   • Hyponatremia   • Gallstones   • Gastroparesis   • Osteopenia   • Vitamin D deficiency   • Psychophysiological insomnia     Past Medical History:   Diagnosis Date   • Ascending aortic aneurysm ([facility] ) 10/23/2018   • Cancer ([facility] )    • Colon polyp    • Constipation 7/7/2022   • COVID-19 8/8/2022 8/8/2022=tx paxlovid=vaccinated and boosted   • Disease of thyroid gland    • Elevated troponin 9/1/2022   • Endometrial cancer [facility])     age 45   • 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   • Epigastric abdominal pain 8/31/2022   • Esophageal reflux    • GERD (gastroesophageal reflux disease)    • History of ovarian cancer 04/28/2022   • Hypertension    • Hypertension 8/31/2022   • Hypertension, essential, benign 10/23/2018   • Hypokalemia 8/24/2021   • Hypomagnesemia 11/11/2022   • Hypothyroid    • Kidney stone    • Large hiatal hernia 2/24/2021    Diagnosis: Paraesophageal hernia  Procedure: EGD, Robotic repair of paraesophageal hernia with mesh and Albino fundoplication on 7/0/27 Pathology: Anterior fat pad and portion of paraesophageal hernia sac revealed mesothelium lined fibrovascular tissue, consistent with hernia sac  3 benign lymph nodes  • Left upper quadrant abdominal pain, nausea and vomiting 8/22/2021   • Obesity    • Obstruction of left ureteropelvic junction (UPJ) due to stone 8/23/2021   • PONV (postoperative nausea and vomiting) 9/2/2022   • Prediabetes 05/12/2022   • Rosacea 05/12/2022   • Weight loss 8/31/2022     Past Surgical History:   Procedure Laterality Date   • CARDIAC CATHETERIZATION Left 09/01/2022    Procedure: Cardiac Left Heart Cath;  Surgeon: Frankie Urbina MD;  [facility];   Service: Cardiology   • CATARACT EXTRACTION     • CATARACT EXTRACTION, BILATERAL     • COLONOSCOPY  08/2018    polyp- 5 years   • CYSTOSCOPY     • FL RETROGRADE PYELOGRAM  10/15/2021   • HERNIA REPAIR     • JOINT REPLACEMENT     • KNEE ARTHROPLASTY     • WY CYSTO BLADDER W/URETERAL CATHETERIZATION Left 08/24/2021    Procedure: CYSTOSCOPY WITH INSERTION STENT URETERAL;  Surgeon: Ally Segundo MD;  Location: [facility];  Service: Urology   • WY CYSTO/URETERO "W/LITHOTRIPSY &INDWELL STENT INSRT Left 10/15/2021    Procedure: CYSTOSCOPY URETEROSCOPY WITH LITHOTRIPSY HOLMIUM LASER, RETROGRADE PYELOGRAM, BASKET STONE EXTRACTION AND EXCHANGE STENT URETERAL;  Surgeon: Annemarie Dewitt MD;  Location: [facility];  Service: Urology   • [place] ESOPHAGOGASTRODUODENOSCOPY TRANSORAL DIAGNOSTIC N/A 03/05/2021    Procedure: ESOPHAGOGASTRODUODENOSCOPY (EGD); Surgeon: Stewart Villa MD;  Location: [facility];  Service: Thoracic   • [place] LAPS RPR PARAESPHGL HRNA INCL FUNDPLSTY W/O MESH N/A 03/05/2021    Procedure: REPAIR HERNIA PARAESOPHAGEAL LAPAROSCOPIC W ROBOTICS WITH MESH, DOOR FUNDOPLICATION;  Surgeon: Stewart Villa MD;  Location: [facility];  Service: Thoracic   • TOTAL ABDOMINAL HYSTERECTOMY      age 45   • TOTAL ABDOMINAL HYSTERECTOMY W/ BILATERAL SALPINGOOPHORECTOMY  1992    endometrial cancer     Family History   Problem Relation Age of Onset   • Stomach cancer Mother 71   • Pancreatic cancer Mother 71   • Heart attack Father    • No Known Problems Daughter    • No Known Problems Maternal Grandmother    • No Known Problems Maternal Grandfather    • No Known Problems Paternal Grandmother    • No Known Problems Paternal Grandfather    • No Known Problems Maternal Aunt    • No Known Problems Maternal Aunt    • Cancer Maternal Uncle         unknown type       Review of Systems   Respiratory: Negative  Cardiovascular: Negative  Gastrointestinal: Negative for constipation and diarrhea  Genitourinary: Negative for difficulty urinating, pelvic pain, vaginal bleeding, vaginal discharge, itching, or odor  O:  Blood pressure 132/82, height 5' 2 5\" (1 588 m), weight 89 kg (196 lb 3 2 oz), not currently breastfeeding  Patient appears well and is not in distress  Breasts are symmetrical without mass, tenderness, nipple discharge, skin changes or adenopathy  Abdomen is soft and nontender without masses  External genitals are normal without lesions or rashes    Urethral " meatus and urethra are normal  Vagina with atrophic changes  Cervix and uterus are surgically absent  Adnexa surgically absent  No pelvic masses appreciated    A:   Yearly exam      P:   Mammo is scheduled   Colonoscopy no longer indicated   DEXA up to date, per PCP   RTO one year for yearly exam or sooner as needed

## 2023-07-12 ENCOUNTER — HOSPITAL ENCOUNTER (INPATIENT)
Facility: HOSPITAL | Age: 83
LOS: 1 days | Discharge: HOME | DRG: 694 | End: 2023-07-14
Attending: EMERGENCY MEDICINE | Admitting: HOSPITALIST
Payer: MEDICARE

## 2023-07-12 ENCOUNTER — APPOINTMENT (EMERGENCY)
Dept: CT IMAGING | Facility: HOSPITAL | Age: 83
DRG: 694 | End: 2023-07-12
Payer: MEDICARE

## 2023-07-12 DIAGNOSIS — I10 HYPERTENSION: ICD-10-CM

## 2023-07-12 DIAGNOSIS — K31.84 GASTROPARESIS: ICD-10-CM

## 2023-07-12 DIAGNOSIS — K21.00 GASTROESOPHAGEAL REFLUX DISEASE WITH ESOPHAGITIS WITHOUT HEMORRHAGE: ICD-10-CM

## 2023-07-12 DIAGNOSIS — R10.13 DYSPEPSIA: ICD-10-CM

## 2023-07-12 DIAGNOSIS — N20.1 URETEROLITHIASIS: Primary | ICD-10-CM

## 2023-07-12 DIAGNOSIS — R11.2 NAUSEA AND VOMITING: ICD-10-CM

## 2023-07-12 DIAGNOSIS — E87.1 HYPONATREMIA: ICD-10-CM

## 2023-07-12 LAB
ALBUMIN SERPL BCP-MCNC: 4.5 G/DL (ref 3.5–5)
ALP SERPL-CCNC: 111 U/L (ref 34–104)
ALT SERPL W P-5'-P-CCNC: 26 U/L (ref 7–52)
ANION GAP SERPL CALCULATED.3IONS-SCNC: 16 MMOL/L
AST SERPL W P-5'-P-CCNC: 31 U/L (ref 13–39)
BASOPHILS # BLD AUTO: 0.03 THOUSANDS/ÂΜL (ref 0–0.1)
BASOPHILS NFR BLD AUTO: 0 % (ref 0–1)
BILIRUB SERPL-MCNC: 1.01 MG/DL (ref 0.2–1)
BUN SERPL-MCNC: 12 MG/DL (ref 5–25)
CALCIUM SERPL-MCNC: 9.7 MG/DL (ref 8.4–10.2)
CARDIAC TROPONIN I PNL SERPL HS: 17 NG/L
CHLORIDE SERPL-SCNC: 95 MMOL/L (ref 96–108)
CO2 SERPL-SCNC: 21 MMOL/L (ref 21–32)
CREAT SERPL-MCNC: 0.58 MG/DL (ref 0.6–1.3)
EOSINOPHIL # BLD AUTO: 0 THOUSAND/ÂΜL (ref 0–0.61)
EOSINOPHIL NFR BLD AUTO: 0 % (ref 0–6)
ERYTHROCYTE [DISTWIDTH] IN BLOOD BY AUTOMATED COUNT: 12.3 % (ref 11.6–15.1)
GFR SERPL CREATININE-BSD FRML MDRD: 85 ML/MIN/1.73SQ M
GLUCOSE SERPL-MCNC: 165 MG/DL (ref 65–140)
HCT VFR BLD AUTO: 47.1 % (ref 34.8–46.1)
HGB BLD-MCNC: 16.2 G/DL (ref 11.5–15.4)
IMM GRANULOCYTES # BLD AUTO: 0.04 THOUSAND/UL (ref 0–0.2)
IMM GRANULOCYTES NFR BLD AUTO: 0 % (ref 0–2)
LIPASE SERPL-CCNC: 23 U/L (ref 11–82)
LYMPHOCYTES # BLD AUTO: 1.7 THOUSANDS/ÂΜL (ref 0.6–4.47)
LYMPHOCYTES NFR BLD AUTO: 16 % (ref 14–44)
MCH RBC QN AUTO: 33 PG (ref 26.8–34.3)
MCHC RBC AUTO-ENTMCNC: 34.4 G/DL (ref 31.4–37.4)
MCV RBC AUTO: 96 FL (ref 82–98)
MONOCYTES # BLD AUTO: 0.51 THOUSAND/ÂΜL (ref 0.17–1.22)
MONOCYTES NFR BLD AUTO: 5 % (ref 4–12)
NEUTROPHILS # BLD AUTO: 8.5 THOUSANDS/ÂΜL (ref 1.85–7.62)
NEUTS SEG NFR BLD AUTO: 79 % (ref 43–75)
NRBC BLD AUTO-RTO: 0 /100 WBCS
PLATELET # BLD AUTO: 227 THOUSANDS/UL (ref 149–390)
PMV BLD AUTO: 10.9 FL (ref 8.9–12.7)
POTASSIUM SERPL-SCNC: 3.3 MMOL/L (ref 3.5–5.3)
PROT SERPL-MCNC: 7.6 G/DL (ref 6.4–8.4)
RBC # BLD AUTO: 4.91 MILLION/UL (ref 3.81–5.12)
SODIUM SERPL-SCNC: 132 MMOL/L (ref 135–147)
WBC # BLD AUTO: 10.78 THOUSAND/UL (ref 4.31–10.16)

## 2023-07-12 PROCEDURE — 85025 COMPLETE CBC W/AUTO DIFF WBC: CPT | Performed by: EMERGENCY MEDICINE

## 2023-07-12 PROCEDURE — 74177 CT ABD & PELVIS W/CONTRAST: CPT

## 2023-07-12 PROCEDURE — 96376 TX/PRO/DX INJ SAME DRUG ADON: CPT

## 2023-07-12 PROCEDURE — 96375 TX/PRO/DX INJ NEW DRUG ADDON: CPT

## 2023-07-12 PROCEDURE — 83690 ASSAY OF LIPASE: CPT | Performed by: EMERGENCY MEDICINE

## 2023-07-12 PROCEDURE — 36415 COLL VENOUS BLD VENIPUNCTURE: CPT

## 2023-07-12 PROCEDURE — 99285 EMERGENCY DEPT VISIT HI MDM: CPT | Performed by: EMERGENCY MEDICINE

## 2023-07-12 PROCEDURE — 80053 COMPREHEN METABOLIC PANEL: CPT | Performed by: EMERGENCY MEDICINE

## 2023-07-12 PROCEDURE — 70450 CT HEAD/BRAIN W/O DYE: CPT

## 2023-07-12 PROCEDURE — G1004 CDSM NDSC: HCPCS

## 2023-07-12 PROCEDURE — 93005 ELECTROCARDIOGRAM TRACING: CPT

## 2023-07-12 PROCEDURE — 99284 EMERGENCY DEPT VISIT MOD MDM: CPT

## 2023-07-12 PROCEDURE — 84484 ASSAY OF TROPONIN QUANT: CPT | Performed by: EMERGENCY MEDICINE

## 2023-07-12 PROCEDURE — 96374 THER/PROPH/DIAG INJ IV PUSH: CPT

## 2023-07-12 RX ORDER — ONDANSETRON 2 MG/ML
4 INJECTION INTRAMUSCULAR; INTRAVENOUS ONCE
Status: COMPLETED | OUTPATIENT
Start: 2023-07-12 | End: 2023-07-12

## 2023-07-12 RX ORDER — HYDRALAZINE HYDROCHLORIDE 20 MG/ML
5 INJECTION INTRAMUSCULAR; INTRAVENOUS ONCE
Status: COMPLETED | OUTPATIENT
Start: 2023-07-12 | End: 2023-07-12

## 2023-07-12 RX ORDER — ACETAMINOPHEN 325 MG/1
650 TABLET ORAL ONCE
Status: DISCONTINUED | OUTPATIENT
Start: 2023-07-12 | End: 2023-07-14 | Stop reason: HOSPADM

## 2023-07-12 RX ADMIN — ONDANSETRON 4 MG: 2 INJECTION INTRAMUSCULAR; INTRAVENOUS at 22:35

## 2023-07-12 RX ADMIN — IOHEXOL 100 ML: 350 INJECTION, SOLUTION INTRAVENOUS at 23:36

## 2023-07-12 RX ADMIN — ONDANSETRON 4 MG: 2 INJECTION INTRAMUSCULAR; INTRAVENOUS at 23:28

## 2023-07-12 RX ADMIN — HYDRALAZINE HYDROCHLORIDE 5 MG: 20 INJECTION, SOLUTION INTRAMUSCULAR; INTRAVENOUS at 22:34

## 2023-07-13 PROBLEM — N20.0 KIDNEY STONE: Status: ACTIVE | Noted: 2023-07-13

## 2023-07-13 PROBLEM — R10.9 ABDOMINAL PAIN: Status: ACTIVE | Noted: 2023-07-13

## 2023-07-13 LAB
2HR DELTA HS TROPONIN: 13 NG/L
4HR DELTA HS TROPONIN: 15 NG/L
ALBUMIN SERPL BCP-MCNC: 4.3 G/DL (ref 3.5–5)
ALP SERPL-CCNC: 103 U/L (ref 34–104)
ALT SERPL W P-5'-P-CCNC: 30 U/L (ref 7–52)
ANION GAP SERPL CALCULATED.3IONS-SCNC: 11 MMOL/L
AST SERPL W P-5'-P-CCNC: 37 U/L (ref 13–39)
BACTERIA UR QL AUTO: ABNORMAL /HPF
BASOPHILS # BLD AUTO: 0.01 THOUSANDS/ÂΜL (ref 0–0.1)
BASOPHILS NFR BLD AUTO: 0 % (ref 0–1)
BILIRUB SERPL-MCNC: 1.09 MG/DL (ref 0.2–1)
BILIRUB UR QL STRIP: NEGATIVE
BUN SERPL-MCNC: 10 MG/DL (ref 5–25)
CALCIUM SERPL-MCNC: 9.2 MG/DL (ref 8.4–10.2)
CARDIAC TROPONIN I PNL SERPL HS: 30 NG/L
CARDIAC TROPONIN I PNL SERPL HS: 32 NG/L
CHLORIDE SERPL-SCNC: 95 MMOL/L (ref 96–108)
CLARITY UR: ABNORMAL
CO2 SERPL-SCNC: 25 MMOL/L (ref 21–32)
COLOR UR: ABNORMAL
CREAT SERPL-MCNC: 0.56 MG/DL (ref 0.6–1.3)
EOSINOPHIL # BLD AUTO: 0.01 THOUSAND/ÂΜL (ref 0–0.61)
EOSINOPHIL NFR BLD AUTO: 0 % (ref 0–6)
ERYTHROCYTE [DISTWIDTH] IN BLOOD BY AUTOMATED COUNT: 12.2 % (ref 11.6–15.1)
GFR SERPL CREATININE-BSD FRML MDRD: 86 ML/MIN/1.73SQ M
GLUCOSE SERPL-MCNC: 156 MG/DL (ref 65–140)
GLUCOSE UR STRIP-MCNC: NEGATIVE MG/DL
HCT VFR BLD AUTO: 46.2 % (ref 34.8–46.1)
HGB BLD-MCNC: 16.1 G/DL (ref 11.5–15.4)
HGB UR QL STRIP.AUTO: ABNORMAL
IMM GRANULOCYTES # BLD AUTO: 0.03 THOUSAND/UL (ref 0–0.2)
IMM GRANULOCYTES NFR BLD AUTO: 0 % (ref 0–2)
KETONES UR STRIP-MCNC: ABNORMAL MG/DL
LEUKOCYTE ESTERASE UR QL STRIP: NEGATIVE
LYMPHOCYTES # BLD AUTO: 0.92 THOUSANDS/ÂΜL (ref 0.6–4.47)
LYMPHOCYTES NFR BLD AUTO: 9 % (ref 14–44)
MAGNESIUM SERPL-MCNC: 1.3 MG/DL (ref 1.9–2.7)
MCH RBC QN AUTO: 33.2 PG (ref 26.8–34.3)
MCHC RBC AUTO-ENTMCNC: 34.8 G/DL (ref 31.4–37.4)
MCV RBC AUTO: 95 FL (ref 82–98)
MONOCYTES # BLD AUTO: 0.44 THOUSAND/ÂΜL (ref 0.17–1.22)
MONOCYTES NFR BLD AUTO: 4 % (ref 4–12)
NEUTROPHILS # BLD AUTO: 9.11 THOUSANDS/ÂΜL (ref 1.85–7.62)
NEUTS SEG NFR BLD AUTO: 87 % (ref 43–75)
NITRITE UR QL STRIP: NEGATIVE
NON-SQ EPI CELLS URNS QL MICRO: ABNORMAL /HPF
NRBC BLD AUTO-RTO: 0 /100 WBCS
PH UR STRIP.AUTO: 7 [PH]
PLATELET # BLD AUTO: 196 THOUSANDS/UL (ref 149–390)
PMV BLD AUTO: 11.3 FL (ref 8.9–12.7)
POTASSIUM SERPL-SCNC: 3.4 MMOL/L (ref 3.5–5.3)
PROT SERPL-MCNC: 7.3 G/DL (ref 6.4–8.4)
PROT UR STRIP-MCNC: ABNORMAL MG/DL
RBC # BLD AUTO: 4.85 MILLION/UL (ref 3.81–5.12)
RBC #/AREA URNS AUTO: ABNORMAL /HPF
SODIUM SERPL-SCNC: 131 MMOL/L (ref 135–147)
SP GR UR STRIP.AUTO: 1.04 (ref 1–1.03)
UROBILINOGEN UR STRIP-ACNC: <2 MG/DL
WBC # BLD AUTO: 10.52 THOUSAND/UL (ref 4.31–10.16)
WBC #/AREA URNS AUTO: ABNORMAL /HPF

## 2023-07-13 PROCEDURE — 84484 ASSAY OF TROPONIN QUANT: CPT | Performed by: EMERGENCY MEDICINE

## 2023-07-13 PROCEDURE — 81001 URINALYSIS AUTO W/SCOPE: CPT

## 2023-07-13 PROCEDURE — 83735 ASSAY OF MAGNESIUM: CPT | Performed by: STUDENT IN AN ORGANIZED HEALTH CARE EDUCATION/TRAINING PROGRAM

## 2023-07-13 PROCEDURE — 36415 COLL VENOUS BLD VENIPUNCTURE: CPT | Performed by: EMERGENCY MEDICINE

## 2023-07-13 PROCEDURE — 96375 TX/PRO/DX INJ NEW DRUG ADDON: CPT

## 2023-07-13 PROCEDURE — C9113 INJ PANTOPRAZOLE SODIUM, VIA: HCPCS | Performed by: PHYSICIAN ASSISTANT

## 2023-07-13 PROCEDURE — 80053 COMPREHEN METABOLIC PANEL: CPT | Performed by: STUDENT IN AN ORGANIZED HEALTH CARE EDUCATION/TRAINING PROGRAM

## 2023-07-13 PROCEDURE — 96361 HYDRATE IV INFUSION ADD-ON: CPT

## 2023-07-13 PROCEDURE — 85025 COMPLETE CBC W/AUTO DIFF WBC: CPT | Performed by: STUDENT IN AN ORGANIZED HEALTH CARE EDUCATION/TRAINING PROGRAM

## 2023-07-13 PROCEDURE — 99223 1ST HOSP IP/OBS HIGH 75: CPT | Performed by: STUDENT IN AN ORGANIZED HEALTH CARE EDUCATION/TRAINING PROGRAM

## 2023-07-13 RX ORDER — HYDRALAZINE HYDROCHLORIDE 20 MG/ML
5 INJECTION INTRAMUSCULAR; INTRAVENOUS EVERY 6 HOURS PRN
Status: DISCONTINUED | OUTPATIENT
Start: 2023-07-13 | End: 2023-07-14 | Stop reason: HOSPADM

## 2023-07-13 RX ORDER — MULTIVIT-MIN/FERROUS GLUCONATE 9 MG/15 ML
15 LIQUID (ML) ORAL DAILY
Status: DISCONTINUED | OUTPATIENT
Start: 2023-07-13 | End: 2023-07-13

## 2023-07-13 RX ORDER — ATORVASTATIN CALCIUM 10 MG/1
10 TABLET, FILM COATED ORAL
Status: DISCONTINUED | OUTPATIENT
Start: 2023-07-13 | End: 2023-07-14 | Stop reason: HOSPADM

## 2023-07-13 RX ORDER — POTASSIUM CHLORIDE 20MEQ/15ML
40 LIQUID (ML) ORAL ONCE
Status: DISCONTINUED | OUTPATIENT
Start: 2023-07-13 | End: 2023-07-13

## 2023-07-13 RX ORDER — ACETAMINOPHEN 325 MG/1
650 TABLET ORAL EVERY 6 HOURS PRN
Status: DISCONTINUED | OUTPATIENT
Start: 2023-07-13 | End: 2023-07-14 | Stop reason: HOSPADM

## 2023-07-13 RX ORDER — METOCLOPRAMIDE HYDROCHLORIDE 5 MG/ML
10 INJECTION INTRAMUSCULAR; INTRAVENOUS EVERY 6 HOURS PRN
Status: DISCONTINUED | OUTPATIENT
Start: 2023-07-13 | End: 2023-07-14 | Stop reason: HOSPADM

## 2023-07-13 RX ORDER — LABETALOL HYDROCHLORIDE 5 MG/ML
10 INJECTION, SOLUTION INTRAVENOUS EVERY 6 HOURS PRN
Status: DISCONTINUED | OUTPATIENT
Start: 2023-07-13 | End: 2023-07-14 | Stop reason: HOSPADM

## 2023-07-13 RX ORDER — POLYETHYLENE GLYCOL 3350 17 G/17G
17 POWDER, FOR SOLUTION ORAL DAILY PRN
Status: DISCONTINUED | OUTPATIENT
Start: 2023-07-13 | End: 2023-07-14 | Stop reason: HOSPADM

## 2023-07-13 RX ORDER — PANTOPRAZOLE SODIUM 40 MG/1
40 TABLET, DELAYED RELEASE ORAL
Status: DISCONTINUED | OUTPATIENT
Start: 2023-07-13 | End: 2023-07-13

## 2023-07-13 RX ORDER — SODIUM CHLORIDE 9 MG/ML
75 INJECTION, SOLUTION INTRAVENOUS CONTINUOUS
Status: DISCONTINUED | OUTPATIENT
Start: 2023-07-13 | End: 2023-07-14

## 2023-07-13 RX ORDER — METOCLOPRAMIDE HYDROCHLORIDE 5 MG/ML
10 INJECTION INTRAMUSCULAR; INTRAVENOUS EVERY 6 HOURS PRN
Status: DISCONTINUED | OUTPATIENT
Start: 2023-07-13 | End: 2023-07-13

## 2023-07-13 RX ORDER — ONDANSETRON 2 MG/ML
4 INJECTION INTRAMUSCULAR; INTRAVENOUS EVERY 6 HOURS PRN
Status: DISCONTINUED | OUTPATIENT
Start: 2023-07-13 | End: 2023-07-14 | Stop reason: HOSPADM

## 2023-07-13 RX ORDER — BISACODYL 10 MG
10 SUPPOSITORY, RECTAL RECTAL DAILY PRN
Status: DISCONTINUED | OUTPATIENT
Start: 2023-07-13 | End: 2023-07-14 | Stop reason: HOSPADM

## 2023-07-13 RX ORDER — KETOROLAC TROMETHAMINE 30 MG/ML
15 INJECTION, SOLUTION INTRAMUSCULAR; INTRAVENOUS ONCE
Status: COMPLETED | OUTPATIENT
Start: 2023-07-13 | End: 2023-07-13

## 2023-07-13 RX ORDER — HEPARIN SODIUM 5000 [USP'U]/ML
5000 INJECTION, SOLUTION INTRAVENOUS; SUBCUTANEOUS EVERY 8 HOURS SCHEDULED
Status: DISCONTINUED | OUTPATIENT
Start: 2023-07-13 | End: 2023-07-14 | Stop reason: HOSPADM

## 2023-07-13 RX ORDER — MAGNESIUM SULFATE HEPTAHYDRATE 40 MG/ML
2 INJECTION, SOLUTION INTRAVENOUS ONCE
Status: COMPLETED | OUTPATIENT
Start: 2023-07-13 | End: 2023-07-13

## 2023-07-13 RX ORDER — POTASSIUM CHLORIDE 20MEQ/15ML
40 LIQUID (ML) ORAL 2 TIMES DAILY
Status: DISCONTINUED | OUTPATIENT
Start: 2023-07-13 | End: 2023-07-13

## 2023-07-13 RX ORDER — TAMSULOSIN HYDROCHLORIDE 0.4 MG/1
0.4 CAPSULE ORAL
Status: DISCONTINUED | OUTPATIENT
Start: 2023-07-13 | End: 2023-07-14 | Stop reason: HOSPADM

## 2023-07-13 RX ORDER — PANTOPRAZOLE SODIUM 40 MG/10ML
40 INJECTION, POWDER, LYOPHILIZED, FOR SOLUTION INTRAVENOUS EVERY 12 HOURS SCHEDULED
Status: DISCONTINUED | OUTPATIENT
Start: 2023-07-13 | End: 2023-07-14 | Stop reason: HOSPADM

## 2023-07-13 RX ORDER — MELATONIN
1000 DAILY
Status: DISCONTINUED | OUTPATIENT
Start: 2023-07-13 | End: 2023-07-13

## 2023-07-13 RX ORDER — POLYETHYLENE GLYCOL 3350 17 G/17G
17 POWDER, FOR SOLUTION ORAL DAILY
Status: DISCONTINUED | OUTPATIENT
Start: 2023-07-13 | End: 2023-07-14 | Stop reason: HOSPADM

## 2023-07-13 RX ORDER — AMOXICILLIN 250 MG
2 CAPSULE ORAL 2 TIMES DAILY
Status: DISCONTINUED | OUTPATIENT
Start: 2023-07-13 | End: 2023-07-14 | Stop reason: HOSPADM

## 2023-07-13 RX ORDER — LABETALOL HYDROCHLORIDE 5 MG/ML
10 INJECTION, SOLUTION INTRAVENOUS ONCE
Status: COMPLETED | OUTPATIENT
Start: 2023-07-13 | End: 2023-07-13

## 2023-07-13 RX ORDER — ALBUTEROL SULFATE 90 UG/1
2 AEROSOL, METERED RESPIRATORY (INHALATION) 4 TIMES DAILY PRN
Status: DISCONTINUED | OUTPATIENT
Start: 2023-07-13 | End: 2023-07-14 | Stop reason: HOSPADM

## 2023-07-13 RX ORDER — POTASSIUM CHLORIDE 14.9 MG/ML
20 INJECTION INTRAVENOUS ONCE
Status: COMPLETED | OUTPATIENT
Start: 2023-07-13 | End: 2023-07-14

## 2023-07-13 RX ORDER — LEVOTHYROXINE SODIUM 0.05 MG/1
50 TABLET ORAL
Status: DISCONTINUED | OUTPATIENT
Start: 2023-07-13 | End: 2023-07-14 | Stop reason: HOSPADM

## 2023-07-13 RX ADMIN — ACETAMINOPHEN 650 MG: 325 TABLET, FILM COATED ORAL at 18:28

## 2023-07-13 RX ADMIN — LABETALOL HYDROCHLORIDE 10 MG: 5 INJECTION, SOLUTION INTRAVENOUS at 09:19

## 2023-07-13 RX ADMIN — MORPHINE SULFATE 2 MG: 2 INJECTION, SOLUTION INTRAMUSCULAR; INTRAVENOUS at 00:09

## 2023-07-13 RX ADMIN — SENNOSIDES AND DOCUSATE SODIUM 2 TABLET: 50; 8.6 TABLET ORAL at 18:28

## 2023-07-13 RX ADMIN — HEPARIN SODIUM 5000 UNITS: 5000 INJECTION INTRAVENOUS; SUBCUTANEOUS at 21:17

## 2023-07-13 RX ADMIN — TAMSULOSIN HYDROCHLORIDE 0.4 MG: 0.4 CAPSULE ORAL at 16:39

## 2023-07-13 RX ADMIN — HEPARIN SODIUM 5000 UNITS: 5000 INJECTION INTRAVENOUS; SUBCUTANEOUS at 05:26

## 2023-07-13 RX ADMIN — PANTOPRAZOLE SODIUM 40 MG: 40 INJECTION, POWDER, FOR SOLUTION INTRAVENOUS at 21:17

## 2023-07-13 RX ADMIN — SODIUM CHLORIDE 1000 ML: 0.9 INJECTION, SOLUTION INTRAVENOUS at 01:18

## 2023-07-13 RX ADMIN — POLYETHYLENE GLYCOL 3350 17 G: 17 POWDER, FOR SOLUTION ORAL at 10:18

## 2023-07-13 RX ADMIN — ONDANSETRON 4 MG: 2 INJECTION INTRAMUSCULAR; INTRAVENOUS at 18:28

## 2023-07-13 RX ADMIN — METOCLOPRAMIDE 10 MG: 5 INJECTION, SOLUTION INTRAMUSCULAR; INTRAVENOUS at 21:17

## 2023-07-13 RX ADMIN — MAGNESIUM SULFATE HEPTAHYDRATE 2 G: 40 INJECTION, SOLUTION INTRAVENOUS at 09:23

## 2023-07-13 RX ADMIN — SENNOSIDES AND DOCUSATE SODIUM 2 TABLET: 50; 8.6 TABLET ORAL at 05:23

## 2023-07-13 RX ADMIN — PANTOPRAZOLE SODIUM 40 MG: 40 TABLET, DELAYED RELEASE ORAL at 05:23

## 2023-07-13 RX ADMIN — KETOROLAC TROMETHAMINE 15 MG: 30 INJECTION, SOLUTION INTRAMUSCULAR; INTRAVENOUS at 01:16

## 2023-07-13 RX ADMIN — SODIUM CHLORIDE 75 ML/HR: 0.9 INJECTION, SOLUTION INTRAVENOUS at 12:13

## 2023-07-13 RX ADMIN — HEPARIN SODIUM 5000 UNITS: 5000 INJECTION INTRAVENOUS; SUBCUTANEOUS at 16:39

## 2023-07-13 RX ADMIN — POTASSIUM CHLORIDE 20 MEQ: 14.9 INJECTION, SOLUTION INTRAVENOUS at 10:12

## 2023-07-13 RX ADMIN — ONDANSETRON 4 MG: 2 INJECTION INTRAMUSCULAR; INTRAVENOUS at 10:08

## 2023-07-13 RX ADMIN — LEVOTHYROXINE SODIUM 50 MCG: 50 TABLET ORAL at 05:23

## 2023-07-13 NOTE — ASSESSMENT & PLAN NOTE
· Calculus in the proximal right ureter measuring 3.6 mm. No proximal hydronephrosis. Nonobstructing 6 mm left renal calculus.   · Encouraged to continue hydration-no more than 1800, avoid dehydration  · Follow-up with PCP  · Alarm signs discussed

## 2023-07-13 NOTE — ASSESSMENT & PLAN NOTE
Evident on CT abdomen  Will give a bowel regimen  We will continue bowel regimen at home-MiraLAX, Senokot as needed Most Recent Ast (Optional): 17

## 2023-07-13 NOTE — ED PROVIDER NOTES
History  Chief Complaint   Patient presents with   • Abdominal Pain     Reports started with headache this am, started with diffuse abdominal pain around 5pm with vomiting. Pt states 10/10 pain, reports having to lay down due to the pain at this time,Pt moaning and shaking during triage due to pain. Hx gastroparesis     Patient is an 79-year-old female with gastroparesis and glaucoma that presents to the emergency department with greater than 20 episodes of vomiting that started at 2 PM this afternoon. She reports that she awoke with a mild headache this morning which slowly became worse throughout the day today. Subsequently, when vomiting started she eventually developed abdominal pain but reports that her headache has improved significantly, however has not completely resolved. She currently reports significant abdominal pain and nausea. She denies any recent illnesses or injuries and reports that yesterday she felt completely fine prior to going to bed. She reports a similar episode to this from several months ago at which time she was diagnosed with gastroparesis. Takes no medications for gastroparesis but was on eyedrops for glaucoma for several months, however eyes became irritated and about 1 week ago was told by her ophthalmologist to stop the glaucoma drops for 1 month. She denies blurred vision or pain in her eyes at this time. She denies urinary symptoms. Last bowel movement was this morning and normal without blood. She has had 1 prior abdominal surgery, hernia repair several years ago. Prior to Admission Medications   Prescriptions Last Dose Informant Patient Reported? Taking?    Cholecalciferol (VITAMIN D3) 400 units CAPS 7/13/2023 Self Yes Yes   Sig: Take 1,000 Units by mouth   Combigan 0.2-0.5 % 7/12/2023 Self Yes Yes   albuterol (PROVENTIL HFA,VENTOLIN HFA) 90 mcg/act inhaler Past Month Self Yes Yes   Sig: Inhale 2 puffs 4 (four) times a day as needed   atorvastatin (LIPITOR) 10 mg tablet 7/13/2023 Self No Yes   Sig: TAKE 1 TABLET BY MOUTH  DAILY AT BEDTIME   levothyroxine 50 mcg tablet 7/13/2023  No Yes   Sig: Take 1 tablet (50 mcg total) by mouth daily   multivitamin-iron-minerals-folic acid (CENTRUM) chewable tablet 7/13/2023 Self Yes Yes   Sig: Chew 1 tablet daily   pantoprazole (PROTONIX) 40 mg tablet 7/12/2023  No Yes   Sig: Take 1 tablet (40 mg total) by mouth daily   polyethylene glycol (MIRALAX) 17 g packet Past Month Self No Yes   Sig: Take 17 g by mouth daily as needed (constipation)      Facility-Administered Medications: None       Past Medical History:   Diagnosis Date   • Ascending aortic aneurysm (HCC) 10/23/2018   • Cancer ([address] [facility])    • Colon polyp    • Constipation 7/7/2022   • COVID-19 8/8/2022 8/8/2022=tx paxlovid=vaccinated and boosted   • Disease of thyroid gland    • Elevated troponin 9/1/2022   • Endometrial cancer [facility])     age 45   • 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   • Epigastric abdominal pain 8/31/2022   • Esophageal reflux    • GERD (gastroesophageal reflux disease)    • History of ovarian cancer 04/28/2022   • Hypertension    • Hypertension 8/31/2022   • Hypertension, essential, benign 10/23/2018   • Hypokalemia 8/24/2021   • Hypomagnesemia 11/11/2022   • Hypothyroid    • Kidney stone    • Large hiatal hernia 2/24/2021    Diagnosis: Paraesophageal hernia  Procedure: EGD, Robotic repair of paraesophageal hernia with mesh and Albino fundoplication on 3/1/44 Pathology: Anterior fat pad and portion of paraesophageal hernia sac revealed mesothelium lined fibrovascular tissue, consistent with hernia sac. 3 benign lymph nodes.      • Left upper quadrant abdominal pain, nausea and vomiting 8/22/2021   • Obesity    • Obstruction of left ureteropelvic junction (UPJ) due to stone 8/23/2021   • PONV (postoperative nausea and vomiting) 9/2/2022   • Prediabetes 05/12/2022   • Rosacea 05/12/2022   • Weight loss 8/31/2022       Past Surgical History: Procedure Laterality Date   • CARDIAC CATHETERIZATION Left 09/01/2022    Procedure: Cardiac Left Heart Cath;  Surgeon: Les Cadena MD;  Location: [facility]; Service: Cardiology   • CATARACT EXTRACTION     • CATARACT EXTRACTION, BILATERAL     • COLONOSCOPY  08/2018    polyp- 5 years   • CYSTOSCOPY     • FL RETROGRADE PYELOGRAM  10/15/2021   • HERNIA REPAIR     • JOINT REPLACEMENT     • KNEE ARTHROPLASTY     • MA CYSTO BLADDER W/URETERAL CATHETERIZATION Left 08/24/2021    Procedure: CYSTOSCOPY WITH INSERTION STENT URETERAL;  Surgeon: Clarence Flynn MD;  Location: [facility];  Service: Urology   • MA CYSTO/URETERO W/LITHOTRIPSY &INDWELL STENT INSRT Left 10/15/2021    Procedure: CYSTOSCOPY URETEROSCOPY WITH LITHOTRIPSY HOLMIUM LASER, RETROGRADE PYELOGRAM, BASKET STONE EXTRACTION AND EXCHANGE STENT URETERAL;  Surgeon: Brayan Holt MD;  Location: [facility];  Service: Urology   • MA ESOPHAGOGASTRODUODENOSCOPY TRANSORAL DIAGNOSTIC N/A 03/05/2021    Procedure: ESOPHAGOGASTRODUODENOSCOPY (EGD);   Surgeon: Kristel Martínez MD;  Location: [facility];  Service: Thoracic   • MA LAPS RPR PARAESPHGL HRNA INCL FUNDPLSTY W/O MESH N/A 03/05/2021    Procedure: REPAIR HERNIA PARAESOPHAGEAL LAPAROSCOPIC W ROBOTICS WITH MESH, DOOR FUNDOPLICATION;  Surgeon: Kristel Martínez MD;  Location: [facility];  Service: Thoracic   • TOTAL ABDOMINAL HYSTERECTOMY      age 45   • TOTAL ABDOMINAL HYSTERECTOMY W/ BILATERAL SALPINGOOPHORECTOMY  1992    endometrial cancer       Family History   Problem Relation Age of Onset   • Stomach cancer Mother 71   • Pancreatic cancer Mother 71   • Heart attack Father    • No Known Problems Daughter    • No Known Problems Maternal Grandmother    • No Known Problems Maternal Grandfather    • No Known Problems Paternal Grandmother    • No Known Problems Paternal Grandfather    • No Known Problems Maternal Aunt    • No Known Problems Maternal Aunt    • Cancer Maternal Uncle         unknown type I have reviewed and agree with the history as documented. E-Cigarette/Vaping   • E-Cigarette Use Never User      E-Cigarette/Vaping Substances   • Nicotine No    • THC No    • CBD No    • Flavoring No    • Other No    • Unknown No      Social History     Tobacco Use   • Smoking status: Former     Packs/day: 0.00     Years: 5.00     Total pack years: 0.00     Types: Cigarettes     Start date: 1959     Quit date: 1964     Years since quittin.5   • Smokeless tobacco: Never   • Tobacco comments:     60 years  smokeless   Vaping Use   • Vaping Use: Never used   Substance Use Topics   • Alcohol use: Not Currently     Comment: socially   • Drug use: No        Review of Systems   Constitutional: Negative for chills and fever. HENT: Negative for congestion, ear pain and sore throat. Eyes: Negative for photophobia, pain and visual disturbance. Respiratory: Negative for cough and shortness of breath. Cardiovascular: Negative for chest pain and palpitations. Gastrointestinal: Positive for nausea and vomiting. Negative for abdominal pain, constipation and diarrhea. Genitourinary: Negative for difficulty urinating, dysuria, flank pain and hematuria. Musculoskeletal: Negative for arthralgias and back pain. Skin: Negative for color change and rash. Neurological: Positive for headaches. Negative for dizziness, seizures, syncope and light-headedness. All other systems reviewed and are negative.       Physical Exam  ED Triage Vitals   Temperature Pulse Respirations Blood Pressure SpO2   23   98.2 °F (36.8 °C) 75 (!) 24 (!) 199/108 99 %      Temp Source Heart Rate Source Patient Position - Orthostatic VS BP Location FiO2 (%)   23 2152 23 -- -- --   Oral Monitor         Pain Score       23 0116       5             Orthostatic Vital Signs  Vitals:    23 0030 23 0100 23 0130 23 0236 BP: (!) 183/91 (!) 196/97 (!) 191/91 (!) 184/101   Pulse: 74 70 63 70       Physical Exam  Vitals and nursing note reviewed. Constitutional:       General: She is in acute distress. Appearance: Normal appearance. She is well-developed. She is ill-appearing and diaphoretic. She is not toxic-appearing. HENT:      Head: Normocephalic and atraumatic. Right Ear: External ear normal.      Left Ear: External ear normal.      Mouth/Throat:      Pharynx: Oropharynx is clear. No oropharyngeal exudate or posterior oropharyngeal erythema. Eyes:      General:         Right eye: No discharge. Left eye: No discharge. Extraocular Movements: Extraocular movements intact. Conjunctiva/sclera: Conjunctivae normal.      Pupils: Pupils are equal, round, and reactive to light. Cardiovascular:      Rate and Rhythm: Normal rate and regular rhythm. Pulses: Normal pulses. Heart sounds: Normal heart sounds. No murmur heard. Pulmonary:      Effort: Pulmonary effort is normal. No respiratory distress. Breath sounds: Normal breath sounds. No wheezing, rhonchi or rales. Abdominal:      General: Abdomen is flat. Palpations: Abdomen is soft. Tenderness: There is generalized abdominal tenderness. There is no guarding or rebound. Musculoskeletal:         General: No swelling or deformity. Normal range of motion. Cervical back: Neck supple. No tenderness. Skin:     General: Skin is warm. Capillary Refill: Capillary refill takes less than 2 seconds. Neurological:      Mental Status: She is alert.          ED Medications  Medications   acetaminophen (TYLENOL) tablet 650 mg (0 mg Oral Hold 7/12/23 4214)   potassium chloride oral solution 40 mEq (has no administration in time range)   heparin (porcine) subcutaneous injection 5,000 Units (has no administration in time range)   senna-docusate sodium (SENOKOT S) 8.6-50 mg per tablet 2 tablet (has no administration in time range) polyethylene glycol (MIRALAX) packet 17 g (has no administration in time range)   levothyroxine tablet 50 mcg (has no administration in time range)   multivitamin with iron-minerals liquid 15 mL (has no administration in time range)   pantoprazole (PROTONIX) EC tablet 40 mg (has no administration in time range)   albuterol (PROVENTIL HFA,VENTOLIN HFA) inhaler 2 puff (has no administration in time range)   polyethylene glycol (MIRALAX) packet 17 g (has no administration in time range)   atorvastatin (LIPITOR) tablet 10 mg (has no administration in time range)   cholecalciferol (VITAMIN D) oral liquid 1,000 Units (has no administration in time range)   ondansetron (ZOFRAN) injection 4 mg (4 mg Intravenous Given 7/12/23 2235)   hydrALAZINE (APRESOLINE) injection 5 mg (5 mg Intravenous Given 7/12/23 2234)   ondansetron (ZOFRAN) injection 4 mg (4 mg Intravenous Given 7/12/23 2328)   iohexol (OMNIPAQUE) 350 MG/ML injection (SINGLE-DOSE) 100 mL (100 mL Intravenous Given 7/12/23 2336)   morphine injection 2 mg (2 mg Intravenous Given 7/13/23 0009)   ketorolac (TORADOL) injection 15 mg (15 mg Intravenous Given 7/13/23 0116)   sodium chloride 0.9 % bolus 1,000 mL (1,000 mL Intravenous New Bag 7/13/23 0118)       Diagnostic Studies  Results Reviewed     Procedure Component Value Units Date/Time    HS Troponin I 4hr [[id number]]  (Normal) Collected: 07/13/23 0215    Lab Status: Final result Specimen: Blood from Arm, Right Updated: 07/13/23 0258     hs TnI 4hr 32 ng/L      Delta 4hr hsTnI 15 ng/L     Urine Microscopic [[id number]]  (Abnormal) Collected: 07/13/23 0101    Lab Status: Final result Specimen: Urine Updated: 07/13/23 0114     RBC, UA 10-20 /hpf      WBC, UA None Seen /hpf      Epithelial Cells Occasional /hpf      Bacteria, UA Occasional /hpf     UA w Reflex to Microscopic w Reflex to Culture [[id number]]  (Abnormal) Collected: 07/13/23 0101    Lab Status: Final result Specimen: Urine Updated: 07/13/23 0107     Color, UA Light Yellow     Clarity, UA Turbid     Specific Gravity, UA 1.038     pH, UA 7.0     Leukocytes, UA Negative     Nitrite, UA Negative     Protein, UA 50 (1+) mg/dl      Glucose, UA Negative mg/dl      Ketones, UA 20 (1+) mg/dl      Urobilinogen, UA <2.0 mg/dl      Bilirubin, UA Negative     Occult Blood, UA Small    HS Troponin I 2hr [[id number]]  (Normal) Collected: 07/13/23 0008    Lab Status: Final result Specimen: Blood from Arm, Right Updated: 07/13/23 0051     hs TnI 2hr 30 ng/L      Delta 2hr hsTnI 13 ng/L     HS Troponin 0hr (reflex protocol) [[id number]]  (Normal) Collected: 07/12/23 2209    Lab Status: Final result Specimen: Blood from Arm, Right Updated: 07/12/23 2244     hs TnI 0hr 17 ng/L     Comprehensive metabolic panel [[id number]]  (Abnormal) Collected: 07/12/23 2209    Lab Status: Final result Specimen: Blood from Arm, Right Updated: 07/12/23 2235     Sodium 132 mmol/L      Potassium 3.3 mmol/L      Chloride 95 mmol/L      CO2 21 mmol/L      ANION GAP 16 mmol/L      BUN 12 mg/dL      Creatinine 0.58 mg/dL      Glucose 165 mg/dL      Calcium 9.7 mg/dL      AST 31 U/L      ALT 26 U/L      Alkaline Phosphatase 111 U/L      Total Protein 7.6 g/dL      Albumin 4.5 g/dL      Total Bilirubin 1.01 mg/dL      eGFR 85 ml/min/1.73sq m     Narrative:      [place] guidelines for Chronic Kidney Disease (CKD):   •  Stage 1 with normal or high GFR (GFR > 90 mL/min/1.73 square meters)  •  Stage 2 Mild CKD (GFR = 60-89 mL/min/1.73 square meters)  •  Stage 3A Moderate CKD (GFR = 45-59 mL/min/1.73 square meters)  •  Stage 3B Moderate CKD (GFR = 30-44 mL/min/1.73 square meters)  •  Stage 4 Severe CKD (GFR = 15-29 mL/min/1.73 square meters)  •  Stage 5 End Stage CKD (GFR <15 mL/min/1.73 square meters)  Note: GFR calculation is accurate only with a steady state creatinine    Lipase [[id number]]  (Normal) Collected: 07/12/23 2209    Lab Status: Final result Specimen: Blood from Arm, Right Updated: 07/12/23 2235     Lipase 23 u/L     CBC and differential [[id number]]  (Abnormal) Collected: 07/12/23 2209    Lab Status: Final result Specimen: Blood from Arm, Right Updated: 07/12/23 2222     WBC 10.78 Thousand/uL      RBC 4.91 Million/uL      Hemoglobin 16.2 g/dL      Hematocrit 47.1 %      MCV 96 fL      MCH 33.0 pg      MCHC 34.4 g/dL      RDW 12.3 %      MPV 10.9 fL      Platelets 109 Thousands/uL      nRBC 0 /100 WBCs      Neutrophils Relative 79 %      Immat GRANS % 0 %      Lymphocytes Relative 16 %      Monocytes Relative 5 %      Eosinophils Relative 0 %      Basophils Relative 0 %      Neutrophils Absolute 8.50 Thousands/µL      Immature Grans Absolute 0.04 Thousand/uL      Lymphocytes Absolute 1.70 Thousands/µL      Monocytes Absolute 0.51 Thousand/µL      Eosinophils Absolute 0.00 Thousand/µL      Basophils Absolute 0.03 Thousands/µL                  CT Abdomen pelvis with contrast   Final Result by Jerome Moreno MD (07/13 0045)         1. Calculus in the proximal right ureter measuring 3.6 mm. No proximal hydronephrosis. Nonobstructing 6 mm left renal calculus. 2. Findings suggestive of constipation. Workstation performed: [id number]         CT head without contrast   Final Result by Jerome Moreno MD (07/13 0038)      No acute intracranial abnormality. Workstation performed: [id number]               Procedures  ECG 12 Lead Documentation Only    Date/Time: 7/12/2023 10:46 PM    Performed by: Devora Feliciano DO  Authorized by:  Devora Feliciano DO    Indications / Diagnosis:  Vomiting  ECG reviewed by me, the ED Provider: yes    Patient location:  ED  Previous ECG:     Previous ECG:  Compared to current    Comparison ECG info:  PACs no longer present    Similarity:  Changes noted  Interpretation:     Interpretation: abnormal    Rate:     ECG rate:  80    ECG rate assessment: normal    Rhythm:     Rhythm: sinus rhythm    Ectopy:     Ectopy: none    QRS: QRS axis:  Left  Conduction:     Conduction: abnormal      Abnormal conduction: LAFB    ST segments:     ST segments:  Normal  T waves:     T waves: normal            ED Course  ED Course as of 07/13/23 0409   Thu Jul 13, 2023   0015 L eye pressure 13  R eye pressure 15  Visual acuity 20/25             HEART Risk Score    Flowsheet Row Most Recent Value   Heart Score Risk Calculator    History 0 Filed at: 07/13/2023 0406   ECG 0 Filed at: 07/13/2023 0406   Age 2 Filed at: 07/13/2023 0406   Risk Factors 1 Filed at: 07/13/2023 0406   Troponin 1 Filed at: 07/13/2023 0406   HEART Score 4 Filed at: 07/13/2023 0406                                Medical Decision Making  82F with history of gastroparesis presents to the emergency department after awakening with a headache this morning which eventually evolved into abdominal pain, nausea, vomiting. She reports headache is significantly improved. On physical exam, patient is in obvious distress due to pain and has several episodes of vomiting. She has generalized abdominal tenderness. Laboratory evaluation reveals mildly elevated white blood cell count of 10.8, sodium of 132, potassium of 3.3. Lipase and UA are within normal limits other than urine suspicious for mild dehydration. Patient's initial troponin is found to be 17. ECG reveals no evidence of acute ischemia or dysrhythmia. CT the head without contrast reveals no acute intracranial abnormalities. Patient recently stopped using glaucoma medications and out of concern for possible acute glaucoma eye pressures are checked which are within normal limits as indicated above. Patient with baseline visual acuity. CT scan of the patient's abdomen pelvis with contrast reveals stone in the proximal right ureter measuring 3.6 mm. There is no evidence of hydronephrosis. A left renal calculus is also noted as well as findings suggestive of constipation.   Patient requires several repeated doses of analgesic medications and antiemetic medications without significant improvement. Due to intractable nausea, vomiting, and pain patient is appropriate for admission under the care of [facility] internal medicine for further evaluation and treatment. Patient is admitted under SLIM. Amount and/or Complexity of Data Reviewed  Labs: ordered. Radiology: ordered. Risk  OTC drugs. Prescription drug management. Decision regarding hospitalization. Disposition  Final diagnoses:   Ureterolithiasis   Nausea and vomiting     Time reflects when diagnosis was documented in both MDM as applicable and the Disposition within this note     Time User Action Codes Description Comment    7/13/2023  1:49 AM Sruthialtaf Johanny Add [N20.1] Ureterolithiasis     7/13/2023  1:49 AM Joe Yost Add [R11.2] Nausea and vomiting       ED Disposition     ED Disposition   Admit    Condition   Stable    Date/Time   u Jul 13, 2023  1:49 AM    Comment   Case was discussed with BA and the patient's admission status was agreed to be Admission Status: observation status to the service of Dr. Gary Cooks.            Follow-up Information    None         Current Discharge Medication List      CONTINUE these medications which have NOT CHANGED    Details   albuterol (PROVENTIL HFA,VENTOLIN HFA) 90 mcg/act inhaler Inhale 2 puffs 4 (four) times a day as needed      atorvastatin (LIPITOR) 10 mg tablet TAKE 1 TABLET BY MOUTH  DAILY AT BEDTIME  Qty: 90 tablet, Refills: 3    Associated Diagnoses: Hyperlipidemia, unspecified hyperlipidemia type      Cholecalciferol (VITAMIN D3) 400 units CAPS Take 1,000 Units by mouth      Combigan 0.2-0.5 %       levothyroxine 50 mcg tablet Take 1 tablet (50 mcg total) by mouth daily  Qty: 90 tablet, Refills: 3    Associated Diagnoses: Acquired hypothyroidism      multivitamin-iron-minerals-folic acid (CENTRUM) chewable tablet Chew 1 tablet daily      pantoprazole (PROTONIX) 40 mg tablet Take 1 tablet (40 mg total) by mouth daily  Qty: 90 tablet, Refills: 3    Associated Diagnoses: Gastroesophageal reflux disease with esophagitis without hemorrhage      polyethylene glycol (MIRALAX) 17 g packet Take 17 g by mouth daily as needed (constipation)  Refills: 0    Associated Diagnoses: Gastroparesis           No discharge procedures on file. PDMP Review       Value Time User    PDMP Reviewed  Yes 7/12/2023 10:15 PM Sai Cordero MD           ED Provider  Attending physically available and evaluated Teodoro Reid. I managed the patient along with the ED Attending.     Electronically Signed by         James Irwin DO  07/13/23 8985

## 2023-07-13 NOTE — CASE MANAGEMENT
Case Management Assessment & Discharge Planning Note    Patient name Mat TENORIO /S -01 MRN [id number]  : 1940 Date 2023       Current Admission Date: 2023  Current Admission Diagnosis:Abdominal pain   Patient Active Problem List    Diagnosis Date Noted   • Kidney stone 2023   • Abdominal pain 2023   • Psychophysiological insomnia 2023   • Vitamin D deficiency 2023   • Osteopenia 2023   • Gastroparesis 11/10/2022   • Dyspepsia 2022   • Hyponatremia 2022   • Gallstones 2022   • Constipation 2022   • Bilateral hearing loss 2022   • Rosacea 2022   • Endometrial cancer ([facility]) 2022   • Disorder of uvula 2021   • Gastroesophageal reflux disease with esophagitis without hemorrhage 2021   • Acquired hypothyroidism    • Morbid obesity ([facility]) 2021   • Abnormal EKG 2020   • Hypercholesterolemia 10/23/2018      LOS (days): 0  Geometric Mean LOS (GMLOS) (days):   Days to GMLOS:     OBJECTIVE:    Risk of Unplanned Readmission Score: 11.7         Current admission status: Inpatient       Preferred Pharmacy:   [address] [facility] [address] [address]  Phone: [phone] Fax: [phone]    [facility] ([address] [place] [address]  [address] [facility] [address]  Phone: [phone] Fax: [phone]    Primary Care Provider: Elia Cruz MD    Primary Insurance: MEDICARE  Secondary Insurance: [facility] HEALTH OPTIONS PROGRAM    ASSESSMENT:  Active Health Care Proxies    There are no active Health Care Proxies on file.                       Patient Information  Admitted from[de-identified] Home  Mental Status: Alert  During Assessment patient was accompanied by: Spouse  Assessment information provided by[de-identified] Spouse, Patient  Primary Caregiver: Self  Support Systems: Self, Spouse/significant other, [address] of Res[address]: [facility] [address] do you live in?: [place]  In the last 12 months, was there a time when you were not able to pay the mortgage or rent on time?: No  In the last 12 months, how many places have you lived?: 1  In the last 12 months, was there a time when you did not have a steady place to sleep or slept in a shelter (including now)?: No  Homeless/housing insecurity resource given?: N/A  Living Arrangements: Lives w/ Spouse/significant other    Activities of Daily Living Prior to Admission  Functional Status: Independent  Completes ADLs independently?: Yes  Ambulates independently?: Yes  Does patient use assisted devices?: Yes  Assisted Devices (DME) used: Straight Cane (only when out of the home)  Does patient currently own DME?: Yes  What DME does the patient currently own?: Straight Cane  Does patient have a history of Outpatient Therapy (PT/OT)?: Yes (following knee replacements)  Does the patient have a history of Short-Term Rehab?: No  Does patient have a history of HHC?: No  Does patient currently have [facility]?: No         Patient Information Continued  Does patient have prescription coverage?: Yes  Within the past 12 months, you worried that your food would run out before you got the money to buy more.: Never true  Within the past 12 months, the food you bought just didn't last and you didn't have money to get more.: Never true  Food insecurity resource given?: N/A  Does patient receive dialysis treatments?: No  Does patient have a history of substance abuse?: No  Does patient have a history of Mental Health Diagnosis?: No         Means of Transportation  In the past 12 months, has lack of transportation kept you from medical appointments or from getting medications?: No  In the past 12 months, has lack of transportation kept you from meetings, work, or from getting things needed for daily living?: No  Was application for public transport provided?: N/A        DISCHARGE DETAILS:    Discharge planning discussed with[de-identified] patient and spouse, Rene Welsh, at bedside        CM contacted family/caregiver?: Yes  Were Treatment Team discharge recommendations reviewed with patient/caregiver?: Yes  Did patient/caregiver verbalize understanding of patient care needs?: Yes  Were patient/caregiver advised of the risks associated with not following Treatment Team discharge recommendations?: Yes    Contacts  Patient Contacts: Rene Blaise, spouse  Relationship to Patient[de-identified] [address]         Is the patient interested in [facility] at discharge?: No    DME Referral Provided  Referral made for DME?: No    Other Referral/Resources/Interventions Provided:  Interventions: None Indicated  Referral Comments: Patient admitted due to abdominal pain, nausea/vomiting. Met with patient and spouse, Rene Welsh, at bedside to complete assessment; spouse provided most information as patient exhausted. Patient and spouse live together in a single-story home. Patient has a cane that she uses when out of the house, but normally walks independently throughout the home. Patient has history of out-patient therapy following knee replacements; no history of home care services or past rehab need. Patient uses [facility] as primary pharmacy or [facility] for mail order. Spouse denies any issues obtaining meds. Patient's PCP is Dr. Arun López. Patient and spouse both drive - denies any issues with transportation. Patient's spouse has financial/medical POA. Anticipate d/c to home once medically stable - no needs identified at this time.     Would you like to participate in our [address] service program?  : No - Declined    Treatment Team Recommendation: Home  Discharge Destination Plan[de-identified] Home  Transport at Discharge : Family

## 2023-07-13 NOTE — ASSESSMENT & PLAN NOTE
· Pain resolved, patient experienced mostly nausea which was contained with Reglan and Zofran  · Patient is pain-free and has no nausea.   Possible contribution from, ureteral stone, gastroparesis, GERD, constipation, esophagitis  · She was able to tolerate a diet  · Discharge with as needed Reglan  · Take pantoprazole 40 mg twice daily for 7 days, then continue with 40 mg daily  · Carafate 1/8 4 times daily for 7 days  · Continue bowel regimen with MiraLAX/Senokot as needed

## 2023-07-13 NOTE — ASSESSMENT & PLAN NOTE
· Will be discharged on pantoprazole 40 mg twice daily for 7 days, then continue daily pantoprazole  · Carafate 1/x4 daily for 7 days  · None ulcerogenic diet

## 2023-07-13 NOTE — ED ATTENDING ATTESTATION
7/12/2023  ILucas MD, saw and evaluated the patient. I have discussed the patient with the resident/non-physician practitioner and agree with the resident's/non-physician practitioner's findings, Plan of Care, and MDM as documented in the resident's/non-physician practitioner's note, except where noted. All available labs and Radiology studies were reviewed. I was present for key portions of any procedure(s) performed by the resident/non-physician practitioner and I was immediately available to provide assistance. At this point I agree with the current assessment done in the Emergency Department. I have conducted an independent evaluation of this patient a history and physical is as follows: Patient is a 79year old female with headache since this AM with N/V and then developed abdominal pain tonight. No diarrhea. Last BM earlier today but no flatus since then. Has had prior hernia surgery and JOANNE/BSO. No trauma. No GI bleeding. No urinary sx. No fever. No blurry vision. Was last seen at [facility] for hypothyroidism. [facility] website checked on this patient and last Rx filled was on 8/25/21 for vicodin for 3 day supply. NCAT. PERRL. Moist mucous membranes. Neck supple. Lungs clear. Heart regular without murmur. Abdomen soft and diffuse tenderness. (+) bowel sounds. No rash noted. No edema. No focal deficits. DDx including but not limited to: tension headache, cluster headache, migraine, ICH,  Glaucoma; doubt SAH, tumor, meningitis, temporal arteritis, carbon monoxide poisoning, zoster; sinusitis.  DDx including but not limited to: appendicitis, gastroenteritis, gastritis, PUD, GERD, gastroparesis, hepatitis, pancreatitis, colitis, enteritis, diverticulitis, food poisoning, mesenteric adenitis, epiploic appendagitis, mesenteric panniculitis, mesenteric ischemia, IBD, IBS, ileus, bowel obstruction, volvulus, internal hernia, AAA, cholecystitis, biliary colic, choledocholithiasis, perforated viscus, tumor, splenic etiology, constipation, pelvic pathology, renal colic, pyelonephritis, UTI, HTN; doubt cardiac etiology. Will check labs, EKG, CXR and CTs.      ED Course         Critical Care Time  Procedures

## 2023-07-13 NOTE — PLAN OF CARE
Problem: MOBILITY - ADULT  Goal: Maintain or return to baseline ADL function  Description: INTERVENTIONS:  -  Assess patient's ability to carry out ADLs; assess patient's baseline for ADL function and identify physical deficits which impact ability to perform ADLs (bathing, care of mouth/teeth, toileting, grooming, dressing, etc.)  - Assess/evaluate cause of self-care deficits   - Assess range of motion  - Assess patient's mobility; develop plan if impaired  - Assess patient's need for assistive devices and provide as appropriate  - Encourage maximum independence but intervene and supervise when necessary  - Involve family in performance of ADLs  - Assess for home care needs following discharge   - Consider OT consult to assist with ADL evaluation and planning for discharge  - Provide patient education as appropriate  7/13/2023 0447 by Jose Reddy RN  Outcome: Progressing  7/13/2023 0447 by Jose Reddy RN  Outcome: Progressing  Goal: Maintains/Returns to pre admission functional level  Description: INTERVENTIONS:  - Perform BMAT or MOVE assessment daily.   - Set and communicate daily mobility goal to care team and patient/family/caregiver.    - Collaborate with rehabilitation services on mobility goals if consulted  - Perform Range of Motion   - Reposition patient   - Dangle patient  - Stand patient   - Ambulate patient   - Out of bed to chair    - Out of bed for meals  - Out of bed for toileting  - Record patient progress and toleration of activity level   7/13/2023 0447 by Jose Reddy RN  Outcome: Progressing  7/13/2023 0447 by Jose Reddy RN  Outcome: Progressing     Problem: PAIN - ADULT  Goal: Verbalizes/displays adequate comfort level or baseline comfort level  Description: Interventions:  - Encourage patient to monitor pain and request assistance  - Assess pain using appropriate pain scale  - Administer analgesics based on type and severity of pain and evaluate response  - Implement non-pharmacological measures as appropriate and evaluate response  - Consider cultural and social influences on pain and pain management  - Notify physician/advanced practitioner if interventions unsuccessful or patient reports new pain  Outcome: Progressing     Problem: INFECTION - ADULT  Goal: Absence or prevention of progression during hospitalization  Description: INTERVENTIONS:  - Assess and monitor for signs and symptoms of infection  - Monitor lab/diagnostic results  - Monitor all insertion sites, i.e. indwelling lines, tubes, and drains  - Monitor endotracheal if appropriate and nasal secretions for changes in amount and color  - [place] appropriate cooling/warming therapies per order  - Administer medications as ordered  - Instruct and encourage patient and family to use good hand hygiene technique  - Identify and instruct in appropriate isolation precautions for identified infection/condition  Outcome: Progressing  Goal: Absence of fever/infection during neutropenic period  Description: INTERVENTIONS:  - Monitor WBC    Outcome: Progressing     Problem: SAFETY ADULT  Goal: Maintain or return to baseline ADL function  Description: INTERVENTIONS:  -  Assess patient's ability to carry out ADLs; assess patient's baseline for ADL function and identify physical deficits which impact ability to perform ADLs (bathing, care of mouth/teeth, toileting, grooming, dressing, etc.)  - Assess/evaluate cause of self-care deficits   - Assess range of motion  - Assess patient's mobility; develop plan if impaired  - Assess patient's need for assistive devices and provide as appropriate  - Encourage maximum independence but intervene and supervise when necessary  - Involve family in performance of ADLs  - Assess for home care needs following discharge   - Consider OT consult to assist with ADL evaluation and planning for discharge  - Provide patient education as appropriate  7/13/2023 0447 by Jen Hale RN  Outcome: Progressing  7/13/2023 0447 by Quincy Aguiar RN  Outcome: Progressing  Goal: Maintains/Returns to pre admission functional level  Description: INTERVENTIONS:  - Perform BMAT or MOVE assessment daily.   - Set and communicate daily mobility goal to care team and patient/family/caregiver.    - Collaborate with rehabilitation services on mobility goals if consulted  - Perform Range of Motion   - Reposition patient   - Dangle patient   - Stand patient  - Ambulate patient   - Out of bed to chair  - Out of bed for meals  - Out of bed for toileting  - Record patient progress and toleration of activity level   7/13/2023 0447 by Quincy Aguiar RN  Outcome: Progressing  7/13/2023 0447 by Quincy Aguiar RN  Outcome: Progressing  Goal: Patient will remain free of falls  Description: INTERVENTIONS:  - Educate patient/family on patient safety including physical limitations  - Instruct patient to call for assistance with activity   - Consult OT/PT to assist with strengthening/mobility   - Keep Call bell within reach  - Keep bed low and locked with side rails adjusted as appropriate  - Keep care items and personal belongings within reach  - Initiate and maintain comfort rounds  - Make Fall Risk Sign visible to staff  - Offer Toileting every 2 Hours, in advance of need  - Initiate/Maintain bed/ chair alarm  - Obtain necessary fall risk management equipment  - Apply yellow socks and bracelet for high fall risk patients  - Consider moving patient to room near nurses station  Outcome: Progressing     Problem: DISCHARGE PLANNING  Goal: Discharge to home or other facility with appropriate resources  Description: INTERVENTIONS:  - Identify barriers to discharge w/patient and caregiver  - Arrange for needed discharge resources and transportation as appropriate  - Identify discharge learning needs (meds, wound care, etc.)  - Arrange for interpretive services to assist at discharge as needed  - Refer to Case Management Department for coordinating discharge planning if the patient needs post-hospital services based on physician/advanced practitioner order or complex needs related to functional status, cognitive ability, or social support system  Outcome: Progressing     Problem: Nutrition/Hydration-ADULT  Goal: Nutrient/Hydration intake appropriate for improving, restoring or maintaining nutritional needs  Description: Monitor and assess patient's nutrition/hydration status for malnutrition. Collaborate with interdisciplinary team and initiate plan and interventions as ordered. Monitor patient's weight and dietary intake as ordered or per policy. Utilize nutrition screening tool and intervene as necessary. Determine patient's food preferences and provide high-protein, high-caloric foods as appropriate.      INTERVENTIONS:  - Monitor oral intake, urinary output, labs, and treatment plans  - Assess nutrition and hydration status and recommend course of action  - Evaluate amount of meals eaten  - Assist patient with eating if necessary   - Allow adequate time for meals  - Recommend/ encourage appropriate diets, oral nutritional supplements, and vitamin/mineral supplements  - Order, calculate, and assess calorie counts as needed  - Recommend, monitor, and adjust tube feedings and TPN/PPN based on assessed needs  - Assess need for intravenous fluids  - Provide specific nutrition/hydration education as appropriate  - Include patient/family/caregiver in decisions related to nutrition  Outcome: Progressing

## 2023-07-13 NOTE — H&P
[facility] ADMISSION H&P     Name: Heladio Galvin   Age & Sex: 79 y.o. female   MRN: [id number]  Unit/Bed#: S -01   Encounter: [id number]  Primary Care Provider: Anshu Collier MD    Code Status: Level 1 - Full Code  Admission Status: OBSERVATION  Disposition: Patient requires Med/Surg      ASSESSMENT/PLAN     Principal Problem:    Abdominal pain  Active Problems:    Gastroesophageal reflux disease with esophagitis without hemorrhage    Constipation    Kidney stone      Kidney stone  Assessment & Plan  3.6 mm RT ureter   No hydro   May consult uro if pain is not relieved after bowel movement. Constipation  Assessment & Plan  Evident on CT abdomen  Will give a bowel regimen    Gastroesophageal reflux disease with esophagitis without hemorrhage  Assessment & Plan  On PPI    * Abdominal pain  Assessment & Plan  Presented with abdominal pain and nausea  Evidence of stool / constipation and a right ureter 3.6mm non-obstructing stone  Can be cause of the pain  Will do a bowel regimen / PO hydration   May consult uro in the morning for further management if pain is more urological than GI related       VTE Pharmacologic Prophylaxis: Heparin  VTE Mechanical Prophylaxis: sequential compression device    CHIEF COMPLAINT     Chief Complaint   Patient presents with   • Abdominal Pain     Reports started with headache this am, started with diffuse abdominal pain around 5pm with vomiting. Pt states 10/10 pain, reports having to lay down due to the pain at this time,Pt moaning and shaking during triage due to pain. Hx gastroparesis      HISTORY OF PRESENT ILLNESS       Patient is a 76-year-old female with past medical history of hypothyroidism, GERD presented to the ED with acute complaint of abdominal pain associated with nausea and vomiting. Vital signs are stable in the ED imaging revealed a 3.6 mm right ureter stone as well as evidence of large amount of stool.  She did have a bowel movement today but have not been passing gas. Denies any chest pain, SOB, fever or chills. BP (!) 184/101   Pulse 70   Temp 98.5 °F (36.9 °C)   Resp 19   SpO2 98%     Will admit to medicine for kidney stone and constipation management     REVIEW OF SYSTEMS     Review of Systems   Constitutional: Negative for fatigue and unexpected weight change. HENT: Negative for drooling. Eyes: Negative for visual disturbance. Respiratory: Negative for shortness of breath and wheezing. Gastrointestinal: Negative for diarrhea and vomiting. Endocrine: Negative for polyuria. Genitourinary: Negative for vaginal pain. Neurological: Negative for speech difficulty and weakness. OBJECTIVE     Vitals:    23 0030 23 0100 23 0130 23 0236   BP: (!) 183/91 (!) 196/97 (!) 191/91 (!) 184/101   Pulse: 74 70 63 70   Resp: 20 20 20 19   Temp:    98.5 °F (36.9 °C)   TempSrc:       SpO2: 98% 98% 98% 98%      Temperature:   Temp (24hrs), Av.4 °F (36.9 °C), Min:98.2 °F (36.8 °C), Max:98.5 °F (36.9 °C)    Temperature: 98.5 °F (36.9 °C)  Intake & Output:  I/O     None        Weights: There is no height or weight on file to calculate BMI. Weight (last 2 days)     None        Physical Exam  Constitutional:       Appearance: She is not ill-appearing, toxic-appearing or diaphoretic. HENT:      Head: Normocephalic and atraumatic. Cardiovascular:      Rate and Rhythm: Normal rate and regular rhythm. Pulses: Normal pulses. Heart sounds: Normal heart sounds. No gallop. Pulmonary:      Effort: Pulmonary effort is normal.      Breath sounds: Normal breath sounds. No rales. Abdominal:      General: Bowel sounds are normal.      Tenderness: There is no right CVA tenderness or left CVA tenderness. Musculoskeletal:      Right lower leg: No edema. Left lower leg: No edema.    Psychiatric:         Mood and Affect: Mood normal.       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   • Epigastric abdominal pain 8/31/2022   • Esophageal reflux    • GERD (gastroesophageal reflux disease)    • History of ovarian cancer 04/28/2022   • Hypertension    • Hypertension 8/31/2022   • Hypertension, essential, benign 10/23/2018   • Hypokalemia 8/24/2021   • Hypomagnesemia 11/11/2022   • Hypothyroid    • Kidney stone    • Large hiatal hernia 2/24/2021    Diagnosis: Paraesophageal hernia  Procedure: EGD, Robotic repair of paraesophageal hernia with mesh and Albino fundoplication on 4/6/08 Pathology: Anterior fat pad and portion of paraesophageal hernia sac revealed mesothelium lined fibrovascular tissue, consistent with hernia sac. 3 benign lymph nodes. • Left upper quadrant abdominal pain, nausea and vomiting 8/22/2021   • Obesity    • Obstruction of left ureteropelvic junction (UPJ) due to stone 8/23/2021   • PONV (postoperative nausea and vomiting) 9/2/2022   • Prediabetes 05/12/2022   • Rosacea 05/12/2022   • Weight loss 8/31/2022     PAST SURGICAL HISTORY     Past Surgical History:   Procedure Laterality Date   • CARDIAC CATHETERIZATION Left 09/01/2022    Procedure: Cardiac Left Heart Cath;  Surgeon: Reinaldo Figueroa MD;  [facility];   Service: Cardiology   • CATARACT EXTRACTION     • CATARACT EXTRACTION, BILATERAL     • COLONOSCOPY  08/2018    polyp- 5 years   • CYSTOSCOPY     • FL RETROGRADE PYELOGRAM  10/15/2021   • HERNIA REPAIR     • JOINT REPLACEMENT     • KNEE ARTHROPLASTY     • CT CYSTO BLADDER W/URETERAL CATHETERIZATION Left 08/24/2021    Procedure: CYSTOSCOPY WITH INSERTION STENT URETERAL;  Surgeon: Megha Castro MD;  Location: [facility];  Service: Urology   • [place] CYSTO/URETERO W/LITHOTRIPSY &INDWELL STENT INSRT Left 10/15/2021    Procedure: CYSTOSCOPY URETEROSCOPY WITH LITHOTRIPSY HOLMIUM LASER, RETROGRADE PYELOGRAM, BASKET STONE EXTRACTION AND EXCHANGE STENT URETERAL;  Surgeon: Garrett Armstrong MD;  Location: [facility];  Service: Urology   • [place] ESOPHAGOGASTRODUODENOSCOPY TRANSORAL DIAGNOSTIC N/A 2021    Procedure: ESOPHAGOGASTRODUODENOSCOPY (EGD); Surgeon: Jong Guan MD;  Location: [facility];  Service: Thoracic   • [place] LAPS RPR PARAESPHGL HRNA INCL FUNDPLSTY W/O MESH N/A 2021    Procedure: REPAIR HERNIA PARAESOPHAGEAL LAPAROSCOPIC W ROBOTICS WITH MESH, DOOR FUNDOPLICATION;  Surgeon: Jong Guan MD;  Location[facility];  Service: Thoracic   • TOTAL ABDOMINAL HYSTERECTOMY      age 45   • TOTAL ABDOMINAL HYSTERECTOMY W/ BILATERAL SALPINGOOPHORECTOMY  1992    endometrial cancer     SOCIAL & FAMILY HISTORY     Social History     Substance and Sexual Activity   Alcohol Use Not Currently    Comment: socially     Substance and Sexual Activity   Alcohol Use Not Currently    Comment: socially        Substance and Sexual Activity   Drug Use No     Social History     Tobacco Use   Smoking Status Former   • Packs/day: 0.00   • Years: 5.00   • Total pack years: 0.00   • Types: Cigarettes   • Start date: 1959   • Quit date: 1964   • Years since quittin.5   Smokeless Tobacco Never   Tobacco Comments    60 years  smokeless     Family History   Problem Relation Age of Onset   • Stomach cancer Mother 71   • Pancreatic cancer Mother 71   • Heart attack Father    • No Known Problems Daughter    • No Known Problems Maternal Grandmother    • No Known Problems Maternal Grandfather    • No Known Problems Paternal Grandmother    • No Known Problems Paternal Grandfather    • No Known Problems Maternal Aunt    • No Known Problems Maternal Aunt    • Cancer Maternal Uncle         unknown type     LABORATORY DATA     Labs:  I have personally reviewed pertinent reports. Results from last 7 days   Lab Units 07/12/23  2209   WBC Thousand/uL 10.78*   HEMOGLOBIN g/dL 16.2*   HEMATOCRIT % 47.1*   PLATELETS Thousands/uL 227   NEUTROS PCT % 79*   MONOS PCT % 5   EOS PCT % 0      Results from last 7 days   Lab Units 07/12/23  2209   POTASSIUM mmol/L 3.3*   CHLORIDE mmol/L 95*   CO2 mmol/L 21   BUN mg/dL 12   CREATININE mg/dL 0.58*   CALCIUM mg/dL 9.7   ALK PHOS U/L 111*   ALT U/L 26   AST U/L 31                          Micro:  Lab Results   Component Value Date    URINECX >100,000 cfu/ml 09/27/2022    URINECX 20,000-29,000 cfu/ml Aerococcus urinae (A) 12/07/2021    URINECX No Growth <1000 cfu/mL 10/17/2021     IMAGING & DIAGNOSTIC TESTS     Imaging: I have personally reviewed pertinent reports. CT Abdomen pelvis with contrast    Result Date: 7/13/2023  Impression: 1. Calculus in the proximal right ureter measuring 3.6 mm. No proximal hydronephrosis. Nonobstructing 6 mm left renal calculus. 2. Findings suggestive of constipation. Workstation performed: [id number]     CT head without contrast    Result Date: 7/13/2023  Impression: No acute intracranial abnormality. Workstation performed: [id number]     EKG, Pathology, and Other Studies: I have personally reviewed pertinent reports. ALLERGIES   No Known Allergies  MEDICATIONS PRIOR TO ARRIVAL     Prior to Admission medications    Medication Sig Start Date End Date Taking?  Authorizing Provider   albuterol (PROVENTIL HFA,VENTOLIN HFA) 90 mcg/act inhaler Inhale 2 puffs 4 (four) times a day as needed 4/21/22  Yes Historical Provider, MD   atorvastatin (LIPITOR) 10 mg tablet TAKE 1 TABLET BY MOUTH  DAILY AT BEDTIME 7/18/22  Yes Jennifer Lubin MD   Cholecalciferol (VITAMIN D3) 400 units CAPS Take 1,000 Units by mouth 7/21/11  Yes Historical Provider, MD   Combigan 0.2-0.5 %  2/6/21  Yes Historical Provider, MD   levothyroxine 50 mcg tablet Take 1 tablet (50 mcg total) by mouth daily 1/26/23  Yes Natali Silva MD multivitamin-iron-minerals-folic acid (CENTRUM) chewable tablet Chew 1 tablet daily   Yes Historical Provider, MD   pantoprazole (PROTONIX) 40 mg tablet Take 1 tablet (40 mg total) by mouth daily 1/26/23  Yes Natali Herman MD   polyethylene glycol (MIRALAX) 17 g packet Take 17 g by mouth daily as needed (constipation) 11/12/22  Yes Nicolette Yen PA-C     MEDICATIONS ADMINISTERED IN LAST 24 HOURS     Medication Administration - last 24 hours from 07/12/2023 0338 to 07/13/2023 0338       Date/Time Order Dose Route Action Action by     07/12/2023 2235 EDT ondansetron (ZOFRAN) injection 4 mg 4 mg Intravenous Given Usman Perez RN     07/12/2023 2234 EDT hydrALAZINE (APRESOLINE) injection 5 mg 5 mg Intravenous Given Usman Perez RN     07/12/2023 2319 EDT acetaminophen (TYLENOL) tablet 650 mg 0 mg Oral Hold Usman Perez RN     07/12/2023 2328 EDT ondansetron (ZOFRAN) injection 4 mg 4 mg Intravenous Given Usman Perez RN     07/12/2023 2336 EDT iohexol (OMNIPAQUE) 350 MG/ML injection (SINGLE-DOSE) 100 mL 100 mL Intravenous Given Jerzytanika Richter.     07/13/2023 0009 EDT morphine injection 2 mg 2 mg Intravenous Given Usman Perez RN     07/13/2023 0116 EDT ketorolac (TORADOL) injection 15 mg 15 mg Intravenous Given Avelino Rivera RN     07/13/2023 0118 EDT sodium chloride 0.9 % bolus 1,000 mL 1,000 mL Intravenous [place] Lavern Rivera, RUI        CURRENT MEDICATIONS     Current Facility-Administered Medications   Medication Dose Route Frequency Provider Last Rate   • acetaminophen  650 mg Oral Once Nichola Mcburney, DO     • heparin (porcine)  5,000 Units Subcutaneous Q8H [facility] Amanda Harden MD     • polyethylene glycol  17 g Oral Daily Amanda Harden MD     • potassium chloride  40 mEq Oral BID Amanda Harden MD     • senna-docusate sodium  2 tablet Oral BID Sharon Harden MD               Admission Time  I spent 45 minutes admitting the patient.   This involved direct patient contact where I performed a full history and physical, reviewing previous records, and reviewing laboratory and other diagnostic studies. Portions of the record may have been created with voice recognition software. Occasional wrong word or "sound a like" substitutions may have occurred due to the inherent limitations of voice recognition software.   Read the chart carefully and recognize, using context, where substitutions have occurred.    ==  Al Rodriguez MD  [address] [facility]

## 2023-07-13 NOTE — QUICK NOTE
Post admit recheck --nurse reported patient still having significantly elevated blood pressures and vomiting. When I came to see the patient she reported that her abdominal pain had eased off a little and described as being generalized, not lateralized to flank area. However she was still having repeated episodes of vomiting (which was still ongoing while I was present)    1. Intractable nausea and vomiting. Patient with history of underlying gastroparesis, also with evidence of constipation on imaging--supportive care with IV fluids and antiemetics. Add Reglan. Change Protonix to IV    2. Hypertensive urgency--add IV hydralazine. Suspect she will not tolerate any oral antihypertensives at this time    3. Right-sided 3.6 mm stone in ureter--unclear if symptomatic.  Continue IVF      Case discussed with patient's nurse and with  at bedside

## 2023-07-13 NOTE — PLAN OF CARE
Problem: MOBILITY - ADULT  Goal: Maintain or return to baseline ADL function  Description: INTERVENTIONS:  -  Assess patient's ability to carry out ADLs; assess patient's baseline for ADL function and identify physical deficits which impact ability to perform ADLs (bathing, care of mouth/teeth, toileting, grooming, dressing, etc.)  - Assess/evaluate cause of self-care deficits   - Assess range of motion  - Assess patient's mobility; develop plan if impaired  - Assess patient's need for assistive devices and provide as appropriate  - Encourage maximum independence but intervene and supervise when necessary  - Involve family in performance of ADLs  - Assess for home care needs following discharge   - Consider OT consult to assist with ADL evaluation and planning for discharge  - Provide patient education as appropriate  Outcome: Progressing  Goal: Maintains/Returns to pre admission functional level  Description: INTERVENTIONS:  - Perform BMAT or MOVE assessment daily.   - Set and communicate daily mobility goal to care team and patient/family/caregiver. - Collaborate with rehabilitation services on mobility goals if consulted  - Perform Range of Motion 3 times a day. - Reposition patient every 2 hours.   - Dangle patient 3 times a day  - Stand patient 3 times a day  - Ambulate patient 3 times a day  - Out of bed to chair 3 times a day   - Out of bed for meals 3 times a day  - Out of bed for toileting  - Record patient progress and toleration of activity level   Outcome: Progressing     Problem: PAIN - ADULT  Goal: Verbalizes/displays adequate comfort level or baseline comfort level  Description: Interventions:  - Encourage patient to monitor pain and request assistance  - Assess pain using appropriate pain scale  - Administer analgesics based on type and severity of pain and evaluate response  - Implement non-pharmacological measures as appropriate and evaluate response  - Consider cultural and social influences on pain and pain management  - Notify physician/advanced practitioner if interventions unsuccessful or patient reports new pain  Outcome: Progressing     Problem: INFECTION - ADULT  Goal: Absence or prevention of progression during hospitalization  Description: INTERVENTIONS:  - Assess and monitor for signs and symptoms of infection  - Monitor lab/diagnostic results  - Monitor all insertion sites, i.e. indwelling lines, tubes, and drains  - Monitor endotracheal if appropriate and nasal secretions for changes in amount and color  - [place] appropriate cooling/warming therapies per order  - Administer medications as ordered  - Instruct and encourage patient and family to use good hand hygiene technique  - Identify and instruct in appropriate isolation precautions for identified infection/condition  Outcome: Progressing  Goal: Absence of fever/infection during neutropenic period  Description: INTERVENTIONS:  - Monitor WBC    Outcome: Progressing     Problem: SAFETY ADULT  Goal: Maintain or return to baseline ADL function  Description: INTERVENTIONS:  -  Assess patient's ability to carry out ADLs; assess patient's baseline for ADL function and identify physical deficits which impact ability to perform ADLs (bathing, care of mouth/teeth, toileting, grooming, dressing, etc.)  - Assess/evaluate cause of self-care deficits   - Assess range of motion  - Assess patient's mobility; develop plan if impaired  - Assess patient's need for assistive devices and provide as appropriate  - Encourage maximum independence but intervene and supervise when necessary  - Involve family in performance of ADLs  - Assess for home care needs following discharge   - Consider OT consult to assist with ADL evaluation and planning for discharge  - Provide patient education as appropriate  Outcome: Progressing  Goal: Maintains/Returns to pre admission functional level  Description: INTERVENTIONS:  - Perform BMAT or MOVE assessment daily.   - Set and communicate daily mobility goal to care team and patient/family/caregiver.    - Collaborate with rehabilitation services on mobility goals if consulted  - Out of bed for toileting  - Record patient progress and toleration of activity level   Outcome: Progressing  Goal: Patient will remain free of falls  Description: INTERVENTIONS:  - Educate patient/family on patient safety including physical limitations  - Instruct patient to call for assistance with activity   - Consult OT/PT to assist with strengthening/mobility   - Keep Call bell within reach  - Keep bed low and locked with side rails adjusted as appropriate  - Keep care items and personal belongings within reach  - Initiate and maintain comfort rounds  - Make Fall Risk Sign visible to staff  - Offer Toileting every 2 Hours, in advance of need  - Initiate/Maintain bed and chair alarm  - Obtain necessary fall risk management equipment:   - Apply yellow socks and bracelet for high fall risk patients  - Consider moving patient to room near nurses station  Outcome: Progressing     Problem: DISCHARGE PLANNING  Goal: Discharge to home or other facility with appropriate resources  Description: INTERVENTIONS:  - Identify barriers to discharge w/patient and caregiver  - Arrange for needed discharge resources and transportation as appropriate  - Identify discharge learning needs (meds, wound care, etc.)  - Arrange for interpretive services to assist at discharge as needed  - Refer to Case Management Department for coordinating discharge planning if the patient needs post-hospital services based on physician/advanced practitioner order or complex needs related to functional status, cognitive ability, or social support system  Outcome: Progressing     Problem: Nutrition/Hydration-ADULT  Goal: Nutrient/Hydration intake appropriate for improving, restoring or maintaining nutritional needs  Description: Monitor and assess patient's nutrition/hydration status for malnutrition. Collaborate with interdisciplinary team and initiate plan and interventions as ordered. Monitor patient's weight and dietary intake as ordered or per policy. Utilize nutrition screening tool and intervene as necessary. Determine patient's food preferences and provide high-protein, high-caloric foods as appropriate.      INTERVENTIONS:  - Monitor oral intake, urinary output, labs, and treatment plans  - Assess nutrition and hydration status and recommend course of action  - Evaluate amount of meals eaten  - Assist patient with eating if necessary   - Allow adequate time for meals  - Recommend/ encourage appropriate diets, oral nutritional supplements, and vitamin/mineral supplements  - Order, calculate, and assess calorie counts as needed  - Recommend, monitor, and adjust tube feedings and TPN/PPN based on assessed needs  - Assess need for intravenous fluids  - Provide specific nutrition/hydration education as appropriate  - Include patient/family/caregiver in decisions related to nutrition  Outcome: Progressing

## 2023-07-13 NOTE — PROGRESS NOTES
Patient is alert and oriented, attempted to ask patient navigator questions. Patient refused to answer. All answers were given from her  in the room with patient at the time of admission to floor [facility] [id number]. I explained the rationale for patient providing her own answers, and patient is still non-compliant.

## 2023-07-14 VITALS
HEART RATE: 95 BPM | SYSTOLIC BLOOD PRESSURE: 120 MMHG | DIASTOLIC BLOOD PRESSURE: 86 MMHG | RESPIRATION RATE: 18 BRPM | OXYGEN SATURATION: 96 % | TEMPERATURE: 98.4 F

## 2023-07-14 DIAGNOSIS — I10 HYPERTENSION: ICD-10-CM

## 2023-07-14 LAB
ALBUMIN SERPL BCP-MCNC: 4.1 G/DL (ref 3.5–5)
ALP SERPL-CCNC: 98 U/L (ref 34–104)
ALT SERPL W P-5'-P-CCNC: 24 U/L (ref 7–52)
ANION GAP SERPL CALCULATED.3IONS-SCNC: 10 MMOL/L
ANION GAP SERPL CALCULATED.3IONS-SCNC: 7 MMOL/L
AST SERPL W P-5'-P-CCNC: 27 U/L (ref 13–39)
BASOPHILS # BLD AUTO: 0.02 THOUSANDS/ÂΜL (ref 0–0.1)
BASOPHILS NFR BLD AUTO: 0 % (ref 0–1)
BILIRUB SERPL-MCNC: 0.96 MG/DL (ref 0.2–1)
BUN SERPL-MCNC: 14 MG/DL (ref 5–25)
BUN SERPL-MCNC: 16 MG/DL (ref 5–25)
CALCIUM SERPL-MCNC: 8.7 MG/DL (ref 8.4–10.2)
CALCIUM SERPL-MCNC: 8.9 MG/DL (ref 8.4–10.2)
CHLORIDE SERPL-SCNC: 95 MMOL/L (ref 96–108)
CHLORIDE SERPL-SCNC: 96 MMOL/L (ref 96–108)
CO2 SERPL-SCNC: 25 MMOL/L (ref 21–32)
CO2 SERPL-SCNC: 27 MMOL/L (ref 21–32)
CREAT SERPL-MCNC: 0.64 MG/DL (ref 0.6–1.3)
CREAT SERPL-MCNC: 0.67 MG/DL (ref 0.6–1.3)
EOSINOPHIL # BLD AUTO: 0 THOUSAND/ÂΜL (ref 0–0.61)
EOSINOPHIL NFR BLD AUTO: 0 % (ref 0–6)
ERYTHROCYTE [DISTWIDTH] IN BLOOD BY AUTOMATED COUNT: 12.8 % (ref 11.6–15.1)
GFR SERPL CREATININE-BSD FRML MDRD: 81 ML/MIN/1.73SQ M
GFR SERPL CREATININE-BSD FRML MDRD: 82 ML/MIN/1.73SQ M
GLUCOSE SERPL-MCNC: 113 MG/DL (ref 65–140)
GLUCOSE SERPL-MCNC: 128 MG/DL (ref 65–140)
HCT VFR BLD AUTO: 47.3 % (ref 34.8–46.1)
HGB BLD-MCNC: 16.4 G/DL (ref 11.5–15.4)
IMM GRANULOCYTES # BLD AUTO: 0.03 THOUSAND/UL (ref 0–0.2)
IMM GRANULOCYTES NFR BLD AUTO: 0 % (ref 0–2)
LYMPHOCYTES # BLD AUTO: 1.31 THOUSANDS/ÂΜL (ref 0.6–4.47)
LYMPHOCYTES NFR BLD AUTO: 17 % (ref 14–44)
MAGNESIUM SERPL-MCNC: 1.7 MG/DL (ref 1.9–2.7)
MCH RBC QN AUTO: 33.2 PG (ref 26.8–34.3)
MCHC RBC AUTO-ENTMCNC: 34.7 G/DL (ref 31.4–37.4)
MCV RBC AUTO: 96 FL (ref 82–98)
MONOCYTES # BLD AUTO: 0.73 THOUSAND/ÂΜL (ref 0.17–1.22)
MONOCYTES NFR BLD AUTO: 9 % (ref 4–12)
NEUTROPHILS # BLD AUTO: 5.87 THOUSANDS/ÂΜL (ref 1.85–7.62)
NEUTS SEG NFR BLD AUTO: 74 % (ref 43–75)
NRBC BLD AUTO-RTO: 0 /100 WBCS
OSMOLALITY UR/SERPL-RTO: 279 MMOL/KG (ref 282–298)
OSMOLALITY UR: 849 MMOL/KG
PLATELET # BLD AUTO: 206 THOUSANDS/UL (ref 149–390)
PMV BLD AUTO: 11.1 FL (ref 8.9–12.7)
POTASSIUM SERPL-SCNC: 3.5 MMOL/L (ref 3.5–5.3)
POTASSIUM SERPL-SCNC: 3.6 MMOL/L (ref 3.5–5.3)
PROT SERPL-MCNC: 7 G/DL (ref 6.4–8.4)
RBC # BLD AUTO: 4.94 MILLION/UL (ref 3.81–5.12)
SODIUM 24H UR-SCNC: 29 MOL/L
SODIUM SERPL-SCNC: 129 MMOL/L (ref 135–147)
SODIUM SERPL-SCNC: 131 MMOL/L (ref 135–147)
WBC # BLD AUTO: 7.96 THOUSAND/UL (ref 4.31–10.16)

## 2023-07-14 PROCEDURE — 85025 COMPLETE CBC W/AUTO DIFF WBC: CPT | Performed by: PHYSICIAN ASSISTANT

## 2023-07-14 PROCEDURE — 83735 ASSAY OF MAGNESIUM: CPT | Performed by: PHYSICIAN ASSISTANT

## 2023-07-14 PROCEDURE — C9113 INJ PANTOPRAZOLE SODIUM, VIA: HCPCS | Performed by: PHYSICIAN ASSISTANT

## 2023-07-14 PROCEDURE — 80048 BASIC METABOLIC PNL TOTAL CA: CPT

## 2023-07-14 PROCEDURE — 83930 ASSAY OF BLOOD OSMOLALITY: CPT

## 2023-07-14 PROCEDURE — 99239 HOSP IP/OBS DSCHRG MGMT >30: CPT | Performed by: INTERNAL MEDICINE

## 2023-07-14 PROCEDURE — 84300 ASSAY OF URINE SODIUM: CPT

## 2023-07-14 PROCEDURE — 80053 COMPREHEN METABOLIC PANEL: CPT | Performed by: PHYSICIAN ASSISTANT

## 2023-07-14 PROCEDURE — 83935 ASSAY OF URINE OSMOLALITY: CPT

## 2023-07-14 RX ORDER — SUCRALFATE 1 G/1
1 TABLET ORAL EVERY 6 HOURS SCHEDULED
Qty: 28 TABLET | Refills: 0 | Status: SHIPPED | OUTPATIENT
Start: 2023-07-14 | End: 2023-07-21

## 2023-07-14 RX ORDER — SUCRALFATE 1 G/1
1 TABLET ORAL EVERY 6 HOURS SCHEDULED
Status: DISCONTINUED | OUTPATIENT
Start: 2023-07-14 | End: 2023-07-14 | Stop reason: HOSPADM

## 2023-07-14 RX ORDER — MAGNESIUM SULFATE HEPTAHYDRATE 40 MG/ML
2 INJECTION, SOLUTION INTRAVENOUS ONCE
Status: COMPLETED | OUTPATIENT
Start: 2023-07-14 | End: 2023-07-14

## 2023-07-14 RX ORDER — AMLODIPINE BESYLATE 5 MG/1
5 TABLET ORAL DAILY
Qty: 30 TABLET | Refills: 0 | Status: SHIPPED | OUTPATIENT
Start: 2023-07-15 | End: 2023-08-14

## 2023-07-14 RX ORDER — AMLODIPINE BESYLATE 5 MG/1
5 TABLET ORAL DAILY
Status: DISCONTINUED | OUTPATIENT
Start: 2023-07-15 | End: 2023-07-14 | Stop reason: HOSPADM

## 2023-07-14 RX ORDER — PANTOPRAZOLE SODIUM 40 MG/1
TABLET, DELAYED RELEASE ORAL
Qty: 90 TABLET | Refills: 1 | Status: SHIPPED | OUTPATIENT
Start: 2023-07-14 | End: 2023-08-20

## 2023-07-14 RX ADMIN — TAMSULOSIN HYDROCHLORIDE 0.4 MG: 0.4 CAPSULE ORAL at 15:31

## 2023-07-14 RX ADMIN — PANTOPRAZOLE SODIUM 40 MG: 40 INJECTION, POWDER, FOR SOLUTION INTRAVENOUS at 08:53

## 2023-07-14 RX ADMIN — METOCLOPRAMIDE 10 MG: 5 INJECTION, SOLUTION INTRAMUSCULAR; INTRAVENOUS at 05:52

## 2023-07-14 RX ADMIN — HEPARIN SODIUM 5000 UNITS: 5000 INJECTION INTRAVENOUS; SUBCUTANEOUS at 05:48

## 2023-07-14 RX ADMIN — LEVOTHYROXINE SODIUM 50 MCG: 50 TABLET ORAL at 05:48

## 2023-07-14 RX ADMIN — SUCRALFATE 1 G: 1 TABLET ORAL at 15:31

## 2023-07-14 RX ADMIN — SUCRALFATE 1 G: 1 TABLET ORAL at 08:53

## 2023-07-14 RX ADMIN — MAGNESIUM SULFATE HEPTAHYDRATE 2 G: 40 INJECTION, SOLUTION INTRAVENOUS at 08:53

## 2023-07-14 RX ADMIN — SENNOSIDES AND DOCUSATE SODIUM 2 TABLET: 50; 8.6 TABLET ORAL at 08:53

## 2023-07-14 RX ADMIN — POLYETHYLENE GLYCOL 3350 17 G: 17 POWDER, FOR SOLUTION ORAL at 08:53

## 2023-07-14 NOTE — PLAN OF CARE
Problem: MOBILITY - ADULT  Goal: Maintain or return to baseline ADL function  Description: INTERVENTIONS:  -  Assess patient's ability to carry out ADLs; assess patient's baseline for ADL function and identify physical deficits which impact ability to perform ADLs (bathing, care of mouth/teeth, toileting, grooming, dressing, etc.)  - Assess/evaluate cause of self-care deficits   - Assess range of motion  - Assess patient's mobility; develop plan if impaired  - Assess patient's need for assistive devices and provide as appropriate  - Encourage maximum independence but intervene and supervise when necessary  - Involve family in performance of ADLs  - Assess for home care needs following discharge   - Consider OT consult to assist with ADL evaluation and planning for discharge  - Provide patient education as appropriate  Outcome: Progressing  Goal: Maintains/Returns to pre admission functional level  Description: INTERVENTIONS:  - Perform BMAT or MOVE assessment daily.   - Set and communicate daily mobility goal to care team and patient/family/caregiver. - Collaborate with rehabilitation services on mobility goals if consulted  - Perform Range of Motion 3 times a day. - Reposition patient every 2 hours.   - Dangle patient 3 times a day  - Stand patient 3 times a day  - Ambulate patient 3 times a day  - Out of bed to chair 3 times a day   - Out of bed for meals 3 times a day  - Out of bed for toileting  - Record patient progress and toleration of activity level   Outcome: Progressing     Problem: PAIN - ADULT  Goal: Verbalizes/displays adequate comfort level or baseline comfort level  Description: Interventions:  - Encourage patient to monitor pain and request assistance  - Assess pain using appropriate pain scale  - Administer analgesics based on type and severity of pain and evaluate response  - Implement non-pharmacological measures as appropriate and evaluate response  - Consider cultural and social influences on pain and pain management  - Notify physician/advanced practitioner if interventions unsuccessful or patient reports new pain  Outcome: Progressing     Problem: INFECTION - ADULT  Goal: Absence or prevention of progression during hospitalization  Description: INTERVENTIONS:  - Assess and monitor for signs and symptoms of infection  - Monitor lab/diagnostic results  - Monitor all insertion sites, i.e. indwelling lines, tubes, and drains  - Monitor endotracheal if appropriate and nasal secretions for changes in amount and color  - [place] appropriate cooling/warming therapies per order  - Administer medications as ordered  - Instruct and encourage patient and family to use good hand hygiene technique  - Identify and instruct in appropriate isolation precautions for identified infection/condition  Outcome: Progressing  Goal: Absence of fever/infection during neutropenic period  Description: INTERVENTIONS:  - Monitor WBC    Outcome: Progressing     Problem: SAFETY ADULT  Goal: Maintain or return to baseline ADL function  Description: INTERVENTIONS:  -  Assess patient's ability to carry out ADLs; assess patient's baseline for ADL function and identify physical deficits which impact ability to perform ADLs (bathing, care of mouth/teeth, toileting, grooming, dressing, etc.)  - Assess/evaluate cause of self-care deficits   - Assess range of motion  - Assess patient's mobility; develop plan if impaired  - Assess patient's need for assistive devices and provide as appropriate  - Encourage maximum independence but intervene and supervise when necessary  - Involve family in performance of ADLs  - Assess for home care needs following discharge   - Consider OT consult to assist with ADL evaluation and planning for discharge  - Provide patient education as appropriate  Outcome: Progressing  Goal: Maintains/Returns to pre admission functional level  Description: INTERVENTIONS:  - Perform BMAT or MOVE assessment daily.   - Set and communicate daily mobility goal to care team and patient/family/caregiver.    - Collaborate with rehabilitation services on mobility goals if consulted  - Out of bed for toileting  - Record patient progress and toleration of activity level   Outcome: Progressing  Goal: Patient will remain free of falls  Description: INTERVENTIONS:  - Educate patient/family on patient safety including physical limitations  - Instruct patient to call for assistance with activity   - Consult OT/PT to assist with strengthening/mobility   - Keep Call bell within reach  - Keep bed low and locked with side rails adjusted as appropriate  - Keep care items and personal belongings within reach  - Initiate and maintain comfort rounds  - Make Fall Risk Sign visible to staff  - Offer Toileting every 2 Hours, in advance of need  - Initiate/Maintain bed and chair alarm  - Obtain necessary fall risk management equipment:   - Apply yellow socks and bracelet for high fall risk patients  - Consider moving patient to room near nurses station  Outcome: Progressing     Problem: DISCHARGE PLANNING  Goal: Discharge to home or other facility with appropriate resources  Description: INTERVENTIONS:  - Identify barriers to discharge w/patient and caregiver  - Arrange for needed discharge resources and transportation as appropriate  - Identify discharge learning needs (meds, wound care, etc.)  - Arrange for interpretive services to assist at discharge as needed  - Refer to Case Management Department for coordinating discharge planning if the patient needs post-hospital services based on physician/advanced practitioner order or complex needs related to functional status, cognitive ability, or social support system  Outcome: Progressing     Problem: Nutrition/Hydration-ADULT  Goal: Nutrient/Hydration intake appropriate for improving, restoring or maintaining nutritional needs  Description: Monitor and assess patient's nutrition/hydration status for malnutrition. Collaborate with interdisciplinary team and initiate plan and interventions as ordered. Monitor patient's weight and dietary intake as ordered or per policy. Utilize nutrition screening tool and intervene as necessary. Determine patient's food preferences and provide high-protein, high-caloric foods as appropriate.      INTERVENTIONS:  - Monitor oral intake, urinary output, labs, and treatment plans  - Assess nutrition and hydration status and recommend course of action  - Evaluate amount of meals eaten  - Assist patient with eating if necessary   - Allow adequate time for meals  - Recommend/ encourage appropriate diets, oral nutritional supplements, and vitamin/mineral supplements  - Order, calculate, and assess calorie counts as needed  - Recommend, monitor, and adjust tube feedings and TPN/PPN based on assessed needs  - Assess need for intravenous fluids  - Provide specific nutrition/hydration education as appropriate  - Include patient/family/caregiver in decisions related to nutrition  Outcome: Progressing

## 2023-07-14 NOTE — DISCHARGE INSTR - AVS FIRST PAGE
Dear Daron Dakins,     It was our pleasure to care for you here at [facility]. It is our hope that we were always able to exceed the expected standards for your care during your stay. You were hospitalized due to elevated blood pressure and  kidney stones. You were cared for on the [facility] 3rd floor by Donna Yun MD under the service of Felipa Baker MD with the [facility] who covers for your primary care physician (PCP), Carlos Avalos MD, while you were hospitalized. If you have any questions or concerns related to this hospitalization, you may contact us at [phone]. For follow up as well as any medication refills, we recommend that you follow up with your primary care physician. A registered nurse will reach out to you by phone within a few days after your discharge to answer any additional questions that you may have after going home. However, at this time we provide for you here, the most important instructions / recommendations at discharge:     Notable Medication Adjustments -   Please start taking pantoprazole 40 mg twice daily for 1 week  Take Carafate 4 times a day for 1 week  Please take metoclopramide 5 mg as needed for nausea or vomiting  Start taking amlodipine 5 mg daily  Testing Required after Discharge -   BMP on 7/17  Important follow up information -   Please follow-up with your primary care physician within 3 to 5 days for blood work  Please follow-up with gastroenterologist within 1 to 2 weeks  Other Instructions -    Please keep hydrated and do not drink more 1800 ml of water  Go to the ED if you have severe abdominal pain, nausea vomiting, fever, shortness of breath, vomiting blood or blood in stools.   Go to the ED if you have any headaches, blurred vision, nausea or vomiting, weakness or tremors  Please check your blood pressure at least daily, please call your PCP if your blood pressure is above 150/90 or blood pressure below 100/90  Please review this entire after visit summary as additional general instructions including medication list, appointments, activity, diet, any pertinent wound care, and other additional recommendations from your care team that may be provided for you.       Sincerely,     Kameron Pierce MD

## 2023-07-14 NOTE — DISCHARGE SUMMARY
[facility]  Discharge- Heladio Galvin 1940, 79 y.o. female MRN: [id number]  Unit/Bed#: S -[facility] Encounter: [id number]  Primary Care Provider: Anshu Collier MD   Date and time admitted to hospital: 7/12/2023  9:56 PM    * Abdominal pain  Assessment & Plan  · Pain resolved, patient experienced mostly nausea which was contained with Reglan and Zofran  · Patient is pain-free and has no nausea. Possible contribution from, ureteral stone, gastroparesis, GERD, constipation, esophagitis  · She was able to tolerate a diet  · Discharge with as needed Reglan  · Take pantoprazole 40 mg twice daily for 7 days, then continue with 40 mg daily  · Carafate 1/8 4 times daily for 7 days  · Continue bowel regimen with MiraLAX/Senokot as needed    Kidney stone  Assessment & Plan  · Calculus in the proximal right ureter measuring 3.6 mm. No proximal hydronephrosis. Nonobstructing 6 mm left renal calculus.   · Encouraged to continue hydration-no more than 1800, avoid dehydration  · Follow-up with PCP  · Alarm signs discussed      Hyponatremia  Assessment & Plan  · Has a prior history of hyponatremia and has been on salt tabs in the past   · Sodium levels trended down  · Sodium 131 at discharge, urine osmolarity 849, serum osmolarity 279, urine sodium 20-SIADH probably due to hypothyroid  · Patient will get a BMP on 7/17 and follow-up with primary care  · Minimal fluid restriction, continue follow-up with PCP May require salt tablets if needed  · Counseled on alarm symptoms of severe hyponatremia    Hypertension  Assessment & Plan  Started on on amlodipine 5 mg  Follow with PCP    Constipation  Assessment & Plan  Evident on CT abdomen  Will give a bowel regimen  We will continue bowel regimen at home-MiraLAX, Senokot as needed    Acquired hypothyroidism  Assessment & Plan  Continue levothyroxine     Gastroesophageal reflux disease with esophagitis without hemorrhage  Assessment & Plan  · Will be discharged on pantoprazole 40 mg twice daily for 7 days, then continue daily pantoprazole  · Carafate 1/x4 daily for 7 days  · None ulcerogenic diet        Medical Problems     Resolved Problems  Date Reviewed: 7/12/2023   None       Discharging Resident: Jeri Douglas MD  Discharging Attending: No att. providers found  PCP: Jose M Nagy MD  Admission Date:   Admission Orders (From admission, onward)     Ordered        07/13/23 1506  Inpatient Admission  Once            07/13/23 0150  Place in Observation  Once                      Discharge Date: 07/14/23    Consultations During Hospital Stay:  · None    Procedures Performed:   · None    Significant Findings / Test Results:   · CT abdomen-3.6 mm right ureteral stone, nonobstructing 6 mm left renal calculus. No hydronephrosis. Also findings suggestive for constipation  · CT head unremarkable  · Sodium 132-129-131  · Serum osmolarity 279, urine osmolarity 849, urine sodium 29    Incidental Findings:   · None    Test Results Pending at Discharge (will require follow up): · None     Outpatient Tests Requested:  · BMP    Complications: None    Reason for Admission: Abdominal pain nausea and headache    Hospital Course:   Sonia Briscoe is a 79 y.o. female patient past medical history of GERD, gastroparesis, central hypothyroidism who originally presented to the hospital on 7/12/2023 due to abdominal pain and nausea. Patient reports feeling sick and nauseous with several episodes of retching but not vomiting per se. She denied any fever or diarrhea, hematemesis or melena. She had also been experiencing generalized headaches, with no associated diplopia, photophobia, weakness or seizures. At the ED she was hypertensive, blood work with hyponatremia. CT abdomen pelvis revealed 3.6 mm right ureteral stone as well as nonobstructive 6 mm left calculus. High stool burden on CT. CT head unremarkable. Patient was admitted for observation for symptom management. She received IV fluids, antiemetic medication, PPI pantoprazole, pain regimen. On the floors, patient continued to have persistent elevated blood pressure requiring as needed IV hydralazine. Improvement of symptoms and able to tolerated diet. Sodium level is low at 132 but at baseline. 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 Patient is required to maintain adequate diuresis but not more than 1800 cc. She will follow-up with her PCP for BMP check 7/17. She  will continue with PPI and Carafate, Reglan as needed. We started her on antihypertensive amlodipine 5 mg, encouraged to check blood pressure daily and follow-up with primary care within the next week. Please see above list of diagnoses and related plan for additional information. Condition at Discharge: stable    Discharge Day Visit / Exam:   Subjective: Patient seen at bedside, reports improvement in her nausea. Was able to tolerated diet without abdominal pain or vomiting. No fever, chest pain or shortness of breath. Vitals: Blood Pressure: 120/86 (07/14/23 1514)  Pulse: 95 (07/14/23 1514)  Temperature: 98.4 °F (36.9 °C) (07/14/23 1514)  Temp Source: Oral (07/14/23 1514)  Respirations: 18 (07/14/23 1514)  SpO2: 96 % (07/14/23 1514)  Exam:   Physical Exam  Vitals and nursing note reviewed. Constitutional:       General: She is not in acute distress. Appearance: She is well-developed. HENT:      Head: Normocephalic and atraumatic. Eyes:      Conjunctiva/sclera: Conjunctivae normal.   Cardiovascular:      Rate and Rhythm: Normal rate and regular rhythm. Heart sounds: No murmur heard. Pulmonary:      Effort: Pulmonary effort is normal. No respiratory distress. Breath sounds: Normal breath sounds. Abdominal:      Palpations: Abdomen is soft. Tenderness: There is no abdominal tenderness. Musculoskeletal:         General: No swelling. Cervical back: Neck supple.    Skin:     General: Skin is warm and dry. Capillary Refill: Capillary refill takes less than 2 seconds. Neurological:      Mental Status: She is alert. Psychiatric:         Mood and Affect: Mood normal.         Discussion with Family: Updated  () at bedside. Discharge instructions/Information to patient and family:   See after visit summary for information provided to patient and family. Provisions for Follow-Up Care:  See after visit summary for information related to follow-up care and any pertinent home health orders. Disposition:   Home    Planned Readmission: no    Discharge Medications:  See after visit summary for reconciled discharge medications provided to patient and/or family.       **Please Note: This note may have been constructed using a voice recognition system**

## 2023-07-14 NOTE — ASSESSMENT & PLAN NOTE
· Has a prior history of hyponatremia and has been on salt tabs in the past   · Sodium levels trended down  · Sodium 131 at discharge, urine osmolarity 849, serum osmolarity 279, urine sodium 20-SIADH probably due to hypothyroid  · Patient will get a BMP on 7/17 and follow-up with primary care  · Minimal fluid restriction, continue follow-up with PCP May require salt tablets if needed  · Counseled on alarm symptoms of severe hyponatremia

## 2023-07-14 NOTE — PROGRESS NOTES
Patient continues to experience nausea requiring the use of anti-emetics. Will continue IV and monitor progress.

## 2023-07-15 RX ORDER — METOCLOPRAMIDE 5 MG/1
5 TABLET ORAL 4 TIMES DAILY PRN
Qty: 20 TABLET | Refills: 0 | Status: SHIPPED | OUTPATIENT
Start: 2023-07-15

## 2023-07-16 LAB
ATRIAL RATE: 80 BPM
P AXIS: 29 DEGREES
PR INTERVAL: 188 MS
QRS AXIS: -54 DEGREES
QRSD INTERVAL: 90 MS
QT INTERVAL: 424 MS
QTC INTERVAL: 489 MS
T WAVE AXIS: 39 DEGREES
VENTRICULAR RATE: 80 BPM

## 2023-07-16 PROCEDURE — 93010 ELECTROCARDIOGRAM REPORT: CPT | Performed by: INTERNAL MEDICINE

## 2023-07-17 ENCOUNTER — TRANSITIONAL CARE MANAGEMENT (OUTPATIENT)
Dept: INTERNAL MEDICINE CLINIC | Facility: CLINIC | Age: 83
End: 2023-07-17

## 2023-07-17 RX ORDER — AMLODIPINE BESYLATE 5 MG/1
TABLET ORAL
Qty: 90 TABLET | OUTPATIENT
Start: 2023-07-17

## 2023-07-18 ENCOUNTER — OFFICE VISIT (OUTPATIENT)
Dept: INTERNAL MEDICINE CLINIC | Facility: CLINIC | Age: 83
End: 2023-07-18
Payer: MEDICARE

## 2023-07-18 VITALS
WEIGHT: 190.2 LBS | DIASTOLIC BLOOD PRESSURE: 76 MMHG | HEIGHT: 63 IN | BODY MASS INDEX: 33.7 KG/M2 | OXYGEN SATURATION: 99 % | SYSTOLIC BLOOD PRESSURE: 122 MMHG | HEART RATE: 61 BPM

## 2023-07-18 DIAGNOSIS — K31.84 GASTROPARESIS: ICD-10-CM

## 2023-07-18 DIAGNOSIS — I10 PRIMARY HYPERTENSION: ICD-10-CM

## 2023-07-18 DIAGNOSIS — N20.0 KIDNEY STONE: Primary | ICD-10-CM

## 2023-07-18 DIAGNOSIS — K21.00 GASTROESOPHAGEAL REFLUX DISEASE WITH ESOPHAGITIS WITHOUT HEMORRHAGE: ICD-10-CM

## 2023-07-18 DIAGNOSIS — K59.00 CONSTIPATION, UNSPECIFIED CONSTIPATION TYPE: ICD-10-CM

## 2023-07-18 PROBLEM — E87.1 HYPONATREMIA: Status: RESOLVED | Noted: 2022-08-31 | Resolved: 2023-07-18

## 2023-07-18 PROBLEM — R10.9 ABDOMINAL PAIN: Status: RESOLVED | Noted: 2023-07-13 | Resolved: 2023-07-18

## 2023-07-18 PROBLEM — R10.13 DYSPEPSIA: Status: RESOLVED | Noted: 2022-08-31 | Resolved: 2023-07-18

## 2023-07-18 PROCEDURE — 99495 TRANSJ CARE MGMT MOD F2F 14D: CPT | Performed by: INTERNAL MEDICINE

## 2023-07-18 NOTE — PROGRESS NOTES
Assessment & Plan     1. Kidney stone  Assessment & Plan:  Unclear if this was causing her pain  She is feeling better and the pain has improved  She will f/u with urology    Orders:  -     Ambulatory Referral to Urology; Future    2. Gastroparesis  Assessment & Plan:  Advised to f/u with gastroenterology if symptoms worsen  She appears to be doing fine when it comes to the gastroparesis  I do not recommend that she go on routine Reglan due to side effects      3. Gastroesophageal reflux disease with esophagitis without hemorrhage  Assessment & Plan:  She was vomiting mucus and phlegm in the hospital  She is on pantoprazole BID and Carafate for a week  She will then stop Carafate and reduce pantoprazole to one a day      4. Primary hypertension  Assessment & Plan:  BP was high in the hospital so amlodipine was started  She was doing well without it prior  She would like to stop it after a week and monitor her BP  We agreed that if it is >140/90 on average, she will restart it      5. Constipation, unspecified constipation type  Assessment & Plan:  Noted on CT scan but she has regular BMs as long as she takes Miralax daily           Subjective     Transitional Care Management Review:   Viry Perez is a 79 y.o. female here for TCM follow up. During the TCM phone call patient stated:  TCM Call     Date and time call was made  7/17/2023  9:51 AM    Hospital care reviewed  Records not available    Patient was hospitialized at  [address] [place] [address]    Date of Admission  07/12/23    Date of discharge  07/14/23    Diagnosis  Abdominal pain    Disposition  Home    Were the patients medications reviewed and updated  No    Current Symptoms  None      TCM Call     Post hospital issues  None    Should patient be enrolled in anticoag monitoring? No    Scheduled for follow up?   Yes    Did you obtain your prescribed medications  Yes    Do you need help managing your prescriptions or medications  No    Is transportation to your appointment needed  No    I have advised the patient to call PCP with any new or worsening symptoms  Nuvia Feldman - MR/MA    Are you recieving any outpatient services  No    Are you recieving home care services  No    Have you fallen in the last 12 months  No    Interperter language line needed  No        Admitted for nausea vomiting and abdominal pain  Noted to have a 3.6 mm right prox ureter stone and 3mm left kidney stone, constipation  Treated with IVF and antiemetics  BP was high and treated with hydralazine  She was discharged on amlodipine  Feeling a little tired but much better  Denies abdominal pain    Review of Systems   Constitutional: Negative for fever and unexpected weight change. Eyes: Positive for redness (using saline drops). Respiratory: Negative for shortness of breath. Cardiovascular: Negative for chest pain and palpitations. Gastrointestinal: Negative for abdominal pain, constipation, diarrhea, nausea and vomiting. Genitourinary: Negative for difficulty urinating and hematuria. Objective     /76 (BP Location: Left arm, Patient Position: Sitting, Cuff Size: Standard)   Pulse 61   Ht 5' 2.5" (1.588 m)   Wt 86.3 kg (190 lb 3.2 oz)   SpO2 99%   BMI 34.23 kg/m²      Physical Exam  Constitutional:       General: She is not in acute distress. Appearance: She is well-developed. She is not ill-appearing, toxic-appearing or diaphoretic. Eyes:      Conjunctiva/sclera: Conjunctivae normal.   Cardiovascular:      Rate and Rhythm: Normal rate and regular rhythm. Heart sounds: Normal heart sounds. No murmur heard. Pulmonary:      Effort: Pulmonary effort is normal. No respiratory distress. Breath sounds: Normal breath sounds. No stridor. No wheezing or rales. Abdominal:      General: There is no distension. Palpations: Abdomen is soft. There is no mass. Tenderness: There is no abdominal tenderness. There is no guarding or rebound. Musculoskeletal:      Cervical back: Neck supple. Right lower leg: No edema. Left lower leg: No edema. Neurological:      Mental Status: She is alert and oriented to person, place, and time. Psychiatric:         Mood and Affect: Mood normal.         Behavior: Behavior normal.         Thought Content:  Thought content normal.         Judgment: Judgment normal.       Medications have been reviewed by provider in current encounter    Viv Duarte MD

## 2023-07-18 NOTE — ASSESSMENT & PLAN NOTE
BP was high in the hospital so amlodipine was started  She was doing well without it prior  She would like to stop it after a week and monitor her BP  We agreed that if it is >140/90 on average, she will restart it

## 2023-07-18 NOTE — ASSESSMENT & PLAN NOTE
She was vomiting mucus and phlegm in the hospital  She is on pantoprazole BID and Carafate for a week  She will then stop Carafate and reduce pantoprazole to one a day

## 2023-07-18 NOTE — ASSESSMENT & PLAN NOTE
Advised to f/u with gastroenterology if symptoms worsen  She appears to be doing fine when it comes to the gastroparesis  I do not recommend that she go on routine Reglan due to side effects

## 2023-07-18 NOTE — ASSESSMENT & PLAN NOTE
Unclear if this was causing her pain  She is feeling better and the pain has improved  She will f/u with urology

## 2023-07-20 ENCOUNTER — TELEPHONE (OUTPATIENT)
Dept: UROLOGY | Facility: AMBULATORY SURGERY CENTER | Age: 83
End: 2023-07-20

## 2023-07-20 DIAGNOSIS — N20.0 NEPHROLITHIASIS: Primary | ICD-10-CM

## 2023-07-20 DIAGNOSIS — N20.0 NEPHROLITHIASIS: ICD-10-CM

## 2023-07-20 RX ORDER — TAMSULOSIN HYDROCHLORIDE 0.4 MG/1
0.4 CAPSULE ORAL
Qty: 30 CAPSULE | Refills: 0 | Status: SHIPPED | OUTPATIENT
Start: 2023-07-20 | End: 2023-07-26 | Stop reason: SDUPTHER

## 2023-07-20 RX ORDER — TAMSULOSIN HYDROCHLORIDE 0.4 MG/1
0.4 CAPSULE ORAL
Qty: 90 CAPSULE | OUTPATIENT
Start: 2023-07-20

## 2023-07-20 NOTE — TELEPHONE ENCOUNTER
Tamsulosin was denied. Please advise of an alternate regimen. Call back and speak to  Shahnaz Nielsen regarding any recommendations.

## 2023-07-20 NOTE — TELEPHONE ENCOUNTER
Pt's  called stated that the med refill was denied.  stated that the plan was to order the Flomax and then see her in 2 week.  got confused and wants to know what the plan is now.  was informed that she needs US and KUB and 2 weeks follow up.      Please review

## 2023-07-20 NOTE — TELEPHONE ENCOUNTER
Pt and her  called in to schedule an appointment. PT was just seen by her PCP and was told to follow up with Urology. She has a hx of kidney stone and currently has some. Pt is not symptomatic and does not have any pain or fevers. Advised I spoke with the provider and will call back with recommendations after she reviews pt chart. They verbally agreed and understood plan.

## 2023-07-20 NOTE — TELEPHONE ENCOUNTER
Seen in the office by Romain Jesus in January 2022. She has a history of ureteroscopy performed in 2021 by Dr. Wayland Hodgkin.  Recent CT scan revealed a 3.6 mm right proximal ureteral calculus without any evidence of hydronephrosis. An additional nonobstructing 6 mm left renal calculus also noted. If patient currently asymptomatic and denies any symptoms of acute infection, would recommend starting tamsulosin 0.4 mg daily for medical expulsive therapy. Given small size and location of stone, patient will hopefully passed calculus. Encourage patient to increase water intake and strain all urine. Patient should also go for urine culture now. Plan to obtain follow-up imaging with KUB and renal ultrasound in the next 2 weeks as well as follow-up to discuss results. Review ER precautions.

## 2023-07-20 NOTE — TELEPHONE ENCOUNTER
Called and spoke with Terrie Muniz and gave them information about Good [facility]  -30-day supply is only $8.76 at [facility], $9.51 at [facility] and $10.15 at [facility].  GoodRx cards are available everywhere, she just needs to tell them to NOT use her insurance - Mailed her script and good RX card

## 2023-07-20 NOTE — TELEPHONE ENCOUNTER
Callled and spoke with Noble Gomez  He is aware of urine studies and ur/ and KUB - however Flomax was denied -  Please advise

## 2023-07-21 DIAGNOSIS — E78.5 HYPERLIPIDEMIA, UNSPECIFIED HYPERLIPIDEMIA TYPE: ICD-10-CM

## 2023-07-21 RX ORDER — ATORVASTATIN CALCIUM 10 MG/1
TABLET, FILM COATED ORAL
Qty: 90 TABLET | Refills: 3 | Status: SHIPPED | OUTPATIENT
Start: 2023-07-21

## 2023-07-26 RX ORDER — TAMSULOSIN HYDROCHLORIDE 0.4 MG/1
0.4 CAPSULE ORAL
Qty: 30 CAPSULE | Refills: 0 | Status: SHIPPED | OUTPATIENT
Start: 2023-07-26

## 2023-07-26 NOTE — TELEPHONE ENCOUNTER
Patient is calling to request a prescription refill    Last seen by: Sara Arnold     Medication: tamsulosin (FLOMAX) 0.4 mg         Pharmacy:  [facility] [phone]    32 / 90 day supply: 30    Pt can be reached at: [phone]

## 2023-08-01 ENCOUNTER — APPOINTMENT (OUTPATIENT)
Dept: LAB | Age: 83
End: 2023-08-01
Payer: MEDICARE

## 2023-08-01 ENCOUNTER — HOSPITAL ENCOUNTER (OUTPATIENT)
Dept: RADIOLOGY | Age: 83
Discharge: HOME | End: 2023-08-01
Payer: MEDICARE

## 2023-08-01 ENCOUNTER — APPOINTMENT (OUTPATIENT)
Dept: RADIOLOGY | Age: 83
End: 2023-08-01
Payer: MEDICARE

## 2023-08-01 DIAGNOSIS — N20.0 NEPHROLITHIASIS: ICD-10-CM

## 2023-08-01 DIAGNOSIS — E87.1 HYPONATREMIA: ICD-10-CM

## 2023-08-01 PROCEDURE — 76775 US EXAM ABDO BACK WALL LIM: CPT

## 2023-08-01 PROCEDURE — 87086 URINE CULTURE/COLONY COUNT: CPT

## 2023-08-01 PROCEDURE — 74018 RADEX ABDOMEN 1 VIEW: CPT

## 2023-08-02 LAB — BACTERIA UR CULT: NORMAL

## 2023-08-07 ENCOUNTER — TELEPHONE (OUTPATIENT)
Dept: UROLOGY | Facility: CLINIC | Age: 83
End: 2023-08-07

## 2023-08-07 ENCOUNTER — RA CDI HCC (OUTPATIENT)
Dept: OTHER | Facility: HOSPITAL | Age: 83
End: 2023-08-07

## 2023-08-07 NOTE — TELEPHONE ENCOUNTER
Was negative for infection. KUB and renal ultrasound results remain pending. Will call once finalized. Please call radiology to see if images can be read.

## 2023-08-07 NOTE — PROGRESS NOTES
[address] [facility] coding opportunities       Chart reviewed, no opportunity found: CHART REVIEWED, NO OPPORTUNITY FOUND        Patients Insurance     Medicare Insurance: Medicare

## 2023-08-07 NOTE — TELEPHONE ENCOUNTER
Rec'd telephone call from pt's spouse stating that they have the U/S, Xray, and urine cx but haven't heard back and are unclear what the next steps are. Please advise.  Pt can be reached at [phone]

## 2023-08-08 NOTE — TELEPHONE ENCOUNTER
Please inform patient results of KUB and renal ultrasound shows no evidence of obstructing calculus and hydronephrosis resolved. Bilateral ureteral jets also detected. There is a 6 mm calculus in right lower pole which may be prior right UPJ calculus. Additional nonobstructing left renal calculus noted. Please ask patient if she is symptomatic.

## 2023-08-08 NOTE — TELEPHONE ENCOUNTER
Called and spoke with  - relayed result of KUB and u/s and that she can just follow up as need as she is having no pain

## 2023-08-10 RX ORDER — DORZOLAMIDE HYDROCHLORIDE AND TIMOLOL MALEATE 20; 5 MG/ML; MG/ML
SOLUTION/ DROPS OPHTHALMIC
COMMUNITY
Start: 2023-07-31

## 2023-08-15 ENCOUNTER — OFFICE VISIT (OUTPATIENT)
Dept: INTERNAL MEDICINE CLINIC | Facility: CLINIC | Age: 83
End: 2023-08-15
Payer: MEDICARE

## 2023-08-15 VITALS
BODY MASS INDEX: 33.49 KG/M2 | OXYGEN SATURATION: 97 % | HEART RATE: 79 BPM | SYSTOLIC BLOOD PRESSURE: 120 MMHG | DIASTOLIC BLOOD PRESSURE: 70 MMHG | HEIGHT: 63 IN | WEIGHT: 189 LBS

## 2023-08-15 DIAGNOSIS — E78.00 HYPERCHOLESTEROLEMIA: ICD-10-CM

## 2023-08-15 DIAGNOSIS — I10 PRIMARY HYPERTENSION: ICD-10-CM

## 2023-08-15 DIAGNOSIS — E03.9 ACQUIRED HYPOTHYROIDISM: Primary | ICD-10-CM

## 2023-08-15 DIAGNOSIS — N20.0 KIDNEY STONE: ICD-10-CM

## 2023-08-15 PROCEDURE — 99214 OFFICE O/P EST MOD 30 MIN: CPT | Performed by: INTERNAL MEDICINE

## 2023-08-15 NOTE — PROGRESS NOTES
Assessment/Plan:    Acquired hypothyroidism  Continue levothyroxine 50mcg    Hypertension  This was high in the hospital but back to normal without medications    Hypercholesterolemia  Controlled on atorvastatin    Kidney stone  Bilateral non obstructing renal stones on US  Stay hydrated    BMI Counseling: Body mass index is 34.02 kg/m². The BMI is above normal. Nutrition recommendations include 3-5 servings of fruits/vegetables daily, moderation in carbohydrate intake and reducing intake of saturated fat and trans fat. Problem List Items Addressed This Visit        Endocrine    Acquired hypothyroidism - Primary     Continue levothyroxine 50mcg         Relevant Orders    TSH, 3rd generation with Free T4 reflex       Cardiovascular and Mediastinum    Hypertension     This was high in the hospital but back to normal without medications         Relevant Orders    CBC and differential    Comprehensive metabolic panel       Genitourinary    Kidney stone     Bilateral non obstructing renal stones on US  Stay hydrated            Other    Hypercholesterolemia     Controlled on atorvastatin         Relevant Orders    Lipid panel         Subjective:      Patient ID: Fransico Heredia is a 79 y.o. female. HPI  Here for a routine follow up  She was hospitalized in July dx with a right kidney stone  She currently denies having abdominal pain  She is known to have gastroparesis and GERD on pantoprazole    The following portions of the patient's history were reviewed and updated as appropriate: allergies, current medications, past family history, past medical history, past social history, past surgical history and problem list.    Review of Systems   Constitutional: Negative for fever and unexpected weight change. Eyes: Positive for redness. Respiratory: Negative for shortness of breath. Cardiovascular: Negative for chest pain and palpitations.    Gastrointestinal: Negative for abdominal pain, constipation and diarrhea. Genitourinary: Negative for difficulty urinating. Objective:      /70 (BP Location: Left arm, Patient Position: Sitting, Cuff Size: Standard)   Pulse 79   Ht 5' 2.5" (1.588 m)   Wt 85.7 kg (189 lb)   SpO2 97%   BMI 34.02 kg/m²          Physical Exam  Constitutional:       Appearance: She is not ill-appearing. Eyes:      Conjunctiva/sclera:      Right eye: Right conjunctiva is injected. Cardiovascular:      Heart sounds: Normal heart sounds. Pulmonary:      Breath sounds: Normal breath sounds. Abdominal:      Palpations: Abdomen is soft. Tenderness: There is no abdominal tenderness. Musculoskeletal:      Right lower leg: No edema. Left lower leg: No edema.    Psychiatric:         Mood and Affect: Mood normal.         Behavior: Behavior normal.

## 2023-08-29 ENCOUNTER — TELEPHONE (OUTPATIENT)
Dept: INTERNAL MEDICINE CLINIC | Facility: CLINIC | Age: 83
End: 2023-08-29

## 2023-08-29 NOTE — TELEPHONE ENCOUNTER
Pt is having eyelid surgery 9/21, her  wants to know if you need her to come back in to clear her for that surgery?

## 2023-10-01 NOTE — PROGRESS NOTES
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  Certainly rule out hypothyroidism/adrenal insufficiency  Also may have a component of decreased solute intake  No other pathology based on the CT the chest abdomen pelvis to explain SIADH      Recommend:  Workup:  · Urine sodium:  Pending  · Urine osmolality:  Pending  · Serum osmolality:  277 which goes along with true hypotonic hyponatremia  · TSH/free T4:  Normal  · A m  Cortisol:  12 8 essentially normal  · Uric acid:  Normal at 4 5 going against SIADH  · CT the chest abdomen pelvis without any acute pathology    Current sodium:  Improved to 131     Treatment:  · Fluid restriction 1500 mL per day  · Sodium chloride 2 g t i d   · Potentially may require small dose loop diuretic for increased water excretion:  However given low normal blood pressure I would hold off at this  · Adequate solute intake I would recommend nutritional supplements now that she is eating better  · Replete borderline magnesium        2  Accelerated hypertension: This has improved possibly related to pain  Now low normal   On Lopressor 25 mg b i d  Will continue to monitor     3   Abdominal pain thus far negative workup moderate to large hiatal hernia GI following possible EGD if cardiac workup negative going for EGD today      4  Elevated troponin/coronary artery calcifications/ascending aortic dilatation:  Status post cardiac catheterization which demonstrated nonobstructive plaques  Discussed with SLIM:  If she does go home place her on fluid restriction 1500 mL adequate diet and sodium chloride 2 g t i d  With a follow-up sodium 1 week later    Subjective:   Patient status post cardiac catheterization  No chest pain or shortness of breath  No nausea vomiting or diarrhea or abdominal pain today  Eating and drinking well  No urinary symptoms    Objective:     Vitals: Blood pressure 102/69, pulse 60, temperature 98 1 °F (36 7 °C), resp  rate 18, height 5' 3" (1 6 m), weight 90 3 kg (199 lb 1 2 oz), SpO2 95 %, not currently breastfeeding  ,Body mass index is 35 26 kg/m²      Weight (last 2 days)     Date/Time Weight    09/02/22 0434 90 3 (199 08)    09/01/22 0945 90 3 (199)    08/31/22 0712 90 6 (199 74)            Intake/Output Summary (Last 24 hours) at 9/2/2022 1021  Last data filed at 9/2/2022 0451  Gross per 24 hour   Intake --   Output 405 ml   Net -405 ml            Physical Exam: General:  No acute distress  Skin:  No acute rash  Eyes:  No scleral icterus and noninjected  ENT:  Moist mucous membranes  Neck:  Supple, no jugular venous distention, trachea midline, overall appearance is normal  Chest:  Clear to auscultation  CVS:  Regular rate and rhythm, without a rub or gallops  Abdomen:  Normal bowel sounds, soft and nontender and nondistended  Extremities:  No edema, and no cyanosis, no significant arthritic changes  Neuro:  No gross focality  Psych:  Alert and oriented and appropriate                Medications:    Scheduled Meds:  Current Facility-Administered Medications   Medication Dose Route Frequency Provider Last Rate    albuterol  2 puff Inhalation 4x Daily PRN Zuleima Fajardo MD      aluminum-magnesium hydroxide-simethicone  30 mL Oral Q4H PRN Zuleima Fajardo MD      vitamin C  2,000 mg Oral Daily Zuleima Fajardo MD      atorvastatin  40 mg Oral HS BRAYDEN Rizo      dicyclomine  10 mg Oral 4x Daily PRN Angelo Aguirre MD      latanoprost  1 drop Both Eyes BID Angelo Aguirre MD      levothyroxine  25 mcg Oral Daily Angelo Aguirre MD      ondansetron  4 mg Intravenous Q6H PRN Angelo Aguirre MD      pantoprazole  40 mg Intravenous Q12H [address] Angelo Aguirre MD      sodium chloride  2 g Oral TID With Meals Amanda Scott MD      sucralfate  1 g Oral 4x Daily (AC & HS) Marquita Adair PA-C         PRN Meds:   albuterol    aluminum-magnesium hydroxide-simethicone    dicyclomine    ondansetron    Continuous Infusions:     Lab, Imaging and other studies: I have personally reviewed pertinent labs    Laboratory Results:  Results from last 7 days   Lab Units 09/02/22  0446 09/02/22  0212 09/01/22  1722 09/01/22  0841 09/01/22  0446 08/31/22  2347 08/31/22  1639 08/31/22  1134 08/31/22  0848 08/30/22  1832   WBC Thousand/uL  --  7 10  --  9 34  --   --   --   --  9 57 9 31   HEMOGLOBIN g/dL  --  13 2  --  15 1  --   --   --   --  15 9* 15 0   HEMATOCRIT %  --  38 8  --  44 0  --   --   --   --  45 4 41 4   PLATELETS Thousands/uL  --  216  --  272  --   --   --   --  288 255   POTASSIUM mmol/L 3 8  --  3 6 3 5 4 1 3 4* 3 4* 3 3* 3 2* 3 5   CHLORIDE mmol/L 100  --  98 93* 93* 93* 92* 90* 90* 93*   CO2 mmol/L 24  --  24 25 26 22 21 26 24 23   BUN mg/dL 26*  --  24 19 17 13 11 9 8 9   CREATININE mg/dL 0 99  --  0 99 0 96 0 96 0 63 0 52* 0 56* 0 56* 0 54*   CALCIUM mg/dL 8 4  --  8 2* 8 8 9 0 8 7 9 1 9 1 9 4 9 0   MAGNESIUM mg/dL 1 9  --   --  1 3*  --   --   --   --   --   --      Urinalysis:   Lab Results   Component Value Date    COLORU Dk Yellow 12/07/2021    CLARITYU Cloudy 12/07/2021    SPECGRAV 1 034 (H) 12/07/2021    PHUR 6 0 12/07/2021    PHUR 6 5 08/22/2021    LEUKOCYTESUR Small (A) 12/07/2021    NITRITE Negative 12/07/2021    GLUCOSEU Negative 12/07/2021    KETONESU Trace (A) 12/07/2021    BILIRUBINUR Negative 12/07/2021    BLOODU Negative 12/07/2021     ABGs:   Lab Results   Component Value Date    PH 7 323 (L) 03/05/2021     Radiology review:     Portions of the record may have been created with voice recognition software  Occasional wrong word or "sound a like" substitutions may have occurred due to the inherent limitations of voice recognition software  Read the chart carefully and recognize, using context, where substitutions have occurred  Dysuria

## 2023-11-21 ENCOUNTER — APPOINTMENT (OUTPATIENT)
Dept: LAB | Age: 83
End: 2023-11-21
Payer: MEDICARE

## 2023-11-21 DIAGNOSIS — I10 PRIMARY HYPERTENSION: ICD-10-CM

## 2023-11-21 DIAGNOSIS — E78.00 HYPERCHOLESTEROLEMIA: ICD-10-CM

## 2023-11-21 DIAGNOSIS — E03.9 ACQUIRED HYPOTHYROIDISM: ICD-10-CM

## 2023-11-21 LAB
ALBUMIN SERPL BCP-MCNC: 4 G/DL (ref 3.5–5)
ALP SERPL-CCNC: 96 U/L (ref 34–104)
ALT SERPL W P-5'-P-CCNC: 24 U/L (ref 7–52)
ANION GAP SERPL CALCULATED.3IONS-SCNC: 8 MMOL/L
AST SERPL W P-5'-P-CCNC: 28 U/L (ref 13–39)
BASOPHILS # BLD AUTO: 0.02 THOUSANDS/ÂΜL (ref 0–0.1)
BASOPHILS NFR BLD AUTO: 0 % (ref 0–1)
BILIRUB SERPL-MCNC: 0.73 MG/DL (ref 0.2–1)
BUN SERPL-MCNC: 12 MG/DL (ref 5–25)
CALCIUM SERPL-MCNC: 9.4 MG/DL (ref 8.4–10.2)
CHLORIDE SERPL-SCNC: 102 MMOL/L (ref 96–108)
CHOLEST SERPL-MCNC: 154 MG/DL
CO2 SERPL-SCNC: 27 MMOL/L (ref 21–32)
CREAT SERPL-MCNC: 0.69 MG/DL (ref 0.6–1.3)
EOSINOPHIL # BLD AUTO: 0.03 THOUSAND/ÂΜL (ref 0–0.61)
EOSINOPHIL NFR BLD AUTO: 1 % (ref 0–6)
ERYTHROCYTE [DISTWIDTH] IN BLOOD BY AUTOMATED COUNT: 12.3 % (ref 11.6–15.1)
GFR SERPL CREATININE-BSD FRML MDRD: 80 ML/MIN/1.73SQ M
GLUCOSE P FAST SERPL-MCNC: 92 MG/DL (ref 65–99)
HCT VFR BLD AUTO: 43.4 % (ref 34.8–46.1)
HDLC SERPL-MCNC: 66 MG/DL
HGB BLD-MCNC: 14.8 G/DL (ref 11.5–15.4)
IMM GRANULOCYTES # BLD AUTO: 0.01 THOUSAND/UL (ref 0–0.2)
IMM GRANULOCYTES NFR BLD AUTO: 0 % (ref 0–2)
LDLC SERPL CALC-MCNC: 77 MG/DL (ref 0–100)
LYMPHOCYTES # BLD AUTO: 1.74 THOUSANDS/ÂΜL (ref 0.6–4.47)
LYMPHOCYTES NFR BLD AUTO: 36 % (ref 14–44)
MCH RBC QN AUTO: 33.7 PG (ref 26.8–34.3)
MCHC RBC AUTO-ENTMCNC: 34.1 G/DL (ref 31.4–37.4)
MCV RBC AUTO: 99 FL (ref 82–98)
MONOCYTES # BLD AUTO: 0.48 THOUSAND/ÂΜL (ref 0.17–1.22)
MONOCYTES NFR BLD AUTO: 10 % (ref 4–12)
NEUTROPHILS # BLD AUTO: 2.57 THOUSANDS/ÂΜL (ref 1.85–7.62)
NEUTS SEG NFR BLD AUTO: 53 % (ref 43–75)
NONHDLC SERPL-MCNC: 88 MG/DL
NRBC BLD AUTO-RTO: 0 /100 WBCS
PLATELET # BLD AUTO: 201 THOUSANDS/UL (ref 149–390)
PMV BLD AUTO: 11.7 FL (ref 8.9–12.7)
POTASSIUM SERPL-SCNC: 4.4 MMOL/L (ref 3.5–5.3)
PROT SERPL-MCNC: 6.6 G/DL (ref 6.4–8.4)
RBC # BLD AUTO: 4.39 MILLION/UL (ref 3.81–5.12)
SODIUM SERPL-SCNC: 137 MMOL/L (ref 135–147)
TRIGL SERPL-MCNC: 53 MG/DL
TSH SERPL DL<=0.05 MIU/L-ACNC: 2.72 UIU/ML (ref 0.45–4.5)
WBC # BLD AUTO: 4.85 THOUSAND/UL (ref 4.31–10.16)

## 2023-11-21 PROCEDURE — 80061 LIPID PANEL: CPT

## 2023-11-21 PROCEDURE — 80053 COMPREHEN METABOLIC PANEL: CPT

## 2023-11-21 PROCEDURE — 84443 ASSAY THYROID STIM HORMONE: CPT

## 2023-11-21 PROCEDURE — 36415 COLL VENOUS BLD VENIPUNCTURE: CPT

## 2023-11-21 PROCEDURE — 85025 COMPLETE CBC W/AUTO DIFF WBC: CPT

## 2023-12-06 ENCOUNTER — RA CDI HCC (OUTPATIENT)
Dept: OTHER | Facility: HOSPITAL | Age: 83
End: 2023-12-06

## 2023-12-14 ENCOUNTER — OFFICE VISIT (OUTPATIENT)
Dept: INTERNAL MEDICINE CLINIC | Facility: CLINIC | Age: 83
End: 2023-12-14
Payer: MEDICARE

## 2023-12-14 VITALS
SYSTOLIC BLOOD PRESSURE: 124 MMHG | HEIGHT: 63 IN | BODY MASS INDEX: 34.34 KG/M2 | WEIGHT: 193.8 LBS | DIASTOLIC BLOOD PRESSURE: 72 MMHG

## 2023-12-14 DIAGNOSIS — E03.9 ACQUIRED HYPOTHYROIDISM: ICD-10-CM

## 2023-12-14 DIAGNOSIS — R26.2 AMBULATORY DYSFUNCTION: Primary | ICD-10-CM

## 2023-12-14 DIAGNOSIS — I10 PRIMARY HYPERTENSION: ICD-10-CM

## 2023-12-14 DIAGNOSIS — E78.00 HYPERCHOLESTEROLEMIA: ICD-10-CM

## 2023-12-14 DIAGNOSIS — K21.00 GASTROESOPHAGEAL REFLUX DISEASE WITH ESOPHAGITIS WITHOUT HEMORRHAGE: ICD-10-CM

## 2023-12-14 PROCEDURE — 99214 OFFICE O/P EST MOD 30 MIN: CPT | Performed by: INTERNAL MEDICINE

## 2023-12-14 RX ORDER — PANTOPRAZOLE SODIUM 40 MG/1
40 TABLET, DELAYED RELEASE ORAL
Start: 2023-12-14

## 2023-12-14 NOTE — PROGRESS NOTES
Name: Peter Roman      : 1940      MRN: [id number]  Encounter Provider: Edu Bojorquez MD  Encounter Date: 2023   Encounter department: [facility]     1. Ambulatory dysfunction  -     Ambulatory Referral to Physical Therapy; Future    2. Gastroesophageal reflux disease with esophagitis without hemorrhage  -     pantoprazole (PROTONIX) 40 mg tablet; Take 1 tablet (40 mg total) by mouth daily at bedtime    3. Acquired hypothyroidism  -     TSH, 3rd generation with Free T4 reflex; Future; Expected date: 2024    4. Primary hypertension  -     CBC; Future; Expected date: 2024  -     Comprehensive metabolic panel; Future; Expected date: 2024    5. Hypercholesterolemia  -     Lipid panel; Future; Expected date: 2024    No med changes made today  RSV vaccine recommended       Subjective      Chest congestion and cough in the morning relieved by Claritin and albuterol inhaler  Overall feeling well  Uses a cane to walk  Recent labs reviewed      Review of Systems   Constitutional:  Negative for fever. Respiratory:  Positive for cough (morning only for a few weeks, relieved by Claritin). Negative for shortness of breath. Cardiovascular:  Negative for chest pain, palpitations and leg swelling. Gastrointestinal:  Negative for abdominal pain, constipation and diarrhea.        Current Outpatient Medications on File Prior to Visit   Medication Sig   • albuterol (PROVENTIL HFA,VENTOLIN HFA) 90 mcg/act inhaler Inhale 2 puffs 4 (four) times a day as needed   • atorvastatin (LIPITOR) 10 mg tablet TAKE 1 TABLET BY MOUTH  DAILY AT BEDTIME   • Cholecalciferol (VITAMIN D3) 400 units CAPS Take 1,000 Units by mouth   • dorzolamide-timolol (COSOPT) 22.3-6.8 MG/ML ophthalmic solution INSTILL 1 DROP IN BOTH EYES TWICE DAILY   • levothyroxine 50 mcg tablet Take 1 tablet (50 mcg total) by mouth daily   • multivitamin-iron-minerals-folic acid (CENTRUM) chewable tablet Chew 1 tablet daily   • polyethylene glycol (MIRALAX) 17 g packet Take 17 g by mouth daily as needed (constipation)   • [DISCONTINUED] pantoprazole (PROTONIX) 40 mg tablet Take 1 tablet (40 mg total) by mouth 2 (two) times a day for 7 days, THEN 1 tablet (40 mg total) daily. Objective     /72 (BP Location: Left arm, Patient Position: Sitting, Cuff Size: Standard)   Ht 5' 2.5" (1.588 m)   Wt 87.9 kg (193 lb 12.8 oz)   BMI 34.88 kg/m²     Physical Exam  Constitutional:       Appearance: She is not ill-appearing. Cardiovascular:      Rate and Rhythm: Regular rhythm. Heart sounds: Normal heart sounds. Pulmonary:      Breath sounds: Normal breath sounds. Abdominal:      Palpations: Abdomen is soft. Tenderness: There is no abdominal tenderness. Musculoskeletal:      Cervical back: Neck supple. Right lower leg: No edema. Left lower leg: No edema.    Psychiatric:         Mood and Affect: Mood normal.         Behavior: Behavior normal.       Carloyn Halsted, MD

## 2023-12-19 ENCOUNTER — EVALUATION (OUTPATIENT)
Dept: PHYSICAL THERAPY | Facility: CLINIC | Age: 83
End: 2023-12-19
Payer: MEDICARE

## 2023-12-19 DIAGNOSIS — R26.2 AMBULATORY DYSFUNCTION: Primary | ICD-10-CM

## 2023-12-19 PROCEDURE — 97161 PT EVAL LOW COMPLEX 20 MIN: CPT

## 2023-12-19 PROCEDURE — 97112 NEUROMUSCULAR REEDUCATION: CPT

## 2023-12-19 NOTE — PROGRESS NOTES
PT Evaluation     Today's date: 2023  Patient name: Deirdre Blackman  : 1940  MRN: [id number]  Referring provider: Reyes, Lea, MD  Dx:   Encounter Diagnosis     ICD-10-CM    1. Ambulatory dysfunction  R26.2 Ambulatory Referral to Physical Therapy                     Assessment  Assessment details: Deirdre is a 82 yo female presenting with complaints of imbalance. Pt demonstrates trendelenberg gait, decrease balance with narrow base of support and over reliance on visual feedback for balance leading to limitations with standing, walking and ADLs. Pt would benefit from skilled physical therapy interventions in order to address the stated impairments, decrease pain with function and increase activity tolerance in order to improve quality of life. No further referral appears necessary at this time based on examination results. Prognosis is good given HEP compliance and PT 1-2/wk.  Positive prognostic indicators include positive attitude toward recovery. Please contact me if you have any questions or recommendations. Thank you for the opportunity to share in Deirdre's care.    Impairments: abnormal coordination, abnormal gait, abnormal muscle firing, abnormal muscle tone, activity intolerance, impaired balance, impaired physical strength, lacks appropriate home exercise program, safety issue, poor posture  and poor body mechanics    Symptom irritability: moderateUnderstanding of Dx/Px/POC: good   Prognosis: good    Goals  Short Term Goals:  Pt will report decreased levels of pain by at least 2 subjective ratings in 4 weeks  Pt will demonstrate improved ROM by at least 10 degrees in 4 weeks  Pt will demonstrate improved strength by ½ grade in 4 weeks  Pt will be able to walk for 10 minutes without LOB/falls in 4 weeks    Long Term Goals:  Pt will be independent with HEP in 8 weeks  Pt will be pain free with ADL's in 8 weeks  Pt will be able to walk without SPC for 15 minutes for exercise without LOB/falls in 8  weeks      Plan  Patient would benefit from: skilled physical therapy  Referral necessary: No  Planned therapy interventions: abdominal trunk stabilization, IASTM, joint mobilization, manual therapy, nerve gliding, neuromuscular re-education, balance, balance/weight bearing training, body mechanics training, breathing training, patient education, postural training, strengthening, stretching, functional ROM exercises, gait training, graded activity, graded exercise, home exercise program and therapeutic activities  Frequency: 2x week  Duration in weeks: 12  Plan of Care beginning date: 2023  Plan of Care expiration date: 3/12/2024  Treatment plan discussed with: patient        Subjective Evaluation    History of Present Illness  Mechanism of injury: Pt reports she is brining her  to PT for frequent falls, but she would like to improve her own balance as well. She has recently started relying on a SPC, and she feels she hunches over too much when she uses it, so she would like to d/c SPC if able to improve balance enough. Pt denies any falls; however, she is worried about falling. They have a very safe home set up with one story condo, a few stairs to enter with railings, grab bars throughout, etc.           Recurrent probem    Quality of life: good    Patient Goals  Patient goals for therapy: improved balance, increased strength, independence with ADLs/IADLs and return to sport/leisure activities    Pain  No pain reported    Social Support  Steps to enter house: yes  Stairs in house: no   Lives in: condominium  Lives with: spouse    Employment status: not working  Hand dominance: right  Exercise history: Recumb bike 30 mins daily          Objective    TU seconds  5xSTS: 15 seconds    FT EO Ground: 30 seconds  FT EC Ground: 30 seconds, mod sway  FT EO Foam: 30 seconds, mod sway  FT EC Foam: 10 seconds, sev sway  Feet Apart EC Foam: 20 seconds, mod sway    Tandem stance: 30 seconds  L SLS: 12  "seconds  R SLS: 18 seconds    4 item DGI: 4/12  Horizontal head turns: 1  Vertical head turns: 1  Gait on level surfaces: 2  Changes in gait speed: 2    Gait: L trendelenburg, narrow gait occasional scissoring       Precautions: HTN, Osteopenia      Manuals 12/19                                                                Neuro Re-Ed             Tandem stance 3x20\" ea            SLS 3x20\" ea            Biodex PS FT             Biodex PS FT EC             Tandem walking             Walking with L/R head turns                          Ther Ex             Standing hip abduction no UE support if able 3x10            Mini squats no UE support if able 3x10                                                                                          Ther Activity             STS             Step up/over no UE support             Gait Training                                       Modalities                                            "

## 2023-12-26 ENCOUNTER — APPOINTMENT (OUTPATIENT)
Dept: PHYSICAL THERAPY | Facility: CLINIC | Age: 83
End: 2023-12-26
Payer: MEDICARE

## 2023-12-27 ENCOUNTER — APPOINTMENT (OUTPATIENT)
Dept: PHYSICAL THERAPY | Facility: CLINIC | Age: 83
End: 2023-12-27
Payer: MEDICARE

## 2023-12-28 ENCOUNTER — OFFICE VISIT (OUTPATIENT)
Dept: PHYSICAL THERAPY | Facility: CLINIC | Age: 83
End: 2023-12-28
Payer: MEDICARE

## 2023-12-28 DIAGNOSIS — R26.2 AMBULATORY DYSFUNCTION: Primary | ICD-10-CM

## 2023-12-28 PROCEDURE — 97112 NEUROMUSCULAR REEDUCATION: CPT | Performed by: PHYSICAL THERAPIST

## 2023-12-28 PROCEDURE — 97530 THERAPEUTIC ACTIVITIES: CPT | Performed by: PHYSICAL THERAPIST

## 2023-12-28 NOTE — PROGRESS NOTES
"Daily Note     Today's date: 2023  Patient name: Deirdre Blackman  : 1940  MRN: [id number]  Referring provider: Reyes, Lea, MD  Dx:   Encounter Diagnosis     ICD-10-CM    1. Ambulatory dysfunction  R26.2                      Subjective: Pt reports good compliance with HEP, difficulty with Standing Hip ABD.    Objective: See treatment diary below    Assessment: Pt demonstrated increased challenge with R LE balancing.    Plan: Continue per plan of care.      Precautions: HTN, Osteopenia      Manuals                                                                Neuro Re-Ed             FT   1x30\"           FT EC  1x30\"           Mod tandem stance  1x30\" ea           Tandem stance 3x20\" ea 1x45\" ea           FT c/s Rot  1x15 ea           FT c/s flex/ext  1x15 ea                        SLS 3x20\" ea 5\"x5 ea           Biodex PS FT             Biodex PS FT EC             Tandem walking  SPC/ 1x15           Walking with L/R head turns                          Ther Ex             Standing hip abduction no UE support if able 3x10 3x10           Mini squats no UE support if able 3x10                                                                                          Ther Activity             STS  Foam/ 2x5           Step up/over no UE support  4\"/ 1x10 ea           Step-ups/over railing support  6\"/ 1x10           Gait Training                                       Modalities                                            "

## 2024-01-03 ENCOUNTER — OFFICE VISIT (OUTPATIENT)
Dept: PHYSICAL THERAPY | Facility: CLINIC | Age: 84
End: 2024-01-03
Payer: MEDICARE

## 2024-01-03 DIAGNOSIS — R26.2 AMBULATORY DYSFUNCTION: Primary | ICD-10-CM

## 2024-01-03 PROCEDURE — 97110 THERAPEUTIC EXERCISES: CPT

## 2024-01-03 PROCEDURE — 97530 THERAPEUTIC ACTIVITIES: CPT

## 2024-01-03 PROCEDURE — 97112 NEUROMUSCULAR REEDUCATION: CPT

## 2024-01-03 NOTE — PROGRESS NOTES
"Daily Note     Today's date: 1/3/2024  Patient name: Deirdre Blackman  : 1940  MRN: [id number]  Referring provider: Reyes, Lea, MD  Dx:   Encounter Diagnosis     ICD-10-CM    1. Ambulatory dysfunction  R26.2                      Subjective: Pt reports she continues to have the most difficulty with standing hip abduction.     Objective: See treatment diary below    Assessment: Pt has occasional scissoring gait which leads to LOB. Pt would benefit from continued strengthening and balance training in order to improve function.     Plan: Continue per plan of care.      Precautions: HTN, Osteopenia      Manuals 12/19 12/28 1/3                                                              Neuro Re-Ed             FT   1x30\" 1x30\"          FT EC  1x30\" 1x30\"          Tandem stance 3x20\" ea 1x45\" ea 1x45\" ea          FT c/s Rot  1x15 ea 1x15 ea          FT c/s flex/ext  1x15 ea 1x15 ea          SLS 3x20\" ea 5\"x5 ea 10\"x5 ea          Biodex PS FT   L7 3x30\"          Biodex PS FT EC   L12 3x30\"          Tandem walking  SPC/ 1x15 SPC 1x5          Walking with L/R head turns                          Ther Ex             Standing hip abduction no UE support if able 3x10 3x10 3x10          Mini squats no UE support if able 3x10 3x10 3x10                                                                                        Ther Activity             STS  Foam/ 2x5 [id number]          Step up/over no UE support  4\"/ 1x10 ea 4\"  1x10 ea          Step-ups/over railing support  6\"/ 1x10 6\" 1x10          Gait Training                                       Modalities                                            "

## 2024-01-04 ENCOUNTER — OFFICE VISIT (OUTPATIENT)
Dept: PHYSICAL THERAPY | Facility: CLINIC | Age: 84
End: 2024-01-04
Payer: MEDICARE

## 2024-01-04 DIAGNOSIS — R26.2 AMBULATORY DYSFUNCTION: Primary | ICD-10-CM

## 2024-01-04 PROCEDURE — 97530 THERAPEUTIC ACTIVITIES: CPT

## 2024-01-04 PROCEDURE — 97110 THERAPEUTIC EXERCISES: CPT

## 2024-01-04 PROCEDURE — 97112 NEUROMUSCULAR REEDUCATION: CPT

## 2024-01-04 NOTE — PROGRESS NOTES
"Daily Note     Today's date: 2024  Patient name: Deirdre Blackman  : 1940  MRN: [id number]  Referring provider: Reyes, Lea, MD  Dx:   Encounter Diagnosis     ICD-10-CM    1. Ambulatory dysfunction  R26.2                      Subjective: Pt reports she continues to have difficulty with her balance.     Objective: See treatment diary below    Assessment: Pt demonstrated greatest difficulty with balancing on an unstable surface. Pt demonstrated difficulty with periods of SL balance when performing stairs.     Plan: Continue per plan of care.      Precautions: HTN, Osteopenia      Manuals 12/19 12/28 1/3 1/4                                                             Neuro Re-Ed             FT   1x30\" 1x30\" 1x30\"         FT EC  1x30\" 1x30\" 1x45\"         Tandem stance 3x20\" ea 1x45\" ea 1x45\" ea 1x45\"         FT c/s Rot  1x15 ea 1x15 ea 1x15 ea         FT c/s flex/ext  1x15 ea 1x15 ea 1x15 ea         SLS 3x20\" ea 5\"x5 ea 10\"x5 ea 10\"x5 ea         Biodex PS FT   L7 3x30\" L7 3x30\"         Biodex PS FT EC   L12 3x30\" L12 3x30\"          Tandem walking  SPC/ 1x15 SPC 1x5 SPC 1x5         Walking with L/R head turns                          Ther Ex             Standing hip abduction no UE support if able 3x10 3x10 3x10 3x10         Mini squats no UE support if able 3x10 3x10 3x10 3x10                                                                                       Ther Activity             STS  Foam/ 2x5 [id number] Foam 3x5         Step up/over no UE support  4\"/ 1x10 ea 4\"  1x10 ea nv         Step-ups/over railing support  6\"/ 1x10 6\" 1x10 6\" 1x10         Gait Training                                       Modalities                                            "

## 2024-01-08 ENCOUNTER — OFFICE VISIT (OUTPATIENT)
Dept: PHYSICAL THERAPY | Facility: CLINIC | Age: 84
End: 2024-01-08
Payer: MEDICARE

## 2024-01-08 DIAGNOSIS — R26.2 AMBULATORY DYSFUNCTION: Primary | ICD-10-CM

## 2024-01-08 PROCEDURE — 97112 NEUROMUSCULAR REEDUCATION: CPT

## 2024-01-08 PROCEDURE — 97530 THERAPEUTIC ACTIVITIES: CPT

## 2024-01-08 NOTE — PROGRESS NOTES
"Daily Note     Today's date: 2024  Patient name: Deirdre Blackman  : 1940  MRN: [id number]  Referring provider: Reyes, Lea, MD  Dx:   Encounter Diagnosis     ICD-10-CM    1. Ambulatory dysfunction  R26.2                      Subjective: Pt reports she is noticing improvement in her balance as she is able to walk short distances without her cane.     Objective: See treatment diary below    Assessment: Pt demonstrates worsening balance as muscular fatigue sets in. She tolerates interventions well. Has significant L hip abduction weakness which leads to increased lateral trunk movement and greater risk of falls. Continue to progress as able.     Plan: Continue per plan of care.      Precautions: HTN, Osteopenia      Manuals 12/19 12/28 1/3 1/4 1/8                                                            Neuro Re-Ed             FT   1x30\" 1x30\" 1x30\" 1x30\"        FT EC  1x30\" 1x30\" 1x45\" 1x45\"        Tandem stance 3x20\" ea 1x45\" ea 1x45\" ea 1x45\" 1x45\"        FT c/s Rot  1x15 ea 1x15 ea 1x15 ea 1x15 ea        FT c/s flex/ext  1x15 ea 1x15 ea 1x15 ea 1x15 ea        SLS 3x20\" ea 5\"x5 ea 10\"x5 ea 10\"x5 ea 10\"x5 ea        Biodex PS FT   L7 3x30\" L7 3x30\" L7 3x30\"        Biodex PS FT EC   L12 3x30\" L12 3x30\"  L12 3x30\"         Tandem walking  SPC/ 1x15 SPC 1x5 SPC 1x5 SPC 1x5        Walking with L/R head turns                          Ther Ex             Standing hip abduction no UE support if able 3x10 3x10 3x10 3x10 3x10        Mini squats no UE support if able 3x10 3x10 3x10 3x10 3x10        Standing hip abduction      YTB 3x10 ea                                                                         Ther Activity             STS  Foam/ 2x5 [id number] Foam 3x5 Foam 3x5        Step up/over no UE support  4\"/ 1x10 ea 4\"  1x10 ea nv 4\" 1x10 ea        Step-ups/over railing support  6\"/ 1x10 6\" 1x10 6\" 1x10 6\" 1x10        Gait Training                                       Modalities                                  "

## 2024-01-11 ENCOUNTER — OFFICE VISIT (OUTPATIENT)
Dept: PHYSICAL THERAPY | Facility: CLINIC | Age: 84
End: 2024-01-11
Payer: MEDICARE

## 2024-01-11 DIAGNOSIS — R26.2 AMBULATORY DYSFUNCTION: Primary | ICD-10-CM

## 2024-01-11 PROCEDURE — 97112 NEUROMUSCULAR REEDUCATION: CPT

## 2024-01-11 PROCEDURE — 97110 THERAPEUTIC EXERCISES: CPT

## 2024-01-11 PROCEDURE — 97530 THERAPEUTIC ACTIVITIES: CPT

## 2024-01-11 NOTE — PROGRESS NOTES
"Daily Note     Today's date: 2024  Patient name: Deirdre Blackman  : 1940  MRN: [id number]  Referring provider: Reyes, Lea, MD  Dx:   Encounter Diagnosis     ICD-10-CM    1. Ambulatory dysfunction  R26.2                      Subjective: Pt reports she is feeling better with her balance.     Objective: See treatment diary below    Assessment: Pt demonstrated fatigue with hip abduction strengthening and limited endurance with stair training secondary to fatigue. Pt demonstrated increased stability with tandem balance and tandem walking.    Plan: Continue per plan of care.      Precautions: HTN, Osteopenia      Manuals 12/19 12/28 1/3 1/4 1/8 1/11                                                           Neuro Re-Ed             FT   1x30\" 1x30\" 1x30\" 1x30\" 1x30\"       FT EC  1x30\" 1x30\" 1x45\" 1x45\" 1x45\"       Tandem stance 3x20\" ea 1x45\" ea 1x45\" ea 1x45\" 1x45\" 1x60\"       FT c/s Rot  1x15 ea 1x15 ea 1x15 ea 1x15 ea 1x15 ea       FT c/s flex/ext  1x15 ea 1x15 ea 1x15 ea 1x15 ea 1x15 ea       SLS 3x20\" ea 5\"x5 ea 10\"x5 ea 10\"x5 ea 10\"x5 ea 10\"x5 ea       Biodex PS FT   L7 3x30\" L7 3x30\" L7 3x30\" L7 3x30\"       Biodex PS FT EC   L12 3x30\" L12 3x30\"  L12 3x30\"  L12  3x30\"        Tandem walking  SPC/ 1x15 SPC 1x5 SPC 1x5 SPC 1x5 SPC  1x7       Walking with L/R head turns                          Ther Ex             Standing hip abduction no UE support if able 3x10 3x10 3x10 3x10 3x10        Mini squats no UE support if able 3x10 3x10 3x10 3x10 3x10 3x10       Standing hip abduction      YTB 3x10 ea YTB  3x10 ea                                                                        Ther Activity             STS  Foam/ 2x5 [id number] Foam 3x5 Foam 3x5 Foam 3x5       Step up/over no UE support  4\"/ 1x10 ea 4\"  1x10 ea nv 4\" 1x10 ea 4\" 1x10 ea       Step-ups/over railing support  6\"/ 1x10 6\" 1x10 6\" 1x10 6\" 1x10 6\" 1x10       Gait Training                                       Modalities                               "

## 2024-01-15 ENCOUNTER — OFFICE VISIT (OUTPATIENT)
Dept: PHYSICAL THERAPY | Facility: CLINIC | Age: 84
End: 2024-01-15
Payer: MEDICARE

## 2024-01-15 DIAGNOSIS — R26.2 AMBULATORY DYSFUNCTION: Primary | ICD-10-CM

## 2024-01-15 PROCEDURE — 97530 THERAPEUTIC ACTIVITIES: CPT

## 2024-01-15 PROCEDURE — 97110 THERAPEUTIC EXERCISES: CPT

## 2024-01-15 PROCEDURE — 97112 NEUROMUSCULAR REEDUCATION: CPT

## 2024-01-15 NOTE — PROGRESS NOTES
"Daily Note     Today's date: 1/15/2024  Patient name: Deirdre Blackman  : 1940  MRN: [id number]  Referring provider: Reyes, Lea, MD  Dx:   Encounter Diagnosis     ICD-10-CM    1. Ambulatory dysfunction  R26.2                      Subjective: Pt reports no change since last visit.     Objective: See treatment diary below    Assessment: Pt continues to have narrow base of support and narrow stride width which leads to scissoring gait and tripping over her own feet. Continue to progress hip abduction strengthening and balance training to reduce risk of falls.     Plan: Continue per plan of care.      Precautions: HTN, Osteopenia      Manuals 12/19 12/28 1/3 1/4 1/8 1/11 1/15                                                          Neuro Re-Ed             FT   1x30\" 1x30\" 1x30\" 1x30\" 1x30\" 1x30\"      FT EC  1x30\" 1x30\" 1x45\" 1x45\" 1x45\" 1x45\"      Tandem stance 3x20\" ea 1x45\" ea 1x45\" ea 1x45\" 1x45\" 1x60\" 1x60\"      FT c/s Rot  1x15 ea 1x15 ea 1x15 ea 1x15 ea 1x15 ea 1x15 ea      FT c/s flex/ext  1x15 ea 1x15 ea 1x15 ea 1x15 ea 1x15 ea 1x15 ea      SLS 3x20\" ea 5\"x5 ea 10\"x5 ea 10\"x5 ea 10\"x5 ea 10\"x5 ea 10\"x5 ea      Biodex PS FT   L7 3x30\" L7 3x30\" L7 3x30\" L7 3x30\" L7 3x30\"      Biodex PS FT EC   L12 3x30\" L12 3x30\"  L12 3x30\"  L12  3x30\"  L12  3x30\"       Tandem walking  SPC/ 1x15 SPC 1x5 SPC 1x5 SPC 1x5 SPC  1x7 SPC  1x7      Walking with L/R head turns                          Ther Ex             Standing hip abduction no UE support if able 3x10 3x10 3x10 3x10 3x10  3x10      Mini squats no UE support if able 3x10 3x10 3x10 3x10 3x10 3x10 3x10      Standing hip abduction      YTB 3x10 ea YTB  3x10 ea YTB  3x10 ea                                                                       Ther Activity             STS  Foam/ 2x5 [id number] Foam 3x5 Foam 3x5 Foam 3x5 Foam 3x5      Step up/over no UE support  4\"/ 1x10 ea 4\"  1x10 ea nv 4\" 1x10 ea 4\" 1x10 ea 4\" 1x10       Step-ups/down retro no UE support on RLE  6\"/ " "1x10 6\" 1x10 6\" 1x10 6\" 1x10 6\" 1x10 6\" 1x10 ea      Gait Training                                       Modalities                                            "

## 2024-01-18 ENCOUNTER — VBI (OUTPATIENT)
Dept: ADMINISTRATIVE | Facility: OTHER | Age: 84
End: 2024-01-18

## 2024-01-18 ENCOUNTER — OFFICE VISIT (OUTPATIENT)
Dept: PHYSICAL THERAPY | Facility: CLINIC | Age: 84
End: 2024-01-18
Payer: MEDICARE

## 2024-01-18 DIAGNOSIS — R26.2 AMBULATORY DYSFUNCTION: Primary | ICD-10-CM

## 2024-01-18 PROCEDURE — 97110 THERAPEUTIC EXERCISES: CPT

## 2024-01-18 PROCEDURE — 97112 NEUROMUSCULAR REEDUCATION: CPT

## 2024-01-18 NOTE — PROGRESS NOTES
"Daily Note     Today's date: 2024  Patient name: Deirdre Blackman  : 1940  MRN: [id number]  Referring provider: Reyes, Lea, MD  Dx:   Encounter Diagnosis     ICD-10-CM    1. Ambulatory dysfunction  R26.2           Start Time: 1000  Stop Time: 1045  Total time in clinic (min): 45 minutes    Subjective: Patient reports overall improvement with balance since starting therapy.     Objective: See treatment diary below    Assessment: Patient demonstrates good form t/o therapy. Minimal therapist assist and UE assist needed occasionally. Continue to progress hip abduction strengthening and balance training to reduce risk of falls.     Plan: Continue per plan of care.      Precautions: HTN, Osteopenia    Manuals 12/19 12/28 1/3 1/4 1/8 1/11 1/15 1/18                                                         Neuro Re-Ed             FT   1x30\" 1x30\" 1x30\" 1x30\" 1x30\" 1x30\" 1x30\"     FT EC  1x30\" 1x30\" 1x45\" 1x45\" 1x45\" 1x45\" 1x45\"     Tandem stance 3x20\" ea 1x45\" ea 1x45\" ea 1x45\" 1x45\" 1x60\" 1x60\" 1x60\"     FT c/s Rot  1x15 ea 1x15 ea 1x15 ea 1x15 ea 1x15 ea 1x15 ea 1x15 ea     FT c/s flex/ext  1x15 ea 1x15 ea 1x15 ea 1x15 ea 1x15 ea 1x15 ea 1x15 ea     SLS 3x20\" ea 5\"x5 ea 10\"x5 ea 10\"x5 ea 10\"x5 ea 10\"x5 ea 10\"x5 ea 10\"x5 ea     Biodex PS FT   L7 3x30\" L7 3x30\" L7 3x30\" L7 3x30\" L7 3x30\"      Biodex PS FT EC   L12 3x30\" L12 3x30\"  L12 3x30\"  L12  3x30\"  L12  3x30\"       Tandem walking  SPC/ 1x15 SPC 1x5 SPC 1x5 SPC 1x5 SPC  1x7 SPC  1x7 SPC  1x7     Walking with L/R head turns                          Ther Ex             Standing hip abduction no UE support if able 3x10 3x10 3x10 3x10 3x10  3x10 3x10     Mini squats no UE support if able 3x10 3x10 3x10 3x10 3x10 3x10 3x10 3x10     Standing hip abduction      YTB 3x10 ea YTB  3x10 ea YTB  3x10 ea YTB  3x10 ea                                                                      Ther Activity             STS  Foam/ 2x5 [id number] Foam 3x5 Foam 3x5 Foam 3x5 Foam 3x5 " "Foam  2x10     Step up/over no UE support  4\"/ 1x10 ea 4\"  1x10 ea nv 4\" 1x10 ea 4\" 1x10 ea 4\" 1x10  4\" 1x10  ea     Step-ups/down retro no UE support on RLE  6\"/ 1x10 6\" 1x10 6\" 1x10 6\" 1x10 6\" 1x10 6\" 1x10 ea 6\"   1x10   ea     Gait Training                                       Modalities                                          "

## 2024-01-19 ENCOUNTER — VBI (OUTPATIENT)
Dept: ADMINISTRATIVE | Facility: OTHER | Age: 84
End: 2024-01-19

## 2024-01-22 ENCOUNTER — OFFICE VISIT (OUTPATIENT)
Dept: PHYSICAL THERAPY | Facility: CLINIC | Age: 84
End: 2024-01-22
Payer: MEDICARE

## 2024-01-22 DIAGNOSIS — R26.2 AMBULATORY DYSFUNCTION: Primary | ICD-10-CM

## 2024-01-22 PROCEDURE — 97110 THERAPEUTIC EXERCISES: CPT

## 2024-01-22 PROCEDURE — 97530 THERAPEUTIC ACTIVITIES: CPT

## 2024-01-22 PROCEDURE — 97112 NEUROMUSCULAR REEDUCATION: CPT

## 2024-01-22 NOTE — PROGRESS NOTES
"Daily Note     Today's date: 2024  Patient name: Deirdre Blackman  : 1940  MRN: [id number]  Referring provider: Reyes, Lea, MD  Dx:   Encounter Diagnosis     ICD-10-CM    1. Ambulatory dysfunction  R26.2             Start Time: 1430  Stop Time: 1510  Total time in clinic (min): 40 minutes    Subjective: Patient notes no falls since last visit. Continues to note improvements with balance.     Objective: See treatment diary below    Assessment: Patient demonstrates good form t/o therapy. Able to complete tandem walking with no AD today with minimal LOB during. Minimal therapist assist and UE assist needed occasionally. Continue to progress hip abduction strengthening and balance training to reduce risk of falls.     Plan: Continue per plan of care.      Precautions: HTN, Osteopenia    Manuals 12/19 12/28 1/3 1/4 1/8 1/11 1/15 1/18 1/22                                                        Neuro Re-Ed             FT   1x30\" 1x30\" 1x30\" 1x30\" 1x30\" 1x30\" 1x30\" 1x30\"    FT EC  1x30\" 1x30\" 1x45\" 1x45\" 1x45\" 1x45\" 1x45\" 1x45\"    Tandem stance 3x20\" ea 1x45\" ea 1x45\" ea 1x45\" 1x45\" 1x60\" 1x60\" 1x60\" 1x60\"    FT c/s Rot  1x15 ea 1x15 ea 1x15 ea 1x15 ea 1x15 ea 1x15 ea 1x15 ea 1x15 ea    FT c/s flex/ext  1x15 ea 1x15 ea 1x15 ea 1x15 ea 1x15 ea 1x15 ea 1x15 ea 1x15 ea    SLS 3x20\" ea 5\"x5 ea 10\"x5 ea 10\"x5 ea 10\"x5 ea 10\"x5 ea 10\"x5 ea 10\"x5 ea 10\"x5 ea    Biodex PS FT   L7 3x30\" L7 3x30\" L7 3x30\" L7 3x30\" L7 3x30\"  L7 3x30\"    Biodex PS FT EC   L12 3x30\" L12 3x30\"  L12 3x30\"  L12  3x30\"  L12  3x30\"   L12  3x30\"     Tandem walking  SPC/ 1x15 SPC 1x5 SPC 1x5 SPC 1x5 SPC  1x7 SPC  1x7 SPC  1x7 No AD  1x7    Walking with L/R head turns                          Ther Ex             Standing hip abduction no UE support if able 3x10 3x10 3x10 3x10 3x10  3x10 3x10 3x10    Mini squats no UE support if able 3x10 3x10 3x10 3x10 3x10 3x10 3x10 3x10 3x10    Standing hip abduction      YTB 3x10 ea YTB  3x10 ea YTB  3x10 ea " "YTB  3x10 ea YTB  3x10 ea                                                                     Ther Activity             STS  Foam/ 2x5 [id number] Foam 3x5 Foam 3x5 Foam 3x5 Foam 3x5 Foam  2x10 Foam  2x10    Step up/over no UE support  4\"/ 1x10 ea 4\"  1x10 ea nv 4\" 1x10 ea 4\" 1x10 ea 4\" 1x10  4\" 1x10  ea 4\" 1x10  ea    Step-ups/down retro no UE support on RLE  6\"/ 1x10 6\" 1x10 6\" 1x10 6\" 1x10 6\" 1x10 6\" 1x10 ea 6\"   1x10   ea 6\"   1x10   ea    Gait Training                                       Modalities                                          "

## 2024-01-23 ENCOUNTER — VBI (OUTPATIENT)
Dept: ADMINISTRATIVE | Facility: OTHER | Age: 84
End: 2024-01-23

## 2024-01-25 ENCOUNTER — OFFICE VISIT (OUTPATIENT)
Dept: PHYSICAL THERAPY | Facility: CLINIC | Age: 84
End: 2024-01-25
Payer: MEDICARE

## 2024-01-25 DIAGNOSIS — R26.2 AMBULATORY DYSFUNCTION: Primary | ICD-10-CM

## 2024-01-25 PROCEDURE — 97110 THERAPEUTIC EXERCISES: CPT

## 2024-01-25 PROCEDURE — 97112 NEUROMUSCULAR REEDUCATION: CPT

## 2024-01-25 PROCEDURE — 97530 THERAPEUTIC ACTIVITIES: CPT

## 2024-01-25 NOTE — PROGRESS NOTES
"Daily Note     Today's date: 2024  Patient name: Deirdre Blackman  : 1940  MRN: [id number]  Referring provider: Reyes, Lea, MD  Dx:   Encounter Diagnosis     ICD-10-CM    1. Ambulatory dysfunction  R26.2               Start Time: 1000  Stop Time: 1045  Total time in clinic (min): 45 minutes    Subjective: Patient reports feeling tired this morning because she cleaned her house and rode her bike 20 minutes prior to therapy.     Objective: See treatment diary below    Assessment: Patient demonstrates good form t/o therapy. Able to progress balance exercises listed below with appropriate challenge. Continue to progress hip abduction strengthening and balance training to reduce risk of falls.     Plan: Continue per plan of care.      Precautions: HTN, Osteopenia    Manuals 12/19 12/28 1/3 1/4 1/8 1/11 1/15 1/18 1/22 1/25                                                       Neuro Re-Ed             FT   1x30\" 1x30\" 1x30\" 1x30\" 1x30\" 1x30\" 1x30\" 1x30\" Foam  1x30\"   FT EC  1x30\" 1x30\" 1x45\" 1x45\" 1x45\" 1x45\" 1x45\" 1x45\" 1x60\"    Foam  1x30\"   Tandem stance 3x20\" ea 1x45\" ea 1x45\" ea 1x45\" 1x45\" 1x60\" 1x60\" 1x60\" 1x60\" 1x60\"   FT c/s Rot  1x15 ea 1x15 ea 1x15 ea 1x15 ea 1x15 ea 1x15 ea 1x15 ea 1x15 ea    FT c/s flex/ext  1x15 ea 1x15 ea 1x15 ea 1x15 ea 1x15 ea 1x15 ea 1x15 ea 1x15 ea    SLS 3x20\" ea 5\"x5 ea 10\"x5 ea 10\"x5 ea 10\"x5 ea 10\"x5 ea 10\"x5 ea 10\"x5 ea 10\"x5 ea 10\"x5 ea   Biodex PS FT   L7 3x30\" L7 3x30\" L7 3x30\" L7 3x30\" L7 3x30\"  L7 3x30\" L7-5  3x30\"   Biodex PS FT EC   L12 3x30\" L12 3x30\"  L12 3x30\"  L12  3x30\"  L12  3x30\"   L12  3x30\"  L12  3x30\"    Tandem walking  SPC/ 1x15 SPC 1x5 SPC 1x5 SPC 1x5 SPC  1x7 SPC  1x7 SPC  1x7 No AD  1x7 No AD  1x7   Walking with L/R head turns                          Ther Ex             Standing hip abduction no UE support if able 3x10 3x10 3x10 3x10 3x10  3x10 3x10 3x10 3x10   Mini squats no UE support if able 3x10 3x10 3x10 3x10 3x10 3x10 3x10 3x10 3x10 3x10 " "  Standing hip abduction      YTB 3x10 ea YTB  3x10 ea YTB  3x10 ea YTB  3x10 ea YTB  3x10 ea YTB  3x10 ea                                                                    Ther Activity             STS  Foam/ 2x5 [id number] Foam 3x5 Foam 3x5 Foam 3x5 Foam 3x5 Foam  2x10 Foam  2x10 Foam  2x10   Step up/over no UE support  4\"/ 1x10 ea 4\"  1x10 ea nv 4\" 1x10 ea 4\" 1x10 ea 4\" 1x10  4\" 1x10  ea 4\" 1x10  ea 4\" 1x10  ea   Step-ups/down retro no UE support on RLE  6\"/ 1x10 6\" 1x10 6\" 1x10 6\" 1x10 6\" 1x10 6\" 1x10 ea 6\"   1x10   ea 6\"   1x10   ea 6\"   1x10   ea   Gait Training                                       Modalities                                          "

## 2024-01-29 ENCOUNTER — APPOINTMENT (OUTPATIENT)
Dept: PHYSICAL THERAPY | Facility: CLINIC | Age: 84
End: 2024-01-29
Payer: MEDICARE

## 2024-01-31 ENCOUNTER — VBI (OUTPATIENT)
Dept: ADMINISTRATIVE | Facility: OTHER | Age: 84
End: 2024-01-31

## 2024-01-31 NOTE — TELEPHONE ENCOUNTER
01/31/24 10:11 AM     [employer] SmartForm used to document caregap status.    Dora Escobar MA

## 2024-02-01 ENCOUNTER — OFFICE VISIT (OUTPATIENT)
Dept: PHYSICAL THERAPY | Facility: CLINIC | Age: 84
End: 2024-02-01
Payer: MEDICARE

## 2024-02-01 DIAGNOSIS — R26.2 AMBULATORY DYSFUNCTION: Primary | ICD-10-CM

## 2024-02-01 PROCEDURE — 97530 THERAPEUTIC ACTIVITIES: CPT

## 2024-02-01 PROCEDURE — 97110 THERAPEUTIC EXERCISES: CPT

## 2024-02-01 PROCEDURE — 97112 NEUROMUSCULAR REEDUCATION: CPT

## 2024-02-01 NOTE — PROGRESS NOTES
"Daily Note     Today's date: 2024  Patient name: Deirdre Blackman  : 1940  MRN: [id number]  Referring provider: Reyes, Lea, MD  Dx:   Encounter Diagnosis     ICD-10-CM    1. Ambulatory dysfunction  R26.2                          Subjective: Patient reports her balance is getting better on low steps.     Objective: See treatment diary below    Assessment: Patient demonstrated significant trunk compensation and difficulty performing hip abduction strengthening secondary to weakness. Pt is challenged with SLS balance and tandem walking. Stairs continue to be fatiguing.     Plan: Continue per plan of care.      Precautions: HTN, Osteopenia    Manuals 2/1    1/8 1/11 1/15 1/18 1/22 1/25                                                       Neuro Re-Ed             FT  Foam 1x30\"    1x30\" 1x30\" 1x30\" 1x30\" 1x30\" Foam  1x30\"   FT EC 1x60\" foam 1x40\"    1x45\" 1x45\" 1x45\" 1x45\" 1x45\" 1x60\"    Foam  1x30\"   Tandem stance 1x60\"    1x45\" 1x60\" 1x60\" 1x60\" 1x60\" 1x60\"   FT c/s Rot     1x15 ea 1x15 ea 1x15 ea 1x15 ea 1x15 ea    FT c/s flex/ext     1x15 ea 1x15 ea 1x15 ea 1x15 ea 1x15 ea    SLS 10\"x5    10\"x5 ea 10\"x5 ea 10\"x5 ea 10\"x5 ea 10\"x5 ea 10\"x5 ea   Biodex PS FT L6-5 3x30\"    L7 3x30\" L7 3x30\" L7 3x30\"  L7 3x30\" L7-5  3x30\"   Biodex PS FT EC L12 3x30\"    L12 3x30\"  L12  3x30\"  L12  3x30\"   L12  3x30\"  L12  3x30\"    Tandem walking No AD 1x7    SPC 1x5 SPC  1x7 SPC  1x7 SPC  1x7 No AD  1x7 No AD  1x7   Walking with L/R head turns                          Ther Ex             Standing hip abduction no UE support if able 3x10    3x10  3x10 3x10 3x10 3x10   Mini squats no UE support if able 3x10    3x10 3x10 3x10 3x10 3x10 3x10   Standing hip abduction  YTB  3x10 ea    YTB 3x10 ea YTB  3x10 ea YTB  3x10 ea YTB  3x10 ea YTB  3x10 ea YTB  3x10 ea                                                                    Ther Activity             STS Foam 2x10    Foam 3x5 Foam 3x5 Foam 3x5 Foam  2x10 Foam  2x10 Foam  2x10   Step " "up/over no UE support 4\"/ 1x10 ea    4\" 1x10 ea 4\" 1x10 ea 4\" 1x10  4\" 1x10  ea 4\" 1x10  ea 4\" 1x10  ea   Step-ups/down retro no UE support on RLE 6\"/ 1x10 ea    6\" 1x10 6\" 1x10 6\" 1x10 ea 6\"   1x10   ea 6\"   1x10   ea 6\"   1x10   ea   Gait Training                                       Modalities                                          "

## 2024-02-05 ENCOUNTER — OFFICE VISIT (OUTPATIENT)
Dept: PHYSICAL THERAPY | Facility: CLINIC | Age: 84
End: 2024-02-05
Payer: MEDICARE

## 2024-02-05 DIAGNOSIS — R26.2 AMBULATORY DYSFUNCTION: Primary | ICD-10-CM

## 2024-02-05 PROCEDURE — 97110 THERAPEUTIC EXERCISES: CPT

## 2024-02-05 PROCEDURE — 97112 NEUROMUSCULAR REEDUCATION: CPT

## 2024-02-05 PROCEDURE — 97530 THERAPEUTIC ACTIVITIES: CPT

## 2024-02-05 NOTE — PROGRESS NOTES
"Daily Note     Today's date: 2024  Patient name: Deirdre Blackman  : 1940  MRN: [id number]  Referring provider: Reyes, Lea, MD  Dx:   Encounter Diagnosis     ICD-10-CM    1. Ambulatory dysfunction  R26.2                          Subjective: Patient reports she feels a little bit more off balance today.     Objective: See treatment diary below    Assessment: Pt was fatigued by the end of the appointment. Pt appropriately challenged with all interventions, especially eyes closed, SLS, tandem walking, hip abduction and stair navigation without UE support. Mariana biggest impairment is hip abduction weakness which we continue to work on.     Plan: Continue per plan of care.      Precautions: HTN, Osteopenia    Manuals 2/1 2/5   1/8 1/11 1/15 1/18 1/22 1/25                                                       Neuro Re-Ed             FT  Foam 1x30\" Foam 1x30\"   1x30\" 1x30\" 1x30\" 1x30\" 1x30\" Foam  1x30\"   FT EC 1x60\" foam 1x40\" Foam 2x60\"   1x45\" 1x45\" 1x45\" 1x45\" 1x45\" 1x60\"    Foam  1x30\"   Tandem stance 1x60\" Foam 1x60\" ea   1x45\" 1x60\" 1x60\" 1x60\" 1x60\" 1x60\"   FT c/s Rot     1x15 ea 1x15 ea 1x15 ea 1x15 ea 1x15 ea    FT c/s flex/ext     1x15 ea 1x15 ea 1x15 ea 1x15 ea 1x15 ea    SLS 10\"x5 15\"x5   10\"x5 ea 10\"x5 ea 10\"x5 ea 10\"x5 ea 10\"x5 ea 10\"x5 ea   Biodex PS FT L6-5 3x30\" L6-5 3x30\"   L7 3x30\" L7 3x30\" L7 3x30\"  L7 3x30\" L7-5  3x30\"   Biodex PS FT EC L12 3x30\" L12 3x30\"   L12 3x30\"  L12  3x30\"  L12  3x30\"   L12  3x30\"  L12  3x30\"    Tandem walking No AD 1x7 SPC 1x4   SPC 1x5 SPC  1x7 SPC  1x7 SPC  1x7 No AD  1x7 No AD  1x7   Walking with L/R head turns  SPC 1x4                        Ther Ex             Standing hip abduction no UE support if able 3x10 3x10   3x10  3x10 3x10 3x10 3x10   Mini squats no UE support if able 3x10 3x10   3x10 3x10 3x10 3x10 3x10 3x10   Standing hip abduction  YTB  3x10 ea YTB  3x10 ea   YTB 3x10 ea YTB  3x10 ea YTB  3x10 ea YTB  3x10 ea YTB  3x10 ea YTB  3x10 ea              " "                                                      Ther Activity             STS Foam 2x10 Foam 2x10   Foam 3x5 Foam 3x5 Foam 3x5 Foam  2x10 Foam  2x10 Foam  2x10   Step up/over no UE support 4\"/ 1x10 ea 6\" 1x10   4\" 1x10 ea 4\" 1x10 ea 4\" 1x10  4\" 1x10  ea 4\" 1x10  ea 4\" 1x10  ea   Step-ups/down retro no UE support on RLE 6\"/ 1x10 ea 6\"/ 1x10 ea   6\" 1x10 6\" 1x10 6\" 1x10 ea 6\"   1x10   ea 6\"   1x10   ea 6\"   1x10   ea   Gait Training                                       Modalities                                          "

## 2024-02-06 ENCOUNTER — HOSPITAL ENCOUNTER (OUTPATIENT)
Dept: RADIOLOGY | Age: 84
Discharge: HOME | End: 2024-02-06
Payer: MEDICARE

## 2024-02-06 VITALS — HEIGHT: 63 IN | BODY MASS INDEX: 33.66 KG/M2 | WEIGHT: 190 LBS

## 2024-02-06 DIAGNOSIS — Z12.31 VISIT FOR SCREENING MAMMOGRAM: ICD-10-CM

## 2024-02-06 PROCEDURE — 77063 BREAST TOMOSYNTHESIS BI: CPT

## 2024-02-06 PROCEDURE — 77067 SCR MAMMO BI INCL CAD: CPT

## 2024-02-08 ENCOUNTER — OFFICE VISIT (OUTPATIENT)
Dept: PHYSICAL THERAPY | Facility: CLINIC | Age: 84
End: 2024-02-08
Payer: MEDICARE

## 2024-02-08 DIAGNOSIS — R26.2 AMBULATORY DYSFUNCTION: Primary | ICD-10-CM

## 2024-02-08 PROCEDURE — 97112 NEUROMUSCULAR REEDUCATION: CPT

## 2024-02-08 PROCEDURE — 97110 THERAPEUTIC EXERCISES: CPT

## 2024-02-08 NOTE — PROGRESS NOTES
"Daily Note     Today's date: 2024  Patient name: Deirdre Blackman  : 1940  MRN: [id number]  Referring provider: Reyes, Lea, MD  Dx:   Encounter Diagnosis     ICD-10-CM    1. Ambulatory dysfunction  R26.2                          Subjective: Patient reports feeling ready to wrap up PT today.     Objective: See treatment diary below    Assessment: Updated and reviewed HEP with pt. Pt continues to demonstrate hip weakness and fair SLS stability.     Plan: Continue per plan of care.      Precautions: HTN, Osteopenia    Manuals 2/1 2/5 2/8  1/8 1/11 1/15 1/18 1/22 1/25                                                       Neuro Re-Ed             FT  Foam 1x30\" Foam 1x30\" Foam   1x30\"  1x30\" 1x30\" 1x30\" 1x30\" 1x30\" Foam  1x30\"   FT EC 1x60\" foam 1x40\" Foam 2x60\" Foam 2x60\"  1x45\" 1x45\" 1x45\" 1x45\" 1x45\" 1x60\"    Foam  1x30\"   Tandem stance 1x60\" Foam 1x60\" ea Foam   1x60\" ea  1x45\" 1x60\" 1x60\" 1x60\" 1x60\" 1x60\"   FT c/s Rot     1x15 ea 1x15 ea 1x15 ea 1x15 ea 1x15 ea    FT c/s flex/ext     1x15 ea 1x15 ea 1x15 ea 1x15 ea 1x15 ea    SLS 10\"x5 15\"x5 15\"x5  10\"x5 ea 10\"x5 ea 10\"x5 ea 10\"x5 ea 10\"x5 ea 10\"x5 ea   Biodex PS FT L6-5 3x30\" L6-5 3x30\" L6-5  3x30\"  L7 3x30\" L7 3x30\" L7 3x30\"  L7 3x30\" L7-5  3x30\"   Biodex PS FT EC L12 3x30\" L12 3x30\" L12   3x30\"  L12 3x30\"  L12  3x30\"  L12  3x30\"   L12  3x30\"  L12  3x30\"    Tandem walking No AD 1x7 SPC 1x4 SPC  1x4  SPC 1x5 SPC  1x7 SPC  1x7 SPC  1x7 No AD  1x7 No AD  1x7   Walking with L/R head turns  SPC 1x4                        Ther Ex             Standing hip abduction no UE support if able 3x10 3x10 3x10  3x10  3x10 3x10 3x10 3x10   Mini squats no UE support if able 3x10 3x10 3x10  3x10 3x10 3x10 3x10 3x10 3x10   Standing hip abduction  YTB  3x10 ea YTB  3x10 ea   YTB 3x10 ea YTB  3x10 ea YTB  3x10 ea YTB  3x10 ea YTB  3x10 ea YTB  3x10 ea                                                                    Ther Activity             STS Foam 2x10 Foam 2x10 " "Foam  2x10  Foam 3x5 Foam 3x5 Foam 3x5 Foam  2x10 Foam  2x10 Foam  2x10   Step up/over no UE support 4\"/ 1x10 ea 6\" 1x10 6\" 1x10  4\" 1x10 ea 4\" 1x10 ea 4\" 1x10  4\" 1x10  ea 4\" 1x10  ea 4\" 1x10  ea   Step-ups/down retro no UE support on RLE 6\"/ 1x10 ea 6\"/ 1x10 ea 6\"/ 1x10 ea  6\" 1x10 6\" 1x10 6\" 1x10 ea 6\"   1x10   ea 6\"   1x10   ea 6\"   1x10   ea   Gait Training                                       Modalities                                          "

## 2024-02-12 ENCOUNTER — APPOINTMENT (OUTPATIENT)
Dept: PHYSICAL THERAPY | Facility: CLINIC | Age: 84
End: 2024-02-12
Payer: MEDICARE

## 2024-02-15 ENCOUNTER — APPOINTMENT (OUTPATIENT)
Dept: PHYSICAL THERAPY | Facility: CLINIC | Age: 84
End: 2024-02-15
Payer: MEDICARE

## 2024-02-19 ENCOUNTER — APPOINTMENT (OUTPATIENT)
Dept: PHYSICAL THERAPY | Facility: CLINIC | Age: 84
End: 2024-02-19
Payer: MEDICARE

## 2024-02-22 ENCOUNTER — APPOINTMENT (OUTPATIENT)
Dept: PHYSICAL THERAPY | Facility: CLINIC | Age: 84
End: 2024-02-22
Payer: MEDICARE

## 2024-02-26 ENCOUNTER — APPOINTMENT (OUTPATIENT)
Dept: PHYSICAL THERAPY | Facility: CLINIC | Age: 84
End: 2024-02-26
Payer: MEDICARE

## 2024-02-27 ENCOUNTER — VBI (OUTPATIENT)
Dept: ADMINISTRATIVE | Facility: OTHER | Age: 84
End: 2024-02-27

## 2024-02-27 DIAGNOSIS — E03.9 ACQUIRED HYPOTHYROIDISM: ICD-10-CM

## 2024-02-27 DIAGNOSIS — K21.00 GASTROESOPHAGEAL REFLUX DISEASE WITH ESOPHAGITIS WITHOUT HEMORRHAGE: ICD-10-CM

## 2024-02-27 DIAGNOSIS — E78.5 HYPERLIPIDEMIA, UNSPECIFIED HYPERLIPIDEMIA TYPE: ICD-10-CM

## 2024-02-27 RX ORDER — PANTOPRAZOLE SODIUM 40 MG/1
40 TABLET, DELAYED RELEASE ORAL
Qty: 90 TABLET | Refills: 1 | Status: SHIPPED | OUTPATIENT
Start: 2024-02-27

## 2024-02-27 RX ORDER — LEVOTHYROXINE SODIUM 0.05 MG/1
50 TABLET ORAL DAILY
Qty: 90 TABLET | Refills: 1 | Status: SHIPPED | OUTPATIENT
Start: 2024-02-27

## 2024-02-27 RX ORDER — ATORVASTATIN CALCIUM 10 MG/1
10 TABLET, FILM COATED ORAL
Qty: 90 TABLET | Refills: 1 | Status: SHIPPED | OUTPATIENT
Start: 2024-02-27

## 2024-02-27 NOTE — TELEPHONE ENCOUNTER
Reason for call:   [x] Refill   [] Prior Auth  [] Other:     Office:   [x] PCP/Provider -   Lea Reyes, MD  PCP - General  [] Specialty/Provider -     Medication:   atorvastatin (LIPITOR) 10 mg tablet     levothyroxine 50 mcg tablet     pantoprazole (PROTONIX) 40 mg tablet       Pharmacy: [facility] - [place], [place] - [address]      Does the patient have enough for 3 days?   [x] Yes   [] No - Send as HP to POD

## 2024-02-29 ENCOUNTER — APPOINTMENT (OUTPATIENT)
Dept: PHYSICAL THERAPY | Facility: CLINIC | Age: 84
End: 2024-02-29
Payer: MEDICARE

## 2024-04-03 ENCOUNTER — APPOINTMENT (OUTPATIENT)
Dept: LAB | Age: 84
End: 2024-04-03
Payer: MEDICARE

## 2024-04-03 DIAGNOSIS — I10 PRIMARY HYPERTENSION: ICD-10-CM

## 2024-04-03 DIAGNOSIS — E03.9 ACQUIRED HYPOTHYROIDISM: ICD-10-CM

## 2024-04-03 DIAGNOSIS — E78.00 HYPERCHOLESTEROLEMIA: ICD-10-CM

## 2024-04-03 LAB
ALBUMIN SERPL BCP-MCNC: 3.8 G/DL (ref 3.5–5)
ALP SERPL-CCNC: 91 U/L (ref 34–104)
ALT SERPL W P-5'-P-CCNC: 18 U/L (ref 7–52)
ANION GAP SERPL CALCULATED.3IONS-SCNC: 10 MMOL/L (ref 4–13)
AST SERPL W P-5'-P-CCNC: 19 U/L (ref 13–39)
BILIRUB SERPL-MCNC: 0.62 MG/DL (ref 0.2–1)
BUN SERPL-MCNC: 19 MG/DL (ref 5–25)
CALCIUM SERPL-MCNC: 8.5 MG/DL (ref 8.4–10.2)
CHLORIDE SERPL-SCNC: 106 MMOL/L (ref 96–108)
CHOLEST SERPL-MCNC: 148 MG/DL
CO2 SERPL-SCNC: 25 MMOL/L (ref 21–32)
CREAT SERPL-MCNC: 0.66 MG/DL (ref 0.6–1.3)
ERYTHROCYTE [DISTWIDTH] IN BLOOD BY AUTOMATED COUNT: 12.2 % (ref 11.6–15.1)
GFR SERPL CREATININE-BSD FRML MDRD: 81 ML/MIN/1.73SQ M
GLUCOSE P FAST SERPL-MCNC: 82 MG/DL (ref 65–99)
HCT VFR BLD AUTO: 42.3 % (ref 34.8–46.1)
HDLC SERPL-MCNC: 64 MG/DL
HGB BLD-MCNC: 14.3 G/DL (ref 11.5–15.4)
LDLC SERPL CALC-MCNC: 70 MG/DL (ref 0–100)
MCH RBC QN AUTO: 34.4 PG (ref 26.8–34.3)
MCHC RBC AUTO-ENTMCNC: 33.8 G/DL (ref 31.4–37.4)
MCV RBC AUTO: 102 FL (ref 82–98)
NONHDLC SERPL-MCNC: 84 MG/DL
PLATELET # BLD AUTO: 201 THOUSANDS/UL (ref 149–390)
PMV BLD AUTO: 11.5 FL (ref 8.9–12.7)
POTASSIUM SERPL-SCNC: 3.8 MMOL/L (ref 3.5–5.3)
PROT SERPL-MCNC: 6.4 G/DL (ref 6.4–8.4)
RBC # BLD AUTO: 4.16 MILLION/UL (ref 3.81–5.12)
SODIUM SERPL-SCNC: 141 MMOL/L (ref 135–147)
TRIGL SERPL-MCNC: 70 MG/DL
TSH SERPL DL<=0.05 MIU/L-ACNC: 2.33 UIU/ML (ref 0.45–4.5)
WBC # BLD AUTO: 5.87 THOUSAND/UL (ref 4.31–10.16)

## 2024-04-03 PROCEDURE — 36415 COLL VENOUS BLD VENIPUNCTURE: CPT

## 2024-04-03 PROCEDURE — 80061 LIPID PANEL: CPT

## 2024-04-03 PROCEDURE — 84443 ASSAY THYROID STIM HORMONE: CPT

## 2024-04-03 PROCEDURE — 85027 COMPLETE CBC AUTOMATED: CPT

## 2024-04-03 PROCEDURE — 80053 COMPREHEN METABOLIC PANEL: CPT

## 2024-04-11 ENCOUNTER — RA CDI HCC (OUTPATIENT)
Dept: OTHER | Facility: HOSPITAL | Age: 84
End: 2024-04-11

## 2024-04-18 ENCOUNTER — OFFICE VISIT (OUTPATIENT)
Dept: INTERNAL MEDICINE CLINIC | Facility: CLINIC | Age: 84
End: 2024-04-18
Payer: MEDICARE

## 2024-04-18 VITALS
HEIGHT: 63 IN | BODY MASS INDEX: 33.77 KG/M2 | WEIGHT: 190.6 LBS | DIASTOLIC BLOOD PRESSURE: 82 MMHG | OXYGEN SATURATION: 98 % | SYSTOLIC BLOOD PRESSURE: 122 MMHG | HEART RATE: 53 BPM

## 2024-04-18 DIAGNOSIS — E03.9 ACQUIRED HYPOTHYROIDISM: ICD-10-CM

## 2024-04-18 DIAGNOSIS — E53.8 DEFICIENCY OF OTHER SPECIFIED B GROUP VITAMINS: ICD-10-CM

## 2024-04-18 DIAGNOSIS — E78.00 HYPERCHOLESTEROLEMIA: ICD-10-CM

## 2024-04-18 DIAGNOSIS — M85.832 OSTEOPENIA OF LEFT FOREARM: ICD-10-CM

## 2024-04-18 DIAGNOSIS — J30.2 SEASONAL ALLERGIES: ICD-10-CM

## 2024-04-18 DIAGNOSIS — I10 PRIMARY HYPERTENSION: ICD-10-CM

## 2024-04-18 DIAGNOSIS — D75.89 MACROCYTOSIS WITHOUT ANEMIA: ICD-10-CM

## 2024-04-18 DIAGNOSIS — Z00.00 MEDICARE ANNUAL WELLNESS VISIT, SUBSEQUENT: Primary | ICD-10-CM

## 2024-04-18 PROBLEM — E66.01 MORBID OBESITY (HCC): Chronic | Status: RESOLVED | Noted: 2021-03-04 | Resolved: 2024-04-18

## 2024-04-18 PROBLEM — C54.1 ENDOMETRIAL CANCER (HCC): Status: RESOLVED | Noted: 2022-04-28 | Resolved: 2024-04-18

## 2024-04-18 PROCEDURE — G0439 PPPS, SUBSEQ VISIT: HCPCS | Performed by: INTERNAL MEDICINE

## 2024-04-18 RX ORDER — LORATADINE 10 MG/1
10 TABLET ORAL DAILY PRN
Start: 2024-04-18

## 2024-04-18 NOTE — PATIENT INSTRUCTIONS
Medicare Preventive Visit Patient Instructions  Thank you for completing your Welcome to Medicare Visit or Medicare Annual Wellness Visit today. Your next wellness visit will be due in one year (4/19/2025).  The screening/preventive services that you may require over the next 5-10 years are detailed below. Some tests may not apply to you based off risk factors and/or age. Screening tests ordered at today's visit but not completed yet may show as past due. Also, please note that scanned in results may not display below.  Preventive Screenings:  Service Recommendations Previous Testing/Comments   Colorectal Cancer Screening  * Colonoscopy    * Fecal Occult Blood Test (FOBT)/Fecal Immunochemical Test (FIT)  * Fecal DNA/Cologuard Test  * Flexible Sigmoidoscopy Age: 45-75 years old   Colonoscopy: every 10 years (may be performed more frequently if at higher risk)  OR  FOBT/FIT: every 1 year  OR  Cologuard: every 3 years  OR  Sigmoidoscopy: every 5 years  Screening may be recommended earlier than age 45 if at higher risk for colorectal cancer. Also, an individualized decision between you and your healthcare provider will decide whether screening between the ages of 76-85 would be appropriate. Colonoscopy: 12/02/2022  FOBT/FIT: Not on file  Cologuard: Not on file  Sigmoidoscopy: Not on file          Breast Cancer Screening Age: 40+ years old  Frequency: every 1-2 years  Not required if history of left and right mastectomy Mammogram: 02/06/2024        Cervical Cancer Screening Between the ages of 21-29, pap smear recommended once every 3 years.   Between the ages of 30-65, can perform pap smear with HPV co-testing every 5 years.   Recommendations may differ for women with a history of total hysterectomy, cervical cancer, or abnormal pap smears in past. Pap Smear: 05/31/2023        Hepatitis C Screening Once for adults born between 1945 and 1965  More frequently in patients at high risk for Hepatitis C Hep C Antibody: Not on  file        Diabetes Screening 1-2 times per year if you're at risk for diabetes or have pre-diabetes Fasting glucose: 82 mg/dL (4/3/2024)  A1C: 5.3 % (3/28/2022)      Cholesterol Screening Once every 5 years if you don't have a lipid disorder. May order more often based on risk factors. Lipid panel: 04/03/2024          Other Preventive Screenings Covered by Medicare:  Abdominal Aortic Aneurysm (AAA) Screening: covered once if your at risk. You're considered to be at risk if you have a family history of AAA.  Lung Cancer Screening: covers low dose CT scan once per year if you meet all of the following conditions: (1) Age 55-77; (2) No signs or symptoms of lung cancer; (3) Current smoker or have quit smoking within the last 15 years; (4) You have a tobacco smoking history of at least 20 pack years (packs per day multiplied by number of years you smoked); (5) You get a written order from a healthcare provider.  Glaucoma Screening: covered annually if you're considered high risk: (1) You have diabetes OR (2) Family history of glaucoma OR (3)  aged 50 and older OR (4)  American aged 65 and older  Osteoporosis Screening: covered every 2 years if you meet one of the following conditions: (1) You're estrogen deficient and at risk for osteoporosis based off medical history and other findings; (2) Have a vertebral abnormality; (3) On glucocorticoid therapy for more than 3 months; (4) Have primary hyperparathyroidism; (5) On osteoporosis medications and need to assess response to drug therapy.   Last bone density test (DXA Scan): 12/06/2022.  HIV Screening: covered annually if you're between the age of 15-65. Also covered annually if you are younger than 15 and older than 65 with risk factors for HIV infection. For pregnant patients, it is covered up to 3 times per pregnancy.    Immunizations:  Immunization Recommendations   Influenza Vaccine Annual influenza vaccination during flu season is  recommended for all persons aged >= 6 months who do not have contraindications   Pneumococcal Vaccine   * Pneumococcal conjugate vaccine = PCV13 (Prevnar 13), PCV15 (Vaxneuvance), PCV20 (Prevnar 20)  * Pneumococcal polysaccharide vaccine = PPSV23 (Pneumovax) Adults 19-63 yo with certain risk factors or if 65+ yo  If never received any pneumonia vaccine: recommend Prevnar 20 (PCV20)  Give PCV20 if previously received 1 dose of PCV13 or PPSV23   Hepatitis B Vaccine 3 dose series if at intermediate or high risk (ex: diabetes, end stage renal disease, liver disease)   Respiratory syncytial virus (RSV) Vaccine - COVERED BY MEDICARE PART D  * RSVPreF3 (Arexvy) CDC recommends that adults 60 years of age and older may receive a single dose of RSV vaccine using shared clinical decision-making (SCDM)   Tetanus (Td) Vaccine - COST NOT COVERED BY MEDICARE PART B Following completion of primary series, a booster dose should be given every 10 years to maintain immunity against tetanus. Td may also be given as tetanus wound prophylaxis.   Tdap Vaccine - COST NOT COVERED BY MEDICARE PART B Recommended at least once for all adults. For pregnant patients, recommended with each pregnancy.   Shingles Vaccine (Shingrix) - COST NOT COVERED BY MEDICARE PART B  2 shot series recommended in those 19 years and older who have or will have weakened immune systems or those 50 years and older     Health Maintenance Due:  There are no preventive care reminders to display for this patient.  Immunizations Due:      Topic Date Due   • COVID-19 Vaccine (7 - 2023-24 season) 12/26/2023     Advance Directives   What are advance directives?  Advance directives are legal documents that state your wishes and plans for medical care. These plans are made ahead of time in case you lose your ability to make decisions for yourself. Advance directives can apply to any medical decision, such as the treatments you want, and if you want to donate organs.   What are  the types of advance directives?  There are many types of advance directives, and each state has rules about how to use them. You may choose a combination of any of the following:  Living will:  This is a written record of the treatment you want. You can also choose which treatments you do not want, which to limit, and which to stop at a certain time. This includes surgery, medicine, IV fluid, and tube feedings.   Durable power of  for healthcare (DPAHC):  This is a written record that states who you want to make healthcare choices for you when you are unable to make them for yourself. This person, called a proxy, is usually a family member or a friend. You may choose more than 1 proxy.  Do not resuscitate (DNR) order:  A DNR order is used in case your heart stops beating or you stop breathing. It is a request not to have certain forms of treatment, such as CPR. A DNR order may be included in other types of advance directives.  Medical directive:  This covers the care that you want if you are in a coma, near death, or unable to make decisions for yourself. You can list the treatments you want for each condition. Treatment may include pain medicine, surgery, blood transfusions, dialysis, IV or tube feedings, and a ventilator (breathing machine).  Values history:  This document has questions about your views, beliefs, and how you feel and think about life. This information can help others choose the care that you would choose.  Why are advance directives important?  An advance directive helps you control your care. Although spoken wishes may be used, it is better to have your wishes written down. Spoken wishes can be misunderstood, or not followed. Treatments may be given even if you do not want them. An advance directive may make it easier for your family to make difficult choices about your care.   Weight Management   Why it is important to manage your weight:  Being overweight increases your risk of health  conditions such as heart disease, high blood pressure, type 2 diabetes, and certain types of cancer. It can also increase your risk for osteoarthritis, sleep apnea, and other respiratory problems. Aim for a slow, steady weight loss. Even a small amount of weight loss can lower your risk of health problems.  How to lose weight safely:  A safe and healthy way to lose weight is to eat fewer calories and get regular exercise. You can lose up about 1 pound a week by decreasing the number of calories you eat by 500 calories each day.   Healthy meal plan for weight management:  A healthy meal plan includes a variety of foods, contains fewer calories, and helps you stay healthy. A healthy meal plan includes the following:  Eat whole-grain foods more often.  A healthy meal plan should contain fiber. Fiber is the part of grains, fruits, and vegetables that is not broken down by your body. Whole-grain foods are healthy and provide extra fiber in your diet. Some examples of whole-grain foods are whole-wheat breads and pastas, oatmeal, brown rice, and bulgur.  Eat a variety of vegetables every day.  Include dark, leafy greens such as spinach, kale, fiorella greens, and mustard greens. Eat yellow and orange vegetables such as carrots, sweet potatoes, and winter squash.   Eat a variety of fruits every day.  Choose fresh or canned fruit (canned in its own juice or light syrup) instead of juice. Fruit juice has very little or no fiber.  Eat low-fat dairy foods.  Drink fat-free (skim) milk or 1% milk. Eat fat-free yogurt and low-fat cottage cheese. Try low-fat cheeses such as mozzarella and other reduced-fat cheeses.  Choose meat and other protein foods that are low in fat.  Choose beans or other legumes such as split peas or lentils. Choose fish, skinless poultry (chicken or turkey), or lean cuts of red meat (beef or pork). Before you cook meat or poultry, cut off any visible fat.   Use less fat and oil.  Try baking foods instead of  frying them. Add less fat, such as margarine, sour cream, regular salad dressing and mayonnaise to foods. Eat fewer high-fat foods. Some examples of high-fat foods include french fries, doughnuts, ice cream, and cakes.  Eat fewer sweets.  Limit foods and drinks that are high in sugar. This includes candy, cookies, regular soda, and sweetened drinks.  Exercise:  Exercise at least 30 minutes per day on most days of the week. Some examples of exercise include walking, biking, dancing, and swimming. You can also fit in more physical activity by taking the stairs instead of the elevator or parking farther away from stores. Ask your healthcare provider about the best exercise plan for you.      © Copyright [employer] 2018 Information is for End User's use only and may not be sold, redistributed or otherwise used for commercial purposes. All illustrations and images included in CareNotes® are the copyrighted property of [employer] Inc[employer] or [employer]

## 2024-04-18 NOTE — PROGRESS NOTES
Assessment and Plan:     Problem List Items Addressed This Visit          Cardiovascular and Mediastinum    Hypertension    Relevant Orders    Basic metabolic panel       Endocrine    Acquired hypothyroidism    Relevant Orders    TSH, 3rd generation with Free T4 reflex       Musculoskeletal and Integument    Osteopenia    Relevant Orders    DXA bone density spine hip and pelvis       Other    Hypercholesterolemia     Other Visit Diagnoses       Medicare annual wellness visit, subsequent    -  Primary    Seasonal allergies        Relevant Medications    loratadine (CLARITIN) 10 mg tablet    Macrocytosis without anemia        Relevant Orders    CBC    Vitamin B12    Deficiency of other specified B group vitamins        Relevant Orders    Vitamin B12               Preventive health issues were discussed with patient, and age appropriate screening tests were ordered as noted in patient's After Visit Summary.  Personalized health advice and appropriate referrals for health education or preventive services given if needed, as noted in patient's After Visit Summary.     History of Present Illness:     Patient presents for a Medicare Wellness Visit    Reports that she did well with PT for balance  Continues to do the exercises  Uses a cane outside the home       Patient Care Team:  Lea Reyes, MD as PCP - General (Internal Medicine)  Joselyn Reyes Bahamonde, MD (Nephrology)     Review of Systems:     Review of Systems   Constitutional:  Negative for unexpected weight change.   Respiratory:  Negative for shortness of breath.    Cardiovascular:  Negative for chest pain, palpitations and leg swelling.   Gastrointestinal:  Positive for constipation. Negative for abdominal pain.        Problem List:     Patient Active Problem List   Diagnosis    Hypercholesterolemia    Abnormal EKG    Gastroesophageal reflux disease with esophagitis without hemorrhage    Acquired hypothyroidism    Disorder of uvula    Rosacea    Constipation     Bilateral hearing loss    Gallstones    Gastroparesis    Osteopenia    Vitamin D deficiency    Psychophysiological insomnia    Kidney stone    Hypertension      Past Medical and Surgical History:     Past Medical History:   Diagnosis Date    Abdominal pain 7/13/2023    Ascending aortic aneurysm (HCC) 10/23/2018    Cancer (HCC)     Colon polyp     Constipation 7/7/2022    COVID-19 8/8/2022 8/8/2022=tx paxlovid=vaccinated and boosted    Disease of thyroid gland     Dyspepsia 8/31/2022    Elevated troponin 9/1/2022    Endometrial cancer (HCC)     age 50    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HCC)     1992    Epigastric abdominal pain 8/31/2022    Esophageal reflux     GERD (gastroesophageal reflux disease)     History of ovarian cancer 04/28/2022    Hypertension     Hypertension 8/31/2022    Hypertension, essential, benign 10/23/2018    Hypokalemia 8/24/2021    Hypomagnesemia 11/11/2022    Hyponatremia 8/31/2022    Hypothyroid     Kidney stone     Large hiatal hernia 2/24/2021    Diagnosis: Paraesophageal hernia  Procedure: EGD, Robotic repair of paraesophageal hernia with mesh and Albino fundoplication on 3/5/21 Pathology: Anterior fat pad and portion of paraesophageal hernia sac revealed mesothelium lined fibrovascular tissue, consistent with hernia sac. 3 benign lymph nodes.      Left upper quadrant abdominal pain, nausea and vomiting 8/22/2021    Obesity     Obstruction of left ureteropelvic junction (UPJ) due to stone 8/23/2021    PONV (postoperative nausea and vomiting) 9/2/2022    Prediabetes 05/12/2022    Rosacea 05/12/2022    Weight loss 8/31/2022     Past Surgical History:   Procedure Laterality Date    CARDIAC CATHETERIZATION Left 09/01/2022    Procedure: Cardiac Left Heart Cath;  Surgeon: Jaxson Paulino MD;  [facility];  Service: Cardiology    CATARACT EXTRACTION      CATARACT EXTRACTION, BILATERAL      COLONOSCOPY  08/2018    polyp- 5 years    CYSTOSCOPY      FL RETROGRADE PYELOGRAM   10/15/2021    HERNIA REPAIR      JOINT REPLACEMENT      KNEE ARTHROPLASTY      NC CYSTO BLADDER W/URETERAL CATHETERIZATION Left 08/24/2021    Procedure: CYSTOSCOPY WITH INSERTION STENT URETERAL;  Surgeon: Rojelio Hobson MD;  Location: [facility];  Service: Urology    NC CYSTO/URETERO W/LITHOTRIPSY &INDWELL STENT INSRT Left 10/15/2021    Procedure: CYSTOSCOPY URETEROSCOPY WITH LITHOTRIPSY HOLMIUM LASER, RETROGRADE PYELOGRAM, BASKET STONE EXTRACTION AND EXCHANGE STENT URETERAL;  Surgeon: Leandro Vazquez MD;  Location: [facility];  Service: Urology    NC ESOPHAGOGASTRODUODENOSCOPY TRANSORAL DIAGNOSTIC N/A 03/05/2021    Procedure: ESOPHAGOGASTRODUODENOSCOPY (EGD);  Surgeon: Brian Servin MD;  Location: [facility];  Service: Thoracic    NC LAPS RPR PARAESPHGL HRNA INCL FUNDPLSTY W/O MESH N/A 03/05/2021    Procedure: REPAIR HERNIA PARAESOPHAGEAL LAPAROSCOPIC W ROBOTICS WITH MESH, DOOR FUNDOPLICATION;  Surgeon: Brian Servin MD;  Location: [facility];  Service: Thoracic    TOTAL ABDOMINAL HYSTERECTOMY      age 50    TOTAL ABDOMINAL HYSTERECTOMY W/ BILATERAL SALPINGOOPHORECTOMY  1992    endometrial cancer      Family History:     Family History   Problem Relation Age of Onset    Stomach cancer Mother 69    Pancreatic cancer Mother 69    Heart attack Father     No Known Problems Daughter     No Known Problems Maternal Grandmother     No Known Problems Maternal Grandfather     No Known Problems Paternal Grandmother     No Known Problems Paternal Grandfather     No Known Problems Maternal Aunt     No Known Problems Maternal Aunt     Cancer Maternal Uncle         unknown type      Social History:     Social History     Socioeconomic History    Marital status: /Civil Union     Spouse name: None    Number of children: None    Years of education: None    Highest education level: None   Occupational History    None   Tobacco Use    Smoking status: Former     Current packs/day: 0.00     Types: Cigarettes     Start  date: 1959     Quit date: 1964     Years since quittin.3    Smokeless tobacco: Never    Tobacco comments:     60 years  smokeless   Vaping Use    Vaping status: Never Used   Substance and Sexual Activity    Alcohol use: Not Currently     Comment: socially    Drug use: No    Sexual activity: Not Currently     Partners: Male     Birth control/protection: Post-menopausal     Comment:    Other Topics Concern    None   Social History Narrative    Exercising regularly     Social Determinants of Health     Financial Resource Strain: Low Risk  (2023)    Overall Financial Resource Strain (CARDIA)     Difficulty of Paying Living Expenses: Not hard at all   Food Insecurity: No Food Insecurity (2024)    Hunger Vital Sign     Worried About Running Out of Food in the Last Year: Never true     Ran Out of Food in the Last Year: Never true   Transportation Needs: No Transportation Needs (2024)    PRAPARE - Transportation     Lack of Transportation (Medical): No     Lack of Transportation (Non-Medical): No   Physical Activity: Not on file   Stress: Not on file   Social Connections: Not on file   Intimate Partner Violence: Not on file   Housing Stability: Low Risk  (2024)    Housing Stability Vital Sign     Unable to Pay for Housing in the Last Year: No     Number of Places Lived in the Last Year: 1     Unstable Housing in the Last Year: No      Medications and Allergies:     Current Outpatient Medications   Medication Sig Dispense Refill    albuterol (PROVENTIL HFA,VENTOLIN HFA) 90 mcg/act inhaler Inhale 2 puffs 4 (four) times a day as needed      atorvastatin (LIPITOR) 10 mg tablet Take 1 tablet (10 mg total) by mouth daily at bedtime 90 tablet 1    Cholecalciferol (VITAMIN D3) 400 units CAPS Take 1,000 Units by mouth      dorzolamide-timolol (COSOPT) 22.3-6.8 MG/ML ophthalmic solution INSTILL 1 DROP IN BOTH EYES TWICE DAILY      levothyroxine 50 mcg tablet Take 1 tablet (50 mcg total) by  mouth daily 90 tablet 1    loratadine (CLARITIN) 10 mg tablet Take 1 tablet (10 mg total) by mouth daily as needed for allergies      multivitamin-iron-minerals-folic acid (CENTRUM) chewable tablet Chew 1 tablet daily      pantoprazole (PROTONIX) 40 mg tablet Take 1 tablet (40 mg total) by mouth daily at bedtime 90 tablet 1    polyethylene glycol (MIRALAX) 17 g packet Take 17 g by mouth daily as needed (constipation)  0     No current facility-administered medications for this visit.     No Known Allergies   Immunizations:     Immunization History   Administered Date(s) Administered    COVID-19 PFIZER VACCINE 0.3 ML IM 01/22/2021, 02/12/2021, 10/09/2021    COVID-19 Pfizer Vac BIVALENT Paras-sucrose 12 Yr+ IM 01/31/2023    COVID-19 Pfizer mRNA vacc PF paras-sucrose 12 yr and older (Comirnaty) 10/31/2023    COVID-19 Pfizer vac (Paras-sucrose, gray cap) 12 yr+ IM 05/10/2022    INFLUENZA 10/31/2023    Influenza, high dose seasonal 0.7 mL 10/20/2022    Pneumococcal Conjugate Vaccine 20-valent (Pcv20), Polysace 07/07/2022    Respiratory Syncytial Virus Vaccine (Recombinant) 12/28/2023    Zoster Vaccine Recombinant 01/02/2020, 06/17/2020      Health Maintenance:     There are no preventive care reminders to display for this patient.      Topic Date Due    COVID-19 Vaccine (7 - 2023-24 season) 12/26/2023      Medicare Screening Tests and Risk Assessments:     Deirdre is here for her Subsequent Wellness visit.     Health Risk Assessment:   Patient rates overall health as very good. Patient feels that their physical health rating is slightly better. Patient is very satisfied with their life. Eyesight was rated as same. Hearing was rated as same. Patient feels that their emotional and mental health rating is much better. Patients states they are never, rarely angry. Patient states they are sometimes unusually tired/fatigued. Pain experienced in the last 7 days has been none. Patient states that she has experienced no weight loss  or gain in last 6 months.     Depression Screening:   PHQ-2 Score: 0      Fall Risk Screening:   In the past year, patient has experienced: no history of falling in past year      Urinary Incontinence Screening:   Patient has not leaked urine accidently in the last six months.     Home Safety:  Patient does not have trouble with stairs inside or outside of their home. Patient has working smoke alarms and has working carbon monoxide detector. Home safety hazards include: none.     Nutrition:   Current diet is Regular.     Medications:   Patient is currently taking over-the-counter supplements. OTC medications include: see medication list. Patient is able to manage medications.     Activities of Daily Living (ADLs)/Instrumental Activities of Daily Living (IADLs):   Walk and transfer into and out of bed and chair?: Yes  Dress and groom yourself?: Yes    Bathe or shower yourself?: Yes    Feed yourself? Yes  Do your laundry/housekeeping?: Yes  Manage your money, pay your bills and track your expenses?: Yes  Make your own meals?: Yes    Do your own shopping?: Yes    Previous Hospitalizations:   Any hospitalizations or ED visits within the last 12 months?: No      Advance Care Planning:   Living will: Yes    Durable POA for healthcare: Yes    Advanced directive: Yes      Cognitive Screening:   Provider or family/friend/caregiver concerned regarding cognition?: No    PREVENTIVE SCREENINGS      Cardiovascular Screening:    General: Screening Not Indicated and History Lipid Disorder      Diabetes Screening:     General: Screening Current      Colorectal Cancer Screening:     General: Screening Not Indicated      Breast Cancer Screening:     General: Screening Current      Cervical Cancer Screening:    General: Screening Not Indicated      Osteoporosis Screening:      Due for: DXA Axial      Lung Cancer Screening:     General: Screening Not Indicated      Hepatitis C Screening:    General: Screening Not Indicated    Screening,  "Brief Intervention, and Referral to Treatment (SBIRT)    Screening  Typical number of drinks in a day: 0  Typical number of drinks in a week: 0  Interpretation: Low risk drinking behavior.    AUDIT-C Screenin) How often did you have a drink containing alcohol in the past year? never  2) How many drinks did you have on a typical day when you were drinking in the past year? 0  3) How often did you have 6 or more drinks on one occasion in the past year? never    AUDIT-C Score: 0  Interpretation: Score 0-2 (female): Negative screen for alcohol misuse    Single Item Drug Screening:  How often have you used an illegal drug (including marijuana) or a prescription medication for non-medical reasons in the past year? never    Single Item Drug Screen Score: 0  Interpretation: Negative screen for possible drug use disorder    No results found.     Physical Exam:     /82 (BP Location: Left arm, Patient Position: Sitting, Cuff Size: Standard)   Pulse (!) 53   Ht 5' 2.5\" (1.588 m)   Wt 86.5 kg (190 lb 9.6 oz)   SpO2 98%   BMI 34.31 kg/m²     Physical Exam     Lea Reyes, MD  "

## 2024-06-25 DIAGNOSIS — E03.9 ACQUIRED HYPOTHYROIDISM: ICD-10-CM

## 2024-06-25 DIAGNOSIS — E78.5 HYPERLIPIDEMIA, UNSPECIFIED HYPERLIPIDEMIA TYPE: ICD-10-CM

## 2024-06-25 DIAGNOSIS — K21.00 GASTROESOPHAGEAL REFLUX DISEASE WITH ESOPHAGITIS WITHOUT HEMORRHAGE: ICD-10-CM

## 2024-06-25 RX ORDER — PANTOPRAZOLE SODIUM 40 MG/1
40 TABLET, DELAYED RELEASE ORAL
Qty: 90 TABLET | Refills: 1 | Status: SHIPPED | OUTPATIENT
Start: 2024-06-25

## 2024-06-25 RX ORDER — LEVOTHYROXINE SODIUM 0.05 MG/1
50 TABLET ORAL DAILY
Qty: 90 TABLET | Refills: 1 | Status: SHIPPED | OUTPATIENT
Start: 2024-06-25

## 2024-06-25 RX ORDER — ATORVASTATIN CALCIUM 10 MG/1
10 TABLET, FILM COATED ORAL
Qty: 90 TABLET | Refills: 1 | Status: SHIPPED | OUTPATIENT
Start: 2024-06-25

## 2024-06-25 NOTE — TELEPHONE ENCOUNTER
Patient requests the following refills be sent to [facility] ([facility]) - [place] - [address] [facility]:     atorvastatin (LIPITOR) 10 mg tablet  levothyroxine 50 mcg tablet  pantoprazole (PROTONIX) 40 mg tablet    Thank you.

## 2024-07-07 NOTE — QUICK NOTE
Patient underwent successful cystoscopy left ureteral stent placement  In the recovery room she appeared to be more comfortable  She has a 16 mm left UPJ stone and will need cystoscopy left ureteroscopy laser lithotripsy and left ureteral stent exchange in the near future  My office has been contacted to contact her to arrange this  Case request placed  Prescriptions for Norco, Pyridium and oxybutynin have been called into her pharmacy  Also urine culture has been ordered as an outpatient prior to surgery  I discussed this with the patient and her  and daughter  37.1

## 2024-08-19 ENCOUNTER — APPOINTMENT (OUTPATIENT)
Dept: LAB | Age: 84
End: 2024-08-19
Payer: MEDICARE

## 2024-08-19 DIAGNOSIS — E53.8 DEFICIENCY OF OTHER SPECIFIED B GROUP VITAMINS: ICD-10-CM

## 2024-08-19 DIAGNOSIS — E03.9 ACQUIRED HYPOTHYROIDISM: ICD-10-CM

## 2024-08-19 DIAGNOSIS — D75.89 MACROCYTOSIS WITHOUT ANEMIA: ICD-10-CM

## 2024-08-19 DIAGNOSIS — I10 PRIMARY HYPERTENSION: ICD-10-CM

## 2024-08-19 LAB
ANION GAP SERPL CALCULATED.3IONS-SCNC: 11 MMOL/L (ref 4–13)
BUN SERPL-MCNC: 17 MG/DL (ref 5–25)
CALCIUM SERPL-MCNC: 9.4 MG/DL (ref 8.4–10.2)
CHLORIDE SERPL-SCNC: 98 MMOL/L (ref 96–108)
CO2 SERPL-SCNC: 25 MMOL/L (ref 21–32)
CREAT SERPL-MCNC: 0.77 MG/DL (ref 0.6–1.3)
ERYTHROCYTE [DISTWIDTH] IN BLOOD BY AUTOMATED COUNT: 12.1 % (ref 11.6–15.1)
GFR SERPL CREATININE-BSD FRML MDRD: 71 ML/MIN/1.73SQ M
GLUCOSE P FAST SERPL-MCNC: 85 MG/DL (ref 65–99)
HCT VFR BLD AUTO: 43.8 % (ref 34.8–46.1)
HGB BLD-MCNC: 14.6 G/DL (ref 11.5–15.4)
MCH RBC QN AUTO: 33.3 PG (ref 26.8–34.3)
MCHC RBC AUTO-ENTMCNC: 33.3 G/DL (ref 31.4–37.4)
MCV RBC AUTO: 100 FL (ref 82–98)
PLATELET # BLD AUTO: 207 THOUSANDS/UL (ref 149–390)
PMV BLD AUTO: 11.5 FL (ref 8.9–12.7)
POTASSIUM SERPL-SCNC: 4.7 MMOL/L (ref 3.5–5.3)
RBC # BLD AUTO: 4.39 MILLION/UL (ref 3.81–5.12)
SODIUM SERPL-SCNC: 134 MMOL/L (ref 135–147)
TSH SERPL DL<=0.05 MIU/L-ACNC: 2.13 UIU/ML (ref 0.45–4.5)
VIT B12 SERPL-MCNC: 565 PG/ML (ref 180–914)
WBC # BLD AUTO: 5.48 THOUSAND/UL (ref 4.31–10.16)

## 2024-08-19 PROCEDURE — 84443 ASSAY THYROID STIM HORMONE: CPT

## 2024-08-19 PROCEDURE — 36415 COLL VENOUS BLD VENIPUNCTURE: CPT

## 2024-08-19 PROCEDURE — 80048 BASIC METABOLIC PNL TOTAL CA: CPT

## 2024-08-19 PROCEDURE — 85027 COMPLETE CBC AUTOMATED: CPT

## 2024-08-19 PROCEDURE — 82607 VITAMIN B-12: CPT

## 2024-08-22 ENCOUNTER — RA CDI HCC (OUTPATIENT)
Dept: OTHER | Facility: HOSPITAL | Age: 84
End: 2024-08-22

## 2024-08-29 ENCOUNTER — OFFICE VISIT (OUTPATIENT)
Dept: INTERNAL MEDICINE CLINIC | Facility: CLINIC | Age: 84
End: 2024-08-29
Payer: MEDICARE

## 2024-08-29 VITALS
HEIGHT: 63 IN | BODY MASS INDEX: 34.2 KG/M2 | SYSTOLIC BLOOD PRESSURE: 102 MMHG | OXYGEN SATURATION: 97 % | WEIGHT: 193 LBS | HEART RATE: 61 BPM | DIASTOLIC BLOOD PRESSURE: 80 MMHG | TEMPERATURE: 98 F | RESPIRATION RATE: 18 BRPM

## 2024-08-29 DIAGNOSIS — E78.00 HYPERCHOLESTEROLEMIA: Primary | ICD-10-CM

## 2024-08-29 DIAGNOSIS — E03.9 ACQUIRED HYPOTHYROIDISM: ICD-10-CM

## 2024-08-29 PROCEDURE — G2211 COMPLEX E/M VISIT ADD ON: HCPCS | Performed by: INTERNAL MEDICINE

## 2024-08-29 PROCEDURE — 99214 OFFICE O/P EST MOD 30 MIN: CPT | Performed by: INTERNAL MEDICINE

## 2024-08-29 NOTE — PROGRESS NOTES
Ambulatory Visit  Name: Deirdre Blackman      : 1940      MRN: [id number]  Encounter Provider: Lea Reyes, MD  Encounter Date: 2024   Enc[facility]: [facility]    Assessment & Plan   1. Hypercholesterolemia  -     CBC and differential; Future; Expected date: 2024  -     Comprehensive metabolic panel; Future; Expected date: 2024  -     Lipid panel; Future; Expected date: 2024  2. Acquired hypothyroidism  -     CBC and differential; Future; Expected date: 2024  -     Comprehensive metabolic panel; Future; Expected date: 2024  -     TSH, 3rd generation with Free T4 reflex; Future; Expected date: 2024  Continue current medications       History of Present Illness     Here for a follow up  Feeling tired waking up this morning but fine now  Recent labs reviewed and unremarkable      Review of Systems   Constitutional:  Positive for fatigue (just today). Negative for unexpected weight change.   Respiratory:  Negative for shortness of breath.    Cardiovascular:  Negative for chest pain and palpitations.   Gastrointestinal:  Negative for abdominal pain, constipation and diarrhea.   Neurological:  Negative for dizziness, light-headedness and headaches.     Past Medical History:   Diagnosis Date   • Abdominal pain 2023   • Ascending aortic aneurysm (HCC) 10/23/2018   • Cancer (HCC)    • Colon polyp    • Constipation 2022   • COVID-19 2022=tx paxlovid=vaccinated and boosted   • Disease of thyroid gland    • Dyspepsia 2022   • Elevated troponin 2022   • Endometrial cancer (HCC)     age 50   • 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HCC)        • Epigastric abdominal pain 2022   • Esophageal reflux    • GERD (gastroesophageal reflux disease)    • History of ovarian cancer 2022   • Hypertension    • Hypertension 2022   • Hypertension, essential, benign 10/23/2018   • Hypokalemia 2021   •  Hypomagnesemia 11/11/2022   • Hyponatremia 8/31/2022   • Hypothyroid    • Kidney stone    • Large hiatal hernia 2/24/2021    Diagnosis: Paraesophageal hernia  Procedure: EGD, Robotic repair of paraesophageal hernia with mesh and Albino fundoplication on 3/5/21 Pathology: Anterior fat pad and portion of paraesophageal hernia sac revealed mesothelium lined fibrovascular tissue, consistent with hernia sac. 3 benign lymph nodes.     • Left upper quadrant abdominal pain, nausea and vomiting 8/22/2021   • Obesity    • Obstruction of left ureteropelvic junction (UPJ) due to stone 8/23/2021   • PONV (postoperative nausea and vomiting) 9/2/2022   • Prediabetes 05/12/2022   • Rosacea 05/12/2022   • Weight loss 8/31/2022     Past Surgical History:   Procedure Laterality Date   • CARDIAC CATHETERIZATION Left 09/01/2022    Procedure: Cardiac Left Heart Cath;  Surgeon: Jaxson Paulino MD;  [facility];  Service: Cardiology   • CATARACT EXTRACTION     • CATARACT EXTRACTION, BILATERAL     • COLONOSCOPY  08/2018    polyp- 5 years   • CYSTOSCOPY     • FL RETROGRADE PYELOGRAM  10/15/2021   • HERNIA REPAIR     • JOINT REPLACEMENT     • KNEE ARTHROPLASTY     • VA CYSTO BLADDER W/URETERAL CATHETERIZATION Left 08/24/2021    Procedure: CYSTOSCOPY WITH INSERTION STENT URETERAL;  Surgeon: Rojelio Hobson MD;  [facility];  Service: Urology   • VA CYSTO/URETERO W/LITHOTRIPSY &INDWELL STENT INSRT Left 10/15/2021    Procedure: CYSTOSCOPY URETEROSCOPY WITH LITHOTRIPSY HOLMIUM LASER, RETROGRADE PYELOGRAM, BASKET STONE EXTRACTION AND EXCHANGE STENT URETERAL;  Surgeon: Leandro Vazquez MD;  Location[facility];  Service: Urology   • VA ESOPHAGOGASTRODUODENOSCOPY TRANSORAL DIAGNOSTIC N/A 03/05/2021    Procedure: ESOPHAGOGASTRODUODENOSCOPY (EGD);  Surgeon: Brian Servin MD;  [facility];  Service: Thoracic   • VA LAPS RPR PARAESPHGL HRNA INCL FUNDPLSTY W/O MESH N/A 03/05/2021    Procedure: REPAIR HERNIA  PARAESOPHAGEAL LAPAROSCOPIC W ROBOTICS WITH MESH, DOOR FUNDOPLICATION;  Surgeon: Brian Servin MD;  [facility];  Service: Thoracic   • TOTAL ABDOMINAL HYSTERECTOMY      age 50   • TOTAL ABDOMINAL HYSTERECTOMY W/ BILATERAL SALPINGOOPHORECTOMY      endometrial cancer     Family History   Problem Relation Age of Onset   • Stomach cancer Mother 69   • Pancreatic cancer Mother 69   • Heart attack Father    • No Known Problems Daughter    • No Known Problems Maternal Grandmother    • No Known Problems Maternal Grandfather    • No Known Problems Paternal Grandmother    • No Known Problems Paternal Grandfather    • No Known Problems Maternal Aunt    • No Known Problems Maternal Aunt    • Cancer Maternal Uncle         unknown type     Social History     Tobacco Use   • Smoking status: Former     Current packs/day: 0.00     Types: Cigarettes     Start date: 1959     Quit date: 1964     Years since quittin.7   • Smokeless tobacco: Never   • Tobacco comments:     60 years  smokeless   Vaping Use   • Vaping status: Never Used   Substance and Sexual Activity   • Alcohol use: Not Currently     Comment: socially   • Drug use: No   • Sexual activity: Not Currently     Partners: Male     Birth control/protection: Post-menopausal     Comment:      Current Outpatient Medications on File Prior to Visit   Medication Sig   • albuterol (PROVENTIL HFA,VENTOLIN HFA) 90 mcg/act inhaler Inhale 2 puffs 4 (four) times a day as needed   • atorvastatin (LIPITOR) 10 mg tablet Take 1 tablet (10 mg total) by mouth daily at bedtime   • Cholecalciferol (VITAMIN D3) 400 units CAPS Take 1,000 Units by mouth   • dorzolamide-timolol (COSOPT) 22.3-6.8 MG/ML ophthalmic solution INSTILL 1 DROP IN BOTH EYES TWICE DAILY   • levothyroxine 50 mcg tablet Take 1 tablet (50 mcg total) by mouth daily   • multivitamin-iron-minerals-folic acid (CENTRUM) chewable tablet Chew 1 tablet daily   • pantoprazole (PROTONIX) 40 mg tablet  "Take 1 tablet (40 mg total) by mouth daily at bedtime   • polyethylene glycol (MIRALAX) 17 g packet Take 17 g by mouth daily as needed (constipation)   • loratadine (CLARITIN) 10 mg tablet Take 1 tablet (10 mg total) by mouth daily as needed for allergies (Patient not taking: Reported on 8/29/2024)     No Known Allergies  Immunization History   Administered Date(s) Administered   • COVID-19 PFIZER VACCINE 0.3 ML IM 01/22/2021, 02/12/2021, 10/09/2021   • COVID-19 Pfizer Vac BIVALENT Paras-sucrose 12 Yr+ IM 01/31/2023   • COVID-19 Pfizer mRNA vacc PF paras-sucrose 12 yr and older (Comirnaty) 10/31/2023   • COVID-19 Pfizer vac (Paras-sucrose, gray cap) 12 yr+ IM 05/10/2022   • INFLUENZA 10/31/2023   • Influenza, high dose seasonal 0.7 mL 10/20/2022   • Pneumococcal Conjugate Vaccine 20-valent (Pcv20), Polysace 07/07/2022   • Respiratory Syncytial Virus Vaccine (Recombinant) 12/28/2023   • Zoster Vaccine Recombinant 01/02/2020, 06/17/2020     Objective     /80   Pulse 61   Temp 98 °F (36.7 °C) (Tympanic)   Resp 18   Ht 5' 3\" (1.6 m)   Wt 87.5 kg (193 lb)   SpO2 97%   BMI 34.19 kg/m²     Physical Exam  Constitutional:       General: She is not in acute distress.     Appearance: She is well-developed. She is not ill-appearing, toxic-appearing or diaphoretic.   Eyes:      Conjunctiva/sclera: Conjunctivae normal.   Cardiovascular:      Rate and Rhythm: Normal rate and regular rhythm.      Heart sounds: Normal heart sounds. No murmur heard.  Pulmonary:      Effort: Pulmonary effort is normal. No respiratory distress.      Breath sounds: Normal breath sounds. No stridor. No wheezing or rales.   Abdominal:      General: There is no distension.      Palpations: Abdomen is soft. There is no mass.      Tenderness: There is no abdominal tenderness. There is no guarding or rebound.   Musculoskeletal:      Cervical back: Neck supple.      Right lower leg: No edema.      Left lower leg: No edema.   Neurological:      Mental " Status: She is alert and oriented to person, place, and time.   Psychiatric:         Mood and Affect: Mood normal.         Behavior: Behavior normal.         Thought Content: Thought content normal.         Judgment: Judgment normal.

## 2024-11-21 DIAGNOSIS — E03.9 ACQUIRED HYPOTHYROIDISM: ICD-10-CM

## 2024-11-21 DIAGNOSIS — E78.5 HYPERLIPIDEMIA, UNSPECIFIED HYPERLIPIDEMIA TYPE: ICD-10-CM

## 2024-11-21 DIAGNOSIS — K21.00 GASTROESOPHAGEAL REFLUX DISEASE WITH ESOPHAGITIS WITHOUT HEMORRHAGE: ICD-10-CM

## 2024-11-21 RX ORDER — PANTOPRAZOLE SODIUM 40 MG/1
40 TABLET, DELAYED RELEASE ORAL
Qty: 90 TABLET | Refills: 1 | Status: SHIPPED | OUTPATIENT
Start: 2024-11-21

## 2024-11-21 RX ORDER — LEVOTHYROXINE SODIUM 50 UG/1
50 TABLET ORAL DAILY
Qty: 90 TABLET | Refills: 1 | Status: SHIPPED | OUTPATIENT
Start: 2024-11-21

## 2024-11-21 RX ORDER — ATORVASTATIN CALCIUM 10 MG/1
10 TABLET, FILM COATED ORAL
Qty: 90 TABLET | Refills: 1 | Status: SHIPPED | OUTPATIENT
Start: 2024-11-21

## 2024-12-10 ENCOUNTER — APPOINTMENT (OUTPATIENT)
Dept: LAB | Age: 84
End: 2024-12-10
Payer: MEDICARE

## 2024-12-10 ENCOUNTER — HOSPITAL ENCOUNTER (OUTPATIENT)
Dept: RADIOLOGY | Age: 84
Discharge: HOME | End: 2024-12-10
Payer: MEDICARE

## 2024-12-10 DIAGNOSIS — M85.832 OSTEOPENIA OF LEFT FOREARM: ICD-10-CM

## 2024-12-10 DIAGNOSIS — E78.00 HYPERCHOLESTEROLEMIA: ICD-10-CM

## 2024-12-10 DIAGNOSIS — E03.9 ACQUIRED HYPOTHYROIDISM: ICD-10-CM

## 2024-12-10 LAB
BASOPHILS # BLD AUTO: 0.03 THOUSANDS/ÂΜL (ref 0–0.1)
BASOPHILS NFR BLD AUTO: 1 % (ref 0–1)
EOSINOPHIL # BLD AUTO: 0.04 THOUSAND/ÂΜL (ref 0–0.61)
EOSINOPHIL NFR BLD AUTO: 1 % (ref 0–6)
ERYTHROCYTE [DISTWIDTH] IN BLOOD BY AUTOMATED COUNT: 12.4 % (ref 11.6–15.1)
HCT VFR BLD AUTO: 44.1 % (ref 34.8–46.1)
HGB BLD-MCNC: 14.9 G/DL (ref 11.5–15.4)
IMM GRANULOCYTES # BLD AUTO: 0.02 THOUSAND/UL (ref 0–0.2)
IMM GRANULOCYTES NFR BLD AUTO: 0 % (ref 0–2)
LYMPHOCYTES # BLD AUTO: 1.53 THOUSANDS/ÂΜL (ref 0.6–4.47)
LYMPHOCYTES NFR BLD AUTO: 34 % (ref 14–44)
MCH RBC QN AUTO: 33.9 PG (ref 26.8–34.3)
MCHC RBC AUTO-ENTMCNC: 33.8 G/DL (ref 31.4–37.4)
MCV RBC AUTO: 101 FL (ref 82–98)
MONOCYTES # BLD AUTO: 0.53 THOUSAND/ÂΜL (ref 0.17–1.22)
MONOCYTES NFR BLD AUTO: 12 % (ref 4–12)
NEUTROPHILS # BLD AUTO: 2.42 THOUSANDS/ÂΜL (ref 1.85–7.62)
NEUTS SEG NFR BLD AUTO: 52 % (ref 43–75)
NRBC BLD AUTO-RTO: 0 /100 WBCS
PLATELET # BLD AUTO: 187 THOUSANDS/UL (ref 149–390)
PMV BLD AUTO: 11.8 FL (ref 8.9–12.7)
RBC # BLD AUTO: 4.39 MILLION/UL (ref 3.81–5.12)
TSH SERPL DL<=0.05 MIU/L-ACNC: 2.88 UIU/ML (ref 0.45–4.5)
WBC # BLD AUTO: 4.57 THOUSAND/UL (ref 4.31–10.16)

## 2024-12-10 PROCEDURE — 84443 ASSAY THYROID STIM HORMONE: CPT

## 2024-12-10 PROCEDURE — 80061 LIPID PANEL: CPT

## 2024-12-10 PROCEDURE — 85025 COMPLETE CBC W/AUTO DIFF WBC: CPT

## 2024-12-10 PROCEDURE — 80053 COMPREHEN METABOLIC PANEL: CPT

## 2024-12-10 PROCEDURE — 36415 COLL VENOUS BLD VENIPUNCTURE: CPT

## 2024-12-10 PROCEDURE — 77080 DXA BONE DENSITY AXIAL: CPT

## 2024-12-11 LAB
ALBUMIN SERPL BCG-MCNC: 3.9 G/DL (ref 3.5–5)
ALP SERPL-CCNC: 84 U/L (ref 34–104)
ALT SERPL W P-5'-P-CCNC: 17 U/L (ref 7–52)
ANION GAP SERPL CALCULATED.3IONS-SCNC: 8 MMOL/L (ref 4–13)
AST SERPL W P-5'-P-CCNC: 22 U/L (ref 13–39)
BILIRUB SERPL-MCNC: 0.72 MG/DL (ref 0.2–1)
BUN SERPL-MCNC: 16 MG/DL (ref 5–25)
CALCIUM SERPL-MCNC: 8.8 MG/DL (ref 8.4–10.2)
CHLORIDE SERPL-SCNC: 104 MMOL/L (ref 96–108)
CHOLEST SERPL-MCNC: 149 MG/DL (ref ?–200)
CO2 SERPL-SCNC: 25 MMOL/L (ref 21–32)
CREAT SERPL-MCNC: 0.75 MG/DL (ref 0.6–1.3)
GFR SERPL CREATININE-BSD FRML MDRD: 72 ML/MIN/1.73SQ M
GLUCOSE P FAST SERPL-MCNC: 87 MG/DL (ref 65–99)
HDLC SERPL-MCNC: 69 MG/DL
LDLC SERPL CALC-MCNC: 68 MG/DL (ref 0–100)
NONHDLC SERPL-MCNC: 80 MG/DL
POTASSIUM SERPL-SCNC: 4 MMOL/L (ref 3.5–5.3)
PROT SERPL-MCNC: 6.4 G/DL (ref 6.4–8.4)
SODIUM SERPL-SCNC: 137 MMOL/L (ref 135–147)
TRIGL SERPL-MCNC: 59 MG/DL (ref ?–150)

## 2024-12-27 ENCOUNTER — TELEPHONE (OUTPATIENT)
Age: 84
End: 2024-12-27

## 2024-12-27 NOTE — TELEPHONE ENCOUNTER
Patient has a cough and chest congestion. Started about 5 days ago OTC meds are not helping Has had a script for inhaler in the past Has not needed  since 2022 Does not have asthma Says she is [place] asthma Requested appt for today

## 2024-12-30 ENCOUNTER — RA CDI HCC (OUTPATIENT)
Dept: OTHER | Facility: HOSPITAL | Age: 84
End: 2024-12-30

## 2025-01-02 ENCOUNTER — OFFICE VISIT (OUTPATIENT)
Dept: INTERNAL MEDICINE CLINIC | Facility: CLINIC | Age: 85
End: 2025-01-02
Payer: MEDICARE

## 2025-01-02 VITALS
HEART RATE: 54 BPM | SYSTOLIC BLOOD PRESSURE: 116 MMHG | WEIGHT: 194.4 LBS | OXYGEN SATURATION: 99 % | HEIGHT: 63 IN | DIASTOLIC BLOOD PRESSURE: 66 MMHG | BODY MASS INDEX: 34.45 KG/M2

## 2025-01-02 DIAGNOSIS — E53.8 DEFICIENCY OF OTHER SPECIFIED B GROUP VITAMINS: ICD-10-CM

## 2025-01-02 DIAGNOSIS — D75.89 MACROCYTOSIS WITHOUT ANEMIA: ICD-10-CM

## 2025-01-02 DIAGNOSIS — E78.00 HYPERCHOLESTEROLEMIA: ICD-10-CM

## 2025-01-02 DIAGNOSIS — M85.832 OSTEOPENIA OF LEFT FOREARM: ICD-10-CM

## 2025-01-02 DIAGNOSIS — E55.9 VITAMIN D DEFICIENCY: ICD-10-CM

## 2025-01-02 DIAGNOSIS — E03.9 ACQUIRED HYPOTHYROIDISM: Primary | ICD-10-CM

## 2025-01-02 PROCEDURE — G2211 COMPLEX E/M VISIT ADD ON: HCPCS | Performed by: INTERNAL MEDICINE

## 2025-01-02 PROCEDURE — 99214 OFFICE O/P EST MOD 30 MIN: CPT | Performed by: INTERNAL MEDICINE

## 2025-01-02 NOTE — PROGRESS NOTES
Name: Deirdre Blackman      : 1940      MRN: [id number]  Encounter Provider: Lea Reyes, MD  Encounter Date: 2025   Encounter department: [facility]    Assessment & Plan  Acquired hypothyroidism    Orders:  •  CBC and differential; Future  •  Comprehensive metabolic panel; Future  •  TSH, 3rd generation with Free T4 reflex; Future    Vitamin D deficiency  Resume vitamin D 1000 IU daily  Orders:  •  Vitamin D 25 hydroxy; Future    Hypercholesterolemia    Orders:  •  CBC and differential; Future  •  Comprehensive metabolic panel; Future  •  Lipid panel; Future    Macrocytosis without anemia    Orders:  •  CBC and differential; Future  •  Vitamin B12; Future    Deficiency of other specified B group vitamins    Orders:  •  Vitamin B12; Future    Osteopenia of left forearm  Slightly elevated FRAX score of 3.6% for risk of hip fracture  Discussed resuming vitamin D supplementation            History of Present Illness     Here for follow-up  Feeling well overall, no complaints  Recovering from URI.  [facility].  She has 1 dose left  Recent labs reviewed and normal except for elevated MCV  Recent DEXA scan with osteopenia, elevated FRAX score of 3.6% risk for hip fracture  Uses a cane to walk      Review of Systems   Constitutional:  Negative for unexpected weight change.   Respiratory:  Negative for cough and shortness of breath.    Cardiovascular:  Negative for chest pain, palpitations and leg swelling.   Gastrointestinal:  Negative for abdominal pain, constipation and diarrhea.   Neurological:  Negative for dizziness and headaches.     Past Medical History:   Diagnosis Date   • Abdominal pain 2023   • Ascending aortic aneurysm (HCC) 10/23/2018   • Cancer (HCC)    • Colon polyp    • Constipation 2022   • COVID-19 2022=tx paxlovid=vaccinated and boosted   • Disease of thyroid gland    • Dyspepsia 2022   •  Elevated troponin 9/1/2022   • Endometrial cancer (HCC)     age 50   • 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HCC)     1992   • Epigastric abdominal pain 8/31/2022   • Esophageal reflux    • GERD (gastroesophageal reflux disease)    • History of ovarian cancer 04/28/2022   • Hypertension    • Hypertension 8/31/2022   • Hypertension, essential, benign 10/23/2018   • Hypokalemia 8/24/2021   • Hypomagnesemia 11/11/2022   • Hyponatremia 8/31/2022   • Hypothyroid    • Kidney stone    • Large hiatal hernia 2/24/2021    Diagnosis: Paraesophageal hernia  Procedure: EGD, Robotic repair of paraesophageal hernia with mesh and Albino fundoplication on 3/5/21 Pathology: Anterior fat pad and portion of paraesophageal hernia sac revealed mesothelium lined fibrovascular tissue, consistent with hernia sac. 3 benign lymph nodes.     • Left upper quadrant abdominal pain, nausea and vomiting 8/22/2021   • Obesity    • Obstruction of left ureteropelvic junction (UPJ) due to stone 8/23/2021   • PONV (postoperative nausea and vomiting) 9/2/2022   • Prediabetes 05/12/2022   • Rosacea 05/12/2022   • Weight loss 8/31/2022     Past Surgical History:   Procedure Laterality Date   • CARDIAC CATHETERIZATION Left 09/01/2022    Procedure: Cardiac Left Heart Cath;  Surgeon: Jaxson Paulino MD;  [facility];  Service: Cardiology   • CATARACT EXTRACTION     • CATARACT EXTRACTION, BILATERAL     • COLONOSCOPY  08/2018    polyp- 5 years   • CYSTOSCOPY     • FL RETROGRADE PYELOGRAM  10/15/2021   • HERNIA REPAIR     • JOINT REPLACEMENT     • KNEE ARTHROPLASTY     • NH CYSTO/URETERO W/LITHOTRIPSY &INDWELL STENT INSRT Left 10/15/2021    Procedure: CYSTOSCOPY URETEROSCOPY WITH LITHOTRIPSY HOLMIUM LASER, RETROGRADE PYELOGRAM, BASKET STONE EXTRACTION AND EXCHANGE STENT URETERAL;  Surgeon: Leandro Vazquez MD;  [facility];  Service: Urology   • NH CYSTOURETHROSCOPY W/URETERAL CATHETERIZATION Left 08/24/2021    Procedure: CYSTOSCOPY WITH  INSERTION STENT URETERAL;  Surgeon: Rojelio Hobson MD;  [facility];  Service: Urology   • ND ESOPHAGOGASTRODUODENOSCOPY TRANSORAL DIAGNOSTIC N/A 2021    Procedure: ESOPHAGOGASTRODUODENOSCOPY (EGD);  Surgeon: Brian Servin MD;  [facility];  Service: Thoracic   • ND LAPS RPR PARAESPHGL HRNA INCL FUNDPLSTY W/O MESH N/A 2021    Procedure: REPAIR HERNIA PARAESOPHAGEAL LAPAROSCOPIC W ROBOTICS WITH MESH, DOOR FUNDOPLICATION;  Surgeon: Brian Servin MD;  [facility];  Service: Thoracic   • TOTAL ABDOMINAL HYSTERECTOMY      age 50   • TOTAL ABDOMINAL HYSTERECTOMY W/ BILATERAL SALPINGOOPHORECTOMY      endometrial cancer     Family History   Problem Relation Age of Onset   • Stomach cancer Mother 69   • Pancreatic cancer Mother 69   • Heart attack Father    • No Known Problems Daughter    • No Known Problems Maternal Grandmother    • No Known Problems Maternal Grandfather    • No Known Problems Paternal Grandmother    • No Known Problems Paternal Grandfather    • No Known Problems Maternal Aunt    • No Known Problems Maternal Aunt    • Cancer Maternal Uncle         unknown type     Social History     Tobacco Use   • Smoking status: Former     Current packs/day: 0.00     Types: Cigarettes     Start date: 1959     Quit date: 1964     Years since quittin.0   • Smokeless tobacco: Never   • Tobacco comments:     60 years  smokeless   Vaping Use   • Vaping status: Never Used   Substance and Sexual Activity   • Alcohol use: Not Currently     Comment: socially   • Drug use: No   • Sexual activity: Not Currently     Partners: Male     Birth control/protection: Post-menopausal     Comment:      Current Outpatient Medications on File Prior to Visit   Medication Sig   • albuterol (PROVENTIL HFA,VENTOLIN HFA) 90 mcg/act inhaler Inhale 2 puffs 4 (four) times a day as needed   • atorvastatin (LIPITOR) 10 mg tablet TAKE 1 TABLET BY MOUTH DAILY AT  BEDTIME   •  "dorzolamide-timolol (COSOPT) 22.3-6.8 MG/ML ophthalmic solution INSTILL 1 DROP IN BOTH EYES TWICE DAILY   • levothyroxine 50 mcg tablet TAKE 1 TABLET BY MOUTH DAILY   • loratadine (CLARITIN) 10 mg tablet Take 1 tablet (10 mg total) by mouth daily as needed for allergies   • multivitamin-iron-minerals-folic acid (CENTRUM) chewable tablet Chew 1 tablet daily   • pantoprazole (PROTONIX) 40 mg tablet TAKE 1 TABLET BY MOUTH DAILY AT  BEDTIME   • polyethylene glycol (MIRALAX) 17 g packet Take 17 g by mouth daily as needed (constipation)   • Cholecalciferol (VITAMIN D3) 400 units CAPS Take 1,000 Units by mouth (Patient not taking: Reported on 1/2/2025)     No Known Allergies  Immunization History   Administered Date(s) Administered   • COVID-19 PFIZER VACCINE 0.3 ML IM 01/22/2021, 02/12/2021, 10/09/2021   • COVID-19 Pfizer Vac BIVALENT Paras-sucrose 12 Yr+ IM 01/31/2023   • COVID-19 Pfizer mRNA vacc PF paras-sucrose 12 yr and older (Comirnaty) 10/31/2023, 10/02/2024   • COVID-19 Pfizer vac (Paras-sucrose, gray cap) 12 yr+ IM 05/10/2022   • INFLUENZA 10/31/2023   • Influenza Split High Dose Preservative Free IM 10/02/2024   • Influenza, high dose seasonal 0.7 mL 10/20/2022   • Pneumococcal Conjugate Vaccine 20-valent (Pcv20), Polysace 07/07/2022   • Respiratory Syncytial Virus Vaccine (Recombinant) 12/28/2023   • Zoster Vaccine Recombinant 01/02/2020, 06/17/2020     Objective   /66 (BP Location: Left arm, Patient Position: Sitting, Cuff Size: Standard)   Pulse (!) 54   Ht 5' 3\" (1.6 m)   Wt 88.2 kg (194 lb 6.4 oz)   SpO2 99%   BMI 34.44 kg/m²     Physical Exam  Constitutional:       Appearance: She is well-developed. She is not ill-appearing.   Eyes:      Conjunctiva/sclera: Conjunctivae normal.   Cardiovascular:      Rate and Rhythm: Normal rate and regular rhythm.      Heart sounds: Normal heart sounds. No murmur heard.  Pulmonary:      Effort: Pulmonary effort is normal. No respiratory distress.      Breath " sounds: Normal breath sounds. No wheezing or rales.   Abdominal:      General: There is no distension.      Palpations: Abdomen is soft. There is no mass.      Tenderness: There is no abdominal tenderness. There is no guarding or rebound.   Musculoskeletal:      Cervical back: Neck supple.      Right lower leg: No edema.      Left lower leg: No edema.   Neurological:      Mental Status: She is alert and oriented to person, place, and time.      Gait: Gait abnormal.   Psychiatric:         Mood and Affect: Mood normal.         Behavior: Behavior normal.         Thought Content: Thought content normal.         Judgment: Judgment normal.

## 2025-01-02 NOTE — ASSESSMENT & PLAN NOTE
Slightly elevated FRAX score of 3.6% for risk of hip fracture  Discussed resuming vitamin D supplementation

## 2025-01-02 NOTE — ASSESSMENT & PLAN NOTE
Orders:  •  CBC and differential; Future  •  Comprehensive metabolic panel; Future  •  TSH, 3rd generation with Free T4 reflex; Future

## 2025-01-14 ENCOUNTER — OFFICE VISIT (OUTPATIENT)
Dept: CARDIOLOGY CLINIC | Facility: CLINIC | Age: 85
End: 2025-01-14
Payer: MEDICARE

## 2025-01-14 VITALS
HEART RATE: 48 BPM | WEIGHT: 193 LBS | SYSTOLIC BLOOD PRESSURE: 118 MMHG | OXYGEN SATURATION: 98 % | DIASTOLIC BLOOD PRESSURE: 78 MMHG | BODY MASS INDEX: 34.19 KG/M2

## 2025-01-14 DIAGNOSIS — R94.31 ABNORMAL EKG: ICD-10-CM

## 2025-01-14 DIAGNOSIS — I44.0 ATRIOVENTRICULAR BLOCK, FIRST DEGREE: ICD-10-CM

## 2025-01-14 DIAGNOSIS — E78.00 HYPERCHOLESTEROLEMIA: Primary | ICD-10-CM

## 2025-01-14 PROCEDURE — 93000 ELECTROCARDIOGRAM COMPLETE: CPT | Performed by: INTERNAL MEDICINE

## 2025-01-14 PROCEDURE — 99213 OFFICE O/P EST LOW 20 MIN: CPT | Performed by: INTERNAL MEDICINE

## 2025-01-14 NOTE — PROGRESS NOTES
Cardiology Follow Up    Deirdre Blackman  1940  [id number]  [facility] CARDIAC CATH LAB  [address] Counts include 234 beds at the [facility] [address]  [phone]    1. Hypercholesterolemia  POCT ECG    Holter monitor      2. Abnormal EKG  POCT ECG    Holter monitor      3. Atrioventricular block, first degree  Holter monitor          Interval History: Cardiology follow-up.  Patient continues to do well from the cardiac interview denies any chest pain or dyspnea, she exercises daily, he rides a stationary bike, no exertional symptoms.  She does feel fatigued at times.  No syncope or presyncope.  Patient denies any history of blood pressure problems, blood pressure today is 118/78.  She is compliant with low-cholesterol diet.  Lipids last checked total is 149, HDL 60, LDL 60, excellent control on low intense statin therapy    Patient Active Problem List   Diagnosis    Hypercholesterolemia    Abnormal EKG    Gastroesophageal reflux disease with esophagitis without hemorrhage    Acquired hypothyroidism    Disorder of uvula    Rosacea    Constipation    Bilateral hearing loss    Gallstones    Gastroparesis    Osteopenia    Vitamin D deficiency    Psychophysiological insomnia    Kidney stone    Hypertension     Past Medical History:   Diagnosis Date    Abdominal pain 7/13/2023    Ascending aortic aneurysm (HCC) 10/23/2018    Cancer (HCC)     Colon polyp     Constipation 7/7/2022    COVID-19 8/8/2022 8/8/2022=tx paxlovid=vaccinated and boosted    Disease of thyroid gland     Dyspepsia 8/31/2022    Elevated troponin 9/1/2022    Endometrial cancer (HCC)     age 50    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HCC)     1992    Epigastric abdominal pain 8/31/2022    Esophageal reflux     GERD (gastroesophageal reflux disease)     History of ovarian cancer 04/28/2022    Hypertension     Hypertension 8/31/2022    Hypertension, essential, benign 10/23/2018    Hypokalemia  2021    Hypomagnesemia 2022    Hyponatremia 2022    Hypothyroid     Kidney stone     Large hiatal hernia 2021    Diagnosis: Paraesophageal hernia  Procedure: EGD, Robotic repair of paraesophageal hernia with mesh and Albino fundoplication on 3/5/21 Pathology: Anterior fat pad and portion of paraesophageal hernia sac revealed mesothelium lined fibrovascular tissue, consistent with hernia sac. 3 benign lymph nodes.      Left upper quadrant abdominal pain, nausea and vomiting 2021    Obesity     Obstruction of left ureteropelvic junction (UPJ) due to stone 2021    PONV (postoperative nausea and vomiting) 2022    Prediabetes 2022    Rosacea 2022    Weight loss 2022     Social History     Socioeconomic History    Marital status: /Civil Union     Spouse name: Not on file    Number of children: Not on file    Years of education: Not on file    Highest education level: Not on file   Occupational History    Not on file   Tobacco Use    Smoking status: Former     Current packs/day: 0.00     Types: Cigarettes     Start date: 1959     Quit date: 1964     Years since quittin.0    Smokeless tobacco: Never    Tobacco comments:     60 years  smokeless   Vaping Use    Vaping status: Never Used   Substance and Sexual Activity    Alcohol use: Not Currently     Comment: socially    Drug use: No    Sexual activity: Not Currently     Partners: Male     Birth control/protection: Post-menopausal     Comment:    Other Topics Concern    Not on file   Social History Narrative    Exercising regularly     Social Drivers of Health     Financial Resource Strain: Low Risk  (2023)    Overall Financial Resource Strain (CARDIA)     Difficulty of Paying Living Expenses: Not hard at all   Food Insecurity: No Food Insecurity (2024)    Nursing - Inadequate Food Risk Classification     Worried About Running Out of Food in the Last Year: Never true     Ran Out of Food in  the Last Year: Never true     Ran Out of Food in the Last Year: Not on file   Transportation Needs: No Transportation Needs (4/17/2024)    PRAPARE - Transportation     Lack of Transportation (Medical): No     Lack of Transportation (Non-Medical): No   Physical Activity: Not on file   Stress: Not on file   Social Connections: Not on file   Intimate Partner Violence: Not on file   Housing Stability: Low Risk  (4/17/2024)    Housing Stability Vital Sign     Unable to Pay for Housing in the Last Year: No     Number of Times Moved in the Last Year: 1     Homeless in the Last Year: No      Family History   Problem Relation Age of Onset    Stomach cancer Mother 69    Pancreatic cancer Mother 69    Heart attack Father     No Known Problems Daughter     No Known Problems Maternal Grandmother     No Known Problems Maternal Grandfather     No Known Problems Paternal Grandmother     No Known Problems Paternal Grandfather     No Known Problems Maternal Aunt     No Known Problems Maternal Aunt     Cancer Maternal Uncle         unknown type     Past Surgical History:   Procedure Laterality Date    CARDIAC CATHETERIZATION Left 09/01/2022    Procedure: Cardiac Left Heart Cath;  Surgeon: Jaxson Paulino MD;  [facility];  Service: Cardiology    CATARACT EXTRACTION      CATARACT EXTRACTION, BILATERAL      COLONOSCOPY  08/2018    polyp- 5 years    CYSTOSCOPY      FL RETROGRADE PYELOGRAM  10/15/2021    HERNIA REPAIR      JOINT REPLACEMENT      KNEE ARTHROPLASTY      NY CYSTO/URETERO W/LITHOTRIPSY &INDWELL STENT INSRT Left 10/15/2021    Procedure: CYSTOSCOPY URETEROSCOPY WITH LITHOTRIPSY HOLMIUM LASER, RETROGRADE PYELOGRAM, BASKET STONE EXTRACTION AND EXCHANGE STENT URETERAL;  Surgeon: Leandro Vazquez MD;  Location: [facility];  Service: Urology    NY CYSTOURETHROSCOPY W/URETERAL CATHETERIZATION Left 08/24/2021    Procedure: CYSTOSCOPY WITH INSERTION STENT URETERAL;  Surgeon: Rojelio Hobson MD;  Location: [facility];   Service: Urology    VA ESOPHAGOGASTRODUODENOSCOPY TRANSORAL DIAGNOSTIC N/A 03/05/2021    Procedure: ESOPHAGOGASTRODUODENOSCOPY (EGD);  Surgeon: Brian Servin MD;  Location: [facility];  Service: Thoracic    VA LAPS RPR PARAESPHGL HRNA INCL FUNDPLSTY W/O MESH N/A 03/05/2021    Procedure: REPAIR HERNIA PARAESOPHAGEAL LAPAROSCOPIC W ROBOTICS WITH MESH, DOOR FUNDOPLICATION;  Surgeon: Brian Servin MD;  Location: [facility];  Service: Thoracic    TOTAL ABDOMINAL HYSTERECTOMY      age 50    TOTAL ABDOMINAL HYSTERECTOMY W/ BILATERAL SALPINGOOPHORECTOMY  1992    endometrial cancer       Current Outpatient Medications:     albuterol (PROVENTIL HFA,VENTOLIN HFA) 90 mcg/act inhaler, Inhale 2 puffs 4 (four) times a day as needed, Disp: , Rfl:     atorvastatin (LIPITOR) 10 mg tablet, TAKE 1 TABLET BY MOUTH DAILY AT  BEDTIME, Disp: 90 tablet, Rfl: 1    dorzolamide-timolol (COSOPT) 22.3-6.8 MG/ML ophthalmic solution, INSTILL 1 DROP IN BOTH EYES TWICE DAILY, Disp: , Rfl:     levothyroxine 50 mcg tablet, TAKE 1 TABLET BY MOUTH DAILY, Disp: 90 tablet, Rfl: 1    loratadine (CLARITIN) 10 mg tablet, Take 1 tablet (10 mg total) by mouth daily as needed for allergies, Disp: , Rfl:     multivitamin-iron-minerals-folic acid (CENTRUM) chewable tablet, Chew 1 tablet daily, Disp: , Rfl:     pantoprazole (PROTONIX) 40 mg tablet, TAKE 1 TABLET BY MOUTH DAILY AT  BEDTIME, Disp: 90 tablet, Rfl: 1    polyethylene glycol (MIRALAX) 17 g packet, Take 17 g by mouth daily as needed (constipation), Disp: , Rfl: 0    Cholecalciferol (VITAMIN D3) 400 units CAPS, Take 1,000 Units by mouth (Patient not taking: Reported on 1/2/2025), Disp: , Rfl:   No Known Allergies    Labs:  Appointment on 12/10/2024   Component Date Value    WBC 12/10/2024 4.57     RBC 12/10/2024 4.39     Hemoglobin 12/10/2024 14.9     Hematocrit 12/10/2024 44.1     MCV 12/10/2024 101 (H)     MCH 12/10/2024 33.9     MCHC 12/10/2024 33.8     RDW 12/10/2024 12.4     MPV 12/10/2024  11.8     Platelets 12/10/2024 187     nRBC 12/10/2024 0     Segmented % 12/10/2024 52     Immature Grans % 12/10/2024 0     Lymphocytes % 12/10/2024 34     Monocytes % 12/10/2024 12     Eosinophils Relative 12/10/2024 1     Basophils Relative 12/10/2024 1     Absolute Neutrophils 12/10/2024 2.42     Absolute Immature Grans 12/10/2024 0.02     Absolute Lymphocytes 12/10/2024 1.53     Absolute Monocytes 12/10/2024 0.53     Eosinophils Absolute 12/10/2024 0.04     Basophils Absolute 12/10/2024 0.03     Sodium 12/10/2024 137     Potassium 12/10/2024 4.0     Chloride 12/10/2024 104     CO2 12/10/2024 25     ANION GAP 12/10/2024 8     BUN 12/10/2024 16     Creatinine 12/10/2024 0.75     Glucose, Fasting 12/10/2024 87     Calcium 12/10/2024 8.8     AST 12/10/2024 22     ALT 12/10/2024 17     Alkaline Phosphatase 12/10/2024 84     Total Protein 12/10/2024 6.4     Albumin 12/10/2024 3.9     Total Bilirubin 12/10/2024 0.72     eGFR 12/10/2024 72     Cholesterol 12/10/2024 149     Triglycerides 12/10/2024 59     HDL, Direct 12/10/2024 69     LDL Calculated 12/10/2024 68     Non-HDL-Chol (CHOL-HDL) 12/10/2024 80     TSH 3RD GENERATON 12/10/2024 2.875    Appointment on 08/19/2024   Component Date Value    WBC 08/19/2024 5.48     RBC 08/19/2024 4.39     Hemoglobin 08/19/2024 14.6     Hematocrit 08/19/2024 43.8     MCV 08/19/2024 100 (H)     MCH 08/19/2024 33.3     MCHC 08/19/2024 33.3     RDW 08/19/2024 12.1     Platelets 08/19/2024 207     MPV 08/19/2024 11.5     Sodium 08/19/2024 134 (L)     Potassium 08/19/2024 4.7     Chloride 08/19/2024 98     CO2 08/19/2024 25     ANION GAP 08/19/2024 11     BUN 08/19/2024 17     Creatinine 08/19/2024 0.77     Glucose, Fasting 08/19/2024 85     Calcium 08/19/2024 9.4     eGFR 08/19/2024 71     TSH 3RD GENERATON 08/19/2024 2.134     Vitamin B-12 08/19/2024 565      Imaging: No results found.    Review of Systems:  Review of Systems   Constitutional:  Positive for fatigue. Negative for  activity change, diaphoresis and fever.   Respiratory:  Negative for apnea, shortness of breath, wheezing and stridor.    Cardiovascular:  Negative for chest pain, palpitations and leg swelling.   Neurological:  Negative for dizziness, syncope, weakness and light-headedness.   Hematological:  Does not bruise/bleed easily.       Physical Exam:  Physical Exam  Vitals reviewed.   Constitutional:       General: She is not in acute distress.     Appearance: Normal appearance. She is obese. She is not ill-appearing, toxic-appearing or diaphoretic.   Eyes:      General: No scleral icterus.  Neck:      Vascular: No carotid bruit.   Cardiovascular:      Rate and Rhythm: Regular rhythm. Bradycardia present.      Pulses: Normal pulses.      Heart sounds: Normal heart sounds. No murmur heard.     No friction rub. No gallop.   Pulmonary:      Effort: Pulmonary effort is normal. No respiratory distress.      Breath sounds: Normal breath sounds. No stridor. No wheezing, rhonchi or rales.   Musculoskeletal:      Right lower leg: No edema.      Left lower leg: No edema.   Skin:     General: Skin is warm and dry.      Capillary Refill: Capillary refill takes less than 2 seconds.      Coloration: Skin is not jaundiced or pale.      Findings: No bruising or erythema.   Neurological:      Mental Status: She is alert and oriented to person, place, and time.   Psychiatric:         Mood and Affect: Mood normal.         Discussion/Summary:  Dyslipidemia, well controlled on current medical regimen. Mild coronary atherosclerosis on catheterization 2022,. Previous stress test few years prior submaximal only 3 minutes on a [place] protocol nondiagnostic. Echocardiography 2022, revealed normal left ventricular systolic and diastolic function.  Mild to moderate left ventricular hypertrophy, seen on EKG today as well.  Uncertain etiology.  Will continue to monitor.  With reported mildly dilated ascending aorta normal aortic root.. 39 mm CAT scan  2021 revealed coronary calcifications, aorta was described as normal however. Regular exercise with weight management recommended.  Fatigue appears to be multifactorial, she is bradycardic and does have a first-degree AV block.  Will proceed with 24-hour Holter to assess chronotropic competence and look for high-grade block..     This note was completed in part utilizing [employer] direct voice recognition software.   Grammatical errors, random word insertion, spelling mistakes, and incomplete sentences may be an occasional consequence of the system secondary to software limitations, ambient noise and hardware issues. At the time of dictation, efforts were made to edit, clarify and /or correct errors.  Please read the chart carefully and recognize, using context, where substitutions have occurred.  If you have any questions or concerns about the context, text or information contained within the body of this dictation, please contact myself, the provider, for further clarification.

## 2025-01-28 ENCOUNTER — HOSPITAL ENCOUNTER (OUTPATIENT)
Dept: NON INVASIVE DIAGNOSTICS | Facility: CLINIC | Age: 85
Discharge: HOME | End: 2025-01-28
Payer: MEDICARE

## 2025-01-28 DIAGNOSIS — I44.0 ATRIOVENTRICULAR BLOCK, FIRST DEGREE: ICD-10-CM

## 2025-01-28 DIAGNOSIS — R94.31 ABNORMAL EKG: ICD-10-CM

## 2025-01-28 DIAGNOSIS — E78.00 HYPERCHOLESTEROLEMIA: ICD-10-CM

## 2025-01-28 PROCEDURE — 93225 XTRNL ECG REC<48 HRS REC: CPT

## 2025-01-28 PROCEDURE — 93226 XTRNL ECG REC<48 HR SCAN A/R: CPT

## 2025-02-04 PROCEDURE — 93227 XTRNL ECG REC<48 HR R&I: CPT | Performed by: STUDENT IN AN ORGANIZED HEALTH CARE EDUCATION/TRAINING PROGRAM

## 2025-02-13 ENCOUNTER — TELEPHONE (OUTPATIENT)
Age: 85
End: 2025-02-13

## 2025-02-18 ENCOUNTER — HOSPITAL ENCOUNTER (OUTPATIENT)
Dept: RADIOLOGY | Age: 85
Discharge: HOME | End: 2025-02-18
Payer: MEDICARE

## 2025-02-18 DIAGNOSIS — Z12.31 VISIT FOR SCREENING MAMMOGRAM: ICD-10-CM

## 2025-02-18 PROCEDURE — 77063 BREAST TOMOSYNTHESIS BI: CPT

## 2025-02-18 PROCEDURE — 77067 SCR MAMMO BI INCL CAD: CPT

## 2025-04-19 DIAGNOSIS — E03.9 ACQUIRED HYPOTHYROIDISM: ICD-10-CM

## 2025-04-19 RX ORDER — LEVOTHYROXINE SODIUM 50 UG/1
50 TABLET ORAL DAILY
Qty: 90 TABLET | Refills: 1 | Status: SHIPPED | OUTPATIENT
Start: 2025-04-19

## 2025-04-29 ENCOUNTER — OFFICE VISIT (OUTPATIENT)
Dept: INTERNAL MEDICINE CLINIC | Facility: CLINIC | Age: 85
End: 2025-04-29
Payer: MEDICARE

## 2025-04-29 VITALS
WEIGHT: 183.8 LBS | HEART RATE: 76 BPM | DIASTOLIC BLOOD PRESSURE: 80 MMHG | SYSTOLIC BLOOD PRESSURE: 120 MMHG | OXYGEN SATURATION: 96 % | BODY MASS INDEX: 32.56 KG/M2 | TEMPERATURE: 99.3 F

## 2025-04-29 DIAGNOSIS — R68.89 FLU-LIKE SYMPTOMS: Primary | ICD-10-CM

## 2025-04-29 LAB
SARS-COV-2 AG UPPER RESP QL IA: NEGATIVE
SL AMB POCT RAPID FLU A: NORMAL
SL AMB POCT RAPID FLU B: NORMAL
VALID CONTROL: NORMAL

## 2025-04-29 PROCEDURE — 99213 OFFICE O/P EST LOW 20 MIN: CPT

## 2025-04-29 PROCEDURE — 87636 SARSCOV2 & INF A&B AMP PRB: CPT

## 2025-04-29 PROCEDURE — 87804 INFLUENZA ASSAY W/OPTIC: CPT

## 2025-04-29 PROCEDURE — G2211 COMPLEX E/M VISIT ADD ON: HCPCS

## 2025-04-29 PROCEDURE — 87811 SARS-COV-2 COVID19 W/OPTIC: CPT

## 2025-04-29 RX ORDER — OSELTAMIVIR PHOSPHATE 75 MG/1
75 CAPSULE ORAL 2 TIMES DAILY
Qty: 10 CAPSULE | Refills: 0 | Status: CANCELLED | OUTPATIENT
Start: 2025-04-29 | End: 2025-05-04

## 2025-04-29 RX ORDER — OSELTAMIVIR PHOSPHATE 75 MG/1
75 CAPSULE ORAL 2 TIMES DAILY
Qty: 10 CAPSULE | Refills: 0 | Status: SHIPPED | OUTPATIENT
Start: 2025-04-29 | End: 2025-05-04

## 2025-04-29 RX ORDER — ONDANSETRON 4 MG/1
4 TABLET, FILM COATED ORAL EVERY 8 HOURS PRN
Qty: 10 TABLET | Refills: 0 | Status: SHIPPED | OUTPATIENT
Start: 2025-04-29

## 2025-04-29 RX ORDER — OSELTAMIVIR PHOSPHATE 75 MG/1
75 CAPSULE ORAL 2 TIMES DAILY
Qty: 10 CAPSULE | Refills: 0 | Status: SHIPPED | OUTPATIENT
Start: 2025-04-29 | End: 2025-04-29

## 2025-04-29 NOTE — ASSESSMENT & PLAN NOTE
1 day history of flulike symptoms with generalized weakness, myalgia, low-grade fevers, nausea and diarrhea episodes  Has been diagnosed with influenza A yesterday  No shortness of breath, chest pain, short throat pain, cough  Rapid flu and COVID testing in the clinic negative  Flulike symptoms is likely secondary to influenza A, given that her  was diagnosed with influenza A and is admitted in the hospital the same  Plan  Will get flu/COVID PCR  Will start patient on Tamiflu 75 mg twice daily for 5 days -start of symptoms was less than 72 hours back, old-age and exposed to influenza A  Complains of nausea-will start patient on Zofran every 8 hours as needed for nausea and vomiting.    Orders:    oseltamivir (TAMIFLU) 75 mg capsule; Take 1 capsule (75 mg total) by mouth 2 (two) times a day for 5 days    POCT Rapid Covid Ag    POCT rapid flu A and B    ondansetron (ZOFRAN) 4 mg tablet; Take 1 tablet (4 mg total) by mouth every 8 (eight) hours as needed for nausea or vomiting    Covid/Flu- Office Collect Normal

## 2025-04-29 NOTE — PATIENT INSTRUCTIONS
You may take upto 1000 mg of tylenol every 8 hours as needed   Or 625 mg tylenol 6 hours as needed for fever/body aches  You may take ibuprofen/Motrin 400 mg every 6 hours as needed for fever body aches 2 days  Take Zofran 4 mg every 8 hours as needed for nausea/vomiting  Start taking Tamiflu 75 mg twice a day for 5 days

## 2025-04-29 NOTE — PROGRESS NOTES
Name: Deirdre Blackman      : 1940      MRN: [id number]  Encounter Provider: Kaylie Hutchinson MD  Encounter Date: 2025   Encounter department: [facility]  :  Assessment & Plan  Flu-like symptoms  1 day history of flulike symptoms with generalized weakness, myalgia, low-grade fevers, nausea and diarrhea episodes  Has been diagnosed with influenza A yesterday  No shortness of breath, chest pain, short throat pain, cough  Rapid flu and COVID testing in the clinic negative  Flulike symptoms is likely secondary to influenza A, given that her  was diagnosed with influenza A and is admitted in the hospital the same  Plan  Will get flu/COVID PCR  Will start patient on Tamiflu 75 mg twice daily for 5 days -start of symptoms was less than 72 hours back, old-age and exposed to influenza A  Complains of nausea-will start patient on Zofran every 8 hours as needed for nausea and vomiting.    Orders:    oseltamivir (TAMIFLU) 75 mg capsule; Take 1 capsule (75 mg total) by mouth 2 (two) times a day for 5 days    POCT Rapid Covid Ag    POCT rapid flu A and B    ondansetron (ZOFRAN) 4 mg tablet; Take 1 tablet (4 mg total) by mouth every 8 (eight) hours as needed for nausea or vomiting    Covid/Flu- Office Collect Normal           History of Present Illness   HPI  84-year-old female with history of GERD, hypercholesterolemia, constipation, hypertension, presenting with complaints of flulike symptoms that started this morning.  Her  was hospitalized yesterday from generalized weakness from influenza A yesterday.  Patient reports experiencing myalgia,, generalized weakness, nausea and diarrheal episodes, she had gastroenteritis last week.  She had low-grade temperatures of 100.6 °F at home and has been taking Tylenol as needed for fevers.  Denies chest pain, cough, shortness of breath, throat pain.  Review of Systems   Constitutional:  Positive for activity change, chills,  fatigue and fever.   Gastrointestinal:  Positive for diarrhea and nausea.   Musculoskeletal:  Positive for myalgias.       Objective   /80 (BP Location: Left arm, Patient Position: Sitting, Cuff Size: Standard)   Pulse 76   Temp 99.3 °F (37.4 °C) (Tympanic)   Wt 83.4 kg (183 lb 12.8 oz)   SpO2 96%   BMI 32.56 kg/m²      Physical Exam  Constitutional:       Appearance: Normal appearance.   HENT:      Mouth/Throat:      Mouth: Mucous membranes are moist.      Pharynx: Oropharynx is clear.   Eyes:      Extraocular Movements: Extraocular movements intact.      Pupils: Pupils are equal, round, and reactive to light.   Cardiovascular:      Rate and Rhythm: Normal rate and regular rhythm.      Pulses: Normal pulses.      Heart sounds: Normal heart sounds.   Pulmonary:      Effort: Pulmonary effort is normal.      Breath sounds: Normal breath sounds.   Abdominal:      General: Abdomen is flat.      Palpations: Abdomen is soft.   Musculoskeletal:         General: Normal range of motion.   Skin:     General: Skin is warm.      Capillary Refill: Capillary refill takes less than 2 seconds.   Neurological:      Mental Status: She is alert and oriented to person, place, and time. Mental status is at baseline.   Psychiatric:         Mood and Affect: Mood normal.         Behavior: Behavior normal.          [facility]  1 (517) [facility]-WELL ([phone])  Text "WELL" to 74382  Website: www.[employer]/.[employer] 1 (048) 621-HOPE (1226) Website: www.safehorizon.org/.National Suicide Prevention Lifeline 5 (660) 437-6537/.  [employer]  1 (202) [employer] (123-6315)/988 Suicide and Crisis Lifeline

## 2025-04-30 LAB
FLUAV RNA RESP QL NAA+PROBE: NEGATIVE
FLUBV RNA RESP QL NAA+PROBE: NEGATIVE
SARS-COV-2 RNA RESP QL NAA+PROBE: NEGATIVE

## 2025-05-02 ENCOUNTER — APPOINTMENT (OUTPATIENT)
Dept: LAB | Age: 85
End: 2025-05-02
Attending: INTERNAL MEDICINE
Payer: MEDICARE

## 2025-05-02 DIAGNOSIS — E78.00 HYPERCHOLESTEROLEMIA: ICD-10-CM

## 2025-05-02 DIAGNOSIS — E53.8 DEFICIENCY OF OTHER SPECIFIED B GROUP VITAMINS: ICD-10-CM

## 2025-05-02 DIAGNOSIS — E55.9 VITAMIN D DEFICIENCY: ICD-10-CM

## 2025-05-02 DIAGNOSIS — E03.9 ACQUIRED HYPOTHYROIDISM: ICD-10-CM

## 2025-05-02 DIAGNOSIS — D75.89 MACROCYTOSIS WITHOUT ANEMIA: ICD-10-CM

## 2025-05-02 LAB
25(OH)D3 SERPL-MCNC: 44.2 NG/ML (ref 30–100)
ALBUMIN SERPL BCG-MCNC: 3.6 G/DL (ref 3.5–5)
ALP SERPL-CCNC: 83 U/L (ref 34–104)
ALT SERPL W P-5'-P-CCNC: 13 U/L (ref 7–52)
ANION GAP SERPL CALCULATED.3IONS-SCNC: 10 MMOL/L (ref 4–13)
AST SERPL W P-5'-P-CCNC: 18 U/L (ref 13–39)
BASOPHILS # BLD AUTO: 0.02 THOUSANDS/ÂΜL (ref 0–0.1)
BASOPHILS NFR BLD AUTO: 0 % (ref 0–1)
BILIRUB SERPL-MCNC: 0.56 MG/DL (ref 0.2–1)
BUN SERPL-MCNC: 15 MG/DL (ref 5–25)
CALCIUM SERPL-MCNC: 8.7 MG/DL (ref 8.4–10.2)
CHLORIDE SERPL-SCNC: 106 MMOL/L (ref 96–108)
CHOLEST SERPL-MCNC: 128 MG/DL (ref ?–200)
CO2 SERPL-SCNC: 24 MMOL/L (ref 21–32)
CREAT SERPL-MCNC: 0.67 MG/DL (ref 0.6–1.3)
EOSINOPHIL # BLD AUTO: 0.04 THOUSAND/ÂΜL (ref 0–0.61)
EOSINOPHIL NFR BLD AUTO: 1 % (ref 0–6)
ERYTHROCYTE [DISTWIDTH] IN BLOOD BY AUTOMATED COUNT: 12.4 % (ref 11.6–15.1)
GFR SERPL CREATININE-BSD FRML MDRD: 80 ML/MIN/1.73SQ M
GLUCOSE P FAST SERPL-MCNC: 94 MG/DL (ref 65–99)
HCT VFR BLD AUTO: 42.1 % (ref 34.8–46.1)
HDLC SERPL-MCNC: 54 MG/DL
HGB BLD-MCNC: 13.6 G/DL (ref 11.5–15.4)
IMM GRANULOCYTES # BLD AUTO: 0.02 THOUSAND/UL (ref 0–0.2)
IMM GRANULOCYTES NFR BLD AUTO: 0 % (ref 0–2)
LDLC SERPL CALC-MCNC: 62 MG/DL (ref 0–100)
LYMPHOCYTES # BLD AUTO: 1.39 THOUSANDS/ÂΜL (ref 0.6–4.47)
LYMPHOCYTES NFR BLD AUTO: 29 % (ref 14–44)
MCH RBC QN AUTO: 32.5 PG (ref 26.8–34.3)
MCHC RBC AUTO-ENTMCNC: 32.3 G/DL (ref 31.4–37.4)
MCV RBC AUTO: 101 FL (ref 82–98)
MONOCYTES # BLD AUTO: 0.81 THOUSAND/ÂΜL (ref 0.17–1.22)
MONOCYTES NFR BLD AUTO: 17 % (ref 4–12)
NEUTROPHILS # BLD AUTO: 2.48 THOUSANDS/ÂΜL (ref 1.85–7.62)
NEUTS SEG NFR BLD AUTO: 53 % (ref 43–75)
NONHDLC SERPL-MCNC: 74 MG/DL
NRBC BLD AUTO-RTO: 0 /100 WBCS
PLATELET # BLD AUTO: 194 THOUSANDS/UL (ref 149–390)
PMV BLD AUTO: 11.4 FL (ref 8.9–12.7)
POTASSIUM SERPL-SCNC: 3.5 MMOL/L (ref 3.5–5.3)
PROT SERPL-MCNC: 6 G/DL (ref 6.4–8.4)
RBC # BLD AUTO: 4.18 MILLION/UL (ref 3.81–5.12)
SODIUM SERPL-SCNC: 140 MMOL/L (ref 135–147)
TRIGL SERPL-MCNC: 61 MG/DL (ref ?–150)
TSH SERPL DL<=0.05 MIU/L-ACNC: 2.19 UIU/ML (ref 0.45–4.5)
VIT B12 SERPL-MCNC: 997 PG/ML (ref 180–914)
WBC # BLD AUTO: 4.76 THOUSAND/UL (ref 4.31–10.16)

## 2025-05-02 PROCEDURE — 80053 COMPREHEN METABOLIC PANEL: CPT

## 2025-05-02 PROCEDURE — 82607 VITAMIN B-12: CPT

## 2025-05-02 PROCEDURE — 80061 LIPID PANEL: CPT

## 2025-05-02 PROCEDURE — 36415 COLL VENOUS BLD VENIPUNCTURE: CPT

## 2025-05-02 PROCEDURE — 82306 VITAMIN D 25 HYDROXY: CPT

## 2025-05-02 PROCEDURE — 85025 COMPLETE CBC W/AUTO DIFF WBC: CPT

## 2025-05-02 PROCEDURE — 84443 ASSAY THYROID STIM HORMONE: CPT

## 2025-05-08 ENCOUNTER — RA CDI HCC (OUTPATIENT)
Dept: OTHER | Facility: HOSPITAL | Age: 85
End: 2025-05-08

## 2025-05-15 ENCOUNTER — TELEPHONE (OUTPATIENT)
Age: 85
End: 2025-05-15

## 2025-05-15 ENCOUNTER — OFFICE VISIT (OUTPATIENT)
Dept: INTERNAL MEDICINE CLINIC | Facility: CLINIC | Age: 85
End: 2025-05-15
Payer: MEDICARE

## 2025-05-15 VITALS
HEIGHT: 63 IN | OXYGEN SATURATION: 99 % | WEIGHT: 189.2 LBS | DIASTOLIC BLOOD PRESSURE: 58 MMHG | SYSTOLIC BLOOD PRESSURE: 106 MMHG | HEART RATE: 56 BPM | BODY MASS INDEX: 33.52 KG/M2

## 2025-05-15 DIAGNOSIS — Z00.00 MEDICARE ANNUAL WELLNESS VISIT, SUBSEQUENT: Primary | ICD-10-CM

## 2025-05-15 DIAGNOSIS — Z23 NEED FOR COVID-19 VACCINE: ICD-10-CM

## 2025-05-15 DIAGNOSIS — E78.00 HYPERCHOLESTEROLEMIA: ICD-10-CM

## 2025-05-15 DIAGNOSIS — I10 PRIMARY HYPERTENSION: ICD-10-CM

## 2025-05-15 DIAGNOSIS — E03.9 ACQUIRED HYPOTHYROIDISM: ICD-10-CM

## 2025-05-15 PROCEDURE — G0439 PPPS, SUBSEQ VISIT: HCPCS | Performed by: INTERNAL MEDICINE

## 2025-05-15 PROCEDURE — 91320 SARSCV2 VAC 30MCG TRS-SUC IM: CPT

## 2025-05-15 PROCEDURE — 90480 ADMN SARSCOV2 VAC 1/ONLY CMP: CPT

## 2025-05-15 RX ORDER — LANOLIN ALCOHOL/MO/W.PET/CERES
CREAM (GRAM) TOPICAL DAILY
COMMUNITY

## 2025-05-15 NOTE — PROGRESS NOTES
Name: Deirdre Blackman      : 1940      MRN: [id number]  Encounter Provider: Lea Reyes, MD  Encounter Date: 5/15/2025   Encounter department: [facility]  :  Assessment & Plan  Medicare annual wellness visit, subsequent         Primary hypertension    Orders:  •  CBC and differential; Future  •  Comprehensive metabolic panel; Future    Hypercholesterolemia    Orders:  •  Lipid panel; Future    Acquired hypothyroidism    Orders:  •  TSH, 3rd generation with Free T4 reflex; Future    Need for COVID-19 vaccine    Orders:  •  COVID-19 Pfizer mRNA vaccine 12 yr and older (Comirnaty pre-filled syringe)       Preventive health issues were discussed with patient, and age appropriate screening tests were ordered as noted in patient's After Visit Summary. Personalized health advice and appropriate referrals for health education or preventive services given if needed, as noted in patient's After Visit Summary.    History of Present Illness     HPI   Patient Care Team:  Lea Reyes, MD as PCP - General (Internal Medicine)  Joselyn Reyes Bahamonde, MD (Nephrology)    Review of Systems   Constitutional:  Negative for fever.   Respiratory:  Negative for shortness of breath.    Cardiovascular:  Negative for chest pain and palpitations.   Gastrointestinal:  Negative for abdominal pain, constipation and diarrhea.   Genitourinary:  Negative for difficulty urinating.     Medical History Reviewed by provider this encounter:  Tobacco  Allergies  Meds  Problems  Med Hx  Surg Hx  Fam Hx       Annual Wellness Visit Questionnaire   Deirdre is here for her Subsequent Wellness visit.     Health Risk Assessment:   Patient rates overall health as good. Patient feels that their physical health rating is same. Patient is very satisfied with their life. Eyesight was rated as same. Hearing was rated as slightly worse. Patient feels that their emotional and mental health rating is same. Patients states they are  never, rarely angry. Patient states they are never, rarely unusually tired/fatigued. Pain experienced in the last 7 days has been none. Patient states that she has experienced no weight loss or gain in last 6 months.     Depression Screening:   PHQ-2 Score: 0      Fall Risk Screening:   In the past year, patient has experienced: no history of falling in past year      Urinary Incontinence Screening:   Patient has not leaked urine accidently in the last six months.     Home Safety:  Patient does not have trouble with stairs inside or outside of their home. Patient has working smoke alarms and has working carbon monoxide detector. Home safety hazards include: none.     Nutrition:   Current diet is Regular.     Medications:   Patient is currently taking over-the-counter supplements. OTC medications include: see medication list. Patient is able to manage medications.     Activities of Daily Living (ADLs)/Instrumental Activities of Daily Living (IADLs):   Walk and transfer into and out of bed and chair?: Yes  Dress and groom yourself?: Yes    Bathe or shower yourself?: Yes    Feed yourself? Yes  Do your laundry/housekeeping?: Yes  Manage your money, pay your bills and track your expenses?: Yes  Make your own meals?: Yes    Do your own shopping?: Yes    Previous Hospitalizations:   Any hospitalizations or ED visits within the last 12 months?: No      Advance Care Planning:   Living will: Yes    Durable POA for healthcare: Yes    Advanced directive: Yes      Cognitive Screening:   Provider or family/friend/caregiver concerned regarding cognition?: No    Preventive Screenings      Cardiovascular Screening:    General: Screening Not Indicated and History Lipid Disorder      Diabetes Screening:     General: Screening Current      Colorectal Cancer Screening:     General: Screening Not Indicated      Breast Cancer Screening:     General: Screening Current      Cervical Cancer Screening:    General: Screening Not Indicated       Osteoporosis Screening:    General: Screening Current      Abdominal Aortic Aneurysm (AAA) Screening:        General: Screening Not Indicated      Lung Cancer Screening:     General: Screening Not Indicated      Hepatitis C Screening:    General: Screening Not Indicated    Screening, Brief Intervention, and Referral to Treatment (SBIRT)     Screening  Typical number of drinks in a day: 0  Typical number of drinks in a week: 0  Interpretation: Low risk drinking behavior.    AUDIT-C Screenin) How often did you have a drink containing alcohol in the past year? never  2) How many drinks did you have on a typical day when you were drinking in the past year? 0  3) How often did you have 6 or more drinks on one occasion in the past year? never    AUDIT-C Score: 0  Interpretation: Score 0-2 (female): Negative screen for alcohol misuse    Single Item Drug Screening:  How often have you used an illegal drug (including marijuana) or a prescription medication for non-medical reasons in the past year? never    Single Item Drug Screen Score: 0  Interpretation: Negative screen for possible drug use disorder    Social Drivers of Health     Financial Resource Strain: Low Risk  (2023)    Overall Financial Resource Strain (CARDIA)    • Difficulty of Paying Living Expenses: Not hard at all   Food Insecurity: No Food Insecurity (5/15/2025)    Nursing - Inadequate Food Risk Classification    • Worried About Running Out of Food in the Last Year: Never true    • Ran Out of Food in the Last Year: Never true   Transportation Needs: No Transportation Needs (5/15/2025)    PRAPARE - Transportation    • Lack of Transportation (Medical): No    • Lack of Transportation (Non-Medical): No   Housing Stability: Low Risk  (5/15/2025)    Housing Stability Vital Sign    • Unable to Pay for Housing in the Last Year: No    • Number of Times Moved in the Last Year: 0    • Homeless in the Last Year: No   Utilities: Not At Risk (5/15/2025)    [facility]  "Utilities    • Threatened with loss of utilities: No     No results found.    Objective   /58 (BP Location: Left arm, Patient Position: Sitting, Cuff Size: Large)   Pulse 56   Ht 5' 3\" (1.6 m)   Wt 85.8 kg (189 lb 3.2 oz)   SpO2 99%   BMI 33.52 kg/m²     Physical Exam  Constitutional:       General: She is not in acute distress.     Appearance: She is well-developed. She is not ill-appearing, toxic-appearing or diaphoretic.     Eyes:      Conjunctiva/sclera: Conjunctivae normal.       Cardiovascular:      Rate and Rhythm: Normal rate and regular rhythm.      Heart sounds: Normal heart sounds. No murmur heard.  Pulmonary:      Effort: Pulmonary effort is normal. No respiratory distress.      Breath sounds: Normal breath sounds. No stridor. No wheezing or rales.   Abdominal:      General: There is no distension.      Palpations: Abdomen is soft. There is no mass.      Tenderness: There is no abdominal tenderness. There is no guarding or rebound.     Musculoskeletal:      Cervical back: Neck supple.      Right lower leg: No edema.      Left lower leg: No edema.     Neurological:      Mental Status: She is alert and oriented to person, place, and time.     Psychiatric:         Mood and Affect: Mood normal.         Behavior: Behavior normal.         Thought Content: Thought content normal.         Judgment: Judgment normal.       "

## 2025-05-15 NOTE — PATIENT INSTRUCTIONS
Medicare Preventive Visit Patient Instructions  Thank you for completing your Welcome to Medicare Visit or Medicare Annual Wellness Visit today. Your next wellness visit will be due in one year (5/16/2026).  The screening/preventive services that you may require over the next 5-10 years are detailed below. Some tests may not apply to you based off risk factors and/or age. Screening tests ordered at today's visit but not completed yet may show as past due. Also, please note that scanned in results may not display below.  Preventive Screenings:  Service Recommendations Previous Testing/Comments   Colorectal Cancer Screening  * Colonoscopy    * Fecal Occult Blood Test (FOBT)/Fecal Immunochemical Test (FIT)  * Fecal DNA/Cologuard Test  * Flexible Sigmoidoscopy Age: 45-75 years old   Colonoscopy: every 10 years (may be performed more frequently if at higher risk)  OR  FOBT/FIT: every 1 year  OR  Cologuard: every 3 years  OR  Sigmoidoscopy: every 5 years  Screening may be recommended earlier than age 45 if at higher risk for colorectal cancer. Also, an individualized decision between you and your healthcare provider will decide whether screening between the ages of 76-85 would be appropriate. Colonoscopy: 12/02/2022  FOBT/FIT: Not on file  Cologuard: Not on file  Sigmoidoscopy: Not on file    Screening Not Indicated     Breast Cancer Screening Age: 40+ years old  Frequency: every 1-2 years  Not required if history of left and right mastectomy Mammogram: 02/18/2025    Screening Current   Cervical Cancer Screening Between the ages of 21-29, pap smear recommended once every 3 years.   Between the ages of 30-65, can perform pap smear with HPV co-testing every 5 years.   Recommendations may differ for women with a history of total hysterectomy, cervical cancer, or abnormal pap smears in past. Pap Smear: 05/31/2023    Screening Not Indicated   Hepatitis C Screening Once for adults born between 1945 and 1965  More frequently in  patients at high risk for Hepatitis C Hep C Antibody: Not on file        Diabetes Screening 1-2 times per year if you're at risk for diabetes or have pre-diabetes Fasting glucose: 94 mg/dL (5/2/2025)  A1C: 5.3 % (3/28/2022)  Screening Current   Cholesterol Screening Once every 5 years if you don't have a lipid disorder. May order more often based on risk factors. Lipid panel: 05/02/2025    Screening Not Indicated  History Lipid Disorder     Other Preventive Screenings Covered by Medicare:  Abdominal Aortic Aneurysm (AAA) Screening: covered once if your at risk. You're considered to be at risk if you have a family history of AAA.  Lung Cancer Screening: covers low dose CT scan once per year if you meet all of the following conditions: (1) Age 55-77; (2) No signs or symptoms of lung cancer; (3) Current smoker or have quit smoking within the last 15 years; (4) You have a tobacco smoking history of at least 20 pack years (packs per day multiplied by number of years you smoked); (5) You get a written order from a healthcare provider.  Glaucoma Screening: covered annually if you're considered high risk: (1) You have diabetes OR (2) Family history of glaucoma OR (3)  aged 50 and older OR (4)  American aged 65 and older  Osteoporosis Screening: covered every 2 years if you meet one of the following conditions: (1) You're estrogen deficient and at risk for osteoporosis based off medical history and other findings; (2) Have a vertebral abnormality; (3) On glucocorticoid therapy for more than 3 months; (4) Have primary hyperparathyroidism; (5) On osteoporosis medications and need to assess response to drug therapy.   Last bone density test (DXA Scan): 12/10/2024.  HIV Screening: covered annually if you're between the age of 15-65. Also covered annually if you are younger than 15 and older than 65 with risk factors for HIV infection. For pregnant patients, it is covered up to 3 times per  pregnancy.    Immunizations:  Immunization Recommendations   Influenza Vaccine Annual influenza vaccination during flu season is recommended for all persons aged >= 6 months who do not have contraindications   Pneumococcal Vaccine   * Pneumococcal conjugate vaccine = PCV13 (Prevnar 13), PCV15 (Vaxneuvance), PCV20 (Prevnar 20)  * Pneumococcal polysaccharide vaccine = PPSV23 (Pneumovax) Adults 19-63 yo with certain risk factors or if 65+ yo  If never received any pneumonia vaccine: recommend Prevnar 20 (PCV20)  Give PCV20 if previously received 1 dose of PCV13 or PPSV23   Hepatitis B Vaccine 3 dose series if at intermediate or high risk (ex: diabetes, end stage renal disease, liver disease)   Respiratory syncytial virus (RSV) Vaccine - COVERED BY MEDICARE PART D  * RSVPreF3 (Arexvy) CDC recommends that adults 60 years of age and older may receive a single dose of RSV vaccine using shared clinical decision-making (SCDM)   Tetanus (Td) Vaccine - COST NOT COVERED BY MEDICARE PART B Following completion of primary series, a booster dose should be given every 10 years to maintain immunity against tetanus. Td may also be given as tetanus wound prophylaxis.   Tdap Vaccine - COST NOT COVERED BY MEDICARE PART B Recommended at least once for all adults. For pregnant patients, recommended with each pregnancy.   Shingles Vaccine (Shingrix) - COST NOT COVERED BY MEDICARE PART B  2 shot series recommended in those 19 years and older who have or will have weakened immune systems or those 50 years and older     Health Maintenance Due:  There are no preventive care reminders to display for this patient.  Immunizations Due:      Topic Date Due   • COVID-19 Vaccine (8 - 2024-25 season) 04/02/2025     Advance Directives   What are advance directives?  Advance directives are legal documents that state your wishes and plans for medical care. These plans are made ahead of time in case you lose your ability to make decisions for yourself.  Advance directives can apply to any medical decision, such as the treatments you want, and if you want to donate organs.   What are the types of advance directives?  There are many types of advance directives, and each state has rules about how to use them. You may choose a combination of any of the following:  Living will:  This is a written record of the treatment you want. You can also choose which treatments you do not want, which to limit, and which to stop at a certain time. This includes surgery, medicine, IV fluid, and tube feedings.   Durable power of  for healthcare (DPAHC):  This is a written record that states who you want to make healthcare choices for you when you are unable to make them for yourself. This person, called a proxy, is usually a family member or a friend. You may choose more than 1 proxy.  Do not resuscitate (DNR) order:  A DNR order is used in case your heart stops beating or you stop breathing. It is a request not to have certain forms of treatment, such as CPR. A DNR order may be included in other types of advance directives.  Medical directive:  This covers the care that you want if you are in a coma, near death, or unable to make decisions for yourself. You can list the treatments you want for each condition. Treatment may include pain medicine, surgery, blood transfusions, dialysis, IV or tube feedings, and a ventilator (breathing machine).  Values history:  This document has questions about your views, beliefs, and how you feel and think about life. This information can help others choose the care that you would choose.  Why are advance directives important?  An advance directive helps you control your care. Although spoken wishes may be used, it is better to have your wishes written down. Spoken wishes can be misunderstood, or not followed. Treatments may be given even if you do not want them. An advance directive may make it easier for your family to make difficult  choices about your care.   Weight Management   Why it is important to manage your weight:  Being overweight increases your risk of health conditions such as heart disease, high blood pressure, type 2 diabetes, and certain types of cancer. It can also increase your risk for osteoarthritis, sleep apnea, and other respiratory problems. Aim for a slow, steady weight loss. Even a small amount of weight loss can lower your risk of health problems.  How to lose weight safely:  A safe and healthy way to lose weight is to eat fewer calories and get regular exercise. You can lose up about 1 pound a week by decreasing the number of calories you eat by 500 calories each day.   Healthy meal plan for weight management:  A healthy meal plan includes a variety of foods, contains fewer calories, and helps you stay healthy. A healthy meal plan includes the following:  Eat whole-grain foods more often.  A healthy meal plan should contain fiber. Fiber is the part of grains, fruits, and vegetables that is not broken down by your body. Whole-grain foods are healthy and provide extra fiber in your diet. Some examples of whole-grain foods are whole-wheat breads and pastas, oatmeal, brown rice, and bulgur.  Eat a variety of vegetables every day.  Include dark, leafy greens such as spinach, kale, fiorella greens, and mustard greens. Eat yellow and orange vegetables such as carrots, sweet potatoes, and winter squash.   Eat a variety of fruits every day.  Choose fresh or canned fruit (canned in its own juice or light syrup) instead of juice. Fruit juice has very little or no fiber.  Eat low-fat dairy foods.  Drink fat-free (skim) milk or 1% milk. Eat fat-free yogurt and low-fat cottage cheese. Try low-fat cheeses such as mozzarella and other reduced-fat cheeses.  Choose meat and other protein foods that are low in fat.  Choose beans or other legumes such as split peas or lentils. Choose fish, skinless poultry (chicken or turkey), or lean cuts  of red meat (beef or pork). Before you cook meat or poultry, cut off any visible fat.   Use less fat and oil.  Try baking foods instead of frying them. Add less fat, such as margarine, sour cream, regular salad dressing and mayonnaise to foods. Eat fewer high-fat foods. Some examples of high-fat foods include french fries, doughnuts, ice cream, and cakes.  Eat fewer sweets.  Limit foods and drinks that are high in sugar. This includes candy, cookies, regular soda, and sweetened drinks.  Exercise:  Exercise at least 30 minutes per day on most days of the week. Some examples of exercise include walking, biking, dancing, and swimming. You can also fit in more physical activity by taking the stairs instead of the elevator or parking farther away from stores. Ask your healthcare provider about the best exercise plan for you.    © Copyright [employer] 2018 Information is for End User's use only and may not be sold, redistributed or otherwise used for commercial purposes. All illustrations and images included in CareNotes® are the copyrighted property of [employer] or [employer]

## 2025-05-15 NOTE — TELEPHONE ENCOUNTER
PT called in to cancel yearly. She stated her primary advised she did not have to see gyn anymore to just follow up with them.

## 2025-05-20 DIAGNOSIS — E78.5 HYPERLIPIDEMIA, UNSPECIFIED HYPERLIPIDEMIA TYPE: ICD-10-CM

## 2025-05-20 DIAGNOSIS — K21.00 GASTROESOPHAGEAL REFLUX DISEASE WITH ESOPHAGITIS WITHOUT HEMORRHAGE: ICD-10-CM

## 2025-05-20 RX ORDER — ATORVASTATIN CALCIUM 10 MG/1
10 TABLET, FILM COATED ORAL
Qty: 90 TABLET | Refills: 1 | Status: SHIPPED | OUTPATIENT
Start: 2025-05-20

## 2025-05-20 RX ORDER — PANTOPRAZOLE SODIUM 40 MG/1
40 TABLET, DELAYED RELEASE ORAL
Qty: 90 TABLET | Refills: 1 | Status: SHIPPED | OUTPATIENT
Start: 2025-05-20

## (undated) DEVICE — GUIDEWIRE STRGHT TIP 0.035 IN  SOLO PLUS

## (undated) DEVICE — CANNULA SEAL

## (undated) DEVICE — GLOVE SRG BIOGEL 7

## (undated) DEVICE — TUBING SUCTION 5MM X 12 FT

## (undated) DEVICE — TROCAR: Brand: KII FIOS FIRST ENTRY

## (undated) DEVICE — CHLORAPREP HI-LITE 26ML ORANGE

## (undated) DEVICE — URETEROSCOPE DIGITAL FLEXIBLE RVS DEFLECTION SNGL USE WISCOPE

## (undated) DEVICE — CATH URET .038 10FR 50CM DUAL LUMEN

## (undated) DEVICE — GLOVE SRG BIOGEL ORTHOPEDIC 7.5

## (undated) DEVICE — FIRST STEP BEDSIDE KIT - STAND-UP POUCH, ENDOSCOPIC CLEANING PAD - 1 POUCH: Brand: FIRST STEP BEDSIDE KIT - STAND-UP POUCH, ENDOSCOPIC CLEANING PAD

## (undated) DEVICE — GLOVE INDICATOR PI UNDERGLOVE SZ 8 BLUE

## (undated) DEVICE — AIR AND WATER TUBING/CAP SET FOR OLYMPUS® SCOPES: Brand: ERBE

## (undated) DEVICE — COLUMN DRAPE

## (undated) DEVICE — GUIDEWIRE WHOLEY HI TORQUE INTERM MOD J .035 145CM

## (undated) DEVICE — TISSUE RETRIEVAL SYSTEM: Brand: INZII RETRIEVAL SYSTEM

## (undated) DEVICE — Device: Brand: OMNICLOSE TROCAR SITE CLOSURE DEVICE

## (undated) DEVICE — VISUALIZATION SYSTEM: Brand: CLEARIFY

## (undated) DEVICE — SYRINGE 10ML LL

## (undated) DEVICE — TIP-UP FENESTRATED GRASPER: Brand: ENDOWRIST

## (undated) DEVICE — INTENDED FOR TISSUE SEPARATION, AND OTHER PROCEDURES THAT REQUIRE A SHARP SURGICAL BLADE TO PUNCTURE OR CUT.: Brand: BARD-PARKER SAFETY BLADES SIZE 15, STERILE

## (undated) DEVICE — 60 ML SYRINGE,REGULAR TIP: Brand: MONOJECT

## (undated) DEVICE — SUT ETHIBOND 0 SH 30 IN X834H

## (undated) DEVICE — ARM DRAPE

## (undated) DEVICE — ENDOPATH PNEUMONEEDLE INSUFFLATION NEEDLES WITH LUER LOCK CONNECTORS 120MM: Brand: ENDOPATH

## (undated) DEVICE — TRAY FOLEY 16FR URIMETER SILICONE SURESTEP

## (undated) DEVICE — UTILITY MARKER,BLACK WITH LABELS: Brand: DEVON

## (undated) DEVICE — ADHESIVE SKIN HIGH VISCOSITY EXOFIN 1ML

## (undated) DEVICE — GAUZE SPONGES,16 PLY: Brand: CURITY

## (undated) DEVICE — SUT VICRYL 0 REEL 54 IN J287G

## (undated) DEVICE — GLIDESHEATH SLENDER STAINLESS STEEL KIT: Brand: GLIDESHEATH SLENDER

## (undated) DEVICE — GAUZE ROLL KITTNER

## (undated) DEVICE — SUT VICRYL 3-0 SH 27 IN J416H

## (undated) DEVICE — NEEDLE HYPO 22G X 1-1/2 IN

## (undated) DEVICE — SPECIMEN CONTAINER STERILE PEEL PACK

## (undated) DEVICE — GUIDEWIRE .035 260CM 3MM J TIP

## (undated) DEVICE — VESSEL SEALER EXTEND: Brand: ENDOWRIST

## (undated) DEVICE — Device: Brand: DEFENDO AIR/WATER/SUCTION AND BIOPSY VALVE

## (undated) DEVICE — PACK TUR

## (undated) DEVICE — TR BAND RADIAL ARTERY COMPRESSION DEVICE: Brand: TR BAND

## (undated) DEVICE — 3000CC GUARDIAN II: Brand: GUARDIAN

## (undated) DEVICE — MEGA SUTURECUT ND: Brand: ENDOWRIST

## (undated) DEVICE — 2000CC GUARDIAN II: Brand: GUARDIAN

## (undated) DEVICE — SUT ETHIBOND 2-0 SH/SH 36 IN X523H

## (undated) DEVICE — STERILE CYSTO PACK: Brand: CARDINAL HEALTH

## (undated) DEVICE — Device: Brand: PROWATER

## (undated) DEVICE — CATH GUIDE LAUNCHER 5FR EBU 3.75

## (undated) DEVICE — STRL PENROSE DRAIN 18" X 3/4": Brand: CARDINAL HEALTH

## (undated) DEVICE — Device

## (undated) DEVICE — CADIERE FORCEPS: Brand: ENDOWRIST

## (undated) DEVICE — CATH URETERAL 5FR X 70 CM FLEX TIP POLYUR BARD

## (undated) DEVICE — GLOVE SRG BIOGEL ECLIPSE 7.5

## (undated) DEVICE — BASKET SPECIMEN RETRIVAL 1.9FR 120CM

## (undated) DEVICE — HEAVY DUTY TABLE COVER: Brand: CONVERTORS

## (undated) DEVICE — KIT, BETHLEHEM THORACIC ROBOT: Brand: CARDINAL HEALTH

## (undated) DEVICE — RADIFOCUS GLIDEWIRE: Brand: GLIDEWIRE

## (undated) DEVICE — SUT MONOCRYL 4-0 PS-2 18 IN Y496G

## (undated) DEVICE — RADIFOCUS OPTITORQUE ANGIOGRAPHIC CATHETER: Brand: OPTITORQUE